# Patient Record
Sex: FEMALE | Race: BLACK OR AFRICAN AMERICAN | NOT HISPANIC OR LATINO | Employment: OTHER | URBAN - METROPOLITAN AREA
[De-identification: names, ages, dates, MRNs, and addresses within clinical notes are randomized per-mention and may not be internally consistent; named-entity substitution may affect disease eponyms.]

---

## 2018-12-24 ENCOUNTER — INITIAL CONSULT (OUTPATIENT)
Dept: HEMATOLOGY/ONCOLOGY | Facility: CLINIC | Age: 78
End: 2018-12-24
Payer: MEDICARE

## 2018-12-24 ENCOUNTER — LAB VISIT (OUTPATIENT)
Dept: LAB | Facility: HOSPITAL | Age: 78
End: 2018-12-24
Attending: INTERNAL MEDICINE
Payer: MEDICARE

## 2018-12-24 VITALS
HEART RATE: 77 BPM | HEIGHT: 63 IN | RESPIRATION RATE: 18 BRPM | TEMPERATURE: 98 F | DIASTOLIC BLOOD PRESSURE: 82 MMHG | OXYGEN SATURATION: 98 % | WEIGHT: 209.88 LBS | BODY MASS INDEX: 37.19 KG/M2 | SYSTOLIC BLOOD PRESSURE: 138 MMHG

## 2018-12-24 DIAGNOSIS — C50.919 TRIPLE NEGATIVE MALIGNANT NEOPLASM OF BREAST: ICD-10-CM

## 2018-12-24 DIAGNOSIS — Z17.1 MALIGNANT NEOPLASM OF UPPER-OUTER QUADRANT OF RIGHT BREAST IN FEMALE, ESTROGEN RECEPTOR NEGATIVE: ICD-10-CM

## 2018-12-24 DIAGNOSIS — C50.919 TRIPLE NEGATIVE MALIGNANT NEOPLASM OF BREAST: Primary | ICD-10-CM

## 2018-12-24 DIAGNOSIS — C50.411 MALIGNANT NEOPLASM OF UPPER-OUTER QUADRANT OF RIGHT BREAST IN FEMALE, ESTROGEN RECEPTOR NEGATIVE: ICD-10-CM

## 2018-12-24 LAB
ALBUMIN SERPL BCP-MCNC: 3.4 G/DL
ALP SERPL-CCNC: 112 U/L
ALT SERPL W/O P-5'-P-CCNC: 15 U/L
ANION GAP SERPL CALC-SCNC: 9 MMOL/L
AST SERPL-CCNC: 23 U/L
BASOPHILS # BLD AUTO: 0.02 K/UL
BASOPHILS NFR BLD: 0.3 %
BILIRUB SERPL-MCNC: 0.3 MG/DL
BUN SERPL-MCNC: 10 MG/DL
CALCIUM SERPL-MCNC: 9.6 MG/DL
CHLORIDE SERPL-SCNC: 106 MMOL/L
CO2 SERPL-SCNC: 26 MMOL/L
CREAT SERPL-MCNC: 0.8 MG/DL
DIFFERENTIAL METHOD: NORMAL
EOSINOPHIL # BLD AUTO: 0.1 K/UL
EOSINOPHIL NFR BLD: 2.2 %
ERYTHROCYTE [DISTWIDTH] IN BLOOD BY AUTOMATED COUNT: 14.2 %
EST. GFR  (AFRICAN AMERICAN): >60 ML/MIN/1.73 M^2
EST. GFR  (NON AFRICAN AMERICAN): >60 ML/MIN/1.73 M^2
GLUCOSE SERPL-MCNC: 80 MG/DL
HCT VFR BLD AUTO: 37.6 %
HGB BLD-MCNC: 12.1 G/DL
LDH SERPL L TO P-CCNC: 237 U/L
LYMPHOCYTES # BLD AUTO: 1.8 K/UL
LYMPHOCYTES NFR BLD: 28.1 %
MCH RBC QN AUTO: 29.4 PG
MCHC RBC AUTO-ENTMCNC: 32.2 G/DL
MCV RBC AUTO: 92 FL
MONOCYTES # BLD AUTO: 0.4 K/UL
MONOCYTES NFR BLD: 6.6 %
NEUTROPHILS # BLD AUTO: 4 K/UL
NEUTROPHILS NFR BLD: 62.8 %
PLATELET # BLD AUTO: 200 K/UL
PMV BLD AUTO: 9.5 FL
POTASSIUM SERPL-SCNC: 3.8 MMOL/L
PROT SERPL-MCNC: 8.1 G/DL
RBC # BLD AUTO: 4.11 M/UL
SODIUM SERPL-SCNC: 141 MMOL/L
WBC # BLD AUTO: 6.33 K/UL

## 2018-12-24 PROCEDURE — 99999 PR PBB SHADOW E&M-NEW PATIENT-LVL IV: CPT | Mod: PBBFAC,,, | Performed by: INTERNAL MEDICINE

## 2018-12-24 PROCEDURE — 80053 COMPREHEN METABOLIC PANEL: CPT

## 2018-12-24 PROCEDURE — 99204 OFFICE O/P NEW MOD 45 MIN: CPT | Mod: PBBFAC | Performed by: INTERNAL MEDICINE

## 2018-12-24 PROCEDURE — 85025 COMPLETE CBC W/AUTO DIFF WBC: CPT

## 2018-12-24 PROCEDURE — 83615 LACTATE (LD) (LDH) ENZYME: CPT

## 2018-12-24 PROCEDURE — 36415 COLL VENOUS BLD VENIPUNCTURE: CPT

## 2018-12-24 PROCEDURE — 99205 OFFICE O/P NEW HI 60 MIN: CPT | Mod: S$PBB,,, | Performed by: INTERNAL MEDICINE

## 2018-12-24 RX ORDER — LISINOPRIL 10 MG/1
TABLET ORAL
COMMUNITY
End: 2019-01-09

## 2018-12-24 RX ORDER — AMLODIPINE BESYLATE 10 MG/1
TABLET ORAL
COMMUNITY
End: 2019-10-31

## 2018-12-24 RX ORDER — ASPIRIN 81 MG/1
81 TABLET ORAL DAILY
COMMUNITY
End: 2019-03-01

## 2018-12-24 NOTE — PROGRESS NOTES
Subjective:       Patient ID: Soo Holly is a 78 y.o. female.    Chief Complaint: Consult; Results; and Breast Cancer (Triple negative breast cancer, inflammatory breast cancer)    HPI 78-year-old female referred by operative surgeon for evaluation of triple negative breast cancer as well as laboratory breast cancer ECOG status 1 patient complains of right shoulder pain    Past Medical History:   Diagnosis Date    Hypertension      Family History   Problem Relation Age of Onset    Breast cancer Sister      Social History     Socioeconomic History    Marital status:      Spouse name: Not on file    Number of children: Not on file    Years of education: Not on file    Highest education level: Not on file   Social Needs    Financial resource strain: Not on file    Food insecurity - worry: Not on file    Food insecurity - inability: Not on file    Transportation needs - medical: Not on file    Transportation needs - non-medical: Not on file   Occupational History    Not on file   Tobacco Use    Smoking status: Never Smoker   Substance and Sexual Activity    Alcohol use: No     Frequency: Never    Drug use: No    Sexual activity: Not Currently   Other Topics Concern    Not on file   Social History Narrative    Not on file     History reviewed. No pertinent surgical history.    Labs:  No results found for: WBC, HGB, HCT, MCV, PLT  BMP  No results found for: NA, K, CL, CO2, BUN, CREATININE, CALCIUM, ANIONGAP, ESTGFRAFRICA, EGFRNONAA  No results found for: ALT, AST, GGT, ALKPHOS, BILITOT    No results found for: IRON, TIBC, FERRITIN, SATURATEDIRO  No results found for: [id number]  No results found for: FOLATE  No results found for: TSH      Review of Systems   Constitutional: Positive for activity change and fatigue. Negative for appetite change, chills, diaphoresis, fever and unexpected weight change.   HENT: Negative for congestion, dental problem, drooling, ear discharge, ear pain, facial  swelling, hearing loss, mouth sores, nosebleeds, postnasal drip, rhinorrhea, sinus pressure, sneezing, sore throat, tinnitus, trouble swallowing and voice change.    Eyes: Negative for photophobia, pain, discharge, redness, itching and visual disturbance.   Respiratory: Negative for cough, choking, chest tightness, shortness of breath, wheezing and stridor.    Cardiovascular: Negative for chest pain, palpitations and leg swelling.   Gastrointestinal: Negative for abdominal distention, abdominal pain, anal bleeding, blood in stool, constipation, diarrhea, nausea, rectal pain and vomiting.   Endocrine: Negative for cold intolerance, heat intolerance, polydipsia, polyphagia and polyuria.   Genitourinary: Negative for decreased urine volume, difficulty urinating, dyspareunia, dysuria, enuresis, flank pain, frequency, genital sores, hematuria, menstrual problem, pelvic pain, urgency, vaginal bleeding, vaginal discharge and vaginal pain.   Musculoskeletal: Positive for arthralgias. Negative for back pain, gait problem, joint swelling, myalgias, neck pain and neck stiffness.   Skin: Negative for color change, pallor and rash.   Allergic/Immunologic: Negative for environmental allergies, food allergies and immunocompromised state.   Neurological: Negative for dizziness, tremors, seizures, syncope, facial asymmetry, speech difficulty, weakness, light-headedness, numbness and headaches.   Hematological: Negative for adenopathy. Does not bruise/bleed easily.   Psychiatric/Behavioral: Positive for dysphoric mood. Negative for agitation, behavioral problems, confusion, decreased concentration, hallucinations, self-injury, sleep disturbance and suicidal ideas. The patient is nervous/anxious. The patient is not hyperactive.        Objective:      Physical Exam   Constitutional: She is oriented to person, place, and time. She appears well-developed and well-nourished. She appears distressed.   HENT:   Head: Normocephalic and  atraumatic.   Right Ear: Tympanic membrane and external ear normal.   Left Ear: Tympanic membrane and external ear normal.   Nose: Nose normal. Right sinus exhibits no maxillary sinus tenderness and no frontal sinus tenderness. Left sinus exhibits no maxillary sinus tenderness and no frontal sinus tenderness.   Mouth/Throat: Oropharynx is clear and moist. No oropharyngeal exudate.   Eyes: Conjunctivae, EOM and lids are normal. Pupils are equal, round, and reactive to light. Right eye exhibits no discharge. Left eye exhibits no discharge. Right conjunctiva is not injected. Right conjunctiva has no hemorrhage. Left conjunctiva is not injected. Left conjunctiva has no hemorrhage. No scleral icterus.   Neck: Normal range of motion. Neck supple. No JVD present. No tracheal deviation present. No thyromegaly present.   Cardiovascular: Normal rate, regular rhythm, normal heart sounds and intact distal pulses.   Pulmonary/Chest: Effort normal and breath sounds normal. No stridor. No respiratory distress. She exhibits no tenderness.       Abdominal: Soft. Bowel sounds are normal. She exhibits no distension and no mass. There is no splenomegaly or hepatomegaly. There is no tenderness. There is no rebound.   Musculoskeletal: Normal range of motion. She exhibits tenderness. She exhibits no edema.   Lymphadenopathy:     She has no cervical adenopathy.     She has no axillary adenopathy.        Right: No supraclavicular adenopathy present.        Left: No supraclavicular adenopathy present.   Neurological: She is alert and oriented to person, place, and time. No cranial nerve deficit. Coordination normal.   Skin: Skin is dry. No rash noted. She is not diaphoretic. No erythema.   Psychiatric: She has a normal mood and affect. Her behavior is normal. Judgment and thought content normal.   Vitals reviewed.          Assessment:      1. Triple negative malignant neoplasm of breast    2. Malignant neoplasm of upper-outer quadrant of  right breast in female, estrogen receptor negative            Plan:     Extensive conversation with family present at this point will proceed with evaluation with baseline laboratory studies PET scans to rule out systemic disease consult placed for surgery as well  for evaluation MediPort placement articles from up-to-date on triple negative breast cancer as well as inflammatory breast cancer followed family discussed briefly treatment options with family history granddaughter present would recommend patient undergo genetic testing is receive recommend a follow-up at my risk testing be done discussed implications with her        Yaniv Pop Jr, MD FACP

## 2018-12-27 ENCOUNTER — TELEPHONE (OUTPATIENT)
Dept: RADIOLOGY | Facility: HOSPITAL | Age: 78
End: 2018-12-27

## 2018-12-28 ENCOUNTER — TELEPHONE (OUTPATIENT)
Dept: RADIOLOGY | Facility: HOSPITAL | Age: 78
End: 2018-12-28

## 2018-12-31 ENCOUNTER — OFFICE VISIT (OUTPATIENT)
Dept: HEMATOLOGY/ONCOLOGY | Facility: CLINIC | Age: 78
End: 2018-12-31
Payer: MEDICARE

## 2018-12-31 ENCOUNTER — HOSPITAL ENCOUNTER (OUTPATIENT)
Dept: RADIOLOGY | Facility: HOSPITAL | Age: 78
Discharge: HOME | End: 2018-12-31
Attending: INTERNAL MEDICINE
Payer: MEDICARE

## 2018-12-31 VITALS
WEIGHT: 207.25 LBS | TEMPERATURE: 98 F | DIASTOLIC BLOOD PRESSURE: 73 MMHG | OXYGEN SATURATION: 96 % | SYSTOLIC BLOOD PRESSURE: 141 MMHG | HEART RATE: 71 BPM | BODY MASS INDEX: 38.14 KG/M2 | HEIGHT: 62 IN

## 2018-12-31 DIAGNOSIS — D49.89 NEOPLASM OF HEAD: ICD-10-CM

## 2018-12-31 DIAGNOSIS — Z79.899 ENCOUNTER FOR MONITORING CARDIOTOXIC DRUG THERAPY: ICD-10-CM

## 2018-12-31 DIAGNOSIS — C50.919 TRIPLE NEGATIVE MALIGNANT NEOPLASM OF BREAST: Primary | ICD-10-CM

## 2018-12-31 DIAGNOSIS — Z51.81 ENCOUNTER FOR MONITORING CARDIOTOXIC DRUG THERAPY: ICD-10-CM

## 2018-12-31 DIAGNOSIS — Z17.1 MALIGNANT NEOPLASM OF UPPER-OUTER QUADRANT OF RIGHT BREAST IN FEMALE, ESTROGEN RECEPTOR NEGATIVE: ICD-10-CM

## 2018-12-31 DIAGNOSIS — C50.919 TRIPLE NEGATIVE MALIGNANT NEOPLASM OF BREAST: ICD-10-CM

## 2018-12-31 DIAGNOSIS — C50.411 MALIGNANT NEOPLASM OF UPPER-OUTER QUADRANT OF RIGHT BREAST IN FEMALE, ESTROGEN RECEPTOR NEGATIVE: ICD-10-CM

## 2018-12-31 PROCEDURE — 78815 PET IMAGE W/CT SKULL-THIGH: CPT | Mod: 26,PI,, | Performed by: RADIOLOGY

## 2018-12-31 PROCEDURE — 99999 PR PBB SHADOW E&M-EST. PATIENT-LVL V: CPT | Mod: PBBFAC,,, | Performed by: INTERNAL MEDICINE

## 2018-12-31 PROCEDURE — 78815 NM PET CT ROUTINE: ICD-10-PCS | Mod: 26,PI,, | Performed by: RADIOLOGY

## 2018-12-31 PROCEDURE — 99215 OFFICE O/P EST HI 40 MIN: CPT | Mod: PBBFAC,25 | Performed by: INTERNAL MEDICINE

## 2018-12-31 PROCEDURE — 78815 PET IMAGE W/CT SKULL-THIGH: CPT | Mod: TC,PI

## 2018-12-31 PROCEDURE — 99215 OFFICE O/P EST HI 40 MIN: CPT | Mod: S$PBB,,, | Performed by: INTERNAL MEDICINE

## 2018-12-31 PROCEDURE — A9552 F18 FDG: HCPCS

## 2018-12-31 RX ORDER — SODIUM CHLORIDE 0.9 % (FLUSH) 0.9 %
10 SYRINGE (ML) INJECTION
Status: CANCELLED | OUTPATIENT
Start: 2019-01-18

## 2018-12-31 RX ORDER — DOXORUBICIN HYDROCHLORIDE 2 MG/ML
60 INJECTION, SOLUTION INTRAVENOUS
Status: CANCELLED | OUTPATIENT
Start: 2019-01-18

## 2018-12-31 RX ORDER — HEPARIN 100 UNIT/ML
500 SYRINGE INTRAVENOUS
Status: CANCELLED | OUTPATIENT
Start: 2019-01-18

## 2018-12-31 NOTE — PROGRESS NOTES
Distress Screening Results: Psychosocial Distress screening score of Distress Score: 0 {AMB ONC DISTRESS SCORE:00770}

## 2018-12-31 NOTE — PROGRESS NOTES
Subjective:       Patient ID: Soo Holly is a 78 y.o. female.    Chief Complaint: Results and Breast Cancer (Triple negative breast cancer, inflammatory breast cancer)    HPI 78-year-old female returns for review of PET scan recent diagnosis triple a breast cancer laboratory breast cancer breast ECOG status 1    Past Medical History:   Diagnosis Date    Hypertension      Family History   Problem Relation Age of Onset    Breast cancer Sister      Social History     Socioeconomic History    Marital status:      Spouse name: Not on file    Number of children: Not on file    Years of education: Not on file    Highest education level: Not on file   Social Needs    Financial resource strain: Not on file    Food insecurity - worry: Not on file    Food insecurity - inability: Not on file    Transportation needs - medical: Not on file    Transportation needs - non-medical: Not on file   Occupational History    Not on file   Tobacco Use    Smoking status: Never Smoker   Substance and Sexual Activity    Alcohol use: No     Frequency: Never    Drug use: No    Sexual activity: Not Currently   Other Topics Concern    Not on file   Social History Narrative    Not on file     History reviewed. No pertinent surgical history.    Labs:  Lab Results   Component Value Date    WBC 6.33 12/24/2018    HGB 12.1 12/24/2018    HCT 37.6 12/24/2018    MCV 92 12/24/2018     12/24/2018     BMP  Lab Results   Component Value Date     12/24/2018    K 3.8 12/24/2018     12/24/2018    CO2 26 12/24/2018    BUN 10 12/24/2018    CREATININE 0.8 12/24/2018    CALCIUM 9.6 12/24/2018    ANIONGAP 9 12/24/2018    ESTGFRAFRICA >60 12/24/2018    EGFRNONAA >60 12/24/2018     Lab Results   Component Value Date    ALT 15 12/24/2018    AST 23 12/24/2018    ALKPHOS 112 12/24/2018    BILITOT 0.3 12/24/2018       No results found for: IRON, TIBC, FERRITIN, SATURATEDIRO  No results found for: [id number]  No results found  for: FOLATE  No results found for: TSH      Review of Systems   Constitutional: Positive for fatigue. Negative for activity change, appetite change, chills, diaphoresis, fever and unexpected weight change.   HENT: Negative for congestion, dental problem, drooling, ear discharge, ear pain, facial swelling, hearing loss, mouth sores, nosebleeds, postnasal drip, rhinorrhea, sinus pressure, sneezing, sore throat, tinnitus, trouble swallowing and voice change.    Eyes: Negative for photophobia, pain, discharge, redness, itching and visual disturbance.   Respiratory: Negative for cough, choking, chest tightness, shortness of breath, wheezing and stridor.    Cardiovascular: Negative for chest pain, palpitations and leg swelling.   Gastrointestinal: Negative for abdominal distention, abdominal pain, anal bleeding, blood in stool, constipation, diarrhea, nausea, rectal pain and vomiting.   Endocrine: Negative for cold intolerance, heat intolerance, polydipsia, polyphagia and polyuria.   Genitourinary: Negative for decreased urine volume, difficulty urinating, dyspareunia, dysuria, enuresis, flank pain, frequency, genital sores, hematuria, menstrual problem, pelvic pain, urgency, vaginal bleeding, vaginal discharge and vaginal pain.   Musculoskeletal: Negative for arthralgias, back pain, gait problem, joint swelling, myalgias, neck pain and neck stiffness.   Skin: Negative for color change, pallor and rash.   Allergic/Immunologic: Negative for environmental allergies, food allergies and immunocompromised state.   Neurological: Positive for weakness. Negative for dizziness, tremors, seizures, syncope, facial asymmetry, speech difficulty, light-headedness, numbness and headaches.   Hematological: Negative for adenopathy. Does not bruise/bleed easily.   Psychiatric/Behavioral: Positive for dysphoric mood. Negative for agitation, behavioral problems, confusion, decreased concentration, hallucinations, self-injury, sleep  disturbance and suicidal ideas. The patient is nervous/anxious. The patient is not hyperactive.        Objective:      Physical Exam   Constitutional: She is oriented to person, place, and time. She appears well-developed and well-nourished. She appears distressed.   HENT:   Head: Normocephalic and atraumatic.   Right Ear: External ear normal.   Left Ear: External ear normal.   Nose: Nose normal. Right sinus exhibits no maxillary sinus tenderness and no frontal sinus tenderness. Left sinus exhibits no maxillary sinus tenderness and no frontal sinus tenderness.   Mouth/Throat: Oropharynx is clear and moist. No oropharyngeal exudate.   Eyes: Conjunctivae, EOM and lids are normal. Pupils are equal, round, and reactive to light. Right eye exhibits no discharge. Left eye exhibits no discharge. Right conjunctiva is not injected. Right conjunctiva has no hemorrhage. Left conjunctiva is not injected. Left conjunctiva has no hemorrhage. No scleral icterus.   Neck: Normal range of motion. Neck supple. No JVD present. No tracheal deviation present. No thyromegaly present.   Cardiovascular: Normal rate and regular rhythm.   Pulmonary/Chest: Effort normal. No stridor. No respiratory distress. She exhibits no tenderness.   Abdominal: Soft. She exhibits no distension and no mass. There is no splenomegaly or hepatomegaly. There is no tenderness. There is no rebound.   Musculoskeletal: Normal range of motion. She exhibits no edema or tenderness.   Lymphadenopathy:     She has no cervical adenopathy.     She has no axillary adenopathy.        Right: No supraclavicular adenopathy present.        Left: No supraclavicular adenopathy present.   Neurological: She is alert and oriented to person, place, and time. No cranial nerve deficit. Coordination normal.   Skin: Skin is dry. No rash noted. She is not diaphoretic. No erythema.   Psychiatric: Her behavior is normal. Judgment and thought content normal. Her mood appears anxious. She  exhibits a depressed mood.   Vitals reviewed.          Assessment:      1. Triple negative malignant neoplasm of breast    2. Encounter for monitoring cardiotoxic drug therapy    3. Neoplasm of head           Plan:     Reviewed PET scan with patient demonstrating locally advanced disease and breast.  As well as intramammary node no evidence of metastatic disease.  Very and deltoid no clinical findings pain discomfort a presents stressed during imaging study.  At this point will begin process to treat patient with locally advanced breast carcinoma dose dense AC followed by Taxol consult made to  cardiology for evaluation cardiac toxicity Radiation Oncology for evaluation pre treatment visit.  In addition are nurse navigator has been notified MediPort as ordered.  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  Discussed implications with family answer questions photographs taken 40 min face-to-face time        Yaniv Pop Jr, MD FACP

## 2019-01-02 ENCOUNTER — TELEPHONE (OUTPATIENT)
Dept: HEMATOLOGY/ONCOLOGY | Facility: CLINIC | Age: 79
End: 2019-01-02

## 2019-01-02 NOTE — TELEPHONE ENCOUNTER
----- Message from Jay Hernandez sent at 1/2/2019 10:44 AM CST -----  I scheduled the appointment with Dr. Wiley on 1/9/19 for 1:00p at our [place] office.  Please notify patient.    Thanks  ----- Message -----  From: Jany Youssef RN  Sent: 1/2/2019   9:36 AM  To: [facility] Clinical Staff, #    Is there a way to get this pt seen for cardiology consult on either 1/7 or 1/9?  She is having an echo on 1/8 and is needing cardiology clearance prior to starting chemotherapy.  They live in [place] and one of those days would be easier for her since she will already be here on 1/8.  Thanks so much!

## 2019-01-07 ENCOUNTER — TELEPHONE (OUTPATIENT)
Dept: RADIOLOGY | Facility: HOSPITAL | Age: 79
End: 2019-01-07

## 2019-01-08 ENCOUNTER — OFFICE VISIT (OUTPATIENT)
Dept: HEMATOLOGY/ONCOLOGY | Facility: CLINIC | Age: 79
End: 2019-01-08
Payer: MEDICARE

## 2019-01-08 ENCOUNTER — OFFICE VISIT (OUTPATIENT)
Dept: SURGERY | Facility: CLINIC | Age: 79
End: 2019-01-08
Payer: MEDICARE

## 2019-01-08 ENCOUNTER — CLINICAL SUPPORT (OUTPATIENT)
Dept: CARDIOLOGY | Facility: CLINIC | Age: 79
End: 2019-01-08
Attending: INTERNAL MEDICINE
Payer: MEDICARE

## 2019-01-08 ENCOUNTER — HOSPITAL ENCOUNTER (OUTPATIENT)
Dept: RADIOLOGY | Facility: HOSPITAL | Age: 79
Discharge: HOME | End: 2019-01-08
Attending: INTERNAL MEDICINE
Payer: MEDICARE

## 2019-01-08 VITALS
TEMPERATURE: 98 F | SYSTOLIC BLOOD PRESSURE: 130 MMHG | DIASTOLIC BLOOD PRESSURE: 70 MMHG | HEART RATE: 69 BPM | BODY MASS INDEX: 38.91 KG/M2 | WEIGHT: 212.75 LBS

## 2019-01-08 VITALS
RESPIRATION RATE: 17 BRPM | WEIGHT: 211.44 LBS | TEMPERATURE: 98 F | HEIGHT: 62 IN | HEART RATE: 70 BPM | SYSTOLIC BLOOD PRESSURE: 138 MMHG | DIASTOLIC BLOOD PRESSURE: 76 MMHG | OXYGEN SATURATION: 98 % | BODY MASS INDEX: 38.91 KG/M2

## 2019-01-08 DIAGNOSIS — C50.411 MALIGNANT NEOPLASM OF UPPER-OUTER QUADRANT OF RIGHT BREAST IN FEMALE, ESTROGEN RECEPTOR NEGATIVE: Primary | ICD-10-CM

## 2019-01-08 DIAGNOSIS — Z17.1 MALIGNANT NEOPLASM OF UPPER-OUTER QUADRANT OF RIGHT BREAST IN FEMALE, ESTROGEN RECEPTOR NEGATIVE: Primary | ICD-10-CM

## 2019-01-08 DIAGNOSIS — Z51.81 ENCOUNTER FOR MONITORING CARDIOTOXIC DRUG THERAPY: ICD-10-CM

## 2019-01-08 DIAGNOSIS — Z79.899 ENCOUNTER FOR MONITORING CARDIOTOXIC DRUG THERAPY: ICD-10-CM

## 2019-01-08 DIAGNOSIS — C77.3 BREAST CANCER METASTASIZED TO AXILLARY LYMPH NODE, RIGHT: ICD-10-CM

## 2019-01-08 DIAGNOSIS — D49.89 NEOPLASM OF HEAD: ICD-10-CM

## 2019-01-08 DIAGNOSIS — C50.911 BREAST CANCER METASTASIZED TO AXILLARY LYMPH NODE, RIGHT: ICD-10-CM

## 2019-01-08 DIAGNOSIS — C50.919 TRIPLE NEGATIVE MALIGNANT NEOPLASM OF BREAST: ICD-10-CM

## 2019-01-08 DIAGNOSIS — I51.7 CARDIOMEGALY: ICD-10-CM

## 2019-01-08 LAB
DIASTOLIC DYSFUNCTION: NO
ESTIMATED PA SYSTOLIC PRESSURE: 17.9
MITRAL VALVE MOBILITY: NORMAL
RETIRED EF AND QEF - SEE NOTES: 70 (ref 55–65)

## 2019-01-08 PROCEDURE — 99215 OFFICE O/P EST HI 40 MIN: CPT | Mod: PBBFAC,25 | Performed by: SURGERY

## 2019-01-08 PROCEDURE — 93307 2D ECHO ONLY: ICD-10-PCS | Mod: 26,S$PBB,, | Performed by: INTERNAL MEDICINE

## 2019-01-08 PROCEDURE — 70553 MRI BRAIN STEM W/O & W/DYE: CPT | Mod: 26,,, | Performed by: RADIOLOGY

## 2019-01-08 PROCEDURE — 70553 MRI BRAIN W WO CONTRAST: ICD-10-PCS | Mod: 26,,, | Performed by: RADIOLOGY

## 2019-01-08 PROCEDURE — 11104 PR PUNCH BIOPSY, SKIN, SINGLE LESION: ICD-10-PCS | Mod: S$PBB,,, | Performed by: SURGERY

## 2019-01-08 PROCEDURE — 99999 PR PBB SHADOW E&M-EST. PATIENT-LVL III: CPT | Mod: PBBFAC,,, | Performed by: NURSE PRACTITIONER

## 2019-01-08 PROCEDURE — 88342 IMHCHEM/IMCYTCHM 1ST ANTB: CPT | Mod: 26,59,, | Performed by: PATHOLOGY

## 2019-01-08 PROCEDURE — 99999 PR PBB SHADOW E&M-EST. PATIENT-LVL V: ICD-10-PCS | Mod: PBBFAC,,, | Performed by: SURGERY

## 2019-01-08 PROCEDURE — 99204 OFFICE O/P NEW MOD 45 MIN: CPT | Mod: S$PBB,,, | Performed by: SURGERY

## 2019-01-08 PROCEDURE — 99999 PR PBB SHADOW E&M-EST. PATIENT-LVL V: CPT | Mod: PBBFAC,,, | Performed by: SURGERY

## 2019-01-08 PROCEDURE — 99213 OFFICE O/P EST LOW 20 MIN: CPT | Mod: PBBFAC,25,27 | Performed by: NURSE PRACTITIONER

## 2019-01-08 PROCEDURE — A9585 GADOBUTROL INJECTION: HCPCS | Mod: PO | Performed by: INTERNAL MEDICINE

## 2019-01-08 PROCEDURE — 11104 PUNCH BX SKIN SINGLE LESION: CPT | Mod: S$PBB,,, | Performed by: SURGERY

## 2019-01-08 PROCEDURE — 93307 TTE W/O DOPPLER COMPLETE: CPT | Mod: PBBFAC | Performed by: INTERNAL MEDICINE

## 2019-01-08 PROCEDURE — 88305 TISSUE EXAM BY PATHOLOGIST: CPT | Mod: 26,,, | Performed by: PATHOLOGY

## 2019-01-08 PROCEDURE — 11104 PUNCH BX SKIN SINGLE LESION: CPT | Mod: PBBFAC | Performed by: SURGERY

## 2019-01-08 PROCEDURE — 99499 NO LOS: ICD-10-PCS | Mod: S$PBB,,, | Performed by: NURSE PRACTITIONER

## 2019-01-08 PROCEDURE — 88305 TISSUE EXAM BY PATHOLOGIST: CPT | Performed by: PATHOLOGY

## 2019-01-08 PROCEDURE — 70553 MRI BRAIN STEM W/O & W/DYE: CPT | Mod: TC,PO

## 2019-01-08 PROCEDURE — 25500020 PHARM REV CODE 255: Mod: PO | Performed by: INTERNAL MEDICINE

## 2019-01-08 PROCEDURE — 99204 PR OFFICE/OUTPT VISIT, NEW, LEVL IV, 45-59 MIN: ICD-10-PCS | Mod: S$PBB,,, | Performed by: SURGERY

## 2019-01-08 PROCEDURE — 88305 TISSUE SPECIMEN TO PATHOLOGY, SURGERY: ICD-10-PCS | Mod: 26,,, | Performed by: PATHOLOGY

## 2019-01-08 PROCEDURE — 88360 TISSUE SPECIMEN TO PATHOLOGY, SURGERY: ICD-10-PCS | Mod: 26,,, | Performed by: PATHOLOGY

## 2019-01-08 PROCEDURE — 99499 UNLISTED E&M SERVICE: CPT | Mod: S$PBB,,, | Performed by: NURSE PRACTITIONER

## 2019-01-08 PROCEDURE — 88342 TISSUE SPECIMEN TO PATHOLOGY, SURGERY: ICD-10-PCS | Mod: 26,59,, | Performed by: PATHOLOGY

## 2019-01-08 PROCEDURE — 88360 TUMOR IMMUNOHISTOCHEM/MANUAL: CPT | Mod: 26,,, | Performed by: PATHOLOGY

## 2019-01-08 PROCEDURE — 99999 PR PBB SHADOW E&M-EST. PATIENT-LVL III: ICD-10-PCS | Mod: PBBFAC,,, | Performed by: NURSE PRACTITIONER

## 2019-01-08 RX ORDER — LIDOCAINE HYDROCHLORIDE 10 MG/ML
1 INJECTION, SOLUTION EPIDURAL; INFILTRATION; INTRACAUDAL; PERINEURAL ONCE
Status: DISCONTINUED | OUTPATIENT
Start: 2019-01-08 | End: 2019-01-16 | Stop reason: HOSPADM

## 2019-01-08 RX ORDER — GADOBUTROL 604.72 MG/ML
9 INJECTION INTRAVENOUS
Status: COMPLETED | OUTPATIENT
Start: 2019-01-08 | End: 2019-01-08

## 2019-01-08 RX ORDER — SODIUM CHLORIDE 9 MG/ML
INJECTION, SOLUTION INTRAVENOUS CONTINUOUS
Status: CANCELLED | OUTPATIENT
Start: 2019-01-08

## 2019-01-08 RX ADMIN — GADOBUTROL 9 ML: 604.72 INJECTION INTRAVENOUS at 09:01

## 2019-01-08 NOTE — LETTER
January 8, 2019      Yaniv Pop MD  [address]  [facility] [address]           [facility]  [address] [place], [place] [address]on [facility] [address]  Phone: [phone]  Fax: [phone]          Patient: Soo Holly   MR Number: [id number]   YOB: 1940   Date of Visit: 1/8/2019       Dear Dr. Yaniv Pop:    Thank you for referring Soo Holly to me for evaluation. Attached you will find relevant portions of my assessment and plan of care.    If you have questions, please do not hesitate to call me. I look forward to following Soo Holly along with you.    Sincerely,    Quin Santa MD    Enclosure  CC:  No Recipients    If you would like to receive this communication electronically, please contact [email or web address] or [phone] to request more information on [facility] [employer] access.    For providers and/or their staff who would like to refer a patient to [facility], please contact us through our one-stop-shop provider referral line, [facility] , at [phone].    If you feel you have received this communication in error or would no longer like to receive these types of communications, please e-mail [email or web address]

## 2019-01-08 NOTE — PROGRESS NOTES
History & Physical  General Surgery      SUBJECTIVE:     Chief Complaint/Reason for Admission: Consult (Triple Neg Malignant Neoplasm)      History of Present Illness:  Patient is a 78 y.o. female presents with Consult (Triple Neg Malignant Neoplasm)    78-year-old female with biopsy [id number]-HER2- breast cancer to right breast with axillary node involvement.  She has noted a palpable mass with overlying skin changes for approximately a month now.  She denies nipple discharge.  She has been experiencing right shoulder pain, denies weight loss, fever, or fatigue.  She reports menarche at age 13.  No history of OCP use.  No new hormonal use for menopause symptoms.  She has a significant family history of 2 sisters with breast cancer, 1 living and 1  at age 69.  No personal history of prior breast biopsy or breast cancer.  PET scan obtained by Oncology shows locally advanced tumor without evidence metastatic disease.  She was referred for MediPort placement.      Current Outpatient Medications:     amLODIPine (NORVASC) 10 MG tablet, amlodipine 10 mg tablet, Disp: , Rfl:     aspirin (ECOTRIN) 81 MG EC tablet, Take 81 mg by mouth once daily., Disp: , Rfl:     lisinopril 10 MG tablet, lisinopril 10 mg tabs, Disp: , Rfl:     Current Facility-Administered Medications:     lidocaine (PF) 10 mg/ml (1%) injection 10 mg, 1 mL, Intradermal, Once, Quin Santa MD    Review of patient's allergies indicates:   Allergen Reactions    Iodine     Sulfa (sulfonamide antibiotics)        Past Medical History:   Diagnosis Date    Hypertension      No past surgical history on file.  Family History   Problem Relation Age of Onset    Breast cancer Sister      Social History     Tobacco Use    Smoking status: Never Smoker   Substance Use Topics    Alcohol use: No     Frequency: Never    Drug use: No        Review of Systems:  Review of Systems   Constitutional: Negative for fever.   HENT: Negative for congestion.     Eyes: Negative for visual disturbance.   Respiratory: Negative for shortness of breath.    Cardiovascular: Positive for chest pain (Right breast pain).   Gastrointestinal: Negative for abdominal pain.   Genitourinary: Negative for difficulty urinating.   Musculoskeletal:        Right shoulder pain   Skin: Positive for color change ( right breast) and rash ( right breast).   Neurological: Negative for weakness.       OBJECTIVE:     Vital Signs (Most Recent)  /70   Pulse 69   Temp 98.1 °F (36.7 °C) (Oral)   Wt 96.5 kg (212 lb 11.9 oz)   BMI 38.91 kg/m²     Physical Exam:   Physical Exam   Constitutional: She appears well-developed and well-nourished. No distress.   HENT:   Head: Normocephalic and atraumatic.   Eyes: EOM are normal.   Neck: Neck supple.   Cardiovascular: Normal rate and regular rhythm.   Pulmonary/Chest: Effort normal. Right breast exhibits mass (Large mass the upper outer quadrant of the breast), skin change (peau de orange skin changes) and tenderness. Right breast exhibits no inverted nipple and no nipple discharge. Left breast exhibits no inverted nipple, no mass, no nipple discharge, no skin change and no tenderness. Breasts are asymmetrical. There is breast swelling.   Musculoskeletal: Normal range of motion.   Lymphadenopathy:     She has axillary adenopathy ( right).   Skin: Skin is warm.   Psychiatric: She has a normal mood and affect.     PET scan reviewed.  Locally advanced tumor without evidence of distant metastatic disease.    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, right    Other orders  -     Vital signs ; Standing  -     Insert peripheral IV; Standing  -     lidocaine (PF) 10 mg/ml (1%) injection 10 mg  -     Verify beta-blocker dose taken within 24 hours if patient is prescribed beta-blocker; Standing  -     Verify discontinuation of antithrombotics; Standing  -     Verify consent; Standing  -     Chlorhexidine (CHG) 2% Wipes; Standing  -     Diet NPO Except for: Medication, Ice Chips, Sips with Medication; Standing  -     0.9%  NaCl infusion  -     IP VTE HIGH RISK PATIENT; Standing  -     ceFAZolin (ANCEF) 2 g in dextrose 5 % 50 mL IVPB  -     Full code; Standing  -     Place in Outpatient; Standing  -     Place sequential compression device; Standing      Will schedule for MediPort placement on 01/16/2019.  Hold aspirin starting tomorrow.  Needs ultrasound-guided clip placement of her involved nodes prior to neoadjuvant therapy.  Skin biopsy performed today.  If this confirms skin involvement, patient will be limited to mastectomy with axillary node dissection after neoadjuvant therapy.    Risks, benefits, and alternatives including bleeding, scarring, infection, pneumothorax, need for further surgery, damage to surrounding structures were discussed with the patient who agrees to proceed with MediPort placement.     Quin Santa

## 2019-01-08 NOTE — H&P (VIEW-ONLY)
History & Physical  General Surgery      SUBJECTIVE:     Chief Complaint/Reason for Admission: Consult (Triple Neg Malignant Neoplasm)      History of Present Illness:  Patient is a 78 y.o. female presents with Consult (Triple Neg Malignant Neoplasm)    78-year-old female with biopsy ER-MO-HER2- breast cancer to right breast with axillary node involvement.  She has noted a palpable mass with overlying skin changes for approximately a month now.  She denies nipple discharge.  She has been experiencing right shoulder pain, denies weight loss, fever, or fatigue.  She reports menarche at age 13.  No history of OCP use.  No new hormonal use for menopause symptoms.  She has a significant family history of 2 sisters with breast cancer, 1 living and 1  at age 69.  No personal history of prior breast biopsy or breast cancer.  PET scan obtained by Oncology shows locally advanced tumor without evidence metastatic disease.  She was referred for MediPort placement.      Current Outpatient Medications:     amLODIPine (NORVASC) 10 MG tablet, amlodipine 10 mg tablet, Disp: , Rfl:     aspirin (ECOTRIN) 81 MG EC tablet, Take 81 mg by mouth once daily., Disp: , Rfl:     lisinopril 10 MG tablet, lisinopril 10 mg tabs, Disp: , Rfl:     Current Facility-Administered Medications:     lidocaine (PF) 10 mg/ml (1%) injection 10 mg, 1 mL, Intradermal, Once, Quin Santa MD    Review of patient's allergies indicates:   Allergen Reactions    Iodine     Sulfa (sulfonamide antibiotics)        Past Medical History:   Diagnosis Date    Hypertension      No past surgical history on file.  Family History   Problem Relation Age of Onset    Breast cancer Sister      Social History     Tobacco Use    Smoking status: Never Smoker   Substance Use Topics    Alcohol use: No     Frequency: Never    Drug use: No        Review of Systems:  Review of Systems   Constitutional: Negative for fever.   HENT: Negative for congestion.     Eyes: Negative for visual disturbance.   Respiratory: Negative for shortness of breath.    Cardiovascular: Positive for chest pain (Right breast pain).   Gastrointestinal: Negative for abdominal pain.   Genitourinary: Negative for difficulty urinating.   Musculoskeletal:        Right shoulder pain   Skin: Positive for color change ( right breast) and rash ( right breast).   Neurological: Negative for weakness.       OBJECTIVE:     Vital Signs (Most Recent)  /70   Pulse 69   Temp 98.1 °F (36.7 °C) (Oral)   Wt 96.5 kg (212 lb 11.9 oz)   BMI 38.91 kg/m²     Physical Exam:   Physical Exam   Constitutional: She appears well-developed and well-nourished. No distress.   HENT:   Head: Normocephalic and atraumatic.   Eyes: EOM are normal.   Neck: Neck supple.   Cardiovascular: Normal rate and regular rhythm.   Pulmonary/Chest: Effort normal. Right breast exhibits mass (Large mass the upper outer quadrant of the breast), skin change (peau de orange skin changes) and tenderness. Right breast exhibits no inverted nipple and no nipple discharge. Left breast exhibits no inverted nipple, no mass, no nipple discharge, no skin change and no tenderness. Breasts are asymmetrical. There is breast swelling.   Musculoskeletal: Normal range of motion.   Lymphadenopathy:     She has axillary adenopathy ( right).   Skin: Skin is warm.   Psychiatric: She has a normal mood and affect.     PET scan reviewed.  Locally advanced tumor without evidence of distant metastatic disease.    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, right    Other orders  -     Vital signs ; Standing  -     Insert peripheral IV; Standing  -     lidocaine (PF) 10 mg/ml (1%) injection 10 mg  -     Verify beta-blocker dose taken within 24 hours if patient is prescribed beta-blocker; Standing  -     Verify discontinuation of antithrombotics; Standing  -     Verify consent; Standing  -     Chlorhexidine (CHG) 2% Wipes; Standing  -     Diet NPO Except for: Medication, Ice Chips, Sips with Medication; Standing  -     0.9%  NaCl infusion  -     IP VTE HIGH RISK PATIENT; Standing  -     ceFAZolin (ANCEF) 2 g in dextrose 5 % 50 mL IVPB  -     Full code; Standing  -     Place in Outpatient; Standing  -     Place sequential compression device; Standing      Will schedule for MediPort placement on 01/16/2019.  Hold aspirin starting tomorrow.  Needs ultrasound-guided clip placement of her involved nodes prior to neoadjuvant therapy.  Skin biopsy performed today.  If this confirms skin involvement, patient will be limited to mastectomy with axillary node dissection after neoadjuvant therapy.    Risks, benefits, and alternatives including bleeding, scarring, infection, pneumothorax, need for further surgery, damage to surrounding structures were discussed with the patient who agrees to proceed with MediPort placement.     Quin Santa

## 2019-01-08 NOTE — PROGRESS NOTES
Punch Biopsy Procedure Note    Pre-operative Diagnosis:  Right breast skin changes, ER-SC-HER2- Breast cancer    Post-operative Diagnosis: same    Locations:inferior periareolar right breast    Indications:  78-year-old female with newly diagnosed ER-SC-HER2- Breast cancer.  She has notable skin changes suggestive of dermal involvement.  She will be undergoing neoadjuvant chemotherapy given her triple negative hormonal status.  She is in need of a skin punch biopsy to determine dermal involvement.  If this is positive she will be limited to mastectomy with axillary node dissection.    Anesthesia: Lidocaine 1% with epinephrine without added sodium bicarbonate    Procedure Details   History of allergy to iodine: yes   Patient informed of the risks (including bleeding and infection) and benefits of the   procedure and Written informed consent obtained.    The lesion and surrounding area was given a sterile prep using chlorhexidine and draped in the usual sterile fashion. The skin was then stretched perpendicular to the skin tension lines and the lesion removed using the 6 mm punch. The resulting ellipse was then closed. The wound was closed with 4-0 Vicryl using simple interrupted stitches. Antibiotic ointment and a sterile dressing applied. The specimen was sent for pathologic examination. The patient tolerated the procedure well.    EBL: 2 ml    Findings:  Sufficient punch biopsy was skin and subcutaneous tissue    Condition:  Stable    Complications:  none.    Plan:  1. Instructed to keep the wound dry and covered for 24-48h and clean thereafter.  2. Warning signs of infection were reviewed.    3. Recommended that the patient use OTC analgesics as needed for pain.

## 2019-01-09 ENCOUNTER — CLINICAL SUPPORT (OUTPATIENT)
Dept: CARDIOLOGY | Facility: CLINIC | Age: 79
End: 2019-01-09
Payer: MEDICARE

## 2019-01-09 ENCOUNTER — OFFICE VISIT (OUTPATIENT)
Dept: HEMATOLOGY/ONCOLOGY | Facility: CLINIC | Age: 79
End: 2019-01-09
Payer: MEDICARE

## 2019-01-09 ENCOUNTER — OFFICE VISIT (OUTPATIENT)
Dept: CARDIOLOGY | Facility: CLINIC | Age: 79
End: 2019-01-09
Payer: MEDICARE

## 2019-01-09 ENCOUNTER — HOSPITAL ENCOUNTER (OUTPATIENT)
Dept: RADIOLOGY | Facility: HOSPITAL | Age: 79
Discharge: HOME | End: 2019-01-09
Attending: SURGERY
Payer: MEDICARE

## 2019-01-09 VITALS
HEART RATE: 74 BPM | SYSTOLIC BLOOD PRESSURE: 132 MMHG | BODY MASS INDEX: 39.01 KG/M2 | WEIGHT: 212 LBS | DIASTOLIC BLOOD PRESSURE: 70 MMHG | HEIGHT: 62 IN

## 2019-01-09 DIAGNOSIS — Z17.1 MALIGNANT NEOPLASM OF UPPER-OUTER QUADRANT OF RIGHT BREAST IN FEMALE, ESTROGEN RECEPTOR NEGATIVE: ICD-10-CM

## 2019-01-09 DIAGNOSIS — C77.3 BREAST CANCER METASTASIZED TO AXILLARY LYMPH NODE, RIGHT: Primary | ICD-10-CM

## 2019-01-09 DIAGNOSIS — R11.0 CHEMOTHERAPY-INDUCED NAUSEA: ICD-10-CM

## 2019-01-09 DIAGNOSIS — C50.919 TRIPLE NEGATIVE MALIGNANT NEOPLASM OF BREAST: Primary | ICD-10-CM

## 2019-01-09 DIAGNOSIS — T45.1X5A CHEMOTHERAPY-INDUCED NAUSEA: ICD-10-CM

## 2019-01-09 DIAGNOSIS — I10 ESSENTIAL HYPERTENSION: ICD-10-CM

## 2019-01-09 DIAGNOSIS — Z71.9 ENCOUNTER FOR EDUCATION: ICD-10-CM

## 2019-01-09 DIAGNOSIS — C50.411 MALIGNANT NEOPLASM OF UPPER-OUTER QUADRANT OF RIGHT BREAST IN FEMALE, ESTROGEN RECEPTOR NEGATIVE: ICD-10-CM

## 2019-01-09 DIAGNOSIS — C50.911 BREAST CANCER METASTASIZED TO AXILLARY LYMPH NODE, RIGHT: Primary | ICD-10-CM

## 2019-01-09 PROCEDURE — 99211 OFF/OP EST MAY X REQ PHY/QHP: CPT | Mod: PBBFAC,25,27 | Performed by: NURSE PRACTITIONER

## 2019-01-09 PROCEDURE — 93010 EKG 12-LEAD: ICD-10-PCS | Mod: S$PBB,,, | Performed by: INTERNAL MEDICINE

## 2019-01-09 PROCEDURE — 99999 PR PBB SHADOW E&M-EST. PATIENT-LVL I: CPT | Mod: PBBFAC,,, | Performed by: NURSE PRACTITIONER

## 2019-01-09 PROCEDURE — 93005 ELECTROCARDIOGRAM TRACING: CPT | Mod: PBBFAC | Performed by: INTERNAL MEDICINE

## 2019-01-09 PROCEDURE — 99999 PR PBB SHADOW E&M-EST. PATIENT-LVL III: CPT | Mod: PBBFAC,,, | Performed by: INTERNAL MEDICINE

## 2019-01-09 PROCEDURE — 99204 OFFICE O/P NEW MOD 45 MIN: CPT | Mod: S$PBB,,, | Performed by: INTERNAL MEDICINE

## 2019-01-09 PROCEDURE — 71045 XR CHEST 1 VIEW: ICD-10-PCS | Mod: 26,,, | Performed by: RADIOLOGY

## 2019-01-09 PROCEDURE — 99204 PR OFFICE/OUTPT VISIT, NEW, LEVL IV, 45-59 MIN: ICD-10-PCS | Mod: S$PBB,,, | Performed by: INTERNAL MEDICINE

## 2019-01-09 PROCEDURE — 71045 X-RAY EXAM CHEST 1 VIEW: CPT | Mod: TC

## 2019-01-09 PROCEDURE — 99999 PR PBB SHADOW E&M-EST. PATIENT-LVL III: ICD-10-PCS | Mod: PBBFAC,,, | Performed by: INTERNAL MEDICINE

## 2019-01-09 PROCEDURE — 99215 PR OFFICE/OUTPT VISIT, EST, LEVL V, 40-54 MIN: ICD-10-PCS | Mod: S$PBB,,, | Performed by: NURSE PRACTITIONER

## 2019-01-09 PROCEDURE — 71045 X-RAY EXAM CHEST 1 VIEW: CPT | Mod: 26,,, | Performed by: RADIOLOGY

## 2019-01-09 PROCEDURE — 99215 OFFICE O/P EST HI 40 MIN: CPT | Mod: S$PBB,,, | Performed by: NURSE PRACTITIONER

## 2019-01-09 PROCEDURE — 93010 ELECTROCARDIOGRAM REPORT: CPT | Mod: S$PBB,,, | Performed by: INTERNAL MEDICINE

## 2019-01-09 PROCEDURE — 99999 PR PBB SHADOW E&M-EST. PATIENT-LVL I: ICD-10-PCS | Mod: PBBFAC,,, | Performed by: NURSE PRACTITIONER

## 2019-01-09 PROCEDURE — 99213 OFFICE O/P EST LOW 20 MIN: CPT | Mod: PBBFAC,25 | Performed by: INTERNAL MEDICINE

## 2019-01-09 RX ORDER — PROCHLORPERAZINE MALEATE 10 MG
10 TABLET ORAL EVERY 6 HOURS PRN
Qty: 30 TABLET | Refills: 1 | Status: SHIPPED | OUTPATIENT
Start: 2019-01-09 | End: 2019-03-18 | Stop reason: SDUPTHER

## 2019-01-09 RX ORDER — ONDANSETRON HYDROCHLORIDE 8 MG/1
8 TABLET, FILM COATED ORAL EVERY 12 HOURS PRN
Qty: 30 TABLET | Refills: 2 | Status: SHIPPED | OUTPATIENT
Start: 2019-01-09 | End: 2019-04-13 | Stop reason: SDUPTHER

## 2019-01-09 NOTE — LETTER
January 9, 2019      Yaniv Pop MD  [address] [facility] [address]           [facility]  [address] [place], [place] [address] [facility] [address]  Phone: [phone]  Fax: [phone]          Patient: Soo Holly   MR Number: [id number]   YOB: 1940   Date of Visit: 1/9/2019       Dear Dr. Yaniv Pop:    Thank you for referring Soo Holly to me for evaluation. Attached you will find relevant portions of my assessment and plan of care.    If you have questions, please do not hesitate to call me. I look forward to following Soo Holly along with you.    Sincerely,    Mehrdad Wiley MD    Enclosure  CC:  No Recipients    If you would like to receive this communication electronically, please contact [email or web address] or [phone] to request more information on [facility] Link access.    For providers and/or their staff who would like to refer a patient to [facility], please contact us through our one-stop-shop provider referral line, [facility], at [phone].    If you feel you have received this communication in error or would no longer like to receive these types of communications, please e-mail [email or web address]

## 2019-01-09 NOTE — PROGRESS NOTES
79 y/o female for chemotherapy teaching. Patient given the Navigation Notebook. Explained the contents of the chemotherapy binder. Discussed with patient the rationale for chemotherapy, how it works, the process of treatment, potential side effects and symptoms to report.  Patient is accompanied by son and grand daughter.  Compazine and zofran sent in to patient's pharmacy and medication indications and proper useage discussed with patient and family.  List of upcoming appointment printed out for patient.      Reviewed the specific medication and gave them a handout describing the potential side effects of Adriamycin, Cytoxan, and taxol.      Discussed potential side effects such as:  Nausea and vomiting  Myelosuppression  Fatigue  Anorexia  Alopecia  Stomatitis  Diarrhea  Cystitis  Red urine  Gastritis  Fever > 100.4  Antiemetics instructions  Skin care  Constipation  Rash  Hyperpigmentation  Photosensitivity  Sunscreen  Small freq meals  Increased protein  Increased calories  Vitamin support   Taste alterations  Neuropathys  Hydration  Renal toxicity  Hepatic toxicity  Port management  Community resources  Thrombocytopenia precautions      Phone numbers to contact healthcare team provided to patient.  Patient verbalized understanding plan of care and how to contact healthcare team if needed.

## 2019-01-09 NOTE — PROGRESS NOTES
Patient accompanied by son to appointment.  [facility] screening paperwork completed and blood work drawn by nurse.

## 2019-01-10 ENCOUNTER — TELEPHONE (OUTPATIENT)
Dept: RADIATION ONCOLOGY | Facility: CLINIC | Age: 79
End: 2019-01-10

## 2019-01-10 NOTE — PRE-PROCEDURE INSTRUCTIONS
Pre op instructions reviewed with patient per phone:    To confirm, Your surgeon has instructed you:  Surgery is scheduled 1/16/19 at 1215.  Pre admit office to call afternoon prior to surgery with final arrival time.  If surgery is on Monday, Pre admit office to call Friday afternoon with with final arrival time      Please report to [facility] 1st floor main lobby by 1045.      INSTRUCTIONS IMPORTANT!!!  ¨ No smoking after 12 midnight, the night before surgery.  ¨ No solid food after 12 midnight, but you may have clear liquids up until 3 hours prior to surgery.  This includes: grape, cranberry, and apple juice (not orange, and no coffee.)   ¨ OK to brush teeth, but no gum, candy or mints!    ¨ Take only these medicines with a small swallow of water-morning of surgery.  Amlodipine    ____  Do not wear makeup, including mascara.  ____  No powder, lotions or creams to surgical area.  ____  Please remove all jewelry, including piercings and leave at home.  ____  No money or valuables needed. Please leave at home.  ____  Please bring identification and insurance information to hospital.  ____  If going home the same day, arrange for a ride home. You will not be able to   drive if Anesthesia was used.  ____  Children, under 12 years old, must remain in the waiting room with an adult.  They are not allowed in patient areas.  ____  Wear loose fitting clothing. Allow for dressings, bandages.  ____  Stop Aspirin, Ibuprofen, Motrin and Aleve at least 5-7 days before surgery, unless otherwise instructed by your doctor, or the nurse.   You MAY use Tylenol/acetaminophen until day of surgery.  ____  If you take diabetic medication, do not take am of surgery unless instructed by   Doctor.  ____ Stop taking any Fish Oil supplement or any Vitamins that contain Vitamin E at least 5 days prior to surgery.          Bathing Instructions-- The night before surgery and the morning prior to coming to the  hospital:   -Do not shave the surgical area.   -Shower and wash your hair and body as usual with your regular soap and shampoo.   -Rinse your hair and body completely.   -Use one packet of hibiclens to wash the surgical site (using your hand) gently for 5 minutes.  Do not scrub you skin too hard.   -Do not use hibiclens on your head, face, or genitals.   -Do not wash with regular soap after you use the hibiclens.   -Rinse your body thoroughly.   -Dry with clean, soft towel.  Do not use lotion, cream, deodorant, or powders on   the surgical site.    Use antibacterial soap in place of hibiclens if your surgery is on the head, face or genitals.         Surgical Site Infection    Prevention of surgical site infections:     -Keep incisions clean and dry.   -Do not soak/submerge incisions in water until completely healed.   -Do not apply lotions, powders, creams, or deodorants to site.   -Always make sure hands are cleaned with antibacterial soap/ alcohol-based   prior to touching the surgical site.  (This includes doctors, nurses, staff, and yourself.)    Signs and symptoms:   -Redness and pain around the area where you had surgery   -Drainage of cloudy fluid from your surgical wound   -Fever over 100.4  I have read or had read and explained to me, and understand the above information.

## 2019-01-15 DIAGNOSIS — C50.919 BREAST CANCER: Primary | ICD-10-CM

## 2019-01-16 ENCOUNTER — HOSPITAL ENCOUNTER (OUTPATIENT)
Facility: HOSPITAL | Age: 79
Discharge: HOME | End: 2019-01-16
Attending: SURGERY | Admitting: SURGERY
Payer: MEDICARE

## 2019-01-16 ENCOUNTER — PATIENT MESSAGE (OUTPATIENT)
Dept: SURGERY | Facility: HOSPITAL | Age: 79
End: 2019-01-16

## 2019-01-16 DIAGNOSIS — C77.3 BREAST CANCER METASTASIZED TO AXILLARY LYMPH NODE, RIGHT: ICD-10-CM

## 2019-01-16 DIAGNOSIS — C50.911 BREAST CANCER METASTASIZED TO AXILLARY LYMPH NODE, RIGHT: ICD-10-CM

## 2019-01-16 PROCEDURE — 25000003 PHARM REV CODE 250: Performed by: SURGERY

## 2019-01-16 PROCEDURE — 77001 CHG FLUOROGUIDE CNTRL VEN ACCESS,PLACE,REPLACE,REMOVE: ICD-10-PCS | Mod: 26,,, | Performed by: SURGERY

## 2019-01-16 PROCEDURE — 63600175 PHARM REV CODE 636 W HCPCS: Performed by: ANESTHESIOLOGY

## 2019-01-16 PROCEDURE — 63600175 PHARM REV CODE 636 W HCPCS: Performed by: SURGERY

## 2019-01-16 PROCEDURE — 71000033 HC RECOVERY, INTIAL HOUR: Performed by: SURGERY

## 2019-01-16 PROCEDURE — 37000008 HC ANESTHESIA 1ST 15 MINUTES: Performed by: SURGERY

## 2019-01-16 PROCEDURE — 25000003 PHARM REV CODE 250: Performed by: ANESTHESIOLOGY

## 2019-01-16 PROCEDURE — 77001 FLUOROGUIDE FOR VEIN DEVICE: CPT | Mod: 26,,, | Performed by: SURGERY

## 2019-01-16 PROCEDURE — 36561 INSERT TUNNELED CV CATH: CPT | Mod: RT,,, | Performed by: SURGERY

## 2019-01-16 PROCEDURE — 36561 PR INSERT TUNNELED CV CATH WITH PORT: ICD-10-PCS | Mod: RT,,, | Performed by: SURGERY

## 2019-01-16 PROCEDURE — 71000039 HC RECOVERY, EACH ADD'L HOUR: Performed by: SURGERY

## 2019-01-16 PROCEDURE — C1788 PORT, INDWELLING, IMP: HCPCS | Performed by: SURGERY

## 2019-01-16 PROCEDURE — S0020 INJECTION, BUPIVICAINE HYDRO: HCPCS | Performed by: SURGERY

## 2019-01-16 PROCEDURE — 71000015 HC POSTOP RECOV 1ST HR: Performed by: SURGERY

## 2019-01-16 PROCEDURE — 36000706: Performed by: SURGERY

## 2019-01-16 PROCEDURE — 36000707: Performed by: SURGERY

## 2019-01-16 PROCEDURE — 37000009 HC ANESTHESIA EA ADD 15 MINS: Performed by: SURGERY

## 2019-01-16 DEVICE — PORT POWER CLEAR VIEW: Type: IMPLANTABLE DEVICE | Site: SUBCLAVIAN | Status: FUNCTIONAL

## 2019-01-16 RX ORDER — SODIUM CHLORIDE 9 MG/ML
INJECTION, SOLUTION INTRAVENOUS CONTINUOUS
Status: CANCELLED | OUTPATIENT
Start: 2019-01-16

## 2019-01-16 RX ORDER — MEPERIDINE HYDROCHLORIDE 50 MG/ML
12.5 INJECTION INTRAMUSCULAR; INTRAVENOUS; SUBCUTANEOUS ONCE AS NEEDED
Status: DISCONTINUED | OUTPATIENT
Start: 2019-01-16 | End: 2019-01-16 | Stop reason: HOSPADM

## 2019-01-16 RX ORDER — BUPIVACAINE HYDROCHLORIDE 5 MG/ML
INJECTION, SOLUTION EPIDURAL; INTRACAUDAL
Status: DISCONTINUED | OUTPATIENT
Start: 2019-01-16 | End: 2019-01-16 | Stop reason: HOSPADM

## 2019-01-16 RX ORDER — SODIUM CHLORIDE 0.9 % (FLUSH) 0.9 %
3 SYRINGE (ML) INJECTION EVERY 8 HOURS
Status: DISCONTINUED | OUTPATIENT
Start: 2019-01-16 | End: 2019-01-16 | Stop reason: HOSPADM

## 2019-01-16 RX ORDER — SODIUM CHLORIDE 0.9 % (FLUSH) 0.9 %
3 SYRINGE (ML) INJECTION
Status: DISCONTINUED | OUTPATIENT
Start: 2019-01-16 | End: 2019-01-16 | Stop reason: HOSPADM

## 2019-01-16 RX ORDER — HEPARIN 100 UNIT/ML
SYRINGE INTRAVENOUS
Status: DISCONTINUED | OUTPATIENT
Start: 2019-01-16 | End: 2019-01-16 | Stop reason: HOSPADM

## 2019-01-16 RX ORDER — CEFAZOLIN SODIUM 2 G/50ML
2 SOLUTION INTRAVENOUS
Status: DISCONTINUED | OUTPATIENT
Start: 2019-01-16 | End: 2019-01-16 | Stop reason: HOSPADM

## 2019-01-16 RX ORDER — ACETAMINOPHEN 10 MG/ML
1000 INJECTION, SOLUTION INTRAVENOUS ONCE
Status: COMPLETED | OUTPATIENT
Start: 2019-01-16 | End: 2019-01-16

## 2019-01-16 RX ORDER — ONDANSETRON 2 MG/ML
4 INJECTION INTRAMUSCULAR; INTRAVENOUS ONCE AS NEEDED
Status: COMPLETED | OUTPATIENT
Start: 2019-01-16 | End: 2019-01-16

## 2019-01-16 RX ORDER — HYDROMORPHONE HYDROCHLORIDE 2 MG/ML
0.2 INJECTION, SOLUTION INTRAMUSCULAR; INTRAVENOUS; SUBCUTANEOUS EVERY 5 MIN PRN
Status: DISCONTINUED | OUTPATIENT
Start: 2019-01-16 | End: 2019-01-16 | Stop reason: HOSPADM

## 2019-01-16 RX ORDER — SODIUM CHLORIDE 9 MG/ML
INJECTION, SOLUTION INTRAVENOUS CONTINUOUS
Status: DISCONTINUED | OUTPATIENT
Start: 2019-01-16 | End: 2019-01-16 | Stop reason: HOSPADM

## 2019-01-16 RX ADMIN — ACETAMINOPHEN 1000 MG: 10 INJECTION, SOLUTION INTRAVENOUS at 09:01

## 2019-01-16 RX ADMIN — PROMETHAZINE HYDROCHLORIDE 6.25 MG: 25 INJECTION INTRAMUSCULAR; INTRAVENOUS at 10:01

## 2019-01-16 RX ADMIN — ONDANSETRON 4 MG: 2 INJECTION, SOLUTION INTRAMUSCULAR; INTRAVENOUS at 09:01

## 2019-01-16 NOTE — OP NOTE
Operative Note       SURGERY DATE:  01/16/2019    PRE-OP DIAGNOSIS:  Malignant neoplasm of upper-outer quadrant of right breast in female, estrogen receptor negative [C50.411, Z17.1]    POST-OP DIAGNOSIS:  Malignant neoplasm of upper-outer quadrant of right breast in female, estrogen receptor negative [C50.411, Z17.1]   Active Hospital Problems    Diagnosis  POA    Breast cancer metastasized to axillary lymph node, right [C50.911, C77.3]  Yes      Resolved Hospital Problems   No resolved problems to display.       Procedure(s) (LRB):  PLACEMENT, MEDIPORT (Right)    Surgeon(s) and Role:     * Prem Massey MD - Primary    ASSISTANTS: None  ANESTHESIA: General    FINDINGS: Unremarkable.    ESTIMATED BLOOD LOSS: 5 mL              COMPLICATIONS:  None    SPECIMEN:  None    Implants: Mediport    INDICATION:  Breast cancer, right.    DESCRIPTION OF PROCEDURE:    The patient was placed supine. SCDs were placed for DVT prophylaxis and antibiotics were given 30 minutes before the incision. The bed was placed in slight Trendelenburg position and the chest and neck were prepped and draped with sterile technique. The central catheter was flushed with heparin to ensure function. Landmarks were identified and a skin entry site was chosen 1-2 cm inferior to the distal third of the clavicle. The skin and subcutaneous tissue were anesthetized with lidocaine. Local anesthesia was carried down to the periosteum of the clavicle.     As the ipsilateral arm was retracted caudad, the right subclavian vein was then located with the needle and a 10-mL syringe. The needle was inserted at the chosen site with the bevel down and directed toward the sternal notch. With continuous negative pressure in the syringe, the needle was advanced in a stepwise fashion down the clavicle. The vein was located as the needle passed beneath the bone. As soon as the syringe began to fill with venous blood, the needle was rotated 90 degrees so the bevel was  pointed at the foot of the bed. The needle position was secured and the syringe removed. The hub was occluded to prevent venous air embolus. The guide wire passed easily, and its position in the superior vena cava was confirmed by fluoroscopy. The needle was removed while the wire was held in place. A small incision was then made at the point of wire entry. A 3-cm incision was made on the anterior chest wall, and electrocautery was used to create a subcutaneous pocket for the port.     The catheter was then tunneled under the skin from the chest wall incision to the guide wire entry site. The port was placed in the subcutaneous pocket, and the catheter was cut at a suitable length to ensure correct placement of the tip in the superior vena cava. The dilator/introducer was placed over the wire and the tract gently dilated. The dilator and wire were then removed, and the catheter inserted into the introducer, which was split and removed. The tip of the catheter was confirmed in the superior vena cava by fluoroscopy.    The port was secured in place in the subcutaneous pocket with 0-Vicryl sutures. The port was aspirated to ensure adequate blood flow and then flushed with heparinized saline. Both skin incisions were then closed with 4-0 Vicryl sutures.     The patient tolerated the procedure well and was taken to the postanesthesia care unit in stable condition.  An upright chest x-ray was ordered to document location of the catheter tip and check for pneumothorax.            CONDITION: Good    DISPOSITION: PACU - hemodynamically stable.     Prem Massey

## 2019-01-16 NOTE — INTERVAL H&P NOTE
The patient has been examined and the H&P has been reviewed:    I concur with the findings and no changes have occurred since H&P was written.    Anesthesia/Surgery risks, benefits and alternative options discussed and understood by patient/family.          Active Hospital Problems    Diagnosis  POA    Breast cancer metastasized to axillary lymph node, right [C50.911, C77.3]  Yes      Resolved Hospital Problems   No resolved problems to display.

## 2019-01-16 NOTE — DISCHARGE SUMMARY
[facility]  Short Stay  Discharge Summary    Admit Date: 1/16/2019    Discharge Date and Time: 1/16/2019      Discharge Attending Physician: Prem Massey MD     Reason for Admission: Mediport insertion, right subclavian.    Hospital Course (synopsis of major diagnoses, care, treatment, and services provided during the course of the hospital stay): Uneventful and full recovery    Final Diagnoses:    Principal Problem:Breast cancer, right.   Secondary Diagnoses: Breast cancer, right.    Procedures Performed: Procedure(s) (LRB):  PLACEMENT, MEDIPORT (Right)      Discharged Condition: good    Disposition: Home or Self Care     Diet: regular.    Discharge Condition: Stable    Follow up/Patient Instructions:      Medications:      Medication List      CONTINUE taking these medications    amLODIPine 10 MG tablet  Commonly known as:  NORVASC     aspirin 81 MG EC tablet  Commonly known as:  ECOTRIN     ondansetron 8 MG tablet  Commonly known as:  ZOFRAN  Take 1 tablet (8 mg total) by mouth every 12 (twelve) hours as needed for Nausea.     prochlorperazine 10 MG tablet  Commonly known as:  COMPAZINE  Take 1 tablet (10 mg total) by mouth every 6 (six) hours as needed.          Discharge Procedure Orders   Diet general     Prem Massey

## 2019-01-16 NOTE — DISCHARGE INSTRUCTIONS
*YOU MAY REMOVE THE DRESSING IN 2 DAYS.*      What to expect during recovery    Pain  · You will experience some level of pain after surgery.  Pain medication should help with the pain, but may not be able to eliminate it entirely.  Pain will decrease with time, and most pain will be gone by 4 to 6 weeks after surgery.  · Ice packs may help with pain and can reduce swelling.  · Your prescription pain medication may contain acetaminophen (Tylenol).  If so, you should not take additional acetaminophen (Tylenol) at the same time as your pain medication.   · Do not drive, operate machinery or power tools, or sign legal papers for 24 hours or as long as you are on your postoperative pain medication.   · Prescription pain medication should be taken only as directed.  We are not able to replace pain medication that has been lost or stolen.    Nausea/vomiting  · You may experience nausea or vomiting as a result of anesthesia or pain medication.  · If you experience severe nausea or you are unable to keep fluids down, contact your doctor.    Bleeding  · A small amount of clear or reddish drainage from the incision is normal after surgery.  · For mild bleeding from the incision, apply pressure for five minutes.  · If bleeding is severe or does not stop with pressure, contact your doctor.    Signs of infection  · Notify your doctor if you have the following signs of infection:  · Fever over 101 degrees  · Worsening redness around incsion  · Thick drainage from incision  · Foul smell from incision  · You may experience low fever/chills, this is normal after surgery.    Other post-operative symptoms  · It is safe to take over-the-counter medications for constipation, heartburn, sleep, or itching if needed.    · You may experience light-headedness, dizziness, and sleepiness following surgery. Please do not stay alone. A responsible adult should be with you for this 24 hour period.     Activity  · Try to rest and avoid strenuous  activity, but also get out of bed regularly unless your doctor has ordered strict bedrest.  · Several times every hour while you are awake, pump and flex your feet 5-6 times and do foot circles. This will help prevent blood clots.  · Several times every hour while you are awake, take 2 to 3 deep breaths and cough. If you had stomach surgery, hold a pillow or rolled towel firmly against your stomach before you cough. This will help with any pain the cough might cause.  · Do not smoke after surgery, it decreases your ability to heal and increases the risk of infection and pneumonia.    Nozin: Nasal   · Nozin reduces nasal germs to help decrease the risk of infection after surgery.  · Continue Nozin provided at discharge twice daily for 7 days or until the incision is healed.    · Place 4 drops to cotton swab tip and swab both nostril rims 6 times in each direction.  · See pamphlet for more information.     Diet  · Drink lots of fluids after surgery, unless otherwise instructed.  · You might not have much appetite at first.  Progress slowly to a normal diet unless given other specific diet instructions by your doctor.  Begin with liquids, then soup and crackers, working up to solid foods.  · Do not drink alcoholic beverages including beer for 24 hours or as long as you are on post-operative pain medication.    Follow-up after surgery  · You can contact your doctor through the patient portal using the [facility]ela or at [email or web address].  · You can also contact your doctor at any time by calling [phone] for the [facility] on [facility], or [phone] for the [facility] on [facility].  · A nurse will be calling you sometime after surgery. Do not be alarmed. This is our way of finding out how you are doing.

## 2019-01-16 NOTE — PLAN OF CARE
Pt prepared for surgery. Family to come to bedside. Pt aware Dr. Massey will be performing surgery.

## 2019-01-17 ENCOUNTER — TELEPHONE (OUTPATIENT)
Dept: HEMATOLOGY/ONCOLOGY | Facility: CLINIC | Age: 79
End: 2019-01-17

## 2019-01-17 ENCOUNTER — TELEPHONE (OUTPATIENT)
Dept: RADIATION ONCOLOGY | Facility: CLINIC | Age: 79
End: 2019-01-17

## 2019-01-17 NOTE — TELEPHONE ENCOUNTER
Contacted patient in regards to my inability to perform her mediport today due to a personal medical urgent issue. Port placed by my college in my stead. Will see patient after neoadjuvant chemotherapy and discuss surgical options.    Quin Santa

## 2019-01-17 NOTE — TELEPHONE ENCOUNTER
Spoke with pts son Jasper and rescheduled lab, Dr. Pop, and chemo appointment to later in the day tomorrow 1/18 per request.  Jasper will speak with Shana in radiation and reschedule Radiation Oncology consult for another day when they are here.

## 2019-01-17 NOTE — TELEPHONE ENCOUNTER
Returned patients phone call. Spoke with Jasper, stated they need to reschedule all appointments for tomorrow to a later time. Called infusion room and was told our nurse navigator jany will call him to reschedule. Confirmed Jany's phone number with Jasper.

## 2019-01-17 NOTE — TELEPHONE ENCOUNTER
Returned call and discussed appt conflict for tomorrow. Pt wants to r/s all appts to later in the day. Sent message to med onc to see if it can be accommodated. They will call patient back.

## 2019-01-17 NOTE — TELEPHONE ENCOUNTER
----- Message from Diamond Mojica sent at 1/17/2019  2:45 PM CST -----  Contact: Pt's son Jasper Hutchinson is calling in regards to rescheduling pt's appt for tomorrow. He would like a callback to reschedule.    He can be reached at [phone].    Thank you

## 2019-01-18 ENCOUNTER — OFFICE VISIT (OUTPATIENT)
Dept: HEMATOLOGY/ONCOLOGY | Facility: CLINIC | Age: 79
End: 2019-01-18
Payer: MEDICARE

## 2019-01-18 ENCOUNTER — INFUSION (OUTPATIENT)
Dept: INFUSION THERAPY | Facility: HOSPITAL | Age: 79
End: 2019-01-18
Attending: INTERNAL MEDICINE
Payer: MEDICARE

## 2019-01-18 VITALS
RESPIRATION RATE: 16 BRPM | TEMPERATURE: 98 F | BODY MASS INDEX: 39.13 KG/M2 | OXYGEN SATURATION: 98 % | SYSTOLIC BLOOD PRESSURE: 116 MMHG | WEIGHT: 207.25 LBS | DIASTOLIC BLOOD PRESSURE: 64 MMHG | HEART RATE: 78 BPM | HEIGHT: 61 IN

## 2019-01-18 DIAGNOSIS — C50.919 TRIPLE NEGATIVE MALIGNANT NEOPLASM OF BREAST: Primary | ICD-10-CM

## 2019-01-18 PROCEDURE — 96413 CHEMO IV INFUSION 1 HR: CPT

## 2019-01-18 PROCEDURE — 96377 APPLICATON ON-BODY INJECTOR: CPT | Mod: 59

## 2019-01-18 PROCEDURE — 96367 TX/PROPH/DG ADDL SEQ IV INF: CPT

## 2019-01-18 PROCEDURE — 25000003 PHARM REV CODE 250: Performed by: INTERNAL MEDICINE

## 2019-01-18 PROCEDURE — 99215 PR OFFICE/OUTPT VISIT, EST, LEVL V, 40-54 MIN: ICD-10-PCS | Mod: 25,S$PBB,, | Performed by: INTERNAL MEDICINE

## 2019-01-18 PROCEDURE — 96411 CHEMO IV PUSH ADDL DRUG: CPT

## 2019-01-18 PROCEDURE — 99213 OFFICE O/P EST LOW 20 MIN: CPT | Mod: PBBFAC,25 | Performed by: INTERNAL MEDICINE

## 2019-01-18 PROCEDURE — 99999 PR PBB SHADOW E&M-EST. PATIENT-LVL III: ICD-10-PCS | Mod: PBBFAC,,, | Performed by: INTERNAL MEDICINE

## 2019-01-18 PROCEDURE — 99999 PR PBB SHADOW E&M-EST. PATIENT-LVL III: CPT | Mod: PBBFAC,,, | Performed by: INTERNAL MEDICINE

## 2019-01-18 PROCEDURE — 99215 OFFICE O/P EST HI 40 MIN: CPT | Mod: 25,S$PBB,, | Performed by: INTERNAL MEDICINE

## 2019-01-18 PROCEDURE — 63600175 PHARM REV CODE 636 W HCPCS: Performed by: INTERNAL MEDICINE

## 2019-01-18 RX ORDER — HEPARIN 100 UNIT/ML
500 SYRINGE INTRAVENOUS
Status: DISCONTINUED | OUTPATIENT
Start: 2019-01-18 | End: 2019-01-18 | Stop reason: HOSPADM

## 2019-01-18 RX ORDER — DOXORUBICIN HYDROCHLORIDE 2 MG/ML
60 INJECTION, SOLUTION INTRAVENOUS
Status: COMPLETED | OUTPATIENT
Start: 2019-01-18 | End: 2019-01-18

## 2019-01-18 RX ADMIN — Medication 500 UNITS: at 04:01

## 2019-01-18 RX ADMIN — DEXAMETHASONE SODIUM PHOSPHATE: 4 INJECTION, SOLUTION INTRA-ARTICULAR; INTRALESIONAL; INTRAMUSCULAR; INTRAVENOUS; SOFT TISSUE at 01:01

## 2019-01-18 RX ADMIN — PEGFILGRASTIM 6 MG: KIT SUBCUTANEOUS at 03:01

## 2019-01-18 RX ADMIN — APREPITANT 130 MG: 130 INJECTION, EMULSION INTRAVENOUS at 02:01

## 2019-01-18 RX ADMIN — DOXORUBICIN HYDROCHLORIDE 122 MG: 2 INJECTION, SOLUTION INTRAVENOUS at 02:01

## 2019-01-18 RX ADMIN — CYCLOPHOSPHAMIDE 1220 MG: 1 INJECTION, POWDER, FOR SOLUTION INTRAVENOUS; ORAL at 02:01

## 2019-01-18 NOTE — NURSING
neulasta on body injector video viewed per patient and grand daughter.  They both state understanding and verbalized correct placement and what lights and sounds indicated.  They state understanding and verifiy intent to comply with instructions

## 2019-01-18 NOTE — DISCHARGE INSTRUCTIONS
[facility]  [address]  [address] [address]  [phone] phone     [phone] fax  Hours of Operation: Monday- Friday 8:00am- 5:00pm  After hours phone  [phone]  Hematology / Oncology Physicians on call      Dr. Donn Kennedy                        Please call with any concerns regarding your appointment today.  FALL PREVENTION   Falls often occur due to slipping, tripping or losing your balance. Here are ways to reduce your risk of falling again.   Was there anything that caused your fall that can be fixed, removed or replaced?   Make your home safe by keeping walkways clear of objects you may trip over.   Use non-slip pads under rugs.   Do not walk in poorly lit areas.   Do not stand on chairs or wobbly ladders.   Use caution when reaching overhead or looking upward. This position can cause a loss of balance.   Be sure your shoes fit properly, have non-slip bottoms and are in good condition.   Be cautious when going up and down stairs, curbs, and when walking on uneven sidewalks.   If your balance is poor, consider using a cane or walker.   If your fall was related to alcohol use, stop or limit alcohol intake.   If your fall was related to use of sleeping medicines, talk to your doctor about this. You may need to reduce your dosage at bedtime if you awaken during the night to go to the bathroom.   To reduce the need for nighttime bathroom trips:   Avoid drinking fluids for several hours before going to bed   Empty your bladder before going to bed   Men can keep a urinal at the bedside   © 8588-1366 [employer] [facility] [place], [address] [place] [address]. All rights reserved. This information is not intended as a substitute for professional medical care. Always follow your healthcare professional's instructions.  HOME CARE AFTER CHEMOTHERAPY   Meals   Many patients feel sick and lose their appetites during treatment. Eat small  meals several times a day. Choose bland foods with little taste or smell if you have problems with nausea. Be sure to cook all food thoroughly. This kills bacteria and helps you avoid intestinal infection. Soft foods are easier to swallow and digest.   Activity   Exercise keeps you strong and keeps your heart and lungs active. Talk to your doctor about an appropriate exercise program for you.   Skin Care   To prevent a skin infection, bathe or shower once a day. Use a moisturizing soap and wash with warm water. Avoid very hot or cold water. Chemotherapy can make your skin dry . Apply moisturizing lotion to help relieve dry skin. Some drugs used in high doses can cause slight burns to appear (like sunburn). Ask for a special cream to help relieve the burn and protect your skin.   Prevent Mouth Sores   During chemotherapy, many people get mouth sores. Do the following to help prevent mouth sores or to ease discomfort.   Brush your teeth with a soft-bristle toothbrush after every meal.  Don't use dental floss if your platelet count is below 50,000. Your doctor or nurse will tell you if this is the case.  Use an oral swab or special soft toothbrush if your gums bleed during regular brushing.  Use mouthwash as directed. If you can't tolerate commercial mouthwash, use salt and baking soda to clean your mouth. Mix 1 teaspoon of salt and 1 teaspoon of baking soda into a glass of water. Swish and spit.  Call your doctor or return to this facility if you develop any of the following:   Sore throat   White patches in the mouth or throat   Fever of 100.4ºF (38ºC) or higher, or as directed by your healthcare provider  © 8148-5327 [employer], [address] [place] [address], [place] [address]. All rights reserved. This information is not intended as a substitute for professional medical care. Always follow your healthcare professional's   Oncology: Controlling Nausea and Vomiting   Call the Doctor If:   Nausea or vomiting lasts for  24 hours or more   You have trouble keeping fluids down      Nausea and vomiting are common side effects of chemotherapy and radiation therapy. Side effects result when treatment affects some normal cells as well as cancer cells. In this case, the cells lining your stomach and the part of your brain that controls vomiting are affected.      Taken before meals, medications can help ease nausea.      Medications Can Help   Nausea or vomiting can often be prevented or controlled with medications (antiemetics). Your doctor can give you antiemetics before or after treatment.   Eating Tips   If you have medications to control nausea, take them before meals as directed.   Avoid fatty or greasy foods while nauseous.   Eat small meals slowly throughout the day.   Ask someone to sit with you while you eat to keep you from thinking about feeling nauseated.   Eat foods at room temperature or colder to avoid strong smells.   Eat dry foods such as toast, crackers, or pretzels; cool, light foods such as applesauce; and bland foods such as oatmeal or skinned chicken.     Eating may be more pleasant if you have company.      Other Ways to Feel Better   Get a little fresh air. Take a short walk.   Talk to a friend, listen to music, or watch TV.   Take a few deep, slow breaths.   Eat by candlelight or in surroundings that you find relaxing.   Use a technique such as guided imagery to help you relax. Imagine yourself in a beautiful, restful scene. Or daydream about the place youd most like to be.  © 0653-5537 [employer], [address] [place] [address], [place] [address]. All rights reserved. This information is not intended as a substitute for professional medical care. Always follow your healthcare professional's instructions  Nutrition During Chemotherapy   During chemotherapy, the energy provided by a healthy diet can help you rebuild normal cells. It can also help you keep up your strength and fight infection. As a result, you may  feel better and be more able to cope with side effects. Ask your doctor about your nutrition needs.   Drink Plenty of Fluids   Fluids help the body produce urine and decrease constipation. They help prevent kidney and bladder problems. They also help replace fluids lost from vomiting and diarrhea.   Try water, unsweetened juices, and other flavored drinks without caffeine. They flush toxins from the body. Avoid drinks with added sugar or those with artificial sweetener.  Get Enough Calories   Calories are fuel. The body uses this fuel to perform all of its functions, including healing.   Its okay to be lean, but be sure you are not underweight. If you are, try eating more calories.   Eat calorie-dense foods such as avocados, peanut butter, eggs, and ice cream.   If you need extra calories, add butter, gravy, and sauces to foods (if tolerated).   Limit the amount of processed foods you eat. Also try to limit foods that are fried, greasy, or high in fat or added sugar.  Eat Protein, Fruits, and Vegetables   Protein builds muscle, bone, skin, and blood. It helps your body heal and fight infection. It also helps boost your energy level.   Good choices include yogurt, eggs, chicken, lean meats, and peanut butter.   Fruits and vegetables are full of vitamins and nutrition. Beans are high in protein.   Try to eat a variety of vegetables, fruits, whole grains, and beans.   Ask your doctor about instant protein powder or other supplements.  Eating Right During Treatment   Side effects may make it a little harder to eat well on some days. The following tips will help you to continue to get the nutrition you need.   Be open to new foods and recipes.   Eat small portions often and slowly.   Have a healthy snack instead of a meal if you are not very hungry.   Try eating in a new setting.   Physical activity, such as walking, can help increase your appetite. Try to be active for at least 30 minutes each day.   Round off your  diet with vitamins from fruit, vegetables, and grains.   If you live alone and are not up to cooking, ask your healthcare provider about Meals on Wheels or other outreach programs.  For more information, go to www.cancer.org or call 6-950-FLM-4006.    © 6806-4614 [employer], [address] [place] [address]. All rights reserved. This information is not intended as a substitute for professional medical care. Always follow your healthcare professional's      Mouth Care During Chemotherapy   Call the Doctor If:   You develop mouth sores   Mouth pain keeps you from eating      Mouth sores (stomatitis) and dry mouth are common side effects of chemotherapy and radiation therapy. These side effects occur because these treatments affect normal cells as well as cancer cells. Using the tips on this handout may help you feel better.   Remedies That Help   Rinse with 1/2 teaspoon baking soda in a glass of water. This helps keep your mouth free of germs.   Use products that coat and protect the mouth and throat. Or use medications that coat and soothe mouth sores themselves.   Numb your mouth and throat with special sprays or lozenges to make eating easier.  Prevent Mouth Sores      Rinse with baking soda.      Buy a special type of soft toothbrush, mild toothpaste, and mouthwash without alcohol.   Gently brush your teeth and gums.   Have your dentist treat any dental problems before therapy begins.  Moisten a Dry Mouth   Drink plenty of water. Take frequent sips, or keep a spray bottle.   Suck on sugar-free candy and lozenges. Chew gum.   Use products that moisten the mouth if your doctor prescribes them.   Apply lip balm to help prevent dry lips.   Avoid mouthwash that contains alcohol.  Choose Foods Less Likely to Irritate   Try foods that are:   Soft and go down smoothly, such as a milkshake or food puréed with a    Served cold or at room temperature   Cooked until tender and cut into small pieces  ©  8755-8639 [employer] [facility], [address] [place] [address], [place] [address]. All rights reserved. This information is not intended as a substitute for professional medical care. Always follow   Oncology: Managing Fatigue   Fatigue is a common side effect of chemotherapy and radiation therapy. It can be caused by worry, lack of sleep, and poor appetite. Fatigue can also be a sign of anemia (a shortage of red blood cells). This could require medical treatment. The tips below can help you feel better.      Family members can help with meals and chores around the house.      Conserving Energy   Note the times of day when you are most tired and plan around them. For instance, if you are more tired in the afternoon, try to get tasks done in the morning.   Decide which tasks are most important. Do those first.   Pass tasks along to others when you need to. Ask for help.   Accept help when its offered. Tell people what they can do to help. For instance, you may need someone to fix a meal, fold clothes, or put gas in your car.   Plan rest times. You may want to take a nap each day. Just sitting quietly for a few minutes can make you feel more rested.  What You Can Do to Feel Better   Relax before you try to sleep. Take a bath or read for a while.   Form a sleep pattern. Go to bed at the same time each night and get up at the same time each morning.   Eat well. Choose foods from all of the food groups each day.   Exercise. Take a brisk walk to help increase your energy.   Avoid caffeine and alcohol. Drink plenty of water or fruit juices instead.  Treating Anemia   If you begin to feel more tired than normal, tell your doctor. Fatigue could be a sign of anemia. This problem is fairly common during chemotherapy and radiation treatments. If your red blood cell count is too low, you are likely to receive a blood transfusion. Most people feel better shortly after the transfusion. In some cases, medication may be prescribed to increase  red blood cell production.   Call Your Doctor If You Have:   Shortness of breath or chest pain   A dizzy feeling when you get up from lying or sitting down   Paler skin than normal   Extreme tiredness that is not helped by sleep    © 7454-7738 [employer], [address] [place] [address], [place] [address]. All rights reserved. This information is not intended as a substitute for professional medical care. Always follow your healthcare professional's instructions.

## 2019-01-18 NOTE — PROGRESS NOTES
Subjective:       Patient ID: Soo Holly is a 78 y.o. female.    Chief Complaint: Results; Breast Cancer; and Chemotherapy    HPI 78-year-old female to initiate treatment with dose dense AC followed by Taxol for stage IIIC locally advanced triple negative breast cancer ECOG status 1    Past Medical History:   Diagnosis Date    Hypertension      Family History   Problem Relation Age of Onset    Breast cancer Sister      Social History     Socioeconomic History    Marital status:      Spouse name: Not on file    Number of children: Not on file    Years of education: Not on file    Highest education level: Not on file   Social Needs    Financial resource strain: Not on file    Food insecurity - worry: Not on file    Food insecurity - inability: Not on file    Transportation needs - medical: Not on file    Transportation needs - non-medical: Not on file   Occupational History    Not on file   Tobacco Use    Smoking status: Never Smoker    Smokeless tobacco: Never Used   Substance and Sexual Activity    Alcohol use: No     Frequency: Never    Drug use: No    Sexual activity: Not Currently   Other Topics Concern    Not on file   Social History Narrative    Not on file     Past Surgical History:   Procedure Laterality Date    cataracts      HYSTERECTOMY      INSERTION, PORT-A-CATH Right 1/16/2019    [facility]    JOINT REPLACEMENT Right     Knee       Labs:  Lab Results   Component Value Date    WBC 6.41 01/18/2019    HGB 12.2 01/18/2019    HCT 38.5 01/18/2019    MCV 93 01/18/2019     01/18/2019     BMP  Lab Results   Component Value Date     01/18/2019    K 3.7 01/18/2019     01/18/2019    CO2 26 01/18/2019    BUN 10 01/18/2019    CREATININE 0.8 01/18/2019    CALCIUM 9.4 01/18/2019    ANIONGAP 7 (L) 01/18/2019    ESTGFRAFRICA >60 01/18/2019    EGFRNONAA >60 01/18/2019     Lab Results   Component Value Date    ALT 15 01/18/2019    AST 18  01/18/2019    ALKPHOS 121 01/18/2019    BILITOT 0.3 01/18/2019       No results found for: IRON, TIBC, FERRITIN, SATURATEDIRO  No results found for: FVIHMLWZ80  No results found for: FOLATE  No results found for: TSH      Review of Systems   Constitutional: Negative for activity change, appetite change, chills, diaphoresis, fatigue, fever and unexpected weight change.   HENT: Negative for congestion, dental problem, drooling, ear discharge, ear pain, facial swelling, hearing loss, mouth sores, nosebleeds, postnasal drip, rhinorrhea, sinus pressure, sneezing, sore throat, tinnitus, trouble swallowing and voice change.    Eyes: Negative for photophobia, pain, discharge, redness, itching and visual disturbance.   Respiratory: Negative for cough, choking, chest tightness, shortness of breath, wheezing and stridor.    Cardiovascular: Negative for chest pain, palpitations and leg swelling.   Gastrointestinal: Negative for abdominal distention, abdominal pain, anal bleeding, blood in stool, constipation, diarrhea, nausea, rectal pain and vomiting.   Endocrine: Negative for cold intolerance, heat intolerance, polydipsia, polyphagia and polyuria.   Genitourinary: Negative for decreased urine volume, difficulty urinating, dyspareunia, dysuria, enuresis, flank pain, frequency, genital sores, hematuria, menstrual problem, pelvic pain, urgency, vaginal bleeding, vaginal discharge and vaginal pain.   Musculoskeletal: Negative for arthralgias, back pain, gait problem, joint swelling, myalgias, neck pain and neck stiffness.   Skin: Negative for color change, pallor and rash.   Allergic/Immunologic: Negative for environmental allergies, food allergies and immunocompromised state.   Neurological: Negative for dizziness, tremors, seizures, syncope, facial asymmetry, speech difficulty, weakness, light-headedness, numbness and headaches.   Hematological: Negative for adenopathy. Does not bruise/bleed easily.   Psychiatric/Behavioral:  Positive for dysphoric mood. Negative for agitation, behavioral problems, confusion, decreased concentration, hallucinations, self-injury, sleep disturbance and suicidal ideas. The patient is nervous/anxious. The patient is not hyperactive.        Objective:      Physical Exam   Constitutional: She is oriented to person, place, and time. She appears well-developed and well-nourished. She appears distressed.   HENT:   Head: Normocephalic and atraumatic.   Right Ear: External ear normal.   Left Ear: External ear normal.   Nose: Nose normal. Right sinus exhibits no maxillary sinus tenderness and no frontal sinus tenderness. Left sinus exhibits no maxillary sinus tenderness and no frontal sinus tenderness.   Mouth/Throat: Oropharynx is clear and moist. No oropharyngeal exudate.   Eyes: Conjunctivae, EOM and lids are normal. Pupils are equal, round, and reactive to light. Right eye exhibits no discharge. Left eye exhibits no discharge. Right conjunctiva is not injected. Right conjunctiva has no hemorrhage. Left conjunctiva is not injected. Left conjunctiva has no hemorrhage. No scleral icterus.   Neck: Normal range of motion. Neck supple. No JVD present. No tracheal deviation present. No thyromegaly present.   Cardiovascular: Normal rate and regular rhythm.   Pulmonary/Chest: Effort normal. No stridor. No respiratory distress. She exhibits no tenderness.   Abdominal: Soft. She exhibits no distension and no mass. There is no splenomegaly or hepatomegaly. There is no tenderness. There is no rebound.   Musculoskeletal: Normal range of motion. She exhibits no edema or tenderness.   Lymphadenopathy:     She has no cervical adenopathy.     She has no axillary adenopathy.        Right: No supraclavicular adenopathy present.        Left: No supraclavicular adenopathy present.   Neurological: She is alert and oriented to person, place, and time. No cranial nerve deficit. Coordination normal.   Skin: Skin is dry. No rash noted.  She is not diaphoretic. No erythema.   Psychiatric: She has a normal mood and affect. Her behavior is normal. Judgment and thought content normal.   Vitals reviewed.          Assessment:      1. Triple negative malignant neoplasm of breast           Plan:   Patient to initiate systemic therapy dose dense AC Taxol risk complications explained consent form signed proceed with initiation of systemic chemotherapy results of my risk testing negative.  Copies given to family return in 2 weeks CBC CMP for cycle 2 day 1          Yaniv Pop Jr, MD FACP

## 2019-01-18 NOTE — PLAN OF CARE
Problem: Adult Inpatient Plan of Care  Goal: Plan of Care Review  Outcome: Ongoing (interventions implemented as appropriate)  Pt states is just a little tired today and is ready to begin her chemo treatments.  New shawna

## 2019-01-21 ENCOUNTER — OFFICE VISIT (OUTPATIENT)
Dept: HEMATOLOGY/ONCOLOGY | Facility: CLINIC | Age: 79
End: 2019-01-21
Payer: MEDICARE

## 2019-01-21 ENCOUNTER — TELEPHONE (OUTPATIENT)
Dept: HEMATOLOGY/ONCOLOGY | Facility: CLINIC | Age: 79
End: 2019-01-21

## 2019-01-21 ENCOUNTER — INFUSION (OUTPATIENT)
Dept: INFUSION THERAPY | Facility: HOSPITAL | Age: 79
End: 2019-01-21
Attending: INTERNAL MEDICINE
Payer: MEDICARE

## 2019-01-21 VITALS
DIASTOLIC BLOOD PRESSURE: 81 MMHG | TEMPERATURE: 97 F | WEIGHT: 213.88 LBS | HEART RATE: 80 BPM | BODY MASS INDEX: 39.36 KG/M2 | OXYGEN SATURATION: 98 % | HEIGHT: 62 IN | SYSTOLIC BLOOD PRESSURE: 143 MMHG

## 2019-01-21 VITALS
HEIGHT: 62 IN | BODY MASS INDEX: 39.36 KG/M2 | DIASTOLIC BLOOD PRESSURE: 71 MMHG | WEIGHT: 213.88 LBS | SYSTOLIC BLOOD PRESSURE: 138 MMHG | HEART RATE: 70 BPM

## 2019-01-21 DIAGNOSIS — C50.919 TRIPLE NEGATIVE MALIGNANT NEOPLASM OF BREAST: Primary | ICD-10-CM

## 2019-01-21 DIAGNOSIS — D70.1 CHEMOTHERAPY INDUCED NEUTROPENIA: ICD-10-CM

## 2019-01-21 DIAGNOSIS — T45.1X5A CHEMOTHERAPY INDUCED NEUTROPENIA: ICD-10-CM

## 2019-01-21 PROCEDURE — 99999 PR PBB SHADOW E&M-EST. PATIENT-LVL III: ICD-10-PCS | Mod: PBBFAC,,, | Performed by: INTERNAL MEDICINE

## 2019-01-21 PROCEDURE — 99215 OFFICE O/P EST HI 40 MIN: CPT | Mod: 25,S$PBB,, | Performed by: INTERNAL MEDICINE

## 2019-01-21 PROCEDURE — 99213 OFFICE O/P EST LOW 20 MIN: CPT | Mod: PBBFAC,25 | Performed by: INTERNAL MEDICINE

## 2019-01-21 PROCEDURE — 96372 THER/PROPH/DIAG INJ SC/IM: CPT

## 2019-01-21 PROCEDURE — 63600175 PHARM REV CODE 636 W HCPCS: Mod: JG | Performed by: INTERNAL MEDICINE

## 2019-01-21 PROCEDURE — 99999 PR PBB SHADOW E&M-EST. PATIENT-LVL III: CPT | Mod: PBBFAC,,, | Performed by: INTERNAL MEDICINE

## 2019-01-21 PROCEDURE — 99215 PR OFFICE/OUTPT VISIT, EST, LEVL V, 40-54 MIN: ICD-10-PCS | Mod: 25,S$PBB,, | Performed by: INTERNAL MEDICINE

## 2019-01-21 RX ADMIN — PEGFILGRASTIM 6 MG: 6 INJECTION SUBCUTANEOUS at 09:01

## 2019-01-21 NOTE — NURSING
0927  1/21/19  Injection given without difficulties.Bandaid applied. Patient instructed to stay in the clinic for 15 minutes. Patient verbalized understanding and will notify nurse with any complaints.

## 2019-01-21 NOTE — TELEPHONE ENCOUNTER
Spoke with patients baltazar Hutchinson, scheduled appointment with Dr. Pop for this morning. Confirmed appointment time and location.

## 2019-01-21 NOTE — PROGRESS NOTES
Subjective:       Patient ID: Soo Holly is a 78 y.o. female.    Chief Complaint: Results and Chemotherapy    HPI 78-year-old female seen urgently this morning patient was given Cytoxan Adriamycin and had Neulasta injector placed the patient begin to take the injector off at the beginning of the injection we cannot be sure how much she received patient presents urgently today for further evaluation patient seen urgently this morning after contacted on-call physician yesterday you    Past Medical History:   Diagnosis Date    Hypertension      Family History   Problem Relation Age of Onset    Breast cancer Sister      Social History     Socioeconomic History    Marital status:      Spouse name: Not on file    Number of children: Not on file    Years of education: Not on file    Highest education level: Not on file   Social Needs    Financial resource strain: Not on file    Food insecurity - worry: Not on file    Food insecurity - inability: Not on file    Transportation needs - medical: Not on file    Transportation needs - non-medical: Not on file   Occupational History    Not on file   Tobacco Use    Smoking status: Never Smoker    Smokeless tobacco: Never Used   Substance and Sexual Activity    Alcohol use: No     Frequency: Never    Drug use: No    Sexual activity: Not Currently   Other Topics Concern    Not on file   Social History Narrative    Not on file     Past Surgical History:   Procedure Laterality Date    cataracts      HYSTERECTOMY      INSERTION, PORT-A-CATH Right 1/16/2019    Performed [facility] at [facility] OR    JOINT REPLACEMENT Right     Knee       Labs:  Lab Results   Component Value Date    WBC 6.41 01/18/2019    HGB 12.2 01/18/2019    HCT 38.5 01/18/2019    MCV 93 01/18/2019     01/18/2019     BMP  Lab Results   Component Value Date     01/18/2019    K 3.7 01/18/2019     01/18/2019    CO2 26 01/18/2019    BUN 10 01/18/2019     CREATININE 0.8 01/18/2019    CALCIUM 9.4 01/18/2019    ANIONGAP 7 (L) 01/18/2019    ESTGFRAFRICA >60 01/18/2019    EGFRNONAA >60 01/18/2019     Lab Results   Component Value Date    ALT 15 01/18/2019    AST 18 01/18/2019    ALKPHOS 121 01/18/2019    BILITOT 0.3 01/18/2019       No results found for: IRON, TIBC, FERRITIN, SATURATEDIRO  No results found for: [id number]  No results found for: FOLATE  No results found for: TSH      Review of Systems   Constitutional: Negative for activity change, appetite change, chills, diaphoresis, fatigue, fever and unexpected weight change.   HENT: Negative for congestion, dental problem, drooling, ear discharge, ear pain, facial swelling, hearing loss, mouth sores, nosebleeds, postnasal drip, rhinorrhea, sinus pressure, sneezing, sore throat, tinnitus, trouble swallowing and voice change.    Eyes: Negative for photophobia, pain, discharge, redness, itching and visual disturbance.   Respiratory: Negative for cough, choking, chest tightness, shortness of breath, wheezing and stridor.    Cardiovascular: Negative for chest pain, palpitations and leg swelling.   Gastrointestinal: Negative for abdominal distention, abdominal pain, anal bleeding, blood in stool, constipation, diarrhea, nausea, rectal pain and vomiting.   Endocrine: Negative for cold intolerance, heat intolerance, polydipsia, polyphagia and polyuria.   Genitourinary: Negative for decreased urine volume, difficulty urinating, dyspareunia, dysuria, enuresis, flank pain, frequency, genital sores, hematuria, menstrual problem, pelvic pain, urgency, vaginal bleeding, vaginal discharge and vaginal pain.   Musculoskeletal: Negative for arthralgias, back pain, gait problem, joint swelling, myalgias, neck pain and neck stiffness.   Skin: Negative for color change, pallor and rash.   Allergic/Immunologic: Negative for environmental allergies, food allergies and immunocompromised state.   Neurological: Negative for dizziness,  tremors, seizures, syncope, facial asymmetry, speech difficulty, weakness, light-headedness, numbness and headaches.   Hematological: Negative for adenopathy. Does not bruise/bleed easily.   Psychiatric/Behavioral: Positive for dysphoric mood. Negative for agitation, behavioral problems, confusion, decreased concentration, hallucinations, self-injury, sleep disturbance and suicidal ideas. The patient is nervous/anxious. The patient is not hyperactive.        Objective:      Physical Exam   Constitutional: She is oriented to person, place, and time. She appears well-developed and well-nourished. She appears distressed.   HENT:   Head: Normocephalic and atraumatic.   Right Ear: External ear normal.   Left Ear: External ear normal.   Nose: Nose normal. Right sinus exhibits no maxillary sinus tenderness and no frontal sinus tenderness. Left sinus exhibits no maxillary sinus tenderness and no frontal sinus tenderness.   Mouth/Throat: Oropharynx is clear and moist. No oropharyngeal exudate.   Eyes: Conjunctivae, EOM and lids are normal. Pupils are equal, round, and reactive to light. Right eye exhibits no discharge. Left eye exhibits no discharge. Right conjunctiva is not injected. Right conjunctiva has no hemorrhage. Left conjunctiva is not injected. Left conjunctiva has no hemorrhage. No scleral icterus.   Neck: Normal range of motion. Neck supple. No JVD present. No tracheal deviation present. No thyromegaly present.   Cardiovascular: Normal rate and regular rhythm.   Pulmonary/Chest: Effort normal. No stridor. No respiratory distress. She exhibits no tenderness.   Abdominal: Soft. She exhibits no distension and no mass. There is no splenomegaly or hepatomegaly. There is no tenderness. There is no rebound.   Musculoskeletal: Normal range of motion. She exhibits no edema or tenderness.   Lymphadenopathy:     She has no cervical adenopathy.     She has no axillary adenopathy.        Right: No supraclavicular adenopathy  present.        Left: No supraclavicular adenopathy present.   Neurological: She is alert and oriented to person, place, and time. No cranial nerve deficit. Coordination normal.   Skin: Skin is dry. No rash noted. She is not diaphoretic. No erythema.   Psychiatric: She has a normal mood and affect. Her behavior is normal. Judgment and thought content normal.   Vitals reviewed.          Assessment:      1. 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  At this point my recommendations are that the patient Neulasta be given by nursing staff.  If this is not able to accomplish from a insurance standpoint that I would probably recommend the patient receive Neupogen between 5 and 7 days.  Obviously we may need to extend the interval between her dosing but she is at a high risk for neutropenia because of advanced age in concurrent medical illnesses      Yaniv Pop Jr, MD FACP

## 2019-01-21 NOTE — NURSING
0900  1/21/19  Pt to clinic today with family stating she took her Neulasta OBI off too early - before the med began injecting.  After speaking with her to verify what time she removed the device, it was indeed determined that she did remove it prior to the infusion of the med.  After seeing Dr. Pop this am, he places orders for Neulasta 6 mg IM today as pt did not receive any medication with the OBI.

## 2019-01-21 NOTE — DISCHARGE INSTRUCTIONS
[facility]  [address]  [address] [address]  [phone] phone     [phone] fax  Hours of Operation: Monday- Friday 8:00am- 5:00pm  After hours phone  [phone]  Hematology / Oncology Physicians on call      Dr. Donn Kennedy                        Please call with any concerns regarding your appointment today.    HOME CARE AFTER CHEMOTHERAPY   Meals   Many patients feel sick and lose their appetites during treatment. Eat small meals several times a day. Choose bland foods with little taste or smell if you have problems with nausea. Be sure to cook all food thoroughly. This kills bacteria and helps you avoid intestinal infection. Soft foods are easier to swallow and digest.   Activity   Exercise keeps you strong and keeps your heart and lungs active. Talk to your doctor about an appropriate exercise program for you.   Skin Care   To prevent a skin infection, bathe or shower once a day. Use a moisturizing soap and wash with warm water. Avoid very hot or cold water. Chemotherapy can make your skin dry . Apply moisturizing lotion to help relieve dry skin. Some drugs used in high doses can cause slight burns to appear (like sunburn). Ask for a special cream to help relieve the burn and protect your skin.   Prevent Mouth Sores   During chemotherapy, many people get mouth sores. Do the following to help prevent mouth sores or to ease discomfort.   Brush your teeth with a soft-bristle toothbrush after every meal.  Don't use dental floss if your platelet count is below 50,000. Your doctor or nurse will tell you if this is the case.  Use an oral swab or special soft toothbrush if your gums bleed during regular brushing.  Use mouthwash as directed. If you can't tolerate commercial mouthwash, use salt and baking soda to clean your mouth. Mix 1 teaspoon of salt and 1 teaspoon of baking soda into a glass of water. Swish and spit.  Call your  doctor or return to this facility if you develop any of the following:   Sore throat   White patches in the mouth or throat   Fever of 100.4ºF (38ºC) or higher, or as directed by your healthcare provider  © 7084-2523 [employer], [address] [place] [address], [place] [address]. All rights reserved. This information is not intended as a substitute for professional medical care. Always follow your healthcare professional's       WAYS TO HELP PREVENT INFECTION         WASH YOUR HANDS OFTEN DURING THE DAY, ESPECIALLY BEFORE YOU EAT, AFTER USING THE BATHROOM, AND AFTER TOUCHING ANIMALS     STAY AWAY FROM PEOPLE WHO HAVE ILLNESSES YOU CAN CATCH; SUCH AS COLDS, FLU, CHICKEN POX     TRY TO AVOID CROWDS     STAY AWAY FROM CHILDREN WHO RECENTLY HAVE RECEIVED LIVE VIRUS VACCINES     MAINTAIN GOOD MOUTH CARE     DO NOT SQUEEZE OR SCRATCH PIMPLES     CLEAN CUTS & SCRAPES RIGHT AWAY AND DAILY UNTIL HEALED WITH WARM WATER, SOAP & AN ANTISEPTIC     AVOID CONTACT WITH LITTER BOXES, BIRD CAGES, & FISH TANKS     AVOID STANDING WATER, IE., BIRD BATHS, FLOWER POTS/VASES, OR HUMIDIFIERS     WEAR GLOVES WHEN GARDENING OR CLEANING UP AFTER OTHERS, ESPECIALLY BABIES & SMALL CHILDREN     DO NOT EAT RAW FISH, SEAFOOD, MEAT, OR EGGS       YOU HAVE STARTED ON CHEMOTHERAPY. IF YOU EXPERIENCE ANY OF THE FOLLOWING PROBLEMS, CALL THE OFFICE IMMEDIATELY.    *FEVER .0 OR GREATER    *CHILLS, ESPECIALLY SHAKING CHILLS, OR SWEATING    *A SEVERE COUGH OR SORE THROAT, OR SINUS PAIN/     PRESSURE    *REDNESS, SWELLING, OR TENDERNESS AROUND A WOUND,     SORE, PIMPLE, RECTAL AREA, OR IV SITE    *SORES OR ULCERS IN THE MOUTH    *BLISTERS ON THE LIPS OR SKIN    *FREQUENT URGENCY TO URINATE OR A BURNING FEELING   WHEN YOU URINATE    *BLOOD IN THE URINE OR STOOL    *ANY UNEXPLAINED BRUISING OR PROLONGED BLEEDING,     (NOSEBLEEDS OR BLEEDING GUMS)    *LOOSE BOWEL MOVEMENTS THAT DO NOT RESPOND TO     IMODIUM OR MORE THAN THREE TIMES A  DAY    *VOMITING UNRESPONSIVE TO ANTINAUSEA MEDICINE    *ANY UNUSUAL PHYSICAL SYMPTOMS THAT BEGAN AFTER     CHEMOTHERAPY    DURING WEEKDAYS, CALL AND ASK TO SPEAK DIRECTLY TO A NURSE.  AT OTHER TIMES, CALL THE OFFICE PHONE NUMBER; THE ANSWERING SERVICE WILL CONTACT THE ON-CALL PHYSICIAN.  SOMEONE IS AVAILABLE 24 HOURS A DAY, SEVEN DAYS A WEEK.    FALL PREVENTION   Falls often occur due to slipping, tripping or losing your balance. Here are ways to reduce your risk of falling again.   Was there anything that caused your fall that can be fixed, removed or replaced?   Make your home safe by keeping walkways clear of objects you may trip over.   Use non-slip pads under rugs.   Do not walk in poorly lit areas.   Do not stand on chairs or wobbly ladders.   Use caution when reaching overhead or looking upward. This position can cause a loss of balance.   Be sure your shoes fit properly, have non-slip bottoms and are in good condition.   Be cautious when going up and down stairs, curbs, and when walking on uneven sidewalks.   If your balance is poor, consider using a cane or walker.   If your fall was related to alcohol use, stop or limit alcohol intake.   If your fall was related to use of sleeping medicines, talk to your doctor about this. You may need to reduce your dosage at bedtime if you awaken during the night to go to the bathroom.   To reduce the need for nighttime bathroom trips:   Avoid drinking fluids for several hours before going to bed   Empty your bladder before going to bed   Men can keep a urinal at the bedside   © 5326-5806 [facility], [address] [place] [address], [place] [address]. All rights reserved. This information is not intended as a substitute for professional medical care. Always follow your healthcare professional's instructions.

## 2019-01-21 NOTE — TELEPHONE ENCOUNTER
----- Message from Yaniv Pop MD sent at 1/21/2019  7:42 AM CST -----  Please give her a call and let us get around this morning to see what  ----- Message -----  From: Vikas Javier MD  Sent: 1/20/2019  10:38 AM  To: Yaniv Pop MD, Christy Jimenez RN, #    I got a call from the patient's granddaughter on Saturday night stating that she thinks her grandmother took off the on body Neulasta injector before she got the actual injection.  I told her to come to the Cancer Center on Monday morning around 9:00 a.m. To verify this as she will need the Neulasta injection if she did not in fact get it.  Her next dose of chemotherapy will have to be appropriately rescheduled as well. Thank you.

## 2019-01-24 ENCOUNTER — HOSPITAL ENCOUNTER (OUTPATIENT)
Dept: RADIOLOGY | Facility: HOSPITAL | Age: 79
Discharge: HOME | End: 2019-01-24
Attending: SURGERY
Payer: MEDICARE

## 2019-01-24 DIAGNOSIS — C50.919 BREAST CANCER: ICD-10-CM

## 2019-01-24 DIAGNOSIS — C50.919 TRIPLE NEGATIVE MALIGNANT NEOPLASM OF BREAST: Primary | ICD-10-CM

## 2019-01-24 DIAGNOSIS — C50.411 MALIGNANT NEOPLASM OF UPPER-OUTER QUADRANT OF RIGHT BREAST IN FEMALE, ESTROGEN RECEPTOR NEGATIVE: ICD-10-CM

## 2019-01-24 DIAGNOSIS — Z17.1 MALIGNANT NEOPLASM OF UPPER-OUTER QUADRANT OF RIGHT BREAST IN FEMALE, ESTROGEN RECEPTOR NEGATIVE: ICD-10-CM

## 2019-01-24 PROCEDURE — 88305 TISSUE EXAM BY PATHOLOGIST: CPT | Performed by: PATHOLOGY

## 2019-01-24 PROCEDURE — 88360 TISSUE SPECIMEN TO PATHOLOGY, RADIOLOGY: ICD-10-PCS | Mod: 26,,, | Performed by: PATHOLOGY

## 2019-01-24 PROCEDURE — 88342 TISSUE SPECIMEN TO PATHOLOGY, RADIOLOGY: ICD-10-PCS | Mod: 26,59,, | Performed by: PATHOLOGY

## 2019-01-24 PROCEDURE — 38505 US BIOPSY LYMPH NODE AXILLA: ICD-10-PCS | Mod: RT,,, | Performed by: RADIOLOGY

## 2019-01-24 PROCEDURE — 38505 NEEDLE BIOPSY LYMPH NODES: CPT | Mod: RT,,, | Performed by: RADIOLOGY

## 2019-01-24 PROCEDURE — 27201068 US BIOPSY LYMPH NODE AXILLA

## 2019-01-24 PROCEDURE — 88305 TISSUE SPECIMEN TO PATHOLOGY, RADIOLOGY: ICD-10-PCS | Mod: 26,,, | Performed by: PATHOLOGY

## 2019-01-24 PROCEDURE — 88342 IMHCHEM/IMCYTCHM 1ST ANTB: CPT | Mod: 26,59,, | Performed by: PATHOLOGY

## 2019-01-24 PROCEDURE — 88305 TISSUE EXAM BY PATHOLOGIST: CPT | Mod: 26,,, | Performed by: PATHOLOGY

## 2019-01-24 PROCEDURE — 88360 TUMOR IMMUNOHISTOCHEM/MANUAL: CPT | Mod: 26,,, | Performed by: PATHOLOGY

## 2019-01-24 NOTE — NURSING
Pressure held on right axilla biopsy site for 10 mins, hemostasis was achieved, steri strips were applied, and wound was covered with 4x4 gauze and a tegaderm. Dressing clean, dry and intact with no drainage noted.  Discharge instructions given verbally and in writing, patient voiced understandings.  Patient discharged and accompanied by family member.

## 2019-01-30 VITALS
RESPIRATION RATE: 13 BRPM | DIASTOLIC BLOOD PRESSURE: 60 MMHG | TEMPERATURE: 98 F | SYSTOLIC BLOOD PRESSURE: 125 MMHG | WEIGHT: 207.69 LBS | HEART RATE: 80 BPM | OXYGEN SATURATION: 95 % | BODY MASS INDEX: 38.22 KG/M2 | HEIGHT: 62 IN

## 2019-01-30 NOTE — PROGRESS NOTES
[facility] [place]    RADIATION ONCOLOGY CONSULTATION    Name: Soo Holly  : 1940      Patient Referred To Radiation Oncology By:  Dr. 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 18 had a diagnostic bilateral mammogram due to palpable fullness in the right breast.  This showed an area 10 cm deep to the nipple at 3:00 position irregular asymmetric increased density with spiculation which was the area marked with a skin marker and measured 27 mm. This was not present in 2013.    2. 12/3/18 right breast core biopsy returned invasive ductal carcinoma, grade 3, ER negative, [place] negative, her 2 Stormy nonamplified, Ki-67 60%.    3. She was seen by Dr. Pop who noted on physical exam inflammatory breast cancer with edema of the skin.    4. 18 PET scan showed multiple FDG avid right axillary lymph nodes in level 1 and 2 at up to 17.2 SUV and possibly tiny mildly FDG avid level 3 lymph nodes more superiorly at 2.5 SUV.  There were FDG avid lymph nodes in the anterior mediastinum/superior right internal mammary chain at 5.3 SUV.  There was a focal area of uptake inferior to the medial clavicle just above the costochondral junction of the 1st rib likely an FDG avid lymph node at 8.8 SUV.  There was diffuse uptake in the right central breast and diffusely in the skin and inferior breast at 6.8 SUV and a focal uptake at a 0.2 SUV superior right breast.  There was intense uptake in the left deltoid muscle at 7.1 SUV which was focal.  There was also focal 4.8 SUV right deltoid uptake.  On my review there are at least 10 suspicious right axillary lymph nodes, and at least 3 right superior internal mammary lymph nodes.    5. 19 skin punch biopsy of the right breast returned invasive ductal carcinoma in the subcutaneous tissue and dermal lymphatics..     6. 18 [facility]  Genetic testing ordered.    7. 1/18/19 started chemotherapy with dose dense AC with Taxol.    8. 1/24/19 right axillary ultrasound-guided biopsy with clip placement was performed of an abnormal lymph node.  This returned invasive ductal carcinoma.    HISTORY OF PRESENT ILLNESS:  Soo Holly is a pleasant 78 y.o. female who 11/19/18 had a diagnostic bilateral mammogram due to palpable fullness in the right breast.  This showed an area 10 cm deep to the nipple at 3:00 position irregular asymmetric increased density with spiculation which was the area marked with a skin marker and measured 27 mm. This was not present in 2013.  12/3/18 right breast core biopsy returned invasive ductal carcinoma, grade 3, ER negative, [place] negative, her 2 Stormy nonamplified, Ki-67 60%.  She was seen by Dr. Pop who noted on physical exam inflammatory breast cancer with edema of the skin.  12/31/18 PET scan showed multiple FDG avid right axillary lymph nodes in level 1 and 2 at up to 17.2 SUV and possibly tiny mildly FDG avid level 3 lymph nodes more superiorly at 2.5 SUV.  There were FDG avid lymph nodes in the anterior mediastinum/superior right internal mammary chain at 5.3 SUV.  There was a focal area of uptake inferior to the medial clavicle just above the costochondral junction of the 1st rib likely an FDG avid lymph node at 8.8 SUV.  There was diffuse uptake in the right central breast and diffusely in the skin and inferior breast at 6.8 SUV and a focal uptake at a 0.2 SUV superior right breast.  There was intense uptake in the left deltoid muscle at 7.1 SUV which was focal.  There was also focal 4.8 SUV right deltoid uptake.  On my review there are at least 10 suspicious right axillary lymph nodes, and at least 3 right superior internal mammary lymph nodes.  She was seen by Dr. Santa of surgery.  1/8/19 skin punch biopsy of the right breast returned invasive ductal carcinoma in the subcutaneous tissue and dermal lymphatics..    1/9/18 [facility] [employer] testing has been ordered.  1/18/19 started chemotherapy plan with dose dense AC with Taxol.  1/24/19 right axillary ultrasound-guided biopsy with clip placement was performed of an abnormal lymph node.  This returned invasive ductal carcinoma.    REVIEW OF SYSTEMS: (Positive findings bold, otherwise negative)   Constitutional: fever, fatigue, weight change  Eyes: blurred vision in the past 3 months, double vision   ENT: ear pain, new mouth lesions, jaw pain, difficulty swallowing, sore throat  Cardiovascular: chest pain on exertion, reflux, extremity swelling  Respiratory: shortness of breath, dyspnea, cough, hemoptysis.   GI: abdominal pain, diarrhea, constipation, blood in stool, painful bowel movements  : painful or burning urination, denies blood in urine  Musculoskeletal: new bone or joint pains  Neurologic: headache, seizure, focal numbness or tingling, balance changes, speech changes  Lymph: new or enlarged lymph nodes  Psychiatric: depression, anxiety    PRIOR RADIATION HISTORY: None    PAST MEDICAL HISTORY:  Past Medical History:   Diagnosis Date    Hypertension        PAST SURGICAL HISTORY:  Past Surgical History:   Procedure Laterality Date    HYSTERECTOMY      JOINT REPLACEMENT Right     Knee       ALLERGIES:   Review of patient's allergies indicates:   Allergen Reactions    Iodine Other (See Comments)     Unknown      Sulfa (sulfonamide antibiotics) Other (See Comments)     Unknown         MEDICATIONS:    Current Outpatient Medications:     amLODIPine (NORVASC) 10 MG tablet, amlodipine 10 mg tablet, Disp: , Rfl:     aspirin (ECOTRIN) 81 MG EC tablet, Take 81 mg by mouth once daily., Disp: , Rfl:     ondansetron (ZOFRAN) 8 MG tablet, Take 1 tablet (8 mg total) by mouth every 12 (twelve) hours as needed for Nausea., Disp: 30 tablet, Rfl: 2    prochlorperazine (COMPAZINE) 10 MG tablet, Take 1 tablet (10 mg total) by mouth every 6 (six) hours as needed., Disp: 30 tablet,  "Rfl: 1    Current Facility-Administered Medications:     lidocaine (PF) 10 mg/ml (1%) injection 10 mg, 1 mL, Intradermal, Once, Quin Santa MD    SOCIAL HISTORY:  Social History     Socioeconomic History    Marital status:      Spouse name: Not on file    Number of children: Not on file    Years of education: Not on file    Highest education level: Not on file   Social Needs    Financial resource strain: Not on file    Food insecurity - worry: Not on file    Food insecurity - inability: Not on file    Transportation needs - medical: Not on file    Transportation needs - non-medical: Not on file   Occupational History    Not on file   Tobacco Use    Smoking status: Never Smoker    Smokeless tobacco: Never Used   Substance and Sexual Activity    Alcohol use: No     Frequency: Never    Drug use: No    Sexual activity: Not Currently   Other Topics Concern    Not on file   Social History Narrative    Not on file       FAMILY HISTORY:  Family History   Problem Relation Age of Onset    Breast cancer Sister        PHYSICAL EXAMINATION:  Constitutional: well appearing, no acute distress, ECOG 0 - Fully Active  Vitals:    BP (!) 146/77   Pulse 83   Temp 97.1 °F (36.2 °C)   Resp 18   Ht 5' 2" (1.575 m)   Wt 93.5 kg (206 lb 1.6 oz)   SpO2 99%   BMI 37.70 kg/m²   Lymphatic: no cervical, supraclavicular adenopathy, fixed right bulky axillary adenopathy involving all of level 1 and at least inferior lateral level 2  Breast:  Left breast without masses, skin changes or retractions, non-tender.  Right breast erythema, edema and peau d'orange occupying 80% of the breast skin centered around the areola.  Induration diffusely of the breast skin in this area.  Diffuse induration of the breast parenchyma.  Skin induration limits exam of the underlying parenchyma.  Nipple retraction.  Cardiovascular: regular rate, no murmurs, no edema of the upper or lower extremities, radial pulse 2+  Respiratory: " unlabored effort, clear to auscultation, no wheezes  Abdomen: soft, non-tender, no rigidity, no masses, no hepatomegaly  Spine: non-tender to percussion cervical, thoracic and lumbosacral spine    IMAGING AND LABORATORY FINDINGS: As per HPI; images reviewed personally.    ASSESSMENT:  Inflammatory breast carcinoma    PLAN:  We discussed the role of radiation treatment in inflammatory breast cancer.  Typical treatment is neoadjuvant chemotherapy followed by modified radical mastectomy and adjuvant chest wall and comprehensive xochilt radiation.  Radiation dose is 50 Gy in 25 fractions with a boost of 10-16 Gy to the mastectomy incision based on the tumor response.  She had multiple FDG avid lymph nodes, and a post chemotherapy PET scan will be helpful for radiation planning purposes to determine the need for xochilt boost.  She will likely receive about 3 months or so of chemotherapy, and hopefully she is able to tolerate the full dose. I will see her back in 3 months.    She may require IMRT for coverage of the extensive xochilt involvement including the axilla, supraclavicular and superior internal mammary chain.  Without IMRT, the lung dose may be unacceptably high and coverage of these regions May need to be compromised to achieve an acceptable lung dose.  Compromising coverage inflammatory cancer with extensive xochilt involvement could decrease her survival.      We discussed the techniques, toxicities and indications of radiation and I answered the patient's questions to their apparent satisfaction.

## 2019-02-01 ENCOUNTER — INITIAL CONSULT (OUTPATIENT)
Dept: RADIATION ONCOLOGY | Facility: CLINIC | Age: 79
End: 2019-02-01
Payer: MEDICARE

## 2019-02-01 ENCOUNTER — OFFICE VISIT (OUTPATIENT)
Dept: HEMATOLOGY/ONCOLOGY | Facility: CLINIC | Age: 79
End: 2019-02-01
Payer: MEDICARE

## 2019-02-01 ENCOUNTER — INFUSION (OUTPATIENT)
Dept: INFUSION THERAPY | Facility: HOSPITAL | Age: 79
End: 2019-02-01
Attending: INTERNAL MEDICINE
Payer: MEDICARE

## 2019-02-01 VITALS
DIASTOLIC BLOOD PRESSURE: 77 MMHG | SYSTOLIC BLOOD PRESSURE: 146 MMHG | WEIGHT: 206.13 LBS | RESPIRATION RATE: 18 BRPM | OXYGEN SATURATION: 99 % | BODY MASS INDEX: 37.93 KG/M2 | HEIGHT: 62 IN | TEMPERATURE: 97 F | HEART RATE: 83 BPM

## 2019-02-01 VITALS
TEMPERATURE: 98 F | OXYGEN SATURATION: 99 % | WEIGHT: 205.94 LBS | DIASTOLIC BLOOD PRESSURE: 77 MMHG | RESPIRATION RATE: 20 BRPM | HEART RATE: 83 BPM | SYSTOLIC BLOOD PRESSURE: 146 MMHG | HEIGHT: 62 IN | BODY MASS INDEX: 37.9 KG/M2

## 2019-02-01 DIAGNOSIS — C50.919 TRIPLE NEGATIVE MALIGNANT NEOPLASM OF BREAST: Primary | ICD-10-CM

## 2019-02-01 PROCEDURE — 99999 PR PBB SHADOW E&M-EST. PATIENT-LVL III: ICD-10-PCS | Mod: PBBFAC,,, | Performed by: INTERNAL MEDICINE

## 2019-02-01 PROCEDURE — 99999 PR PBB SHADOW E&M-EST. PATIENT-LVL III: ICD-10-PCS | Mod: PBBFAC,,, | Performed by: RADIOLOGY

## 2019-02-01 PROCEDURE — 96377 APPLICATON ON-BODY INJECTOR: CPT

## 2019-02-01 PROCEDURE — 96367 TX/PROPH/DG ADDL SEQ IV INF: CPT

## 2019-02-01 PROCEDURE — 25000003 PHARM REV CODE 250

## 2019-02-01 PROCEDURE — 25000003 PHARM REV CODE 250: Performed by: INTERNAL MEDICINE

## 2019-02-01 PROCEDURE — 96411 CHEMO IV PUSH ADDL DRUG: CPT

## 2019-02-01 PROCEDURE — 99215 OFFICE O/P EST HI 40 MIN: CPT | Mod: 25,S$PBB,, | Performed by: INTERNAL MEDICINE

## 2019-02-01 PROCEDURE — 63600175 PHARM REV CODE 636 W HCPCS: Mod: JG

## 2019-02-01 PROCEDURE — 99213 OFFICE O/P EST LOW 20 MIN: CPT | Mod: PBBFAC,25 | Performed by: RADIOLOGY

## 2019-02-01 PROCEDURE — 99205 PR OFFICE/OUTPT VISIT, NEW, LEVL V, 60-74 MIN: ICD-10-PCS | Mod: S$PBB,,, | Performed by: RADIOLOGY

## 2019-02-01 PROCEDURE — 96413 CHEMO IV INFUSION 1 HR: CPT

## 2019-02-01 PROCEDURE — 99205 OFFICE O/P NEW HI 60 MIN: CPT | Mod: S$PBB,,, | Performed by: RADIOLOGY

## 2019-02-01 PROCEDURE — 99999 PR PBB SHADOW E&M-EST. PATIENT-LVL III: CPT | Mod: PBBFAC,,, | Performed by: INTERNAL MEDICINE

## 2019-02-01 PROCEDURE — 99215 PR OFFICE/OUTPT VISIT, EST, LEVL V, 40-54 MIN: ICD-10-PCS | Mod: 25,S$PBB,, | Performed by: INTERNAL MEDICINE

## 2019-02-01 PROCEDURE — 99213 OFFICE O/P EST LOW 20 MIN: CPT | Mod: PBBFAC,27,25 | Performed by: INTERNAL MEDICINE

## 2019-02-01 PROCEDURE — 63600175 PHARM REV CODE 636 W HCPCS: Mod: TB | Performed by: INTERNAL MEDICINE

## 2019-02-01 PROCEDURE — 99999 PR PBB SHADOW E&M-EST. PATIENT-LVL III: CPT | Mod: PBBFAC,,, | Performed by: RADIOLOGY

## 2019-02-01 RX ORDER — DOXORUBICIN HYDROCHLORIDE 2 MG/ML
60 INJECTION, SOLUTION INTRAVENOUS
Status: CANCELLED | OUTPATIENT
Start: 2019-02-01

## 2019-02-01 RX ORDER — SODIUM CHLORIDE 0.9 % (FLUSH) 0.9 %
10 SYRINGE (ML) INJECTION
Status: CANCELLED | OUTPATIENT
Start: 2019-02-01

## 2019-02-01 RX ORDER — HEPARIN 100 UNIT/ML
500 SYRINGE INTRAVENOUS
Status: CANCELLED | OUTPATIENT
Start: 2019-02-01

## 2019-02-01 RX ORDER — DOXORUBICIN HYDROCHLORIDE 2 MG/ML
60 INJECTION, SOLUTION INTRAVENOUS
Status: COMPLETED | OUTPATIENT
Start: 2019-02-01 | End: 2019-02-01

## 2019-02-01 RX ORDER — HEPARIN 100 UNIT/ML
500 SYRINGE INTRAVENOUS
Status: DISCONTINUED | OUTPATIENT
Start: 2019-02-01 | End: 2019-02-01 | Stop reason: HOSPADM

## 2019-02-01 RX ADMIN — PEGFILGRASTIM 6 MG: KIT SUBCUTANEOUS at 04:02

## 2019-02-01 RX ADMIN — DOXORUBICIN HYDROCHLORIDE 122 MG: 2 INJECTION, SOLUTION INTRAVENOUS at 03:02

## 2019-02-01 RX ADMIN — CYCLOPHOSPHAMIDE 1220 MG: 1 INJECTION, POWDER, FOR SOLUTION INTRAVENOUS; ORAL at 03:02

## 2019-02-01 RX ADMIN — DEXAMETHASONE SODIUM PHOSPHATE: 4 INJECTION, SOLUTION INTRA-ARTICULAR; INTRALESIONAL; INTRAMUSCULAR; INTRAVENOUS; SOFT TISSUE at 02:02

## 2019-02-01 RX ADMIN — APREPITANT 130 MG: 130 INJECTION, EMULSION INTRAVENOUS at 02:02

## 2019-02-01 RX ADMIN — Medication 500 UNITS: at 04:02

## 2019-02-01 RX ADMIN — SODIUM CHLORIDE: 0.9 INJECTION, SOLUTION INTRAVENOUS at 02:02

## 2019-02-01 NOTE — PROGRESS NOTES
Subjective:       Patient ID: Soo Holly is a 79 y.o. female.    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 presents for cycle 2 day 1 of therapy tolerated 1st cycle well ECOG status 1 seen by Radiation Oncology today    Past Medical History:   Diagnosis Date    Hypertension      Family History   Problem Relation Age of Onset    Breast cancer Sister      Social History     Socioeconomic History    Marital status:      Spouse name: Not on file    Number of children: Not on file    Years of education: Not on file    Highest education level: Not on file   Social Needs    Financial resource strain: Not on file    Food insecurity - worry: Not on file    Food insecurity - inability: Not on file    Transportation needs - medical: Not on file    Transportation needs - non-medical: Not on file   Occupational History    Not on file   Tobacco Use    Smoking status: Never Smoker    Smokeless tobacco: Never Used   Substance and Sexual Activity    Alcohol use: No     Frequency: Never    Drug use: No    Sexual activity: Not Currently   Other Topics Concern    Not on file   Social History Narrative    Lives in [place], [place] (1.5h).  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     Past Surgical History:   Procedure Laterality Date    cataracts      HYSTERECTOMY      INSERTION, PORT-A-CATH Right 1/16/2019    [facility]formed [facility]    JOINT REPLACEMENT Right     Knee       Labs:  Lab Results   Component Value Date    WBC 12.22 02/01/2019    HGB 11.4 (L) 02/01/2019    HCT 36.5 (L) 02/01/2019    MCV 93 02/01/2019     02/01/2019     BMP  Lab Results   Component Value Date     02/01/2019    K 3.8 02/01/2019     02/01/2019    CO2 26 02/01/2019    BUN 8 02/01/2019    CREATININE 0.8 02/01/2019    CALCIUM 9.1 02/01/2019    ANIONGAP 9 02/01/2019    ESTGFRAFRICA >60 02/01/2019    EGFRNONAA >60 02/01/2019      Lab Results   Component Value Date    ALT 21 02/01/2019    AST 25 02/01/2019    ALKPHOS 133 02/01/2019    BILITOT 0.1 02/01/2019       No results found for: IRON, TIBC, FERRITIN, SATURATEDIRO  No results found for: [id number]  No results found for: FOLATE  No results found for: TSH      Review of Systems   Constitutional: Negative for activity change, appetite change, chills, diaphoresis, fatigue, fever and unexpected weight change.   HENT: Negative for congestion, dental problem, drooling, ear discharge, ear pain, facial swelling, hearing loss, mouth sores, nosebleeds, postnasal drip, rhinorrhea, sinus pressure, sneezing, sore throat, tinnitus, trouble swallowing and voice change.    Eyes: Negative for photophobia, pain, discharge, redness, itching and visual disturbance.   Respiratory: Negative for cough, choking, chest tightness, shortness of breath, wheezing and stridor.    Cardiovascular: Negative for chest pain, palpitations and leg swelling.   Gastrointestinal: Negative for abdominal distention, abdominal pain, anal bleeding, blood in stool, constipation, diarrhea, nausea, rectal pain and vomiting.   Endocrine: Negative for cold intolerance, heat intolerance, polydipsia, polyphagia and polyuria.   Genitourinary: Negative for decreased urine volume, difficulty urinating, dyspareunia, dysuria, enuresis, flank pain, frequency, genital sores, hematuria, menstrual problem, pelvic pain, urgency, vaginal bleeding, vaginal discharge and vaginal pain.   Musculoskeletal: Negative for arthralgias, back pain, gait problem, joint swelling, myalgias, neck pain and neck stiffness.   Skin: Negative for color change, pallor and rash.   Allergic/Immunologic: Negative for environmental allergies, food allergies and immunocompromised state.   Neurological: Negative for dizziness, tremors, seizures, syncope, facial asymmetry, speech difficulty, weakness, light-headedness, numbness and headaches.   Hematological: Negative for  adenopathy. Does not bruise/bleed easily.   Psychiatric/Behavioral: Negative for agitation, behavioral problems, confusion, decreased concentration, dysphoric mood, hallucinations, self-injury, sleep disturbance and suicidal ideas. The patient is not nervous/anxious and is not hyperactive.        Objective:      Physical Exam   Constitutional: She is oriented to person, place, and time. She appears well-developed and well-nourished. She appears distressed.   HENT:   Head: Normocephalic and atraumatic.   Right Ear: External ear normal.   Left Ear: External ear normal.   Nose: Nose normal. Right sinus exhibits no maxillary sinus tenderness and no frontal sinus tenderness. Left sinus exhibits no maxillary sinus tenderness and no frontal sinus tenderness.   Mouth/Throat: Oropharynx is clear and moist. No oropharyngeal exudate.   Eyes: Conjunctivae, EOM and lids are normal. Pupils are equal, round, and reactive to light. Right eye exhibits no discharge. Left eye exhibits no discharge. Right conjunctiva is not injected. Right conjunctiva has no hemorrhage. Left conjunctiva is not injected. Left conjunctiva has no hemorrhage. No scleral icterus.   Neck: Normal range of motion. Neck supple. No JVD present. No tracheal deviation present. No thyromegaly present.   Cardiovascular: Normal rate, regular rhythm, normal heart sounds and intact distal pulses.   Pulmonary/Chest: Effort normal and breath sounds normal. No stridor. No respiratory distress. She exhibits no tenderness.       Abdominal: Soft. Bowel sounds are normal. She exhibits no distension and no mass. There is no splenomegaly or hepatomegaly. There is no tenderness. There is no rebound.   Musculoskeletal: Normal range of motion. She exhibits no edema or tenderness.   Lymphadenopathy:     She has no cervical adenopathy.     She has no axillary adenopathy.        Right: No supraclavicular adenopathy present.        Left: No supraclavicular adenopathy present.    Neurological: She is alert and oriented to person, place, and time. No cranial nerve deficit. Coordination normal.   Skin: Skin is dry. No rash noted. She is not diaphoretic. No erythema.   Psychiatric: She has a normal mood and affect. Her behavior is normal. Judgment and thought content normal.   Vitals reviewed.          Assessment:      1. Triple negative malignant neoplasm of breast           Plan:   CBC except for treatment proceed with cycle 2 day 1 of therapy orders written reviewed patient did have trouble with Neulasta self injected will be educated again not take off too soon proceed with cycle 2 day 1 of therapy Radiation Oncology note appreciated for baseline initiation of therapy          Yaniv Pop Jr, MD FACP

## 2019-02-01 NOTE — PLAN OF CARE
Problem: Nausea and Vomiting (Chemotherapy Effects)  Goal: Fluid and Electrolyte Balance    Intervention: Prevent and Manage Nausea and Vomiting  Oral fluids encouraged.

## 2019-02-01 NOTE — PLAN OF CARE
Problem: Neutropenia (Chemotherapy Effects)  Goal: Absence of Infection    Intervention: Prevent Infection and Maximize Resistance  Infection precautions reviewed.  Pt states full understanding to call with any sign of infection, including temp >100.4.

## 2019-02-01 NOTE — NURSING
Pt tolerated Adriamycin/Cytoxan well. No adverse reaction noted. Pt education reinforced on chemo regimen, side effects, what to expect, and when to call . Pt and family verbalized understanding. I reviewed pt calendar w/ pt and understanding verbalized. Right PAC deaccessed and flushed w/ NS and heparin per protocol.  Neulasta OBI in place on right upper abdomen, verbal and written instructions given on when it will be ok to remove OBI, pt and her brother state understanding.

## 2019-02-01 NOTE — DISCHARGE INSTRUCTIONS
[facility]  [address] [address]  [address] [address]  [phone] phone     [phone] fax  Hours of Operation: Monday- Friday 8:00am- 5:00pm  After hours phone  [phone]  Hematology / Oncology Physicians on call      Dr. Donn Martin    Please call with any concerns regarding your appointment today.    Discharge Instructions for Chemotherapy   Your doctor prescribed a type of medication therapy for you called chemotherapy. Doctors prescribe chemotherapy for many different types of illnesses, including cancer. There are many types of chemotherapy. This sheet provides general guidelines on how you can take care of yourself after your chemotherapy.   Mouth Care   Dont be discouraged if you get mouth sores, even if you are following all your doctors instructions. Many people get mouth sores as a side effect of chemotherapy. Heres what you can do to prevent mouth sores:   Keep your mouth clean. Brush your teeth with a soft-bristle toothbrush after every meal.   Use an oral swab or special soft toothbrush if your gums bleed during regular brushing.   Dont use dental floss if your platelet count is below 50,000. Your doctor or nurse will tell you if this is the case.   Use any mouthwashes given to you as directed.   If you cant tolerate regular methods, use salt and baking soda to clean your mouth. Mix 1 teaspoon(s) of salt and 1 teaspoon(s) of baking soda into an 8-ounce glass of warm water. Swish and spit.   Watch your mouth and tongue for white patches. This is a sign of fungal infection, a common side effect of chemotherapy. Be sure to tell your doctor about these patches. Medication can be prescribed to help you fight the fungal infection.  Other Home Care   Try to exercise. Exercise keeps you strong and keeps your heart and lungs active. Walk as much as you can without becoming dizzy or weak.   Keep clean. During  chemotherapy, your body cant fight infection very well. Take short baths or showers.   Use moisturizing soap. Chemotherapy can make your skin dry.   Apply moisturizing lotion several times a day to help relieve dry skin.   Dont take very hot or very cold showers or baths.   Dont be surprised if your chemotherapy causes slight burns to your skin--usually on the hands and feet. Some drugs used in high doses cause this to happen. Ask for a special cream to help relieve the burn and protect your skin.   Let your doctor know if your throat is sore. You may have an infection that needs treatment.   Remember, many patients feel sick and lose their appetites during treatment. Eat small meals several times a day to keep your strength up.   Choose bland foods with little taste or smell if you are reacting strongly to food.   Be sure to cook all food thoroughly. This kills bacteria and helps you avoid infection.   Eat foods that are soft. Soft foods are less likely to cause stomach irritation.   Follow-Up   Make a follow-up appointment as directed by our staff.   When to Call Your Doctor   Call your doctor right away if you have any of the following:   Unexplained bleeding   Trouble concentrating   Ongoing fatigue   Shortness of breath, wheezing, or trouble breathing   Rapid, irregular heartbeat; chest pain   Dizziness, lightheadedness   Constant feeling of being cold   Hives or a cut or rash that swells, turns red, feels hot or painful, or begins to ooze   Fever of 100.4°F or higher, or chills    © 3426-7459 [facility], [address] [place] [address], [place] [address]. All rights reserved. This information is not intended as a substitute for professional medical care. Always follow your

## 2019-02-01 NOTE — PLAN OF CARE
Problem: Adult Inpatient Plan of Care  Goal: Plan of Care Review  Outcome: Ongoing (interventions implemented as appropriate)  Pt states she feels well today, only complaint is some fatigue.

## 2019-02-01 NOTE — LETTER
February 1, 2019      Yaniv Pop MD  [address]  [place] [address]           [facility] Radiation Oncology  [id number] [place] [id number]  Phone: [phone]  Fax: [phone]          Patient: Soo Holly   MR Number: [id number]   YOB: 1940   Date of Visit: 2/1/2019       Dear Dr. Yaniv Pop:    Thank you for referring Soo Holly to me for evaluation. Attached you will find relevant portions of my assessment and plan of care.    If you have questions, please do not hesitate to call me. I look forward to following Soo Holly along with you.    Sincerely,    Devaughn Plata II, MD    Enclosure  CC:  No Recipients    If you would like to receive this communication electronically, please contact [email or web address] or [phone] to request more information on [facility] access.    For providers and/or their staff who would like to refer a patient to [facility], please contact us through our one-stop-shop provider referral line, [facility], at [phone].    If you feel you have received this communication in error or would no longer like to receive these types of communications, please e-mail [email or web address]

## 2019-02-11 ENCOUNTER — PATIENT MESSAGE (OUTPATIENT)
Dept: HEMATOLOGY/ONCOLOGY | Facility: CLINIC | Age: 79
End: 2019-02-11

## 2019-02-15 ENCOUNTER — INFUSION (OUTPATIENT)
Dept: INFUSION THERAPY | Facility: HOSPITAL | Age: 79
End: 2019-02-15
Attending: INTERNAL MEDICINE
Payer: MEDICARE

## 2019-02-15 ENCOUNTER — OFFICE VISIT (OUTPATIENT)
Dept: HEMATOLOGY/ONCOLOGY | Facility: CLINIC | Age: 79
End: 2019-02-15
Payer: MEDICARE

## 2019-02-15 VITALS
HEART RATE: 88 BPM | TEMPERATURE: 99 F | WEIGHT: 205 LBS | DIASTOLIC BLOOD PRESSURE: 78 MMHG | OXYGEN SATURATION: 98 % | SYSTOLIC BLOOD PRESSURE: 145 MMHG | BODY MASS INDEX: 37.73 KG/M2 | HEIGHT: 62 IN

## 2019-02-15 DIAGNOSIS — E86.0 DEHYDRATION: ICD-10-CM

## 2019-02-15 DIAGNOSIS — C50.919 TRIPLE NEGATIVE MALIGNANT NEOPLASM OF BREAST: Primary | ICD-10-CM

## 2019-02-15 DIAGNOSIS — K12.1 STOMATITIS: ICD-10-CM

## 2019-02-15 PROCEDURE — 96377 APPLICATON ON-BODY INJECTOR: CPT

## 2019-02-15 PROCEDURE — 96367 TX/PROPH/DG ADDL SEQ IV INF: CPT

## 2019-02-15 PROCEDURE — S5010 5% DEXTROSE AND 0.45% SALINE: HCPCS | Performed by: INTERNAL MEDICINE

## 2019-02-15 PROCEDURE — 96411 CHEMO IV PUSH ADDL DRUG: CPT

## 2019-02-15 PROCEDURE — 25000003 PHARM REV CODE 250: Performed by: INTERNAL MEDICINE

## 2019-02-15 PROCEDURE — 99215 PR OFFICE/OUTPT VISIT, EST, LEVL V, 40-54 MIN: ICD-10-PCS | Mod: 25,S$PBB,, | Performed by: INTERNAL MEDICINE

## 2019-02-15 PROCEDURE — 99999 PR PBB SHADOW E&M-EST. PATIENT-LVL II: CPT | Mod: PBBFAC,,, | Performed by: INTERNAL MEDICINE

## 2019-02-15 PROCEDURE — 99215 OFFICE O/P EST HI 40 MIN: CPT | Mod: 25,S$PBB,, | Performed by: INTERNAL MEDICINE

## 2019-02-15 PROCEDURE — 99999 PR PBB SHADOW E&M-EST. PATIENT-LVL II: ICD-10-PCS | Mod: PBBFAC,,, | Performed by: INTERNAL MEDICINE

## 2019-02-15 PROCEDURE — 63600175 PHARM REV CODE 636 W HCPCS: Performed by: INTERNAL MEDICINE

## 2019-02-15 PROCEDURE — 96413 CHEMO IV INFUSION 1 HR: CPT

## 2019-02-15 PROCEDURE — 99212 OFFICE O/P EST SF 10 MIN: CPT | Mod: PBBFAC,25 | Performed by: INTERNAL MEDICINE

## 2019-02-15 RX ORDER — DOXORUBICIN HYDROCHLORIDE 2 MG/ML
60 INJECTION, SOLUTION INTRAVENOUS
Status: COMPLETED | OUTPATIENT
Start: 2019-02-15 | End: 2019-02-15

## 2019-02-15 RX ORDER — HEPARIN 100 UNIT/ML
500 SYRINGE INTRAVENOUS
Status: DISCONTINUED | OUTPATIENT
Start: 2019-02-15 | End: 2019-02-15 | Stop reason: HOSPADM

## 2019-02-15 RX ORDER — HEPARIN 100 UNIT/ML
500 SYRINGE INTRAVENOUS
Status: CANCELLED | OUTPATIENT
Start: 2019-02-15

## 2019-02-15 RX ORDER — DOXORUBICIN HYDROCHLORIDE 2 MG/ML
60 INJECTION, SOLUTION INTRAVENOUS
Status: CANCELLED | OUTPATIENT
Start: 2019-02-15

## 2019-02-15 RX ORDER — DEXTROSE MONOHYDRATE, SODIUM CHLORIDE, AND POTASSIUM CHLORIDE 50; 1.49; 4.5 G/1000ML; G/1000ML; G/1000ML
INJECTION, SOLUTION INTRAVENOUS
Status: CANCELLED | OUTPATIENT
Start: 2019-02-15 | End: 2019-02-15

## 2019-02-15 RX ORDER — SODIUM CHLORIDE 0.9 % (FLUSH) 0.9 %
10 SYRINGE (ML) INJECTION
Status: DISCONTINUED | OUTPATIENT
Start: 2019-02-15 | End: 2019-02-15 | Stop reason: HOSPADM

## 2019-02-15 RX ORDER — SODIUM CHLORIDE 0.9 % (FLUSH) 0.9 %
10 SYRINGE (ML) INJECTION
Status: CANCELLED | OUTPATIENT
Start: 2019-02-15

## 2019-02-15 RX ADMIN — PEGFILGRASTIM 6 MG: KIT SUBCUTANEOUS at 04:02

## 2019-02-15 RX ADMIN — HEPARIN 500 UNITS: 100 SYRINGE at 04:02

## 2019-02-15 RX ADMIN — APREPITANT 130 MG: 130 INJECTION, EMULSION INTRAVENOUS at 03:02

## 2019-02-15 RX ADMIN — DEXTROSE AND SODIUM CHLORIDE 1000 ML: 5; .45 INJECTION, SOLUTION INTRAVENOUS at 02:02

## 2019-02-15 RX ADMIN — DOXORUBICIN HYDROCHLORIDE 122 MG: 2 INJECTION, SOLUTION INTRAVENOUS at 03:02

## 2019-02-15 RX ADMIN — SODIUM CHLORIDE: 0.9 INJECTION, SOLUTION INTRAVENOUS at 02:02

## 2019-02-15 RX ADMIN — DEXAMETHASONE SODIUM PHOSPHATE: 4 INJECTION, SOLUTION INTRA-ARTICULAR; INTRALESIONAL; INTRAMUSCULAR; INTRAVENOUS; SOFT TISSUE at 02:02

## 2019-02-15 RX ADMIN — CYCLOPHOSPHAMIDE 1210 MG: 1 INJECTION, POWDER, FOR SOLUTION INTRAVENOUS; ORAL at 03:02

## 2019-02-15 NOTE — DISCHARGE INSTRUCTIONS
[facility]  [address]  [address] [address]  [phone] phone     [phone] fax  Hours of Operation: Monday- Friday 8:00am- 5:00pm  After hours phone  [phone]  Hematology / Oncology Physicians on call      Dr. Donn Kennedy                        Please call with any concerns regarding your appointment today.  Support Groups/Classes    Support groups and classes are being offered at the   [facility] and [facility]!!    Nutrition Class:  Second Wednesday of each month   at noon at the [facility].  Metastatic Support Group:  Third Tuesday of each month   at noon at the [facility].  Next Steps Class/Group: Second Thursday and Last Wednesday   of each month at noon at the [facility].  [facility] (Breast Cancer Support Group): First Tuesday of each month   at 5:30pm at the [facility] location.    If you are interested in attending or would like more information please ask our social workers or your nurse!

## 2019-02-15 NOTE — PROGRESS NOTES
Subjective:       Patient ID: Soo Holly is a 79 y.o. female.    Chief Complaint: Triple negative malignant neoplasm of breast [C50.919]  HPI: We have an opportunity to see Ms. Soo Holly in Hematology Oncology clinic at [facility] on 02/15/2019.  Ms. Soo Holly is a 79 y.o. 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  Reported feeling dehydrated with dizziness and unsteadiness, dry mouth.  Has stomatitis.       Triple negative malignant neoplasm of breast    12/24/2018 Initial Diagnosis     Triple negative malignant neoplasm of breast          Cancer Staged     Cancer Staging  Triple negative malignant neoplasm of breast  Staging form: Breast, AJCC 8th Edition  - Clinical stage from 12/31/2018: Stage IIIC (cT4, cN2b, cM0, G3, ER: Negative, AR: Negative, HER2: Negative) - Signed by Yaniv Pop MD on 12/31/2018          Genetic Testing     Patient has genetic testing done for MY RISK                                           Results revealed patient has the following mutationNO MUTATION:          Past Medical History:   Diagnosis Date    Hypertension      Family History   Problem Relation Age of Onset    Breast cancer Sister      Social History     Socioeconomic History    Marital status:      Spouse name: Not on file    Number of children: Not on file    Years of education: Not on file    Highest education level: Not on file   Social Needs    Financial resource strain: Not on file    Food insecurity - worry: Not on file    Food insecurity - inability: Not on file    Transportation needs - medical: Not on file    Transportation needs - non-medical: Not on file   Occupational History    Not on file   Tobacco Use    Smoking status: Never Smoker    Smokeless tobacco: Never Used   Substance and Sexual Activity    Alcohol use: No     Frequency: Never    Drug use: No    Sexual activity: Not Currently   Other Topics  Concern    Not on file   Social History Narrative    Lives in [place], [place] (1.5h).  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     Past Surgical History:   Procedure Laterality Date    cataracts      HYSTERECTOMY      INSERTION, PORT-A-CATH Right 1/16/2019    [facility]    JOINT REPLACEMENT Right     Knee     Current Outpatient Medications   Medication Sig Dispense Refill    amLODIPine (NORVASC) 10 MG tablet amlodipine 10 mg tablet once daily      ondansetron (ZOFRAN) 8 MG tablet Take 1 tablet (8 mg total) by mouth every 12 (twelve) hours as needed for Nausea. 30 tablet 2    prochlorperazine (COMPAZINE) 10 MG tablet Take 1 tablet (10 mg total) by mouth every 6 (six) hours as needed. 30 tablet 1    aspirin (ECOTRIN) 81 MG EC tablet Take 81 mg by mouth once daily.      diphenhydrAMINE-aluminum-magnesium hydroxide-simethicone-lidocaine HCl 2% Swish and spit 15 mLs every 4 (four) hours as needed. 500 mL 2     No current facility-administered medications for this visit.      Facility-Administered Medications Ordered in Other Visits   Medication Dose Route Frequency Provider Last Rate Last Dose    aprepitant (CINVANTI) 130 mg in sodium chloride 0.9% 148 mL infusion  130 mg Intravenous 1 time in Clinic/HOD Talib Kennedy Jr.,  mL/hr at 02/15/19 1502 130 mg at 02/15/19 1502    cyclophosphamide (CYTOXAN) 600 mg/m2 = 1,210 mg in sodium chloride 0.9% 310.5 mL chemo infusion  600 mg/m2 (Treatment Plan Recorded) Intravenous 1 time in Clinic/HOD Talib Kennedy Jr., MD        dextrose 5 % and 0.45% NaCl 5-0.45 % bolus 1,000 mL  1,000 mL Intravenous Once Long H. MD Veronica        DOXOrubicin chemo injection 122 mg  60 mg/m2 (Treatment Plan Recorded) Intravenous 1 time in Clinic/HOD Talib Kennedy Jr., MD        heparin, porcine (PF) 100 unit/mL injection flush 500 Units  500 Units Intravenous PRN Talib Kennedy Jr., MD        pegfilgrastim (NEULASTA (ON BODY INJECTOR)) injection 6 mg  6 mg  Subcutaneous Once Talib Kennedy Jr., MD        sodium chloride 0.9% flush 10 mL  10 mL Intravenous PRN Talib Kennedy Jr., MD           Labs:  Lab Results   Component Value Date    WBC 16.31 (H) 02/15/2019    HGB 10.4 (L) 02/15/2019    HCT 32.5 (L) 02/15/2019    MCV 91 02/15/2019     02/15/2019     BMP  Lab Results   Component Value Date     02/15/2019    K 3.7 02/15/2019     02/15/2019    CO2 28 02/15/2019    BUN 7 (L) 02/15/2019    CREATININE 0.8 02/15/2019    CALCIUM 9.1 02/15/2019    ANIONGAP 8 02/15/2019    ESTGFRAFRICA >60 02/15/2019    EGFRNONAA >60 02/15/2019     Lab Results   Component Value Date    ALT 14 02/15/2019    AST 18 02/15/2019    ALKPHOS 114 02/15/2019    BILITOT 0.1 02/15/2019       No results found for: IRON, TIBC, FERRITIN, SATURATEDIRO  No results found for: [id number]  No results found for: FOLATE  No results found for: TSH    I have reviewed the radiology reports and examined the scan/xray images.    Review of Systems   Constitutional: Positive for activity change, fatigue and unexpected weight change.   HENT: Negative.    Eyes: Negative.    Respiratory: Negative.    Cardiovascular: Negative.    Gastrointestinal: Negative.    Endocrine: Negative.    Genitourinary: Negative.    Musculoskeletal: Negative.    Skin: Negative.    Allergic/Immunologic: Negative.    Neurological: Negative.    Hematological: Negative.    Psychiatric/Behavioral: Negative.      ECOG SCORE    1 - Restricted in strenuous activity-ambulatory and able to carry out work of a light nature            Objective:     Vitals:    02/15/19 1255   BP: (!) 145/78   Pulse: 88   Temp: 98.7 °F (37.1 °C)   Body mass index is 37.5 kg/m².  Physical Exam   Constitutional: She is oriented to person, place, and time. She appears well-developed and well-nourished.   HENT:   Head: Normocephalic and atraumatic.   Eyes: EOM are normal. Pupils are equal, round, and reactive to light.   Neck: Normal range of motion. Neck  supple.   Cardiovascular: Normal rate and regular rhythm.   Pulmonary/Chest: Effort normal and breath sounds normal.   Abdominal: Soft. Bowel sounds are normal.   Musculoskeletal: Normal range of motion.   Neurological: She is alert and oriented to person, place, and time.   Skin: Skin is warm and dry.   Psychiatric: She has a normal mood and affect. Her behavior is normal. Judgment and thought content normal.   Nursing note and vitals reviewed.        Assessment:      1. Triple negative malignant neoplasm of breast    2. Stomatitis    3. Dehydration           Plan:     Triple negative malignant neoplasm of breast  Will give cycle 3 of 4 dose dense AC today, also onbody neulasta.      Stomatitis  -     diphenhydrAMINE-aluminum-magnesium  hydroxide-simethicone-lidocaine HCl 2%; Swish and spit 15 mLs every 4 (four) hours as needed.  Dispense: 500 mL; Refill: 2    Dehydration  1 liter D5 1/2 NS

## 2019-02-15 NOTE — PLAN OF CARE
Problem: Adult Inpatient Plan of Care  Goal: Plan of Care Review  Outcome: Ongoing (interventions implemented as appropriate)  I'm doing good, just a little more tired than usual.

## 2019-02-23 ENCOUNTER — NURSE TRIAGE (OUTPATIENT)
Dept: ADMINISTRATIVE | Facility: CLINIC | Age: 79
End: 2019-02-23

## 2019-02-23 NOTE — TELEPHONE ENCOUNTER
Reason for Disposition   Caller has medication question, adult has minor symptoms, caller declines triage, and triager answers question    Protocols used: ST MEDICATION QUESTION CALL-A-AH     calling regarding Pt having a headache and wanted to know what medication she can take for it.  Noted Pt already prescribed Compazine.  Care advice given.

## 2019-03-01 ENCOUNTER — OFFICE VISIT (OUTPATIENT)
Dept: HEMATOLOGY/ONCOLOGY | Facility: CLINIC | Age: 79
End: 2019-03-01
Payer: MEDICARE

## 2019-03-01 ENCOUNTER — INFUSION (OUTPATIENT)
Dept: INFUSION THERAPY | Facility: HOSPITAL | Age: 79
End: 2019-03-01
Attending: INTERNAL MEDICINE
Payer: MEDICARE

## 2019-03-01 VITALS
HEIGHT: 62 IN | TEMPERATURE: 98 F | BODY MASS INDEX: 38.09 KG/M2 | SYSTOLIC BLOOD PRESSURE: 122 MMHG | WEIGHT: 207 LBS | HEART RATE: 87 BPM | RESPIRATION RATE: 16 BRPM | DIASTOLIC BLOOD PRESSURE: 64 MMHG | OXYGEN SATURATION: 99 %

## 2019-03-01 DIAGNOSIS — C50.919 TRIPLE NEGATIVE MALIGNANT NEOPLASM OF BREAST: Primary | ICD-10-CM

## 2019-03-01 PROCEDURE — 96377 APPLICATON ON-BODY INJECTOR: CPT

## 2019-03-01 PROCEDURE — 96413 CHEMO IV INFUSION 1 HR: CPT

## 2019-03-01 PROCEDURE — 99213 OFFICE O/P EST LOW 20 MIN: CPT | Mod: PBBFAC,25 | Performed by: INTERNAL MEDICINE

## 2019-03-01 PROCEDURE — 63600175 PHARM REV CODE 636 W HCPCS: Mod: JG | Performed by: INTERNAL MEDICINE

## 2019-03-01 PROCEDURE — 99215 OFFICE O/P EST HI 40 MIN: CPT | Mod: 25,S$PBB,, | Performed by: INTERNAL MEDICINE

## 2019-03-01 PROCEDURE — 99999 PR PBB SHADOW E&M-EST. PATIENT-LVL III: CPT | Mod: PBBFAC,,, | Performed by: INTERNAL MEDICINE

## 2019-03-01 PROCEDURE — 99999 PR PBB SHADOW E&M-EST. PATIENT-LVL III: ICD-10-PCS | Mod: PBBFAC,,, | Performed by: INTERNAL MEDICINE

## 2019-03-01 PROCEDURE — 99215 PR OFFICE/OUTPT VISIT, EST, LEVL V, 40-54 MIN: ICD-10-PCS | Mod: 25,S$PBB,, | Performed by: INTERNAL MEDICINE

## 2019-03-01 PROCEDURE — 25000003 PHARM REV CODE 250: Performed by: INTERNAL MEDICINE

## 2019-03-01 PROCEDURE — 96411 CHEMO IV PUSH ADDL DRUG: CPT

## 2019-03-01 PROCEDURE — 96367 TX/PROPH/DG ADDL SEQ IV INF: CPT

## 2019-03-01 RX ORDER — HEPARIN 100 UNIT/ML
500 SYRINGE INTRAVENOUS
Status: CANCELLED | OUTPATIENT
Start: 2019-03-15

## 2019-03-01 RX ORDER — SODIUM CHLORIDE 0.9 % (FLUSH) 0.9 %
10 SYRINGE (ML) INJECTION
Status: CANCELLED | OUTPATIENT
Start: 2019-03-15

## 2019-03-01 RX ORDER — DIPHENHYDRAMINE HYDROCHLORIDE 50 MG/ML
50 INJECTION INTRAMUSCULAR; INTRAVENOUS ONCE AS NEEDED
Status: CANCELLED | OUTPATIENT
Start: 2019-03-15 | End: 2019-03-15

## 2019-03-01 RX ORDER — DEXAMETHASONE 4 MG/1
20 TABLET ORAL EVERY 6 HOURS
Qty: 20 TABLET | Refills: 2 | Status: SHIPPED | OUTPATIENT
Start: 2019-03-01 | End: 2019-04-29 | Stop reason: SDUPTHER

## 2019-03-01 RX ORDER — DOXORUBICIN HYDROCHLORIDE 2 MG/ML
60 INJECTION, SOLUTION INTRAVENOUS
Status: COMPLETED | OUTPATIENT
Start: 2019-03-01 | End: 2019-03-01

## 2019-03-01 RX ORDER — EPINEPHRINE 0.3 MG/.3ML
0.3 INJECTION SUBCUTANEOUS ONCE AS NEEDED
Status: CANCELLED | OUTPATIENT
Start: 2019-03-15 | End: 2019-03-15

## 2019-03-01 RX ORDER — DOXORUBICIN HYDROCHLORIDE 2 MG/ML
60 INJECTION, SOLUTION INTRAVENOUS
Status: CANCELLED | OUTPATIENT
Start: 2019-03-01

## 2019-03-01 RX ORDER — FAMOTIDINE 10 MG/ML
20 INJECTION INTRAVENOUS
Status: CANCELLED | OUTPATIENT
Start: 2019-03-15

## 2019-03-01 RX ORDER — HEPARIN 100 UNIT/ML
500 SYRINGE INTRAVENOUS
Status: DISCONTINUED | OUTPATIENT
Start: 2019-03-01 | End: 2019-03-01 | Stop reason: HOSPADM

## 2019-03-01 RX ORDER — HEPARIN 100 UNIT/ML
500 SYRINGE INTRAVENOUS
Status: CANCELLED | OUTPATIENT
Start: 2019-03-01

## 2019-03-01 RX ORDER — SODIUM CHLORIDE 0.9 % (FLUSH) 0.9 %
10 SYRINGE (ML) INJECTION
Status: CANCELLED | OUTPATIENT
Start: 2019-03-01

## 2019-03-01 RX ADMIN — HEPARIN 500 UNITS: 100 SYRINGE at 11:03

## 2019-03-01 RX ADMIN — PEGFILGRASTIM 6 MG: KIT SUBCUTANEOUS at 11:03

## 2019-03-01 RX ADMIN — APREPITANT 130 MG: 130 INJECTION, EMULSION INTRAVENOUS at 09:03

## 2019-03-01 RX ADMIN — DOXORUBICIN HYDROCHLORIDE 122 MG: 2 INJECTION, SOLUTION INTRAVENOUS at 09:03

## 2019-03-01 RX ADMIN — CYCLOPHOSPHAMIDE 1210 MG: 1 INJECTION, POWDER, FOR SOLUTION INTRAVENOUS; ORAL at 10:03

## 2019-03-01 RX ADMIN — DEXAMETHASONE SODIUM PHOSPHATE: 4 INJECTION, SOLUTION INTRA-ARTICULAR; INTRALESIONAL; INTRAMUSCULAR; INTRAVENOUS; SOFT TISSUE at 08:03

## 2019-03-01 NOTE — PLAN OF CARE
Problem: Adult Inpatient Plan of Care  Goal: Plan of Care Review  Outcome: Ongoing (interventions implemented as appropriate)  Pt states no complaints today

## 2019-03-01 NOTE — PROGRESS NOTES
Subjective:       Patient ID: Soo Holly is a 79 y.o. female.    Chief Complaint: Results; Chemotherapy; and Breast Cancer    HPI 79-year-old female locally advanced triple negative breast cancer right breast patient states that she has has congestion developed patient states that there is persistent pain right breast but continued shrinkage of tumor cycle 4 day 1 of therapy ECOG status 2    Past Medical History:   Diagnosis Date    Hypertension      Family History   Problem Relation Age of Onset    Breast cancer Sister      Social History     Socioeconomic History    Marital status:      Spouse name: Not on file    Number of children: Not on file    Years of education: Not on file    Highest education level: Not on file   Social Needs    Financial resource strain: Not on file    Food insecurity - worry: Not on file    Food insecurity - inability: Not on file    Transportation needs - medical: Not on file    Transportation needs - non-medical: Not on file   Occupational History    Not on file   Tobacco Use    Smoking status: Never Smoker    Smokeless tobacco: Never Used   Substance and Sexual Activity    Alcohol use: No     Frequency: Never    Drug use: No    Sexual activity: Not Currently   Other Topics Concern    Not on file   Social History Narrative    Lives in [place], [place] (1.5h).  Retired [employer].     Past Surgical History:   Procedure Laterality Date    cataracts      HYSTERECTOMY      INSERTION, PORT-A-CATH Right 1/16/2019    Performed by [facility] at [facility]    JOINT REPLACEMENT Right     Knee       Labs:  Lab Results   Component Value Date    WBC 12.95 (H) 03/01/2019    HGB 9.4 (L) 03/01/2019    HCT 29.8 (L) 03/01/2019    MCV 93 03/01/2019     03/01/2019     BMP  Lab Results   Component Value Date     03/01/2019    K 3.5 03/01/2019     03/01/2019    CO2 27 03/01/2019    BUN 7 (L) 03/01/2019    CREATININE 0.7 03/01/2019    CALCIUM 8.9  03/01/2019    ANIONGAP 11 03/01/2019    ESTGFRAFRICA >60 03/01/2019    EGFRNONAA >60 03/01/2019     Lab Results   Component Value Date    ALT 12 03/01/2019    AST 17 03/01/2019    ALKPHOS 127 03/01/2019    BILITOT 0.1 03/01/2019       No results found for: IRON, TIBC, FERRITIN, SATURATEDIRO  No results found for: [id number]  No results found for: FOLATE  No results found for: TSH      Review of Systems   Constitutional: Positive for activity change and fatigue. Negative for appetite change, chills, diaphoresis, fever and unexpected weight change.   HENT: Negative for congestion, dental problem, drooling, ear discharge, ear pain, facial swelling, hearing loss, mouth sores, nosebleeds, postnasal drip, rhinorrhea, sinus pressure, sneezing, sore throat, tinnitus, trouble swallowing and voice change.    Eyes: Negative for photophobia, pain, discharge, redness, itching and visual disturbance.   Respiratory: Negative for cough, choking, chest tightness, shortness of breath, wheezing and stridor.    Cardiovascular: Negative for chest pain, palpitations and leg swelling.   Gastrointestinal: Negative for abdominal distention, abdominal pain, anal bleeding, blood in stool, constipation, diarrhea, nausea, rectal pain and vomiting.   Endocrine: Negative for cold intolerance, heat intolerance, polydipsia, polyphagia and polyuria.   Genitourinary: Negative for decreased urine volume, difficulty urinating, dyspareunia, dysuria, enuresis, flank pain, frequency, genital sores, hematuria, menstrual problem, pelvic pain, urgency, vaginal bleeding, vaginal discharge and vaginal pain.   Musculoskeletal: Negative for arthralgias, back pain, gait problem, joint swelling, myalgias, neck pain and neck stiffness.   Skin: Negative for color change, pallor and rash.   Allergic/Immunologic: Negative for environmental allergies, food allergies and immunocompromised state.   Neurological: Positive for weakness. Negative for dizziness, tremors,  seizures, syncope, facial asymmetry, speech difficulty, light-headedness, numbness and headaches.   Hematological: Negative for adenopathy. Does not bruise/bleed easily.   Psychiatric/Behavioral: Positive for dysphoric mood. Negative for agitation, behavioral problems, confusion, decreased concentration, hallucinations, self-injury, sleep disturbance and suicidal ideas. The patient is nervous/anxious. The patient is not hyperactive.        Objective:      Physical Exam   Constitutional: She is oriented to person, place, and time. She appears well-developed and well-nourished. She appears distressed.   HENT:   Head: Normocephalic and atraumatic.   Right Ear: External ear normal.   Left Ear: External ear normal.   Nose: Nose normal. Right sinus exhibits no maxillary sinus tenderness and no frontal sinus tenderness. Left sinus exhibits no maxillary sinus tenderness and no frontal sinus tenderness.   Mouth/Throat: Oropharynx is clear and moist. No oropharyngeal exudate.   Eyes: Conjunctivae, EOM and lids are normal. Pupils are equal, round, and reactive to light. Right eye exhibits no discharge. Left eye exhibits no discharge. Right conjunctiva is not injected. Right conjunctiva has no hemorrhage. Left conjunctiva is not injected. Left conjunctiva has no hemorrhage. No scleral icterus.   Neck: Normal range of motion. Neck supple. No JVD present. No tracheal deviation present. No thyromegaly present.   Cardiovascular: Normal rate and regular rhythm.   Pulmonary/Chest: Effort normal. No stridor. No respiratory distress. She exhibits no tenderness.   Abdominal: Soft. She exhibits no distension and no mass. There is no splenomegaly or hepatomegaly. There is no tenderness. There is no rebound.   Musculoskeletal: Normal range of motion. She exhibits no edema or tenderness.   Lymphadenopathy:     She has no cervical adenopathy.     She has no axillary adenopathy.        Right: No supraclavicular adenopathy present.         Left: No supraclavicular adenopathy present.   Neurological: She is alert and oriented to person, place, and time. No cranial nerve deficit. Coordination normal.   Skin: Skin is dry. No rash noted. She is not diaphoretic. No erythema.   Psychiatric: She has a normal mood and affect. Her behavior is normal. Judgment and thought content normal.   Vitals reviewed.          Assessment:      1. Triple negative malignant neoplasm of breast           Plan:     CBC except for treatment proceed with dose dense AC will begin next dose of Taxol prescription for at Decadron given.  Continued responsive tumor told Nexium for GI looks        Yaniv Pop Jr, MD FACP

## 2019-03-01 NOTE — DISCHARGE INSTRUCTIONS
[facility]  [address]  [address] [address]  [phone] phone     [phone] fax  Hours of Operation: Monday- Friday 8:00am- 5:00pm  After hours phone  [phone]  Hematology / Oncology Physicians on call      Dr. Donn Kennedy                        Please call with any concerns regarding your appointment today.  FALL PREVENTION   Falls often occur due to slipping, tripping or losing your balance. Here are ways to reduce your risk of falling again.   Was there anything that caused your fall that can be fixed, removed or replaced?   Make your home safe by keeping walkways clear of objects you may trip over.   Use non-slip pads under rugs.   Do not walk in poorly lit areas.   Do not stand on chairs or wobbly ladders.   Use caution when reaching overhead or looking upward. This position can cause a loss of balance.   Be sure your shoes fit properly, have non-slip bottoms and are in good condition.   Be cautious when going up and down stairs, curbs, and when walking on uneven sidewalks.   If your balance is poor, consider using a cane or walker.   If your fall was related to alcohol use, stop or limit alcohol intake.   If your fall was related to use of sleeping medicines, talk to your doctor about this. You may need to reduce your dosage at bedtime if you awaken during the night to go to the bathroom.   To reduce the need for nighttime bathroom trips:   Avoid drinking fluids for several hours before going to bed   Empty your bladder before going to bed   Men can keep a urinal at the bedside   © 2627-3973 [employer] [facility], [address] [place] [address]. All rights reserved. This information is not intended as a substitute for professional medical care. Always follow your healthcare professional's instructions.  HOME CARE AFTER CHEMOTHERAPY   Meals   Many patients feel sick and lose their appetites during treatment. Eat small  meals several times a day. Choose bland foods with little taste or smell if you have problems with nausea. Be sure to cook all food thoroughly. This kills bacteria and helps you avoid intestinal infection. Soft foods are easier to swallow and digest.   Activity   Exercise keeps you strong and keeps your heart and lungs active. Talk to your doctor about an appropriate exercise program for you.   Skin Care   To prevent a skin infection, bathe or shower once a day. Use a moisturizing soap and wash with warm water. Avoid very hot or cold water. Chemotherapy can make your skin dry . Apply moisturizing lotion to help relieve dry skin. Some drugs used in high doses can cause slight burns to appear (like sunburn). Ask for a special cream to help relieve the burn and protect your skin.   Prevent Mouth Sores   During chemotherapy, many people get mouth sores. Do the following to help prevent mouth sores or to ease discomfort.   Brush your teeth with a soft-bristle toothbrush after every meal.  Don't use dental floss if your platelet count is below 50,000. Your doctor or nurse will tell you if this is the case.  Use an oral swab or special soft toothbrush if your gums bleed during regular brushing.  Use mouthwash as directed. If you can't tolerate commercial mouthwash, use salt and baking soda to clean your mouth. Mix 1 teaspoon of salt and 1 teaspoon of baking soda into a glass of water. Swish and spit.  Call your doctor or return to this facility if you develop any of the following:   Sore throat   White patches in the mouth or throat   Fever of 100.4ºF (38ºC) or higher, or as directed by your healthcare provider  © 9909-0381 [employer], [address] [place] [address], [place] [address]. All rights reserved. This information is not intended as a substitute for professional medical care. Always follow your healthcare professional's   WAYS TO HELP PREVENT INFECTION         WASH YOUR HANDS OFTEN DURING THE DAY, ESPECIALLY BEFORE  YOU EAT, AFTER USING THE BATHROOM, AND AFTER TOUCHING ANIMALS     STAY AWAY FROM PEOPLE WHO HAVE ILLNESSES YOU CAN CATCH; SUCH AS COLDS, FLU, CHICKEN POX     TRY TO AVOID CROWDS     STAY AWAY FROM CHILDREN WHO RECENTLY HAVE RECEIVED LIVE VIRUS VACCINES     MAINTAIN GOOD MOUTH CARE     DO NOT SQUEEZE OR SCRATCH PIMPLES     CLEAN CUTS & SCRAPES RIGHT AWAY AND DAILY UNTIL HEALED WITH WARM WATER, SOAP & AN ANTISEPTIC     AVOID CONTACT WITH LITTER BOXES, BIRD CAGES, & FISH TANKS     AVOID STANDING WATER, IE., BIRD BATHS, FLOWER POTS/VASES, OR HUMIDIFIERS     WEAR GLOVES WHEN GARDENING OR CLEANING UP AFTER OTHERS, ESPECIALLY BABIES & SMALL CHILDREN     DO NOT EAT RAW FISH, SEAFOOD, MEAT, OR EGGS    Remember to take your decadron pills the night before and morning of your next chemo.  Take 5 pills each time as directed on instruction sheet.

## 2019-03-15 ENCOUNTER — OFFICE VISIT (OUTPATIENT)
Dept: HEMATOLOGY/ONCOLOGY | Facility: CLINIC | Age: 79
End: 2019-03-15
Payer: MEDICARE

## 2019-03-15 ENCOUNTER — INFUSION (OUTPATIENT)
Dept: INFUSION THERAPY | Facility: HOSPITAL | Age: 79
End: 2019-03-15
Attending: INTERNAL MEDICINE
Payer: MEDICARE

## 2019-03-15 VITALS
DIASTOLIC BLOOD PRESSURE: 71 MMHG | RESPIRATION RATE: 18 BRPM | SYSTOLIC BLOOD PRESSURE: 142 MMHG | BODY MASS INDEX: 38.06 KG/M2 | WEIGHT: 206.81 LBS | HEART RATE: 96 BPM | TEMPERATURE: 97 F | OXYGEN SATURATION: 98 % | HEIGHT: 62 IN

## 2019-03-15 VITALS
OXYGEN SATURATION: 98 % | DIASTOLIC BLOOD PRESSURE: 77 MMHG | SYSTOLIC BLOOD PRESSURE: 134 MMHG | RESPIRATION RATE: 18 BRPM | HEART RATE: 81 BPM

## 2019-03-15 DIAGNOSIS — K12.1 STOMATITIS: ICD-10-CM

## 2019-03-15 DIAGNOSIS — C50.919 TRIPLE NEGATIVE MALIGNANT NEOPLASM OF BREAST: Primary | ICD-10-CM

## 2019-03-15 PROCEDURE — 99215 OFFICE O/P EST HI 40 MIN: CPT | Mod: 25,S$PBB,, | Performed by: INTERNAL MEDICINE

## 2019-03-15 PROCEDURE — 63600175 PHARM REV CODE 636 W HCPCS: Performed by: INTERNAL MEDICINE

## 2019-03-15 PROCEDURE — 99215 PR OFFICE/OUTPT VISIT, EST, LEVL V, 40-54 MIN: ICD-10-PCS | Mod: 25,S$PBB,, | Performed by: INTERNAL MEDICINE

## 2019-03-15 PROCEDURE — 96375 TX/PRO/DX INJ NEW DRUG ADDON: CPT

## 2019-03-15 PROCEDURE — 96413 CHEMO IV INFUSION 1 HR: CPT

## 2019-03-15 PROCEDURE — 99999 PR PBB SHADOW E&M-EST. PATIENT-LVL III: ICD-10-PCS | Mod: PBBFAC,,, | Performed by: INTERNAL MEDICINE

## 2019-03-15 PROCEDURE — 96367 TX/PROPH/DG ADDL SEQ IV INF: CPT

## 2019-03-15 PROCEDURE — 96377 APPLICATON ON-BODY INJECTOR: CPT

## 2019-03-15 PROCEDURE — 96415 CHEMO IV INFUSION ADDL HR: CPT

## 2019-03-15 PROCEDURE — S0028 INJECTION, FAMOTIDINE, 20 MG: HCPCS | Performed by: INTERNAL MEDICINE

## 2019-03-15 PROCEDURE — 99999 PR PBB SHADOW E&M-EST. PATIENT-LVL III: CPT | Mod: PBBFAC,,, | Performed by: INTERNAL MEDICINE

## 2019-03-15 PROCEDURE — 25000003 PHARM REV CODE 250: Performed by: INTERNAL MEDICINE

## 2019-03-15 PROCEDURE — 99213 OFFICE O/P EST LOW 20 MIN: CPT | Mod: PBBFAC,25 | Performed by: INTERNAL MEDICINE

## 2019-03-15 RX ORDER — HEPARIN 100 UNIT/ML
500 SYRINGE INTRAVENOUS
Status: DISCONTINUED | OUTPATIENT
Start: 2019-03-15 | End: 2019-03-15 | Stop reason: HOSPADM

## 2019-03-15 RX ORDER — FAMOTIDINE 10 MG/ML
20 INJECTION INTRAVENOUS
Status: COMPLETED | OUTPATIENT
Start: 2019-03-15 | End: 2019-03-15

## 2019-03-15 RX ORDER — DIPHENHYDRAMINE HYDROCHLORIDE 50 MG/ML
50 INJECTION INTRAMUSCULAR; INTRAVENOUS ONCE AS NEEDED
Status: DISCONTINUED | OUTPATIENT
Start: 2019-03-15 | End: 2019-03-15 | Stop reason: HOSPADM

## 2019-03-15 RX ADMIN — DIPHENHYDRAMINE HYDROCHLORIDE 50 MG: 50 INJECTION, SOLUTION INTRAMUSCULAR; INTRAVENOUS at 09:03

## 2019-03-15 RX ADMIN — DEXAMETHASONE SODIUM PHOSPHATE 20 MG: 4 INJECTION, SOLUTION INTRA-ARTICULAR; INTRALESIONAL; INTRAMUSCULAR; INTRAVENOUS; SOFT TISSUE at 09:03

## 2019-03-15 RX ADMIN — HEPARIN 500 UNITS: 100 SYRINGE at 01:03

## 2019-03-15 RX ADMIN — PEGFILGRASTIM 6 MG: KIT SUBCUTANEOUS at 01:03

## 2019-03-15 RX ADMIN — PACLITAXEL 354 MG: 6 INJECTION, SOLUTION INTRAVENOUS at 10:03

## 2019-03-15 RX ADMIN — SODIUM CHLORIDE: 0.9 INJECTION, SOLUTION INTRAVENOUS at 09:03

## 2019-03-15 RX ADMIN — FAMOTIDINE 20 MG: 10 INJECTION, SOLUTION INTRAVENOUS at 09:03

## 2019-03-15 NOTE — DISCHARGE INSTRUCTIONS
[facility]  [address]  [address] [address]  [phone] phone     [phone] fax  Hours of Operation: Monday- Friday 8:00am- 5:00pm  After hours phone  [phone]  Hematology / Oncology Physicians on call      Dr. Donn Kennedy                        Please call with any concerns regarding your appointment today.  FALL PREVENTION   Falls often occur due to slipping, tripping or losing your balance. Here are ways to reduce your risk of falling again.   Was there anything that caused your fall that can be fixed, removed or replaced?   Make your home safe by keeping walkways clear of objects you may trip over.   Use non-slip pads under rugs.   Do not walk in poorly lit areas.   Do not stand on chairs or wobbly ladders.   Use caution when reaching overhead or looking upward. This position can cause a loss of balance.   Be sure your shoes fit properly, have non-slip bottoms and are in good condition.   Be cautious when going up and down stairs, curbs, and when walking on uneven sidewalks.   If your balance is poor, consider using a cane or walker.   If your fall was related to alcohol use, stop or limit alcohol intake.   If your fall was related to use of sleeping medicines, talk to your doctor about this. You may need to reduce your dosage at bedtime if you awaken during the night to go to the bathroom.   To reduce the need for nighttime bathroom trips:   Avoid drinking fluids for several hours before going to bed   Empty your bladder before going to bed   Men can keep a urinal at the bedside   © 2385-7485 [employer] [facility], [address] [place] [address]. All rights reserved. This information is not intended as a substitute for professional medical care. Always follow your healthcare professional's instructions.  HOME CARE AFTER CHEMOTHERAPY   Meals   Many patients feel sick and lose their appetites during treatment. Eat small  meals several times a day. Choose bland foods with little taste or smell if you have problems with nausea. Be sure to cook all food thoroughly. This kills bacteria and helps you avoid intestinal infection. Soft foods are easier to swallow and digest.   Activity   Exercise keeps you strong and keeps your heart and lungs active. Talk to your doctor about an appropriate exercise program for you.   Skin Care   To prevent a skin infection, bathe or shower once a day. Use a moisturizing soap and wash with warm water. Avoid very hot or cold water. Chemotherapy can make your skin dry . Apply moisturizing lotion to help relieve dry skin. Some drugs used in high doses can cause slight burns to appear (like sunburn). Ask for a special cream to help relieve the burn and protect your skin.   Prevent Mouth Sores   During chemotherapy, many people get mouth sores. Do the following to help prevent mouth sores or to ease discomfort.   Brush your teeth with a soft-bristle toothbrush after every meal.  Don't use dental floss if your platelet count is below 50,000. Your doctor or nurse will tell you if this is the case.  Use an oral swab or special soft toothbrush if your gums bleed during regular brushing.  Use mouthwash as directed. If you can't tolerate commercial mouthwash, use salt and baking soda to clean your mouth. Mix 1 teaspoon of salt and 1 teaspoon of baking soda into a glass of water. Swish and spit.  Call your doctor or return to this facility if you develop any of the following:   Sore throat   White patches in the mouth or throat   Fever of 100.4ºF (38ºC) or higher, or as directed by your healthcare provider  © 4662-9588 [employer], [address] [place] [address], [place] [address]. All rights reserved. This information is not intended as a substitute for professional medical care. Always follow your healthcare professional's   WAYS TO HELP PREVENT INFECTION         WASH YOUR HANDS OFTEN DURING THE DAY, ESPECIALLY BEFORE  YOU EAT, AFTER USING THE BATHROOM, AND AFTER TOUCHING ANIMALS     STAY AWAY FROM PEOPLE WHO HAVE ILLNESSES YOU CAN CATCH; SUCH AS COLDS, FLU, CHICKEN POX     TRY TO AVOID CROWDS     STAY AWAY FROM CHILDREN WHO RECENTLY HAVE RECEIVED LIVE VIRUS VACCINES     MAINTAIN GOOD MOUTH CARE     DO NOT SQUEEZE OR SCRATCH PIMPLES     CLEAN CUTS & SCRAPES RIGHT AWAY AND DAILY UNTIL HEALED WITH WARM WATER, SOAP & AN ANTISEPTIC     AVOID CONTACT WITH LITTER BOXES, BIRD CAGES, & FISH TANKS     AVOID STANDING WATER, IE., BIRD BATHS, FLOWER POTS/VASES, OR HUMIDIFIERS     WEAR GLOVES WHEN GARDENING OR CLEANING UP AFTER OTHERS, ESPECIALLY BABIES & SMALL CHILDREN     DO NOT EAT RAW FISH, SEAFOOD, MEAT, OR EGGS

## 2019-03-15 NOTE — NURSING
Pt states she took oral decadron as RX last night and this am.  Written and verbal instructions to take decadron premeds 12 and 6 hours prior to next infusion given to patient .  She states understanding and verifies intent to comply with instructions

## 2019-03-15 NOTE — PROGRESS NOTES
Subjective:       Patient ID: Soo Holly is a 79 y.o. female.    Chief Complaint: Triple negative malignant neoplasm of breast [C50.919]  HPI: We have an opportunity to see Ms. Soo Holly in Hematology Oncology clinic at [facility] on 03/15/2019.  Ms. Soo Holly is a 79 y.o. woman with stage IIIC locally advanced triple negative breast cancer s/p AC 4 cycles.  Reported fatigue.       Triple negative malignant neoplasm of breast    12/24/2018 Initial Diagnosis     Triple negative malignant neoplasm of breast          Cancer Staged     Cancer Staging  Triple negative malignant neoplasm of breast  Staging form: Breast, AJCC 8th Edition  - Clinical stage from 12/31/2018: Stage IIIC (cT4, cN2b, cM0, G3, ER: Negative, TN: Negative, HER2: Negative) - Signed by Yaniv Pop MD on 12/31/2018          Genetic Testing     Patient has genetic testing done for MY RISK                                           Results revealed patient has the following mutationNO MUTATION:          Past Medical History:   Diagnosis Date    Hypertension      Family History   Problem Relation Age of Onset    Breast cancer Sister      Social History     Socioeconomic History    Marital status:      Spouse name: Not on file    Number of children: Not on file    Years of education: Not on file    Highest education level: Not on file   Social Needs    Financial resource strain: Not on file    Food insecurity - worry: Not on file    Food insecurity - inability: Not on file    Transportation needs - medical: Not on file    Transportation needs - non-medical: Not on file   Occupational History    Not on file   Tobacco Use    Smoking status: Never Smoker    Smokeless tobacco: Never Used   Substance and Sexual Activity    Alcohol use: No     Frequency: Never    Drug use: No    Sexual activity: Not Currently   Other Topics Concern    Not on file   Social History Narrative    Lives in  [place], [place] (1.5h).  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     Past Surgical History:   Procedure Laterality Date    cataracts      HYSTERECTOMY      INSERTION, PORT-A-CATH Right 1/16/2019    [facility]    JOINT REPLACEMENT Right     Knee     Current Outpatient Medications   Medication Sig Dispense Refill    amLODIPine (NORVASC) 10 MG tablet amlodipine 10 mg tablet once daily      dexamethasone (DECADRON) 4 MG Tab Take 5 tablets (20 mg) by mouth every 6 hours. Take 5 tablets (20 mg) by mouth 6 & 12 hours prior to TAXOL 20 tablet 2    diphenhydrAMINE-aluminum-magnesium hydroxide-simethicone-lidocaine HCl 2% Swish and spit 15 mLs every 4 (four) hours as needed. 500 mL 2    esomeprazole magnesium (NEXIUM ORAL) Take by mouth daily as needed.      ondansetron (ZOFRAN) 8 MG tablet Take 1 tablet (8 mg total) by mouth every 12 (twelve) hours as needed for Nausea. 30 tablet 2    prochlorperazine (COMPAZINE) 10 MG tablet Take 1 tablet (10 mg total) by mouth every 6 (six) hours as needed. 30 tablet 1     No current facility-administered medications for this visit.        Labs:  Lab Results   Component Value Date    WBC 21.25 (H) 03/15/2019    HGB 9.5 (L) 03/15/2019    HCT 29.5 (L) 03/15/2019    MCV 92 03/15/2019     03/15/2019     BMP  Lab Results   Component Value Date     03/15/2019    K 3.7 03/15/2019     03/15/2019    CO2 19 (L) 03/15/2019    BUN 5 (L) 03/15/2019    CREATININE 0.9 03/15/2019    CALCIUM 9.1 03/15/2019    ANIONGAP 15 03/15/2019    ESTGFRAFRICA >60 03/15/2019    EGFRNONAA >60 03/15/2019     Lab Results   Component Value Date    ALT 14 03/15/2019    AST 15 03/15/2019    ALKPHOS 154 (H) 03/15/2019    BILITOT 0.1 03/15/2019       No results found for: IRON, TIBC, FERRITIN, SATURATEDIRO  No results found for: [id number]  No results found for: FOLATE  No results found for: TSH    I have reviewed the radiology reports and examined the scan/xray images.    Review of  Systems   Constitutional: Positive for fatigue.   HENT: Negative.    Eyes: Negative.    Respiratory: Negative.    Cardiovascular: Negative.    Gastrointestinal: Negative.    Endocrine: Negative.    Genitourinary: Negative.    Musculoskeletal: Negative.    Skin: Negative.    Allergic/Immunologic: Negative.    Neurological: Negative.    Hematological: Negative.    Psychiatric/Behavioral: Negative.      ECOG SCORE    1 - Restricted in strenuous activity-ambulatory and able to carry out work of a light nature            Objective:     Vitals:    03/15/19 0815   BP: (!) 142/71   Pulse: 96   Resp: 18   Temp: 96.9 °F (36.1 °C)   Body mass index is 37.82 kg/m².  Physical Exam   Constitutional: She is oriented to person, place, and time. She appears well-developed and well-nourished.   HENT:   Head: Normocephalic and atraumatic.   Eyes: Conjunctivae and EOM are normal.   Neck: Normal range of motion. Neck supple.   Cardiovascular: Normal rate and regular rhythm.   Pulmonary/Chest: Effort normal and breath sounds normal.   Abdominal: Soft. Bowel sounds are normal.   Musculoskeletal: Normal range of motion.   Neurological: She is alert and oriented to person, place, and time.   Skin: Skin is warm and dry.   Psychiatric: She has a normal mood and affect. Her behavior is normal. Judgment and thought content normal.   Nursing note and vitals reviewed.        Assessment:      1. Triple negative malignant neoplasm of breast    2. Stomatitis           Plan:     Triple negative malignant neoplasm of breast  Will start cycle 1 of 4 dose dense taxol today.  Give neulasta onbody injection.    Stomatitis  Under control with magic mouthwash.

## 2019-03-15 NOTE — PLAN OF CARE
Problem: Adult Inpatient Plan of Care  Goal: Plan of Care Review  Outcome: Ongoing (interventions implemented as appropriate)  Pt states feels well no complaints except that she gets tired easily

## 2019-03-18 DIAGNOSIS — R11.0 CHEMOTHERAPY-INDUCED NAUSEA: ICD-10-CM

## 2019-03-18 DIAGNOSIS — T45.1X5A CHEMOTHERAPY-INDUCED NAUSEA: ICD-10-CM

## 2019-03-18 RX ORDER — PROCHLORPERAZINE MALEATE 10 MG
TABLET ORAL
Qty: 30 TABLET | Refills: 1 | Status: SHIPPED | OUTPATIENT
Start: 2019-03-18 | End: 2019-04-15 | Stop reason: SDUPTHER

## 2019-03-21 ENCOUNTER — DOCUMENTATION ONLY (OUTPATIENT)
Dept: HEMATOLOGY/ONCOLOGY | Facility: CLINIC | Age: 79
End: 2019-03-21

## 2019-03-21 NOTE — PROGRESS NOTES
On Friday 3/15 pt rec'd [facility] and visit from [facility]  while in infusion.

## 2019-03-25 ENCOUNTER — CLINICAL SUPPORT (OUTPATIENT)
Dept: CARDIOLOGY | Facility: CLINIC | Age: 79
End: 2019-03-25
Attending: INTERNAL MEDICINE
Payer: MEDICARE

## 2019-03-25 DIAGNOSIS — I10 ESSENTIAL HYPERTENSION: ICD-10-CM

## 2019-03-25 DIAGNOSIS — C50.919 TRIPLE NEGATIVE MALIGNANT NEOPLASM OF BREAST: ICD-10-CM

## 2019-03-25 LAB
DIASTOLIC DYSFUNCTION: NO
ESTIMATED PA SYSTOLIC PRESSURE: 20.47
MITRAL VALVE MOBILITY: NORMAL
RETIRED EF AND QEF - SEE NOTES: 65 (ref 55–65)

## 2019-03-25 PROCEDURE — 93306 2D ECHO WITH COLOR FLOW DOPPLER: ICD-10-PCS | Mod: 26,S$PBB,, | Performed by: NUCLEAR MEDICINE

## 2019-03-25 PROCEDURE — 93306 TTE W/DOPPLER COMPLETE: CPT | Mod: PBBFAC | Performed by: NUCLEAR MEDICINE

## 2019-03-29 ENCOUNTER — OFFICE VISIT (OUTPATIENT)
Dept: HEMATOLOGY/ONCOLOGY | Facility: CLINIC | Age: 79
End: 2019-03-29
Payer: MEDICARE

## 2019-03-29 ENCOUNTER — INFUSION (OUTPATIENT)
Dept: INFUSION THERAPY | Facility: HOSPITAL | Age: 79
End: 2019-03-29
Attending: INTERNAL MEDICINE
Payer: MEDICARE

## 2019-03-29 VITALS
HEIGHT: 62 IN | DIASTOLIC BLOOD PRESSURE: 77 MMHG | TEMPERATURE: 97 F | OXYGEN SATURATION: 98 % | HEART RATE: 94 BPM | WEIGHT: 202.38 LBS | SYSTOLIC BLOOD PRESSURE: 142 MMHG | BODY MASS INDEX: 37.24 KG/M2

## 2019-03-29 VITALS — SYSTOLIC BLOOD PRESSURE: 143 MMHG | HEART RATE: 85 BPM | DIASTOLIC BLOOD PRESSURE: 79 MMHG

## 2019-03-29 DIAGNOSIS — C50.919 TRIPLE NEGATIVE MALIGNANT NEOPLASM OF BREAST: Primary | ICD-10-CM

## 2019-03-29 PROCEDURE — 96367 TX/PROPH/DG ADDL SEQ IV INF: CPT

## 2019-03-29 PROCEDURE — 99999 PR PBB SHADOW E&M-EST. PATIENT-LVL III: ICD-10-PCS | Mod: PBBFAC,,, | Performed by: INTERNAL MEDICINE

## 2019-03-29 PROCEDURE — 96415 CHEMO IV INFUSION ADDL HR: CPT

## 2019-03-29 PROCEDURE — 99215 PR OFFICE/OUTPT VISIT, EST, LEVL V, 40-54 MIN: ICD-10-PCS | Mod: 25,S$PBB,, | Performed by: INTERNAL MEDICINE

## 2019-03-29 PROCEDURE — 99213 OFFICE O/P EST LOW 20 MIN: CPT | Mod: PBBFAC | Performed by: INTERNAL MEDICINE

## 2019-03-29 PROCEDURE — 99999 PR PBB SHADOW E&M-EST. PATIENT-LVL III: CPT | Mod: PBBFAC,,, | Performed by: INTERNAL MEDICINE

## 2019-03-29 PROCEDURE — 25000003 PHARM REV CODE 250: Performed by: INTERNAL MEDICINE

## 2019-03-29 PROCEDURE — 96377 APPLICATON ON-BODY INJECTOR: CPT | Mod: 59

## 2019-03-29 PROCEDURE — 99215 OFFICE O/P EST HI 40 MIN: CPT | Mod: 25,S$PBB,, | Performed by: INTERNAL MEDICINE

## 2019-03-29 PROCEDURE — 96413 CHEMO IV INFUSION 1 HR: CPT

## 2019-03-29 PROCEDURE — 63600175 PHARM REV CODE 636 W HCPCS: Performed by: INTERNAL MEDICINE

## 2019-03-29 PROCEDURE — 96375 TX/PRO/DX INJ NEW DRUG ADDON: CPT

## 2019-03-29 PROCEDURE — S0028 INJECTION, FAMOTIDINE, 20 MG: HCPCS | Performed by: INTERNAL MEDICINE

## 2019-03-29 RX ORDER — HEPARIN 100 UNIT/ML
500 SYRINGE INTRAVENOUS
Status: CANCELLED | OUTPATIENT
Start: 2019-03-29

## 2019-03-29 RX ORDER — HEPARIN 100 UNIT/ML
500 SYRINGE INTRAVENOUS
Status: DISCONTINUED | OUTPATIENT
Start: 2019-03-29 | End: 2019-03-29 | Stop reason: HOSPADM

## 2019-03-29 RX ORDER — DIPHENHYDRAMINE HYDROCHLORIDE 50 MG/ML
50 INJECTION INTRAMUSCULAR; INTRAVENOUS ONCE AS NEEDED
Status: DISCONTINUED | OUTPATIENT
Start: 2019-03-29 | End: 2019-03-29 | Stop reason: HOSPADM

## 2019-03-29 RX ORDER — EPINEPHRINE 0.3 MG/.3ML
0.3 INJECTION SUBCUTANEOUS ONCE AS NEEDED
Status: CANCELLED | OUTPATIENT
Start: 2019-03-29

## 2019-03-29 RX ORDER — DIPHENHYDRAMINE HYDROCHLORIDE 50 MG/ML
50 INJECTION INTRAMUSCULAR; INTRAVENOUS ONCE AS NEEDED
Status: CANCELLED | OUTPATIENT
Start: 2019-03-29

## 2019-03-29 RX ORDER — SODIUM CHLORIDE 0.9 % (FLUSH) 0.9 %
10 SYRINGE (ML) INJECTION
Status: CANCELLED | OUTPATIENT
Start: 2019-03-29

## 2019-03-29 RX ORDER — FAMOTIDINE 10 MG/ML
20 INJECTION INTRAVENOUS
Status: CANCELLED | OUTPATIENT
Start: 2019-03-29

## 2019-03-29 RX ORDER — FAMOTIDINE 10 MG/ML
20 INJECTION INTRAVENOUS
Status: COMPLETED | OUTPATIENT
Start: 2019-03-29 | End: 2019-03-29

## 2019-03-29 RX ADMIN — PEGFILGRASTIM 6 MG: KIT SUBCUTANEOUS at 01:03

## 2019-03-29 RX ADMIN — DIPHENHYDRAMINE HYDROCHLORIDE 50 MG: 50 INJECTION, SOLUTION INTRAMUSCULAR; INTRAVENOUS at 09:03

## 2019-03-29 RX ADMIN — FAMOTIDINE 20 MG: 10 INJECTION, SOLUTION INTRAVENOUS at 08:03

## 2019-03-29 RX ADMIN — SODIUM CHLORIDE: 0.9 INJECTION, SOLUTION INTRAVENOUS at 08:03

## 2019-03-29 RX ADMIN — PACLITAXEL 354 MG: 6 INJECTION, SOLUTION INTRAVENOUS at 10:03

## 2019-03-29 RX ADMIN — HEPARIN SODIUM (PORCINE) LOCK FLUSH IV SOLN 100 UNIT/ML 500 UNITS: 100 SOLUTION at 01:03

## 2019-03-29 RX ADMIN — DEXAMETHASONE SODIUM PHOSPHATE 20 MG: 4 INJECTION, SOLUTION INTRA-ARTICULAR; INTRALESIONAL; INTRAMUSCULAR; INTRAVENOUS; SOFT TISSUE at 09:03

## 2019-03-29 NOTE — NURSING
Pt tolerated DD Taxol #2 well. No adverse reaction noted. Pt education reinforced on chemo regimen, side effects, what to expect, and when to call . Pt verbalized understanding. I reviewed pt calendar w/ pt and understanding verbalized. Right PAC deaccessed and flushed w/ NS and heparin per protocol.  Neulasta OBI in place on right lower abdomen, verbal and written instructions given on when it will be ok to remove OBI, pt and family state understanding.  Dexamethasone premed instructions reviewed with pt, she states full understanding to take #5 tabs 12 hours and 6 hours before next Taxol treatment.

## 2019-03-29 NOTE — DISCHARGE INSTRUCTIONS
[facility]  [address] [facility]  [address]  [phone] phone     [phone] fax  Hours of Operation: Monday- Friday 8:00am- 5:00pm  After hours phone  [phone]  Hematology / Oncology Physicians on call      Dr. Donn Martin      Please call with any concerns regarding your appointment today.    FALL PREVENTION   Falls often occur due to slipping, tripping or losing your balance. Here are ways to reduce your risk of falling again.   Was there anything that caused your fall that can be fixed, removed or replaced?   Make your home safe by keeping walkways clear of objects you may trip over.   Use non-slip pads under rugs.   Do not walk in poorly lit areas.   Do not stand on chairs or wobbly ladders.   Use caution when reaching overhead or looking upward. This position can cause a loss of balance.   Be sure your shoes fit properly, have non-slip bottoms and are in good condition.   Be cautious when going up and down stairs, curbs, and when walking on uneven sidewalks.   If your balance is poor, consider using a cane or walker.   If your fall was related to alcohol use, stop or limit alcohol intake.   If your fall was related to use of sleeping medicines, talk to your doctor about this. You may need to reduce your dosage at bedtime if you awaken during the night to go to the bathroom.   To reduce the need for nighttime bathroom trips:   Avoid drinking fluids for several hours before going to bed   Empty your bladder before going to bed   Men can keep a urinal at the bedside   © 6414-1090 [facility], [address] [place] [address]. All rights reserved. This information is not intended as a substitute for professional medical care. Always follow your healthcare professional's instructions.      IF YOU EXPERIENCE ANY OF THE FOLLOWING PROBLEMS, CALL THE OFFICE IMMEDIATELY.    *FEVER .0 OR  GREATER    *CHILLS, ESPECIALLY SHAKING CHILLS, OR SWEATING    *A SEVERE COUGH OR SORE THROAT, OR SINUS PAIN/     PRESSURE    *REDNESS, SWELLING, OR TENDERNESS AROUND A WOUND,     SORE, PIMPLE, RECTAL AREA, OR IV SITE    *SORES OR ULCERS IN THE MOUTH    *BLISTERS ON THE LIPS OR SKIN    *FREQUENT URGENCY TO URINATE OR A BURNING FEELING   WHEN YOU URINATE    *BLOOD IN THE URINE OR STOOL    *ANY UNEXPLAINED BRUISING OR PROLONGED BLEEDING,     (NOSEBLEEDS OR BLEEDING GUMS)    *LOOSE BOWEL MOVEMENTS THAT DO NOT RESPOND TO     IMODIUM OR MORE THAN THREE TIMES A DAY    *VOMITING UNRESPONSIVE TO ANTINAUSEA MEDICINE    *ANY UNUSUAL PHYSICAL SYMPTOMS THAT BEGAN AFTER     CHEMOTHERAPY    DURING WEEKDAYS, CALL AND ASK TO SPEAK DIRECTLY TO A NURSE.  AT OTHER TIMES, CALL THE OFFICE PHONE NUMBER; THE ANSWERING SERVICE WILL CONTACT THE ON-CALL PHYSICIAN.  SOMEONE IS AVAILABLE 24 HOURS A DAY, SEVEN DAYS A WEEK.

## 2019-03-29 NOTE — PLAN OF CARE
Problem: Adult Inpatient Plan of Care  Goal: Plan of Care Review  Outcome: Ongoing (interventions implemented as appropriate)  Pt states she feels well today except that her legs feel weak.

## 2019-03-29 NOTE — PROGRESS NOTES
Subjective:       Patient ID: Soo Holly is a 79 y.o. female.    Chief Complaint: Results; Chemotherapy; and Breast Cancer    HPI 79-year-old female cycle 2/4 dose dense Taxol triple negative breast cancer patient reports myalgias arthralgias lower extremities denies any nausea vomiting fevers chills night sweats ECOG status 1    Past Medical History:   Diagnosis Date    Hypertension      Family History   Problem Relation Age of Onset    Breast cancer Sister      Social History     Socioeconomic History    Marital status:      Spouse name: Not on file    Number of children: Not on file    Years of education: Not on file    Highest education level: Not on file   Occupational History    Not on file   Social Needs    Financial resource strain: Not on file    Food insecurity:     Worry: Not on file     Inability: Not on file    Transportation needs:     Medical: Not on file     Non-medical: Not on file   Tobacco Use    Smoking status: Never Smoker    Smokeless tobacco: Never Used   Substance and Sexual Activity    Alcohol use: No     Frequency: Never    Drug use: No    Sexual activity: Not Currently   Lifestyle    Physical activity:     Days per week: Not on file     Minutes per session: Not on file    Stress: Not on file   Relationships    Social connections:     Talks on phone: Not on file     Gets together: Not on file     Attends Anabaptism service: Not on file     Active member of club or organization: Not on file     Attends meetings of clubs or organizations: Not on file     Relationship status: Not on file    Intimate partner violence:     Fear of current or ex partner: Not on file     Emotionally abused: Not on file     Physically abused: Not on file     Forced sexual activity: Not on file   Other Topics Concern    Not on file   Social History Narrative    Lives in [place], [place] (1.5h).  Retired [employer].     Past Surgical History:   Procedure Laterality Date     cataracts      HYSTERECTOMY      INSERTION, PORT-A-CATH Right 1/16/2019    [facility]    JOINT REPLACEMENT Right     Knee       Labs:  Lab Results   Component Value Date    WBC 19.87 (H) 03/29/2019    HGB 9.9 (L) 03/29/2019    HCT 31.9 (L) 03/29/2019    MCV 93 03/29/2019     03/29/2019     BMP  Lab Results   Component Value Date     03/15/2019    K 3.7 03/15/2019     03/15/2019    CO2 19 (L) 03/15/2019    BUN 5 (L) 03/15/2019    CREATININE 0.9 03/15/2019    CALCIUM 9.1 03/15/2019    ANIONGAP 15 03/15/2019    ESTGFRAFRICA >60 03/15/2019    EGFRNONAA >60 03/15/2019     Lab Results   Component Value Date    ALT 14 03/15/2019    AST 15 03/15/2019    ALKPHOS 154 (H) 03/15/2019    BILITOT 0.1 03/15/2019       No results found for: IRON, TIBC, FERRITIN, SATURATEDIRO  No results found for: [id number]  No results found for: FOLATE  No results found for: TSH      Review of Systems   Constitutional: Positive for activity change, appetite change and fatigue. Negative for chills, diaphoresis, fever and unexpected weight change.   HENT: Negative for congestion, dental problem, drooling, ear discharge, ear pain, facial swelling, hearing loss, mouth sores, nosebleeds, postnasal drip, rhinorrhea, sinus pressure, sneezing, sore throat, tinnitus, trouble swallowing and voice change.    Eyes: Negative for photophobia, pain, discharge, redness, itching and visual disturbance.   Respiratory: Negative for cough, choking, chest tightness, shortness of breath, wheezing and stridor.    Cardiovascular: Negative for chest pain, palpitations and leg swelling.   Gastrointestinal: Negative for abdominal distention, abdominal pain, anal bleeding, blood in stool, constipation, diarrhea, nausea, rectal pain and vomiting.   Endocrine: Negative for cold intolerance, heat intolerance, polydipsia, polyphagia and polyuria.   Genitourinary: Negative for decreased urine volume, difficulty urinating,  dyspareunia, dysuria, enuresis, flank pain, frequency, genital sores, hematuria, menstrual problem, pelvic pain, urgency, vaginal bleeding, vaginal discharge and vaginal pain.   Musculoskeletal: Positive for arthralgias and myalgias. Negative for back pain, gait problem, joint swelling, neck pain and neck stiffness.   Skin: Negative for color change, pallor and rash.   Allergic/Immunologic: Negative for environmental allergies, food allergies and immunocompromised state.   Neurological: Positive for weakness. Negative for dizziness, tremors, seizures, syncope, facial asymmetry, speech difficulty, light-headedness, numbness and headaches.   Hematological: Negative for adenopathy. Does not bruise/bleed easily.   Psychiatric/Behavioral: Positive for dysphoric mood. Negative for agitation, behavioral problems, confusion, decreased concentration, hallucinations, self-injury, sleep disturbance and suicidal ideas. The patient is nervous/anxious. The patient is not hyperactive.        Objective:      Physical Exam   Constitutional: She is oriented to person, place, and time. She appears distressed.   HENT:   Head: Normocephalic and atraumatic.   Right Ear: External ear normal.   Left Ear: External ear normal.   Nose: Nose normal. Right sinus exhibits no maxillary sinus tenderness and no frontal sinus tenderness. Left sinus exhibits no maxillary sinus tenderness and no frontal sinus tenderness.   Mouth/Throat: Oropharynx is clear and moist. No oropharyngeal exudate.   Eyes: Pupils are equal, round, and reactive to light. Conjunctivae, EOM and lids are normal. Right eye exhibits no discharge. Left eye exhibits no discharge. Right conjunctiva is not injected. Right conjunctiva has no hemorrhage. Left conjunctiva is not injected. Left conjunctiva has no hemorrhage. No scleral icterus.   Neck: Normal range of motion. Neck supple. No JVD present. No tracheal deviation present. No thyromegaly present.   Cardiovascular: Normal rate  and regular rhythm.   Pulmonary/Chest: Effort normal. No stridor. No respiratory distress. She exhibits no tenderness.   Abdominal: Soft. She exhibits no distension and no mass. There is no splenomegaly or hepatomegaly. There is no tenderness. There is no rebound.   Musculoskeletal: Normal range of motion. She exhibits no edema or tenderness.   Lymphadenopathy:     She has no cervical adenopathy.     She has no axillary adenopathy.        Right: No supraclavicular adenopathy present.        Left: No supraclavicular adenopathy present.   Neurological: She is alert and oriented to person, place, and time. No cranial nerve deficit. Coordination normal.   Skin: Skin is dry. No rash noted. She is not diaphoretic. No erythema.   Psychiatric: She has a normal mood and affect. Her behavior is normal. Judgment and thought content normal.   Vitals reviewed.          Assessment:      1. Triple negative malignant neoplasm of breast           Plan:   CBC acceptable for treatment proceed with cycle 2/4 dose dense Taxol orders written discuss treatment goals options and symptomatology patient is tolerating therapy well proceed with systemic therapy order signed          Yaniv Pop Jr, MD FACP

## 2019-04-12 ENCOUNTER — OFFICE VISIT (OUTPATIENT)
Dept: HEMATOLOGY/ONCOLOGY | Facility: CLINIC | Age: 79
End: 2019-04-12
Payer: MEDICARE

## 2019-04-12 ENCOUNTER — INFUSION (OUTPATIENT)
Dept: INFUSION THERAPY | Facility: HOSPITAL | Age: 79
End: 2019-04-12
Attending: INTERNAL MEDICINE
Payer: MEDICARE

## 2019-04-12 VITALS
RESPIRATION RATE: 16 BRPM | BODY MASS INDEX: 37.21 KG/M2 | SYSTOLIC BLOOD PRESSURE: 140 MMHG | TEMPERATURE: 98 F | WEIGHT: 202.19 LBS | OXYGEN SATURATION: 99 % | HEART RATE: 96 BPM | HEIGHT: 62 IN | DIASTOLIC BLOOD PRESSURE: 84 MMHG

## 2019-04-12 VITALS
SYSTOLIC BLOOD PRESSURE: 143 MMHG | HEART RATE: 92 BPM | WEIGHT: 202.19 LBS | HEIGHT: 62 IN | DIASTOLIC BLOOD PRESSURE: 81 MMHG | BODY MASS INDEX: 37.21 KG/M2 | RESPIRATION RATE: 20 BRPM | OXYGEN SATURATION: 98 %

## 2019-04-12 DIAGNOSIS — C50.919 TRIPLE NEGATIVE MALIGNANT NEOPLASM OF BREAST: Primary | ICD-10-CM

## 2019-04-12 PROCEDURE — S0028 INJECTION, FAMOTIDINE, 20 MG: HCPCS | Performed by: INTERNAL MEDICINE

## 2019-04-12 PROCEDURE — 99999 PR PBB SHADOW E&M-EST. PATIENT-LVL III: CPT | Mod: PBBFAC,,, | Performed by: INTERNAL MEDICINE

## 2019-04-12 PROCEDURE — 96375 TX/PRO/DX INJ NEW DRUG ADDON: CPT

## 2019-04-12 PROCEDURE — A4216 STERILE WATER/SALINE, 10 ML: HCPCS | Performed by: INTERNAL MEDICINE

## 2019-04-12 PROCEDURE — 99215 OFFICE O/P EST HI 40 MIN: CPT | Mod: 25,S$PBB,, | Performed by: INTERNAL MEDICINE

## 2019-04-12 PROCEDURE — 99213 OFFICE O/P EST LOW 20 MIN: CPT | Mod: PBBFAC,25 | Performed by: INTERNAL MEDICINE

## 2019-04-12 PROCEDURE — 63600175 PHARM REV CODE 636 W HCPCS: Performed by: INTERNAL MEDICINE

## 2019-04-12 PROCEDURE — 96415 CHEMO IV INFUSION ADDL HR: CPT

## 2019-04-12 PROCEDURE — 96361 HYDRATE IV INFUSION ADD-ON: CPT

## 2019-04-12 PROCEDURE — 99999 PR PBB SHADOW E&M-EST. PATIENT-LVL III: ICD-10-PCS | Mod: PBBFAC,,, | Performed by: INTERNAL MEDICINE

## 2019-04-12 PROCEDURE — 96377 APPLICATON ON-BODY INJECTOR: CPT | Mod: 59

## 2019-04-12 PROCEDURE — 25000003 PHARM REV CODE 250: Performed by: INTERNAL MEDICINE

## 2019-04-12 PROCEDURE — 96367 TX/PROPH/DG ADDL SEQ IV INF: CPT

## 2019-04-12 PROCEDURE — 99215 PR OFFICE/OUTPT VISIT, EST, LEVL V, 40-54 MIN: ICD-10-PCS | Mod: 25,S$PBB,, | Performed by: INTERNAL MEDICINE

## 2019-04-12 PROCEDURE — 96413 CHEMO IV INFUSION 1 HR: CPT

## 2019-04-12 RX ORDER — SODIUM CHLORIDE 9 MG/ML
1000 INJECTION, SOLUTION INTRAVENOUS
Status: COMPLETED | OUTPATIENT
Start: 2019-04-12 | End: 2019-04-12

## 2019-04-12 RX ORDER — HEPARIN 100 UNIT/ML
500 SYRINGE INTRAVENOUS
Status: CANCELLED | OUTPATIENT
Start: 2019-04-12

## 2019-04-12 RX ORDER — DIPHENHYDRAMINE HYDROCHLORIDE 50 MG/ML
50 INJECTION INTRAMUSCULAR; INTRAVENOUS ONCE AS NEEDED
Status: CANCELLED | OUTPATIENT
Start: 2019-04-12

## 2019-04-12 RX ORDER — FAMOTIDINE 10 MG/ML
20 INJECTION INTRAVENOUS
Status: CANCELLED | OUTPATIENT
Start: 2019-04-12

## 2019-04-12 RX ORDER — SODIUM CHLORIDE 0.9 % (FLUSH) 0.9 %
10 SYRINGE (ML) INJECTION
Status: DISCONTINUED | OUTPATIENT
Start: 2019-04-12 | End: 2019-04-12 | Stop reason: HOSPADM

## 2019-04-12 RX ORDER — FAMOTIDINE 10 MG/ML
20 INJECTION INTRAVENOUS
Status: COMPLETED | OUTPATIENT
Start: 2019-04-12 | End: 2019-04-12

## 2019-04-12 RX ORDER — ACETAMINOPHEN 325 MG/1
650 TABLET ORAL
Status: COMPLETED | OUTPATIENT
Start: 2019-04-12 | End: 2019-04-12

## 2019-04-12 RX ORDER — EPINEPHRINE 0.3 MG/.3ML
0.3 INJECTION SUBCUTANEOUS ONCE AS NEEDED
Status: CANCELLED | OUTPATIENT
Start: 2019-04-12

## 2019-04-12 RX ORDER — ACETAMINOPHEN 325 MG/1
650 TABLET ORAL
Status: CANCELLED
Start: 2019-04-12

## 2019-04-12 RX ORDER — SODIUM CHLORIDE 0.9 % (FLUSH) 0.9 %
10 SYRINGE (ML) INJECTION
Status: CANCELLED | OUTPATIENT
Start: 2019-04-12

## 2019-04-12 RX ORDER — HEPARIN 100 UNIT/ML
500 SYRINGE INTRAVENOUS
Status: DISCONTINUED | OUTPATIENT
Start: 2019-04-12 | End: 2019-04-12 | Stop reason: HOSPADM

## 2019-04-12 RX ADMIN — SODIUM CHLORIDE 10 ML: 9 INJECTION, SOLUTION INTRAMUSCULAR; INTRAVENOUS; SUBCUTANEOUS at 09:04

## 2019-04-12 RX ADMIN — DIPHENHYDRAMINE HYDROCHLORIDE 50 MG: 50 INJECTION, SOLUTION INTRAMUSCULAR; INTRAVENOUS at 10:04

## 2019-04-12 RX ADMIN — DEXAMETHASONE SODIUM PHOSPHATE 20 MG: 4 INJECTION, SOLUTION INTRA-ARTICULAR; INTRALESIONAL; INTRAMUSCULAR; INTRAVENOUS; SOFT TISSUE at 10:04

## 2019-04-12 RX ADMIN — SODIUM CHLORIDE: 0.9 INJECTION, SOLUTION INTRAVENOUS at 09:04

## 2019-04-12 RX ADMIN — PACLITAXEL 348 MG: 6 INJECTION, SOLUTION INTRAVENOUS at 11:04

## 2019-04-12 RX ADMIN — ACETAMINOPHEN 650 MG: 325 TABLET ORAL at 10:04

## 2019-04-12 RX ADMIN — FAMOTIDINE 20 MG: 10 INJECTION, SOLUTION INTRAVENOUS at 11:04

## 2019-04-12 RX ADMIN — HEPARIN SODIUM (PORCINE) LOCK FLUSH IV SOLN 100 UNIT/ML 500 UNITS: 100 SOLUTION at 04:04

## 2019-04-12 RX ADMIN — SODIUM CHLORIDE 1000 ML: 0.9 INJECTION, SOLUTION INTRAVENOUS at 02:04

## 2019-04-12 RX ADMIN — PEGFILGRASTIM 6 MG: KIT SUBCUTANEOUS at 04:04

## 2019-04-12 NOTE — PLAN OF CARE
Problem: Adult Inpatient Plan of Care  Goal: Plan of Care Review  Outcome: Ongoing (interventions implemented as appropriate)  Pt states feeling weak, tired, SOB with exertion.

## 2019-04-12 NOTE — PROGRESS NOTES
Subjective:       Patient ID: Soo Holly is a 79 y.o. female.    Chief Complaint: Triple negative malignant neoplasm of breast [C50.919]  HPI: We have an opportunity to see Ms. Soo Holly in Hematology Oncology clinic at [facility] on 04/12/2019.  Ms. Soo Holly is a 79 y.o. woman with stage IIIC locally advanced triple negative breast cancer on adjuvant chemotherapy s/p AC 4 cycles, currently on ddtaxol s/p 2 cycles.  Has fatigue, insomnia, dry eyes.         Triple negative malignant neoplasm of breast    12/24/2018 Initial Diagnosis     Triple negative malignant neoplasm of breast          Cancer Staged     Cancer Staging  Triple negative malignant neoplasm of breast  Staging form: Breast, AJCC 8th Edition  - Clinical stage from 12/31/2018: Stage IIIC (cT4, cN2b, cM0, G3, ER: Negative, CO: Negative, HER2: Negative) - Signed by Yaniv Ppo MD on 12/31/2018          Genetic Testing     Patient has genetic testing done for MY RISK                                           Results revealed patient has the following mutationNO MUTATION:         3/15/2019 -  Chemotherapy     Treatment Summary   Plan Name: OP BREAST PACLITAXEL Q2W  Treatment Goal: Control  Status: Active  Start Date: 3/15/2019  End Date: 4/26/2019 (Planned)  Provider: Yaniv Pop MD  Chemotherapy: PACLitaxel (TAXOL) 175 mg/m2 = 354 mg in sodium chloride 0.9% 559 mL chemo infusion, 175 mg/m2 = 354 mg, Intravenous, Clinic/HOD 1 time, 2 of 4 cycles  Administration: 354 mg (3/15/2019), 354 mg (3/29/2019)          Past Medical History:   Diagnosis Date    Hypertension      Family History   Problem Relation Age of Onset    Breast cancer Sister      Social History     Socioeconomic History    Marital status:      Spouse name: Not on file    Number of children: Not on file    Years of education: Not on file    Highest education level: Not on file   Occupational History    Not on file   Social Needs     Financial resource strain: Not on file    Food insecurity:     Worry: Not on file     Inability: Not on file    Transportation needs:     Medical: Not on file     Non-medical: Not on file   Tobacco Use    Smoking status: Never Smoker    Smokeless tobacco: Never Used   Substance and Sexual Activity    Alcohol use: No     Frequency: Never    Drug use: No    Sexual activity: Not Currently   Lifestyle    Physical activity:     Days per week: Not on file     Minutes per session: Not on file    Stress: Not on file   Relationships    Social connections:     Talks on phone: Not on file     Gets together: Not on file     Attends Yarsanism service: Not on file     Active member of club or organization: Not on file     Attends meetings of clubs or organizations: Not on file     Relationship status: Not on file   Other Topics Concern    Not on file   Social History Narrative    Lives in [place], [place] (1.5h).  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     Past Surgical History:   Procedure Laterality Date    cataracts      HYSTERECTOMY      INSERTION, PORT-A-CATH Right 1/16/2019    Performed by Prem [facility]sey MD at [facility] OR    JOINT REPLACEMENT Right     Knee     Current Outpatient Medications   Medication Sig Dispense Refill    amLODIPine (NORVASC) 10 MG tablet amlodipine 10 mg tablet once daily      dexamethasone (DECADRON) 4 MG Tab Take 5 tablets (20 mg) by mouth every 6 hours. Take 5 tablets (20 mg) by mouth 6 & 12 hours prior to TAXOL 20 tablet 2    diphenhydrAMINE-aluminum-magnesium hydroxide-simethicone-lidocaine HCl 2% Swish and spit 15 mLs every 4 (four) hours as needed. 500 mL 2    esomeprazole magnesium (NEXIUM ORAL) Take by mouth daily as needed.      ondansetron (ZOFRAN) 8 MG tablet Take 1 tablet (8 mg total) by mouth every 12 (twelve) hours as needed for Nausea. 30 tablet 2    prochlorperazine (COMPAZINE) 10 MG tablet TAKE ONE TABLET BY MOUTH EVERY 6 HOURS AS NEEDED 30 tablet 1     No current  facility-administered medications for this visit.        Labs:  Lab Results   Component Value Date    WBC 9.31 04/12/2019    HGB 10.3 (L) 04/12/2019    HCT 32.6 (L) 04/12/2019    MCV 95 04/12/2019     04/12/2019     BMP  Lab Results   Component Value Date     04/12/2019    K 4.0 04/12/2019     04/12/2019    CO2 20 (L) 04/12/2019    BUN 11 04/12/2019    CREATININE 0.9 04/12/2019    CALCIUM 9.8 04/12/2019    ANIONGAP 15 04/12/2019    ESTGFRAFRICA >60 04/12/2019    EGFRNONAA >60 04/12/2019     Lab Results   Component Value Date    ALT 26 04/12/2019    AST 20 04/12/2019    ALKPHOS 138 (H) 04/12/2019    BILITOT 0.2 04/12/2019       No results found for: IRON, TIBC, FERRITIN, SATURATEDIRO  No results found for: [id number]  No results found for: FOLATE  No results found for: TSH    I have reviewed the radiology reports and examined the scan/xray images.    Review of Systems   Constitutional: Positive for fatigue.   HENT: Negative.    Eyes: Negative.    Respiratory: Negative.    Cardiovascular: Negative.    Gastrointestinal: Negative.    Endocrine: Negative.    Genitourinary: Negative.    Musculoskeletal: Negative.    Skin: Negative.    Allergic/Immunologic: Negative.    Neurological: Negative.    Hematological: Negative.    Psychiatric/Behavioral: Negative.      ECOG SCORE    1 - Restricted in strenuous activity-ambulatory and able to carry out work of a light nature            Objective:     Vitals:    04/12/19 0825   BP: (!) 140/84   Pulse: 96   Resp: 16   Temp: 97.5 °F (36.4 °C)   Body mass index is 36.98 kg/m².  Physical Exam   Constitutional: She is oriented to person, place, and time. She appears well-developed and well-nourished.   HENT:   Head: Normocephalic and atraumatic.   Eyes: Conjunctivae and EOM are normal.   Neck: Normal range of motion. Neck supple.   Cardiovascular: Normal rate and regular rhythm.   Pulmonary/Chest: Effort normal and breath sounds normal.   Abdominal: Soft. Bowel sounds  are normal.   Musculoskeletal: Normal range of motion.   Neurological: She is alert and oriented to person, place, and time.   Skin: Skin is warm and dry.   Psychiatric: She has a normal mood and affect. Her behavior is normal. Judgment and thought content normal.   Nursing note and vitals reviewed.        Assessment:      1. Triple negative malignant neoplasm of breast           Plan:     Triple negative malignant neoplasm of breast  Will give cycle 3 today, cycle 4 in 2 weeks.  Give 1 liter NS, and encourage to drink at least 60 ounces fluids per day.  Artificial tears   Melatonin for insomnia.  -     CBC auto differential; Future; Expected date: 04/26/2019  -     Comprehensive metabolic panel; Future; Expected date: 04/26/2019

## 2019-04-12 NOTE — DISCHARGE INSTRUCTIONS
[facility]  [address]  [address] [address]  [phone] phone     [phone] fax  Hours of Operation: Monday- Friday 8:00am- 5:00pm  After hours phone  [phone]  Hematology / Oncology Physicians on call      Dr. Donn Kennedy                        Please call with any concerns regarding your appointment today.    [facility]  [address]  or  [address] [address]  [phone] phone     [phone] fax  Hours of Operation: Monday- Friday 8:00am- 5:00pm  After hours phone  [phone]  Hematology / Oncology Physicians on call      Dr. Donn Martin      Please call with any concerns regarding your appointment today.        WAYS TO HELP PREVENT INFECTION         WASH YOUR HANDS OFTEN DURING THE DAY, ESPECIALLY BEFORE YOU EAT, AFTER USING THE BATHROOM, AND AFTER TOUCHING ANIMALS     STAY AWAY FROM PEOPLE WHO HAVE ILLNESSES YOU CAN CATCH; SUCH AS COLDS, FLU, CHICKEN POX     TRY TO AVOID CROWDS     STAY AWAY FROM CHILDREN WHO RECENTLY HAVE RECEIVED LIVE VIRUS VACCINES     MAINTAIN GOOD MOUTH CARE     DO NOT SQUEEZE OR SCRATCH PIMPLES     CLEAN CUTS & SCRAPES RIGHT AWAY AND DAILY UNTIL HEALED WITH WARM WATER, SOAP & AN ANTISEPTIC     AVOID CONTACT WITH LITTER BOXES, BIRD CAGES, & FISH TANKS     AVOID STANDING WATER, IE., BIRD BATHS, FLOWER POTS/VASES, OR HUMIDIFIERS     WEAR GLOVES WHEN GARDENING OR CLEANING UP AFTER OTHERS, ESPECIALLY BABIES & SMALL CHILDREN     DO NOT EAT RAW FISH, SEAFOOD, MEAT, OR EGGS    YOU HAVE STARTED ON CHEMOTHERAPY. IF YOU EXPERIENCE ANY OF THE FOLLOWING PROBLEMS, CALL THE OFFICE IMMEDIATELY.    *FEVER .0 OR GREATER    *CHILLS, ESPECIALLY SHAKING CHILLS, OR SWEATING    *A SEVERE COUGH OR SORE THROAT, OR SINUS PAIN/     PRESSURE    *REDNESS, SWELLING, OR TENDERNESS AROUND A WOUND,     SORE, PIMPLE, RECTAL  AREA, OR IV SITE    *SORES OR ULCERS IN THE MOUTH    *BLISTERS ON THE LIPS OR SKIN    *FREQUENT URGENCY TO URINATE OR A BURNING FEELING   WHEN YOU URINATE    *BLOOD IN THE URINE OR STOOL    *ANY UNEXPLAINED BRUISING OR PROLONGED BLEEDING,     (NOSEBLEEDS OR BLEEDING GUMS)    *LOOSE BOWEL MOVEMENTS THAT DO NOT RESPOND TO     IMODIUM OR MORE THAN THREE TIMES A DAY    *VOMITING UNRESPONSIVE TO ANTINAUSEA MEDICINE    *ANY UNUSUAL PHYSICAL SYMPTOMS THAT BEGAN AFTER     CHEMOTHERAPY    DURING WEEKDAYS, CALL AND ASK TO SPEAK DIRECTLY TO A NURSE.  AT OTHER TIMES, CALL THE OFFICE PHONE NUMBER; THE ANSWERING SERVICE WILL CONTACT THE ON-CALL PHYSICIAN.  SOMEONE IS AVAILABLE 24 HOURS A DAY, SEVEN DAYS A WEEK.    FALL PREVENTION   Falls often occur due to slipping, tripping or losing your balance. Here are ways to reduce your risk of falling again.   Was there anything that caused your fall that can be fixed, removed or replaced?   Make your home safe by keeping walkways clear of objects you may trip over.   Use non-slip pads under rugs.   Do not walk in poorly lit areas.   Do not stand on chairs or wobbly ladders.   Use caution when reaching overhead or looking upward. This position can cause a loss of balance.   Be sure your shoes fit properly, have non-slip bottoms and are in good condition.   Be cautious when going up and down stairs, curbs, and when walking on uneven sidewalks.   If your balance is poor, consider using a cane or walker.   If your fall was related to alcohol use, stop or limit alcohol intake.   If your fall was related to use of sleeping medicines, talk to your doctor about this. You may need to reduce your dosage at bedtime if you awaken during the night to go to the bathroom.   To reduce the need for nighttime bathroom trips:   Avoid drinking fluids for several hours before going to bed   Empty your bladder before going to bed   Men can keep a urinal at the bedside   © 6647-5371 [facility], [address]  [place] [address]. All rights reserved. This information is not intended as a substitute for professional medical care. Always follow your healthcare professional's instructions.

## 2019-04-13 DIAGNOSIS — T45.1X5A CHEMOTHERAPY-INDUCED NAUSEA: ICD-10-CM

## 2019-04-13 DIAGNOSIS — R11.0 CHEMOTHERAPY-INDUCED NAUSEA: ICD-10-CM

## 2019-04-15 ENCOUNTER — TELEPHONE (OUTPATIENT)
Dept: HEMATOLOGY/ONCOLOGY | Facility: CLINIC | Age: 79
End: 2019-04-15

## 2019-04-15 DIAGNOSIS — R11.0 CHEMOTHERAPY-INDUCED NAUSEA: ICD-10-CM

## 2019-04-15 DIAGNOSIS — T45.1X5A CHEMOTHERAPY-INDUCED NAUSEA: ICD-10-CM

## 2019-04-15 RX ORDER — ONDANSETRON HYDROCHLORIDE 8 MG/1
TABLET, FILM COATED ORAL
Qty: 30 TABLET | Refills: 2 | Status: SHIPPED | OUTPATIENT
Start: 2019-04-15

## 2019-04-15 RX ORDER — PROCHLORPERAZINE MALEATE 10 MG
10 TABLET ORAL EVERY 6 HOURS PRN
Qty: 30 TABLET | Refills: 1 | Status: SHIPPED | OUTPATIENT
Start: 2019-04-15 | End: 2019-09-16

## 2019-04-15 RX ORDER — ONDANSETRON HYDROCHLORIDE 8 MG/1
TABLET, FILM COATED ORAL
Qty: 30 TABLET | Refills: 2 | Status: SHIPPED | OUTPATIENT
Start: 2019-04-15 | End: 2019-04-15 | Stop reason: SDUPTHER

## 2019-04-15 NOTE — TELEPHONE ENCOUNTER
----- Message from Mohinder Linder sent at 4/15/2019 12:42 PM CDT -----  Contact: self- [phone]  ..Type:  Patient Returning Call    Who Called:Soo   Who Left Message for Patient:nurse  Does the patient know what this is regarding?:dont know   Would the patient rather a call back or a response via [facility]? Call back   Best Call Back Number:[phone]  Additional Information:

## 2019-04-15 NOTE — TELEPHONE ENCOUNTER
Notified patient that medication has been refilled and sent to local pharmacy. Verbalized understanding

## 2019-04-26 ENCOUNTER — HOSPITAL ENCOUNTER (INPATIENT)
Facility: HOSPITAL | Age: 79
LOS: 1 days | Discharge: HOME | DRG: 303 | End: 2019-04-28
Attending: EMERGENCY MEDICINE | Admitting: INTERNAL MEDICINE
Payer: MEDICARE

## 2019-04-26 ENCOUNTER — OFFICE VISIT (OUTPATIENT)
Dept: HEMATOLOGY/ONCOLOGY | Facility: CLINIC | Age: 79
DRG: 303 | End: 2019-04-26
Payer: MEDICARE

## 2019-04-26 VITALS
HEART RATE: 102 BPM | HEIGHT: 62 IN | BODY MASS INDEX: 36.63 KG/M2 | OXYGEN SATURATION: 99 % | RESPIRATION RATE: 18 BRPM | WEIGHT: 199.06 LBS | TEMPERATURE: 98 F | SYSTOLIC BLOOD PRESSURE: 130 MMHG | DIASTOLIC BLOOD PRESSURE: 81 MMHG

## 2019-04-26 DIAGNOSIS — R07.9 ACUTE CHEST PAIN: ICD-10-CM

## 2019-04-26 DIAGNOSIS — R06.02 SOB (SHORTNESS OF BREATH): ICD-10-CM

## 2019-04-26 DIAGNOSIS — C50.919 TRIPLE NEGATIVE MALIGNANT NEOPLASM OF BREAST: Primary | ICD-10-CM

## 2019-04-26 DIAGNOSIS — R79.89 ELEVATED TROPONIN: Primary | ICD-10-CM

## 2019-04-26 DIAGNOSIS — R07.9 CHEST PAIN: ICD-10-CM

## 2019-04-26 PROBLEM — R55 PRE-SYNCOPE: Status: ACTIVE | Noted: 2019-04-26

## 2019-04-26 LAB
ALBUMIN SERPL BCP-MCNC: 3.3 G/DL (ref 3.5–5.2)
ALP SERPL-CCNC: 159 U/L (ref 55–135)
ALT SERPL W/O P-5'-P-CCNC: 28 U/L (ref 10–44)
ANION GAP SERPL CALC-SCNC: 15 MMOL/L (ref 8–16)
ANISOCYTOSIS BLD QL SMEAR: SLIGHT
AST SERPL-CCNC: 22 U/L (ref 10–40)
BASOPHILS # BLD AUTO: 0 K/UL (ref 0–0.2)
BASOPHILS NFR BLD: 0 % (ref 0–1.9)
BILIRUB SERPL-MCNC: 0.2 MG/DL (ref 0.1–1)
BNP SERPL-MCNC: 87 PG/ML (ref 0–99)
BUN SERPL-MCNC: 8 MG/DL (ref 8–23)
CALCIUM SERPL-MCNC: 10 MG/DL (ref 8.7–10.5)
CHLORIDE SERPL-SCNC: 106 MMOL/L (ref 95–110)
CO2 SERPL-SCNC: 18 MMOL/L (ref 23–29)
CREAT SERPL-MCNC: 0.9 MG/DL (ref 0.5–1.4)
DACRYOCYTES BLD QL SMEAR: ABNORMAL
DIFFERENTIAL METHOD: ABNORMAL
EOSINOPHIL # BLD AUTO: 0 K/UL (ref 0–0.5)
EOSINOPHIL NFR BLD: 0 % (ref 0–8)
ERYTHROCYTE [DISTWIDTH] IN BLOOD BY AUTOMATED COUNT: 18.3 % (ref 11.5–14.5)
EST. GFR  (AFRICAN AMERICAN): >60 ML/MIN/1.73 M^2
EST. GFR  (NON AFRICAN AMERICAN): >60 ML/MIN/1.73 M^2
GLUCOSE SERPL-MCNC: 328 MG/DL (ref 70–110)
HCT VFR BLD AUTO: 32.5 % (ref 37–48.5)
HGB BLD-MCNC: 10.4 G/DL (ref 12–16)
LYMPHOCYTES # BLD AUTO: 0.7 K/UL (ref 1–4.8)
LYMPHOCYTES NFR BLD: 7.3 % (ref 18–48)
MCH RBC QN AUTO: 30.5 PG (ref 27–31)
MCHC RBC AUTO-ENTMCNC: 32 G/DL (ref 32–36)
MCV RBC AUTO: 95 FL (ref 82–98)
MONOCYTES # BLD AUTO: 0.1 K/UL (ref 0.3–1)
MONOCYTES NFR BLD: 0.9 % (ref 4–15)
NEUTROPHILS # BLD AUTO: 8.6 K/UL (ref 1.8–7.7)
NEUTROPHILS NFR BLD: 92.2 % (ref 38–73)
PLATELET # BLD AUTO: 207 K/UL (ref 150–350)
PLATELET BLD QL SMEAR: ABNORMAL
PMV BLD AUTO: 9.9 FL (ref 9.2–12.9)
POIKILOCYTOSIS BLD QL SMEAR: SLIGHT
POLYCHROMASIA BLD QL SMEAR: ABNORMAL
POTASSIUM SERPL-SCNC: 4.2 MMOL/L (ref 3.5–5.1)
PROT SERPL-MCNC: 7.7 G/DL (ref 6–8.4)
RBC # BLD AUTO: 3.41 M/UL (ref 4–5.4)
SODIUM SERPL-SCNC: 139 MMOL/L (ref 136–145)
STOMATOCYTES BLD QL SMEAR: PRESENT
TROPONIN I SERPL DL<=0.01 NG/ML-MCNC: 0.06 NG/ML (ref 0–0.03)
TROPONIN I SERPL DL<=0.01 NG/ML-MCNC: 0.06 NG/ML (ref 0–0.03)
WBC # BLD AUTO: 9.39 K/UL (ref 3.9–12.7)

## 2019-04-26 PROCEDURE — 99215 PR OFFICE/OUTPT VISIT, EST, LEVL V, 40-54 MIN: ICD-10-PCS | Mod: S$PBB,,, | Performed by: INTERNAL MEDICINE

## 2019-04-26 PROCEDURE — G0378 HOSPITAL OBSERVATION PER HR: HCPCS

## 2019-04-26 PROCEDURE — 83036 HEMOGLOBIN GLYCOSYLATED A1C: CPT

## 2019-04-26 PROCEDURE — 99999 PR PBB SHADOW E&M-EST. PATIENT-LVL III: ICD-10-PCS | Mod: PBBFAC,,, | Performed by: INTERNAL MEDICINE

## 2019-04-26 PROCEDURE — 99213 OFFICE O/P EST LOW 20 MIN: CPT | Mod: PBBFAC,25 | Performed by: INTERNAL MEDICINE

## 2019-04-26 PROCEDURE — 25000003 PHARM REV CODE 250: Performed by: NURSE PRACTITIONER

## 2019-04-26 PROCEDURE — 80053 COMPREHEN METABOLIC PANEL: CPT

## 2019-04-26 PROCEDURE — 96374 THER/PROPH/DIAG INJ IV PUSH: CPT

## 2019-04-26 PROCEDURE — 83880 ASSAY OF NATRIURETIC PEPTIDE: CPT

## 2019-04-26 PROCEDURE — 96372 THER/PROPH/DIAG INJ SC/IM: CPT | Mod: 59

## 2019-04-26 PROCEDURE — 99215 OFFICE O/P EST HI 40 MIN: CPT | Mod: S$PBB,,, | Performed by: INTERNAL MEDICINE

## 2019-04-26 PROCEDURE — 99285 EMERGENCY DEPT VISIT HI MDM: CPT | Mod: 25,27

## 2019-04-26 PROCEDURE — 36415 COLL VENOUS BLD VENIPUNCTURE: CPT

## 2019-04-26 PROCEDURE — 63600175 PHARM REV CODE 636 W HCPCS: Performed by: NURSE PRACTITIONER

## 2019-04-26 PROCEDURE — 96361 HYDRATE IV INFUSION ADD-ON: CPT

## 2019-04-26 PROCEDURE — 25500020 PHARM REV CODE 255: Performed by: EMERGENCY MEDICINE

## 2019-04-26 PROCEDURE — 80061 LIPID PANEL: CPT

## 2019-04-26 PROCEDURE — 93010 EKG 12-LEAD: ICD-10-PCS | Mod: ,,, | Performed by: INTERNAL MEDICINE

## 2019-04-26 PROCEDURE — 93010 ELECTROCARDIOGRAM REPORT: CPT | Mod: ,,, | Performed by: INTERNAL MEDICINE

## 2019-04-26 PROCEDURE — 25000003 PHARM REV CODE 250: Performed by: EMERGENCY MEDICINE

## 2019-04-26 PROCEDURE — 85025 COMPLETE CBC W/AUTO DIFF WBC: CPT

## 2019-04-26 PROCEDURE — 99999 PR PBB SHADOW E&M-EST. PATIENT-LVL III: CPT | Mod: PBBFAC,,, | Performed by: INTERNAL MEDICINE

## 2019-04-26 PROCEDURE — 63600175 PHARM REV CODE 636 W HCPCS: Performed by: EMERGENCY MEDICINE

## 2019-04-26 PROCEDURE — 84484 ASSAY OF TROPONIN QUANT: CPT

## 2019-04-26 PROCEDURE — 93005 ELECTROCARDIOGRAM TRACING: CPT

## 2019-04-26 RX ORDER — SODIUM CHLORIDE 0.9 % (FLUSH) 0.9 %
10 SYRINGE (ML) INJECTION
Status: CANCELLED | OUTPATIENT
Start: 2019-04-26

## 2019-04-26 RX ORDER — DIPHENHYDRAMINE HYDROCHLORIDE 50 MG/ML
25 INJECTION INTRAMUSCULAR; INTRAVENOUS
Status: COMPLETED | OUTPATIENT
Start: 2019-04-26 | End: 2019-04-26

## 2019-04-26 RX ORDER — ASPIRIN 325 MG
325 TABLET ORAL
Status: COMPLETED | OUTPATIENT
Start: 2019-04-26 | End: 2019-04-26

## 2019-04-26 RX ORDER — ONDANSETRON 2 MG/ML
4 INJECTION INTRAMUSCULAR; INTRAVENOUS EVERY 8 HOURS PRN
Status: DISCONTINUED | OUTPATIENT
Start: 2019-04-26 | End: 2019-04-28 | Stop reason: HOSPADM

## 2019-04-26 RX ORDER — ENOXAPARIN SODIUM 100 MG/ML
40 INJECTION SUBCUTANEOUS EVERY 24 HOURS
Status: DISCONTINUED | OUTPATIENT
Start: 2019-04-26 | End: 2019-04-28 | Stop reason: HOSPADM

## 2019-04-26 RX ORDER — PANTOPRAZOLE SODIUM 40 MG/1
40 TABLET, DELAYED RELEASE ORAL DAILY
Status: DISCONTINUED | OUTPATIENT
Start: 2019-04-26 | End: 2019-04-28 | Stop reason: HOSPADM

## 2019-04-26 RX ORDER — EPINEPHRINE 0.3 MG/.3ML
0.3 INJECTION SUBCUTANEOUS ONCE AS NEEDED
Status: CANCELLED | OUTPATIENT
Start: 2019-04-26

## 2019-04-26 RX ORDER — AMLODIPINE BESYLATE 10 MG/1
10 TABLET ORAL DAILY
Status: DISCONTINUED | OUTPATIENT
Start: 2019-04-27 | End: 2019-04-28 | Stop reason: HOSPADM

## 2019-04-26 RX ORDER — ACETAMINOPHEN 325 MG/1
650 TABLET ORAL EVERY 6 HOURS PRN
Status: DISCONTINUED | OUTPATIENT
Start: 2019-04-26 | End: 2019-04-28 | Stop reason: HOSPADM

## 2019-04-26 RX ORDER — SODIUM CHLORIDE 0.9 % (FLUSH) 0.9 %
10 SYRINGE (ML) INJECTION
Status: DISCONTINUED | OUTPATIENT
Start: 2019-04-26 | End: 2019-04-28 | Stop reason: HOSPADM

## 2019-04-26 RX ORDER — HEPARIN 100 UNIT/ML
500 SYRINGE INTRAVENOUS
Status: CANCELLED | OUTPATIENT
Start: 2019-04-26

## 2019-04-26 RX ORDER — FAMOTIDINE 10 MG/ML
20 INJECTION INTRAVENOUS
Status: CANCELLED | OUTPATIENT
Start: 2019-04-26

## 2019-04-26 RX ORDER — DIPHENHYDRAMINE HYDROCHLORIDE 50 MG/ML
50 INJECTION INTRAMUSCULAR; INTRAVENOUS ONCE AS NEEDED
Status: CANCELLED | OUTPATIENT
Start: 2019-04-26

## 2019-04-26 RX ORDER — ASPIRIN 81 MG/1
81 TABLET ORAL DAILY
Status: DISCONTINUED | OUTPATIENT
Start: 2019-04-26 | End: 2019-04-28 | Stop reason: HOSPADM

## 2019-04-26 RX ADMIN — ASPIRIN 81 MG: 81 TABLET, COATED ORAL at 05:04

## 2019-04-26 RX ADMIN — ENOXAPARIN SODIUM 40 MG: 100 INJECTION SUBCUTANEOUS at 05:04

## 2019-04-26 RX ADMIN — SODIUM CHLORIDE 500 ML: 0.9 INJECTION, SOLUTION INTRAVENOUS at 12:04

## 2019-04-26 RX ADMIN — PANTOPRAZOLE SODIUM 40 MG: 40 TABLET, DELAYED RELEASE ORAL at 05:04

## 2019-04-26 RX ADMIN — ASPIRIN 325 MG ORAL TABLET 325 MG: 325 PILL ORAL at 12:04

## 2019-04-26 RX ADMIN — DIPHENHYDRAMINE HYDROCHLORIDE 25 MG: 50 INJECTION, SOLUTION INTRAMUSCULAR; INTRAVENOUS at 12:04

## 2019-04-26 RX ADMIN — IOHEXOL 100 ML: 350 INJECTION, SOLUTION INTRAVENOUS at 01:04

## 2019-04-26 NOTE — ED PROVIDER NOTES
SCRIBE #1 NOTE: I, Naveen Mott, am scribing for, and in the presence of, Favian Shay Jr., MD. I have scribed the entire note.      History      Chief Complaint   Patient presents with    Shortness of Breath     intermittent x 1 week; sent today by Dr. Pop for chest pressure and heaviness     Review of patient's allergies indicates:   Allergen Reactions    Iodine Other (See Comments)     Unknown  When eating shellfish       Sulfa (sulfonamide antibiotics) Other (See Comments)     Unknown          HPI   HPI    4/26/2019, 11:23 AM   History obtained from the patient      History of Present Illness: Soo Holly is a 79 y.o. female patient with a PMHx of HTN and breast CA (currently on chemotherapy) who presents to the Emergency Department for SOB, onset 1 week PTA. Pt was referred to the ED by Dr. Pop (Hem/Onc) for further evaluation. Symptoms are intermittent and moderate in severity. No mitigating or exacerbating factors reported. Associated sxs include chest tightness and L chest pain. Patient denies any BLE edema, palpitations, fever, chills, weakness, numbness, n/v, dizziness, headache, syncope, and all other sxs at this time. No prior Tx reported. No further complaints or concerns at this time.     Arrival mode: Personal vehicle     PCP: [facility]       Past Medical History:  Past Medical History:   Diagnosis Date    Hypertension        Past Surgical History:  Past Surgical History:   Procedure Laterality Date    cataracts      HYSTERECTOMY      INSERTION, PORT-A-CATH Right 1/16/2019    Performed by Prem [facility]sey MD at [facility] OR    JOINT REPLACEMENT Right     Knee         Family History:  Family History   Problem Relation Age of Onset    Breast cancer Sister        Social History:  Social History     Tobacco Use    Smoking status: Never Smoker    Smokeless tobacco: Never Used   Substance and Sexual Activity    Alcohol use: No     Frequency: Never    Drug use: No    Sexual activity:  Not Currently       ROS   Review of Systems   Constitutional: Negative for chills, diaphoresis and fever.   HENT: Negative for sore throat.    Respiratory: Positive for chest tightness and shortness of breath.    Cardiovascular: Positive for chest pain. Negative for palpitations and leg swelling.   Gastrointestinal: Negative for abdominal pain, diarrhea, nausea and vomiting.   Genitourinary: Negative for dysuria.   Musculoskeletal: Negative for back pain.   Skin: Negative for rash.   Neurological: Negative for dizziness, syncope, weakness, light-headedness, numbness and headaches.   Hematological: Does not bruise/bleed easily.   All other systems reviewed and are negative.    Physical Exam      Initial Vitals [04/26/19 1113]   BP Pulse Resp Temp SpO2   133/73 94 16 98.5 °F (36.9 °C) 99 %      MAP       --          Physical Exam  Nursing Notes and Vital Signs Reviewed.  Constitutional: Patient is in no acute distress. Well-developed and well-nourished.  Head: Atraumatic. Normocephalic.  Eyes: PERRL. EOM intact. Conjunctivae are not pale. No scleral icterus.  ENT: Mucous membranes are moist. Oropharynx is clear and symmetric.    Neck: Supple. Full ROM. No lymphadenopathy.  Cardiovascular: Regular rate. Regular rhythm. No murmurs, rubs, or gallops. Distal pulses are 2+ and symmetric.  Pulmonary/Chest: No respiratory distress. Clear to auscultation bilaterally. No wheezing or rales.  Abdominal: Soft and non-distended.  There is no tenderness.  No rebound, guarding, or rigidity. Good bowel sounds.  Musculoskeletal: Moves all extremities. No obvious deformities. No edema. No calf tenderness.  Skin: Warm and dry.  Neurological:  Alert, awake, and appropriate.  Normal speech.  No acute focal neurological deficits are appreciated.  Psychiatric: Normal affect. Good eye contact. Appropriate in content.    ED Course    Procedures  ED Vital Signs:  Vitals:    04/27/19 0500 04/27/19 0844 04/27/19 1134 04/27/19 1702   BP:  130/61  "(!) 166/72 (!) 142/68   Pulse:  83 91 73   Resp:  20 20 18   Temp:  97.8 °F (36.6 °C)  98.2 °F (36.8 °C)   TempSrc:  Oral  Oral   SpO2:  100%  97%   Weight:       Height: 5' 2" (1.575 m)       04/27/19 1920 04/27/19 2002 04/27/19 2100 04/27/19 2325   BP:  124/62  (!) 116/56   Pulse: 74 76 72 77   Resp:  18  18   Temp:  97.7 °F (36.5 °C)  97.9 °F (36.6 °C)   TempSrc:  Oral  Oral   SpO2:  98%  100%   Weight:       Height:        04/28/19 0100 04/28/19 0300 04/28/19 0445 04/28/19 0500   BP:   (!) 140/87    Pulse: 78 74 68 64   Resp:   18    Temp:   97.3 °F (36.3 °C)    TempSrc:   Oral    SpO2:   99%    Weight:   86.8 kg (191 lb 5.8 oz)    Height:        04/28/19 0851 04/28/19 1154 04/28/19 1623   BP: (!) 118/58 104/61 101/64   Pulse: 78 71 80   Resp: 20     Temp: 97 °F (36.1 °C) 98.1 °F (36.7 °C) 98.1 °F (36.7 °C)   TempSrc: Oral Oral Oral   SpO2: 99% 98% 96%   Weight:      Height:          Abnormal Lab Results:  Labs Reviewed   CBC W/ AUTO DIFFERENTIAL - Abnormal; Notable for the following components:       Result Value    RBC 3.41 (*)     Hemoglobin 10.4 (*)     Hematocrit 32.5 (*)     RDW 18.3 (*)     Gran # (ANC) 8.6 (*)     Lymph # 0.7 (*)     Mono # 0.1 (*)     Gran% 92.2 (*)     Lymph% 7.3 (*)     Mono% 0.9 (*)     All other components within normal limits   COMPREHENSIVE METABOLIC PANEL - Abnormal; Notable for the following components:    CO2 18 (*)     Glucose 328 (*)     Albumin 3.3 (*)     Alkaline Phosphatase 159 (*)     All other components within normal limits   TROPONIN I - Abnormal; Notable for the following components:    Troponin I 0.060 (*)     All other components within normal limits   B-TYPE NATRIURETIC PEPTIDE        All Lab Results:  Results for orders placed or performed during the hospital encounter of 04/26/19   CBC auto differential   Result Value Ref Range    WBC 9.39 3.90 - 12.70 K/uL    RBC 3.41 (L) 4.00 - 5.40 M/uL    Hemoglobin 10.4 (L) 12.0 - 16.0 g/dL    Hematocrit 32.5 (L) 37.0 - " 48.5 %    Mean Corpuscular Volume 95 82 - 98 fL    Mean Corpuscular Hemoglobin 30.5 27.0 - 31.0 pg    Mean Corpuscular Hemoglobin Conc 32.0 32.0 - 36.0 g/dL    RDW 18.3 (H) 11.5 - 14.5 %    Platelets 207 150 - 350 K/uL    MPV 9.9 9.2 - 12.9 fL    Gran # (ANC) 8.6 (H) 1.8 - 7.7 K/uL    Lymph # 0.7 (L) 1.0 - 4.8 K/uL    Mono # 0.1 (L) 0.3 - 1.0 K/uL    Eos # 0.0 0.0 - 0.5 K/uL    Baso # 0.00 0.00 - 0.20 K/uL    Gran% 92.2 (H) 38.0 - 73.0 %    Lymph% 7.3 (L) 18.0 - 48.0 %    Mono% 0.9 (L) 4.0 - 15.0 %    Eosinophil% 0.0 0.0 - 8.0 %    Basophil% 0.0 0.0 - 1.9 %    Platelet Estimate Appears normal     Aniso Slight     Poik Slight     Poly Occasional     Tear Drop Cells Occasional     Stomatocytes Present     Differential Method Automated    Comprehensive metabolic panel   Result Value Ref Range    Sodium 139 136 - 145 mmol/L    Potassium 4.2 3.5 - 5.1 mmol/L    Chloride 106 95 - 110 mmol/L    CO2 18 (L) 23 - 29 mmol/L    Glucose 328 (H) 70 - 110 mg/dL    BUN, Bld 8 8 - 23 mg/dL    Creatinine 0.9 0.5 - 1.4 mg/dL    Calcium 10.0 8.7 - 10.5 mg/dL    Total Protein 7.7 6.0 - 8.4 g/dL    Albumin 3.3 (L) 3.5 - 5.2 g/dL    Total Bilirubin 0.2 0.1 - 1.0 mg/dL    Alkaline Phosphatase 159 (H) 55 - 135 U/L    AST 22 10 - 40 U/L    ALT 28 10 - 44 U/L    Anion Gap 15 8 - 16 mmol/L    eGFR if African American >60 >60 mL/min/1.73 m^2    eGFR if non African American >60 >60 mL/min/1.73 m^2   Troponin I #1   Result Value Ref Range    Troponin I 0.060 (H) 0.000 - 0.026 ng/mL   B-Type natriuretic peptide (BNP)   Result Value Ref Range    BNP 87 0 - 99 pg/mL   Lipid panel   Result Value Ref Range    Cholesterol 221 (H) 120 - 199 mg/dL    Triglycerides 90 30 - 150 mg/dL    HDL 41 40 - 75 mg/dL    LDL Cholesterol 162.0 (H) 63.0 - 159.0 mg/dL    Hdl/Cholesterol Ratio 18.6 (L) 20.0 - 50.0 %    Total Cholesterol/HDL Ratio 5.4 (H) 2.0 - 5.0    Non-HDL Cholesterol 180 mg/dL   Hemoglobin A1c   Result Value Ref Range    Hemoglobin A1C 6.7 (H) 4.0 -  5.6 %    Estimated Avg Glucose 146 (H) 68 - 131 mg/dL   Troponin I #2   Result Value Ref Range    Troponin I 0.057 (H) 0.000 - 0.026 ng/mL   Troponin I #2   Result Value Ref Range    Troponin I 0.076 (H) 0.000 - 0.026 ng/mL   Troponin I #2   Result Value Ref Range    Troponin I 0.070 (H) 0.000 - 0.026 ng/mL   CBC auto differential   Result Value Ref Range    WBC 14.06 (H) 3.90 - 12.70 K/uL    RBC 3.12 (L) 4.00 - 5.40 M/uL    Hemoglobin 9.4 (L) 12.0 - 16.0 g/dL    Hematocrit 29.7 (L) 37.0 - 48.5 %    Mean Corpuscular Volume 95 82 - 98 fL    Mean Corpuscular Hemoglobin 30.1 27.0 - 31.0 pg    Mean Corpuscular Hemoglobin Conc 31.6 (L) 32.0 - 36.0 g/dL    RDW 18.6 (H) 11.5 - 14.5 %    Platelets 227 150 - 350 K/uL    MPV 9.9 9.2 - 12.9 fL    Gran # (ANC) 12.7 (H) 1.8 - 7.7 K/uL    Lymph # 0.9 (L) 1.0 - 4.8 K/uL    Mono # 0.5 0.3 - 1.0 K/uL    Eos # 0.0 0.0 - 0.5 K/uL    Baso # 0.00 0.00 - 0.20 K/uL    Gran% 90.3 (H) 38.0 - 73.0 %    Lymph% 6.4 (L) 18.0 - 48.0 %    Mono% 3.3 (L) 4.0 - 15.0 %    Eosinophil% 0.0 0.0 - 8.0 %    Basophil% 0.0 0.0 - 1.9 %    Differential Method Automated    Comprehensive metabolic panel   Result Value Ref Range    Sodium 142 136 - 145 mmol/L    Potassium 4.2 3.5 - 5.1 mmol/L    Chloride 106 95 - 110 mmol/L    CO2 25 23 - 29 mmol/L    Glucose 153 (H) 70 - 110 mg/dL    BUN, Bld 10 8 - 23 mg/dL    Creatinine 0.7 0.5 - 1.4 mg/dL    Calcium 9.6 8.7 - 10.5 mg/dL    Total Protein 6.8 6.0 - 8.4 g/dL    Albumin 3.1 (L) 3.5 - 5.2 g/dL    Total Bilirubin 0.2 0.1 - 1.0 mg/dL    Alkaline Phosphatase 141 (H) 55 - 135 U/L    AST 21 10 - 40 U/L    ALT 27 10 - 44 U/L    Anion Gap 11 8 - 16 mmol/L    eGFR if African American >60 >60 mL/min/1.73 m^2    eGFR if non African American >60 >60 mL/min/1.73 m^2   Troponin I #2   Result Value Ref Range    Troponin I 0.078 (H) 0.000 - 0.026 ng/mL   Urinalysis, Reflex to Urine Culture Urine, Clean Catch   Result Value Ref Range    Specimen UA Urine, Clean Catch     Color,  UA Yellow Yellow, Straw, Chrissy    Appearance, UA Clear Clear    pH, UA 6.0 5.0 - 8.0    Specific Gravity, UA 1.010 1.005 - 1.030    Protein, UA Negative Negative    Glucose, UA Negative Negative    Ketones, UA Negative Negative    Bilirubin (UA) Negative Negative    Occult Blood UA Negative Negative    Nitrite, UA Negative Negative    Urobilinogen, UA Negative <2.0 EU/dL    Leukocytes, UA Negative Negative   CBC auto differential   Result Value Ref Range    WBC 7.64 3.90 - 12.70 K/uL    RBC 3.16 (L) 4.00 - 5.40 M/uL    Hemoglobin 9.4 (L) 12.0 - 16.0 g/dL    Hematocrit 30.8 (L) 37.0 - 48.5 %    Mean Corpuscular Volume 98 82 - 98 fL    Mean Corpuscular Hemoglobin 29.7 27.0 - 31.0 pg    Mean Corpuscular Hemoglobin Conc 30.5 (L) 32.0 - 36.0 g/dL    RDW 18.9 (H) 11.5 - 14.5 %    Platelets 234 150 - 350 K/uL    MPV 9.6 9.2 - 12.9 fL    Gran # (ANC) 5.6 1.8 - 7.7 K/uL    Lymph # 1.3 1.0 - 4.8 K/uL    Mono # 0.8 0.3 - 1.0 K/uL    Eos # 0.0 0.0 - 0.5 K/uL    Baso # 0.02 0.00 - 0.20 K/uL    Gran% 73.0 38.0 - 73.0 %    Lymph% 16.4 (L) 18.0 - 48.0 %    Mono% 10.2 4.0 - 15.0 %    Eosinophil% 0.1 0.0 - 8.0 %    Basophil% 0.3 0.0 - 1.9 %    Differential Method Automated    Comprehensive metabolic panel   Result Value Ref Range    Sodium 143 136 - 145 mmol/L    Potassium 4.3 3.5 - 5.1 mmol/L    Chloride 107 95 - 110 mmol/L    CO2 31 (H) 23 - 29 mmol/L    Glucose 123 (H) 70 - 110 mg/dL    BUN, Bld 12 8 - 23 mg/dL    Creatinine 0.7 0.5 - 1.4 mg/dL    Calcium 9.1 8.7 - 10.5 mg/dL    Total Protein 6.6 6.0 - 8.4 g/dL    Albumin 3.1 (L) 3.5 - 5.2 g/dL    Total Bilirubin 0.3 0.1 - 1.0 mg/dL    Alkaline Phosphatase 138 (H) 55 - 135 U/L    AST 26 10 - 40 U/L    ALT 33 10 - 44 U/L    Anion Gap 5 (L) 8 - 16 mmol/L    eGFR if African American >60 >60 mL/min/1.73 m^2    eGFR if non African American >60 >60 mL/min/1.73 m^2   Lipid panel   Result Value Ref Range    Cholesterol 212 (H) 120 - 199 mg/dL    Triglycerides 132 30 - 150 mg/dL    HDL 39  (L) 40 - 75 mg/dL    LDL Cholesterol 146.6 63.0 - 159.0 mg/dL    Hdl/Cholesterol Ratio 18.4 (L) 20.0 - 50.0 %    Total Cholesterol/HDL Ratio 5.4 (H) 2.0 - 5.0    Non-HDL Cholesterol 173 mg/dL   Iron and TIBC   Result Value Ref Range    Iron 86 30 - 160 ug/dL    Transferrin 159 (L) 200 - 375 mg/dL    TIBC 235 (L) 250 - 450 ug/dL    Saturated Iron 37 20 - 50 %   Ferritin   Result Value Ref Range    Ferritin 754 (H) 20.0 - 300.0 ng/mL   NM Multi Pharm Stress Cardiac Component   Result Value Ref Range    Diastolic Dysfunction No    2D echo only   Result Value Ref Range    QEF 60 55 - 65   POCT glucose   Result Value Ref Range    POCT Glucose 161 (H) 70 - 110 mg/dL   POCT glucose   Result Value Ref Range    POCT Glucose 173 (H) 70 - 110 mg/dL   POCT glucose   Result Value Ref Range    POCT Glucose 133 (H) 70 - 110 mg/dL     Imaging Results:  Imaging Results          US Carotid Bilateral (Final result)  Result time 04/26/19 18:04:46    Final result by Osmar March MD (04/26/19 18:04:46)                 Impression:      No sonographic evidence of a significant stenosis is identified in either carotid system.      Electronically signed by: Osmar March MD  Date:    04/26/2019  Time:    18:04             Narrative:    EXAMINATION:  US CAROTID BILATERAL    CLINICAL HISTORY:  Aortic atherosclerosis.    COMPARISON:  None    FINDINGS:  Sonographic evaluation of the carotid systems was performed.    Calcified plaque in the carotid bulb regions bilaterally.    The peak systolic velocity in the right internal carotid artery was approximately 76 cm/sec.    The peak systolic velocity in the left internal carotid artery was [id number] cm/sec.    Antegrade flow noted in both vertebral arteries.    Stenosis percentage validated velocity measurements with angiographic measurements, velocity criteria are extrapolated from diameter data as defined by the Society of Radiologists in Ultrasound Consensus Conference Radiology 2003;  229;340-346.                               CTA Chest Non-Coronary (PE Study) (Final result)  Result time 04/26/19 13:39:18    Final result by Mehdi Mast MD (04/26/19 13:39:18)                 Impression:      No evidence of pulmonary embolism.    See additional findings above    All CT scans at this facility use dose modulation, iterative reconstruction and/or weight based dosing when appropriate to reduce radiation dose to as low as reasonably achievable.      Electronically signed by: Mehdi Mast MD  Date:    04/26/2019  Time:    13:39             Narrative:    EXAMINATION:  CTA CHEST NON CORONARY    CLINICAL HISTORY:  Shortness of breath    TECHNIQUE:  After the intravenous administration of 100 cc of Omni 35 nonionic contrast using CT pulmonary angio technique, 2.5 mm axial images were acquired using helical CT technique from the lung apices through costophrenic sulci.  Sagittal coronal and oblique MIPS were also submitted for interpretation.    COMPARISON:  Chest x-ray dated 01/16/2019    FINDINGS:  -Pulmonary arteries: Pulmonary arteries are well opacified.  No evidence of pulmonary embolism.  No evidence of pulmonary hypertension.  No right heart strain is identified.    -Lungs: No nodules or infiltrates.    -Pleura: No thickening or fluid.    -Mediastinum/Mitra:No significant adenopathy    -Axilla: No adenopathy.    -Thyroid: Lower solid right thyroid nodule.  Weight small [facility] at with this and body of the    -Heart/Aorta: Heart size is normal.  Severe coronary artery disease.  No pericardial effusion. Aorta normal caliber. Aorta demonstrates moderate atherosclerotic disease.    -Bones/Chest Wall: Mild degenerative changes.  No lytic or blastic abnormalities are identified right-sided chest port noted with the tip overlying the SVC.    -Upper Abdomen: Cholecystectomy clips.  Mild constipation.                               The EKG was ordered, reviewed, and independently interpreted by the ED  provider.  Interpretation time: 11:33  Rate: 94 BPM  Rhythm: normal sinus rhythm  Interpretation: LVH. No STEMI.           The Emergency Provider reviewed the vital signs and test results, which are outlined above.    ED Discussion     2:43 PM: Discussed case with WAYNE Granger ([facility]). Dr. Mack agrees with current care and management of pt and accepts admission.   Admitting Service: Hospital Medicine  Admitting Physician: Dr. Mack  Admit to: Obs Tele    2:45 PM: Re-evaluated pt. I have discussed test results, shared treatment plan, and the need for admission with patient and family at bedside. Pt and family express understanding at this time and agree with all information. All questions answered. Pt and family have no further questions or concerns at this time. Pt is ready for admit.    ED Medication(s):  Medications   aspirin tablet 325 mg (325 mg Oral Given 4/26/19 1240)   sodium chloride 0.9% bolus 500 mL (0 mLs Intravenous Stopped 4/26/19 1432)   diphenhydrAMINE injection 25 mg (25 mg Intravenous Given 4/26/19 1241)   iohexol (OMNIPAQUE 350) injection 100 mL (100 mLs Intravenous Given 4/26/19 1306)   regadenoson injection 0.4 mg (0.4 mg Intravenous Given 4/27/19 1415)       Discharge Medication List as of 4/28/2019  5:21 PM      START taking these medications    Details   aspirin (ECOTRIN) 81 MG EC tablet Take 1 tablet (81 mg total) by mouth once daily., Starting Mon 4/29/2019, Until Tue 4/28/2020, OTC      metoprolol tartrate (LOPRESSOR) 25 MG tablet Take 1 tablet (25 mg total) by mouth 2 (two) times daily., Starting Sun 4/28/2019, Until Mon 4/27/2020, Normal           Follow-up Information     [facility] In 3 days.    Contact information:  [address] [place] [phone]                 Medical Decision Making    Medical Decision Making:   Clinical Tests:   Lab Tests: Ordered and Reviewed  Radiological Study: Ordered and Reviewed  Medical Tests: Ordered and Reviewed     Additional  MDM:   Heart Score:    History:          Slightly suspicious.  ECG:             Normal  Age:               >65 years  Risk factors: 1-2 risk factors  Troponin:       1-2x normal limit  Final Score: 4           Scribe Attestation:   Scribe #1: I performed the above scribed service and the documentation accurately describes the services I performed. I attest to the accuracy of the note.    Attending:   Physician Attestation Statement for Scribe #1: I, Favian Shay Jr., MD, personally performed the services described in this documentation, as scribed by Naveen Mott, in my presence, and it is both accurate and complete.          Clinical Impression       ICD-10-CM ICD-9-CM   1. Elevated troponin R74.8 790.6   2. SOB (shortness of breath) R06.02 786.05   3. Chest pain R07.9 786.50       Disposition:   Disposition: Placed in Observation  Condition: Scott Shay Jr., MD  05/01/19 0900

## 2019-04-26 NOTE — ASSESSMENT & PLAN NOTE
Near Syncope with concerns for decompensation.  Place on Observation  Telemetry monitoring, orthostatic VS every shift, neuro checks every 4 hours, serial troponin levels, 2 D ECHO pending

## 2019-04-26 NOTE — ED NOTES
Patient moved to ED room 14, patient assisted onto stretcher and changed into a gown. Patient placed on cardiac monitor, continuous pulse oximetry and automatic blood pressure cuff. Bed placed in low locked position, side rails up x 2, call light is within reach of patient or family, orientation to room and explanation of wait provided to family and patient, alarms set and turned on for monitor and pulse ox, awaiting MD evaluation and orders, will continue to monitor.    Patient identifies self as Soo Holly      LOC: The patient is awake, alert and aware of environment with an appropriate affect, the patient is oriented x 3 and speaking appropriately.  APPEARANCE: Patient resting comfortably and in no acute distress, patient is clean and well groomed, patient's clothing is properly fastened.  SKIN: The skin is warm and dry, color consistent with ethnicity, patient has normal skin turgor and moist mucus membranes, skin intact, no breakdown or bruising noted.  MUSCULOSKELETAL: Patient moving all extremities well, no obvious swelling or deformities noted.  RESPIRATORY: Airway is open and patent, respirations are spontaneous, patient has a normal effort and rate, no accessory muscle use noted.  CARDIAC: Patient has a normal rate and rhythm, no periphreal edema noted, capillary refill < 3 seconds.  ABDOMEN: Soft and non tender to palpation, no distention noted.  NEUROLOGIC: PERRL, 3 mm bilaterally, eyes open spontaneously, behavior appropriate to situation, follows commands, facial expression symmetrical, bilateral hand grasp equal and even, purposeful motor response noted, normal sensation in all extremities when touched with a finger.

## 2019-04-26 NOTE — ASSESSMENT & PLAN NOTE
Place on Observation  Telemetry monitoring  Trend troponins - depending on results, may need Cardiology consult for possible stress test  Lipid panel pending  ASA daily

## 2019-04-26 NOTE — ASSESSMENT & PLAN NOTE
CTA of Chest is negative for PE or other significant findings  2 d ECHO is pending  Does patient have anxiety?

## 2019-04-26 NOTE — ASSESSMENT & PLAN NOTE
Moderately elevated  Continue amlodipine  Adjust medication as warrante  Hydralazine 10 mg IV every 6 hours prn SBP > 180

## 2019-04-26 NOTE — ED NOTES
Report given to MARIA DEL CARMEN Hutchinson on Observation Unit. Patient will be transferred at this time.

## 2019-04-26 NOTE — NURSING
Pt family did not bring home O2, instructed pt to have them go get it prior to leaving hospital, stated he did not need it for ride home.

## 2019-04-26 NOTE — SUBJECTIVE & OBJECTIVE
Past Medical History:   Diagnosis Date    Hypertension        Past Surgical History:   Procedure Laterality Date    cataracts      HYSTERECTOMY      INSERTION, PORT-A-CATH Right 1/16/2019    Performed by Prem Massey MD at [facility] OR    JOINT REPLACEMENT Right     Knee       Review of patient's allergies indicates:   Allergen Reactions    Iodine Other (See Comments)     Unknown  When eating shellfish       Sulfa (sulfonamide antibiotics) Other (See Comments)     Unknown         No current facility-administered medications on file prior to encounter.      Current Outpatient Medications on File Prior to Encounter   Medication Sig    amLODIPine (NORVASC) 10 MG tablet amlodipine 10 mg tablet once daily    dexamethasone (DECADRON) 4 MG Tab Take 5 tablets (20 mg) by mouth every 6 hours. Take 5 tablets (20 mg) by mouth 6 & 12 hours prior to TAXOL    diphenhydrAMINE-aluminum-magnesium hydroxide-simethicone-lidocaine HCl 2% Swish and spit 15 mLs every 4 (four) hours as needed.    esomeprazole magnesium (NEXIUM ORAL) Take by mouth daily as needed.    ondansetron (ZOFRAN) 8 MG tablet TAKE ONE TABLET BY MOUTH EVERY 12 HOURS AS NEEDED FOR NAUSEA    prochlorperazine (COMPAZINE) 10 MG tablet Take 1 tablet (10 mg total) by mouth every 6 (six) hours as needed.     Family History     Problem Relation (Age of Onset)    Breast cancer Sister        Tobacco Use    Smoking status: Never Smoker    Smokeless tobacco: Never Used   Substance and Sexual Activity    Alcohol use: No     Frequency: Never    Drug use: No    Sexual activity: Not Currently     Review of Systems   Constitutional: Negative.    HENT: Negative.    Eyes: Negative for pain, redness and visual disturbance.   Respiratory: Positive for shortness of breath. Negative for cough and wheezing.    Cardiovascular: Negative for chest pain, palpitations and leg swelling.   Gastrointestinal: Negative for abdominal pain, diarrhea, nausea and vomiting.   Genitourinary:  Negative for difficulty urinating, dysuria and hematuria.   Musculoskeletal: Positive for myalgias. Negative for arthralgias.   Skin: Negative for pallor, rash and wound.   Neurological: Positive for weakness. Negative for dizziness, seizures, facial asymmetry, speech difficulty, light-headedness, numbness and headaches. Syncope: previous hx of syncope.   Psychiatric/Behavioral: Negative for confusion. The patient is nervous/anxious.      Objective:     Vital Signs (Most Recent):  Temp: 98.5 °F (36.9 °C) (04/26/19 1113)  Pulse: 109 (04/26/19 1500)  Resp: (!) 21 (04/26/19 1500)  BP: (!) 166/81 (04/26/19 1500)  SpO2: 99 % (04/26/19 1500) Vital Signs (24h Range):  Temp:  [97.5 °F (36.4 °C)-98.5 °F (36.9 °C)] 98.5 °F (36.9 °C)  Pulse:  [] 109  Resp:  [15-21] 21  SpO2:  [98 %-99 %] 99 %  BP: (130-166)/(67-81) 166/81     Weight: 90.3 kg (199 lb 1.2 oz)  Body mass index is 36.41 kg/m².    Physical Exam   Constitutional: She is oriented to person, place, and time. She appears well-developed and well-nourished.   HENT:   Head: Normocephalic and atraumatic.   Nose: Nose normal.   Mouth/Throat: Oropharynx is clear and moist.   Eyes: Pupils are equal, round, and reactive to light. Conjunctivae are normal. No scleral icterus.   Neck: Normal range of motion. Neck supple.   Cardiovascular: Normal rate, regular rhythm and normal heart sounds. Exam reveals no gallop and no friction rub.   No murmur heard.  Pulmonary/Chest: Effort normal and breath sounds normal.   Abdominal: Soft. Bowel sounds are normal.   Musculoskeletal: Normal range of motion. She exhibits no edema or tenderness.   Neurological: She is alert and oriented to person, place, and time.   Skin: Skin is warm and dry.   Psychiatric: She has a normal mood and affect. Her behavior is normal.   Vitals reviewed.        CRANIAL NERVES     CN III, IV, VI   Pupils are equal, round, and reactive to light.       Significant Labs:   CBC:   Recent Labs   Lab 04/26/19  1000  04/26/19  1141   WBC 10.11 9.39   HGB 10.1* 10.4*   HCT 32.9* 32.5*    207     CMP:   Recent Labs   Lab 04/26/19  1000 04/26/19  1141    139   K 4.0 4.2    106   CO2 20* 18*   * 328*   BUN 8 8   CREATININE 1.0 0.9   CALCIUM 10.1 10.0   PROT 7.6 7.7   ALBUMIN 3.3* 3.3*   BILITOT 0.2 0.2   ALKPHOS 166* 159*   AST 20 22   ALT 28 28   ANIONGAP 13 15   EGFRNONAA 54* >60     Troponin:   Recent Labs   Lab 04/26/19  1141   TROPONINI 0.060*     All pertinent labs within the past 24 hours have been reviewed.    Significant Imaging: I have reviewed all pertinent imaging results/findings within the past 24 hours.

## 2019-04-26 NOTE — PROGRESS NOTES
Subjective:       Patient ID: Soo Holly is a 79 y.o. female.    Chief Complaint: Results; Chemotherapy; Breast Cancer; and Pain    HPI 79-year-old female scheduled for day 4 of dose dense Taxol locally advanced breast cancer patient reports pain involving her left chest with some shortness of breath patient states that has been very trouble with tumor over the last several days accompanied by family was concerned    Past Medical History:   Diagnosis Date    Hypertension      Family History   Problem Relation Age of Onset    Breast cancer Sister      Social History     Socioeconomic History    Marital status:      Spouse name: Not on file    Number of children: Not on file    Years of education: Not on file    Highest education level: Not on file   Occupational History    Not on file   Social Needs    Financial resource strain: Not on file    Food insecurity:     Worry: Not on file     Inability: Not on file    Transportation needs:     Medical: Not on file     Non-medical: Not on file   Tobacco Use    Smoking status: Never Smoker    Smokeless tobacco: Never Used   Substance and Sexual Activity    Alcohol use: No     Frequency: Never    Drug use: No    Sexual activity: Not Currently   Lifestyle    Physical activity:     Days per week: Not on file     Minutes per session: Not on file    Stress: Not on file   Relationships    Social connections:     Talks on phone: Not on file     Gets together: Not on file     Attends Latter day service: Not on file     Active member of club or organization: Not on file     Attends meetings of clubs or organizations: Not on file     Relationship status: Not on file   Other Topics Concern    Not on file   Social History Narrative    Lives in [place], [place] (1.5h).  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     Past Surgical History:   Procedure Laterality Date    cataracts      HYSTERECTOMY      INSERTION, PORT-A-CATH Right 1/16/2019    Performed by Prem Massey MD at  [facility] OR    JOINT REPLACEMENT Right     Knee       Labs:  Lab Results   Component Value Date    WBC 10.11 04/26/2019    HGB 10.1 (L) 04/26/2019    HCT 32.9 (L) 04/26/2019    MCV 96 04/26/2019     04/26/2019     BMP  Lab Results   Component Value Date     04/26/2019    K 4.0 04/26/2019     04/26/2019    CO2 20 (L) 04/26/2019    BUN 8 04/26/2019    CREATININE 1.0 04/26/2019    CALCIUM 10.1 04/26/2019    ANIONGAP 13 04/26/2019    ESTGFRAFRICA >60 04/26/2019    EGFRNONAA 54 (A) 04/26/2019     Lab Results   Component Value Date    ALT 28 04/26/2019    AST 20 04/26/2019    ALKPHOS 166 (H) 04/26/2019    BILITOT 0.2 04/26/2019       No results found for: IRON, TIBC, FERRITIN, SATURATEDIRO  No results found for: [id number]  No results found for: FOLATE  No results found for: TSH      Review of Systems   Constitutional: Negative for activity change, appetite change, chills, diaphoresis, fatigue, fever and unexpected weight change.   HENT: Negative for congestion, dental problem, drooling, ear discharge, ear pain, facial swelling, hearing loss, mouth sores, nosebleeds, postnasal drip, rhinorrhea, sinus pressure, sneezing, sore throat, tinnitus, trouble swallowing and voice change.    Eyes: Negative for photophobia, pain, discharge, redness, itching and visual disturbance.   Respiratory: Positive for shortness of breath. Negative for cough, choking, chest tightness, wheezing and stridor.    Cardiovascular: Positive for chest pain. Negative for palpitations and leg swelling.   Gastrointestinal: Negative for abdominal distention, abdominal pain, anal bleeding, blood in stool, constipation, diarrhea, nausea, rectal pain and vomiting.   Endocrine: Negative for cold intolerance, heat intolerance, polydipsia, polyphagia and polyuria.   Genitourinary: Negative for decreased urine volume, difficulty urinating, dyspareunia, dysuria, enuresis, flank pain, frequency, genital sores, hematuria, menstrual problem, pelvic  pain, urgency, vaginal bleeding, vaginal discharge and vaginal pain.   Musculoskeletal: Negative for arthralgias, back pain, gait problem, joint swelling, myalgias, neck pain and neck stiffness.   Skin: Negative for color change, pallor and rash.   Allergic/Immunologic: Negative for environmental allergies, food allergies and immunocompromised state.   Neurological: Negative for dizziness, tremors, seizures, syncope, facial asymmetry, speech difficulty, weakness, light-headedness, numbness and headaches.   Hematological: Negative for adenopathy. Does not bruise/bleed easily.   Psychiatric/Behavioral: Negative for agitation, behavioral problems, confusion, decreased concentration, dysphoric mood, hallucinations, self-injury, sleep disturbance and suicidal ideas. The patient is not nervous/anxious and is not hyperactive.        Objective:      Physical Exam   Constitutional: She is oriented to person, place, and time. She appears well-developed and well-nourished. She appears distressed.   HENT:   Head: Normocephalic and atraumatic.   Right Ear: External ear normal.   Left Ear: External ear normal.   Nose: Nose normal. Right sinus exhibits no maxillary sinus tenderness and no frontal sinus tenderness. Left sinus exhibits no maxillary sinus tenderness and no frontal sinus tenderness.   Mouth/Throat: Oropharynx is clear and moist. No oropharyngeal exudate.   Eyes: Pupils are equal, round, and reactive to light. Conjunctivae, EOM and lids are normal. Right eye exhibits no discharge. Left eye exhibits no discharge. Right conjunctiva is not injected. Right conjunctiva has no hemorrhage. Left conjunctiva is not injected. Left conjunctiva has no hemorrhage. No scleral icterus.   Neck: Normal range of motion. Neck supple. No JVD present. No tracheal deviation present. No thyromegaly present.   Cardiovascular: Normal rate, regular rhythm, normal heart sounds and intact distal pulses.   Pulmonary/Chest: Effort normal and breath  sounds normal. No stridor. No respiratory distress. She exhibits no tenderness.   Abdominal: Soft. Bowel sounds are normal. She exhibits no distension and no mass. There is no splenomegaly or hepatomegaly. There is no tenderness. There is no rebound.   Musculoskeletal: Normal range of motion. She exhibits no edema or tenderness.   Lymphadenopathy:     She has no cervical adenopathy.     She has no axillary adenopathy.        Right: No supraclavicular adenopathy present.        Left: No supraclavicular adenopathy present.   Neurological: She is alert and oriented to person, place, and time. No cranial nerve deficit. Coordination normal.   Skin: Skin is dry. No rash noted. She is not diaphoretic. No erythema.   Psychiatric: Her behavior is normal. Judgment and thought content normal. Her mood appears anxious. She exhibits a depressed mood.   Vitals reviewed.          Assessment:      1. Triple negative malignant neoplasm of breast    2. Acute chest pain           Plan:     Hold treatment today concern possibility of pulmonary embolus spoke to emergency room will send patient for evaluation of chest pain left-sided make sure CTA of lung is been done will follow up next week for further therapy based off of imaging studies today        Yaniv Pop Jr, MD FACP

## 2019-04-26 NOTE — H&P
[facility] Hospital Medicine  History & Physical    Patient Name: Soo Holly  MRN: [id number]  Admission Date: 4/26/2019  Attending Physician: Leonel Mack MD  Primary Care Provider: LANG Seals         Patient information was obtained from patient and ER records.     Subjective:     Principal Problem:Elevated troponin    Chief Complaint:   Chief Complaint   Patient presents with    Shortness of Breath     intermittent x 1 week; sent today by Dr. Pop for chest pressure and heaviness        HPI: Patient is a 79-year-old female with PMHx of HTN.  She is presently undergoing chemotherapy for right-sided breast cancer.  Today, she was scheduled for chemotherapy however, she verbalized discomfort to her left shoulder blade area with shortness of breath.  Dr. Pop was concerned about pulmonary embolism therefore, he sent for further evaluation.  Patient explains that for the past week she has noted periodic episodes of a pressure-like sensation that starts at the right shoulder and radiates throught the anterior neck and down the left posterior back at the shoulder blade region.   The discomfort is described as dull & pressure-like and it causes the patient to have to take a deep breath due to shortness of breath.  Associated symptoms include generalized weakness and presyncopal feelings. Duration of symptoms are brief. She denies any anterior chest pain, fever, chills, cough, palpitations, abdominal pain, nausea, diaphoresis, dizziness/lightheadedness and urinary symptoms.  Patient does report a previous history of syncope which was evaluated approximately 1 year ago but is unsure of findings.  She states she has never had a cardiac stress test.  Vital signs on arrival are temperature 98.5°, pulse 94, respirations 16, blood pressure 133/73 and oxygen saturation 99% on room air.  CTA of the chest was negative for pulmonary embolism. EKG - NSR with rate 94.  Labs find normocytic anemia &  essentially baseline CMP with initial troponin 0.060. Pt is placed on Observation to evaluate chest pain, SOB and presyncopal feelings.     Past Medical History:   Diagnosis Date    Hypertension        Past Surgical History:   Procedure Laterality Date    cataracts      HYSTERECTOMY      INSERTION, PORT-A-CATH Right 1/16/2019    [facility]    JOINT REPLACEMENT Right     Knee       Review of patient's allergies indicates:   Allergen Reactions    Iodine Other (See Comments)     Unknown  When eating shellfish       Sulfa (sulfonamide antibiotics) Other (See Comments)     Unknown         No current facility-administered medications on file prior to encounter.      Current Outpatient Medications on File Prior to Encounter   Medication Sig    amLODIPine (NORVASC) 10 MG tablet amlodipine 10 mg tablet once daily    dexamethasone (DECADRON) 4 MG Tab Take 5 tablets (20 mg) by mouth every 6 hours. Take 5 tablets (20 mg) by mouth 6 & 12 hours prior to TAXOL    diphenhydrAMINE-aluminum-magnesium hydroxide-simethicone-lidocaine HCl 2% Swish and spit 15 mLs every 4 (four) hours as needed.    esomeprazole magnesium (NEXIUM ORAL) Take by mouth daily as needed.    ondansetron (ZOFRAN) 8 MG tablet TAKE ONE TABLET BY MOUTH EVERY 12 HOURS AS NEEDED FOR NAUSEA    prochlorperazine (COMPAZINE) 10 MG tablet Take 1 tablet (10 mg total) by mouth every 6 (six) hours as needed.     Family History     Problem Relation (Age of Onset)    Breast cancer Sister        Tobacco Use    Smoking status: Never Smoker    Smokeless tobacco: Never Used   Substance and Sexual Activity    Alcohol use: No     Frequency: Never    Drug use: No    Sexual activity: Not Currently     Review of Systems   Constitutional: Negative.    HENT: Negative.    Eyes: Negative for pain, redness and visual disturbance.   Respiratory: Positive for shortness of breath. Negative for cough and wheezing.    Cardiovascular: Negative for  chest pain, palpitations and leg swelling.   Gastrointestinal: Negative for abdominal pain, diarrhea, nausea and vomiting.   Genitourinary: Negative for difficulty urinating, dysuria and hematuria.   Musculoskeletal: Positive for myalgias. Negative for arthralgias.   Skin: Negative for pallor, rash and wound.   Neurological: Positive for weakness. Negative for dizziness, seizures, facial asymmetry, speech difficulty, light-headedness, numbness and headaches. Syncope: previous hx of syncope.   Psychiatric/Behavioral: Negative for confusion. The patient is nervous/anxious.      Objective:     Vital Signs (Most Recent):  Temp: 98.5 °F (36.9 °C) (04/26/19 1113)  Pulse: 109 (04/26/19 1500)  Resp: (!) 21 (04/26/19 1500)  BP: (!) 166/81 (04/26/19 1500)  SpO2: 99 % (04/26/19 1500) Vital Signs (24h Range):  Temp:  [97.5 °F (36.4 °C)-98.5 °F (36.9 °C)] 98.5 °F (36.9 °C)  Pulse:  [] 109  Resp:  [15-21] 21  SpO2:  [98 %-99 %] 99 %  BP: (130-166)/(67-81) 166/81     Weight: 90.3 kg (199 lb 1.2 oz)  Body mass index is 36.41 kg/m².    Physical Exam   Constitutional: She is oriented to person, place, and time. She appears well-developed and well-nourished.   HENT:   Head: Normocephalic and atraumatic.   Nose: Nose normal.   Mouth/Throat: Oropharynx is clear and moist.   Eyes: Pupils are equal, round, and reactive to light. Conjunctivae are normal. No scleral icterus.   Neck: Normal range of motion. Neck supple.   Cardiovascular: Normal rate, regular rhythm and normal heart sounds. Exam reveals no gallop and no friction rub.   No murmur heard.  Pulmonary/Chest: Effort normal and breath sounds normal.   Abdominal: Soft. Bowel sounds are normal.   Musculoskeletal: Normal range of motion. She exhibits no edema or tenderness.   Neurological: She is alert and oriented to person, place, and time.   Skin: Skin is warm and dry.   Psychiatric: She has a normal mood and affect. Her behavior is normal.   Vitals reviewed.        CRANIAL  NERVES     CN III, IV, VI   Pupils are equal, round, and reactive to light.       Significant Labs:   CBC:   Recent Labs   Lab 04/26/19  1000 04/26/19  1141   WBC 10.11 9.39   HGB 10.1* 10.4*   HCT 32.9* 32.5*    207     CMP:   Recent Labs   Lab 04/26/19  1000 04/26/19  1141    139   K 4.0 4.2    106   CO2 20* 18*   * 328*   BUN 8 8   CREATININE 1.0 0.9   CALCIUM 10.1 10.0   PROT 7.6 7.7   ALBUMIN 3.3* 3.3*   BILITOT 0.2 0.2   ALKPHOS 166* 159*   AST 20 22   ALT 28 28   ANIONGAP 13 15   EGFRNONAA 54* >60     Troponin:   Recent Labs   Lab 04/26/19  1141   TROPONINI 0.060*     All pertinent labs within the past 24 hours have been reviewed.    Significant Imaging: I have reviewed all pertinent imaging results/findings within the past 24 hours.    Assessment/Plan:     * Elevated troponin  Place on Observation  Telemetry monitoring  Trend troponins - depending on results, may need Cardiology consult for possible stress test  Lipid panel pending  ASA daily       Pre-syncope  Near Syncope with concerns for decompensation.  Place on Observation  Telemetry monitoring, orthostatic VS every shift, neuro checks every 4 hours, serial troponin levels, 2 D ECHO pending          SOB (shortness of breath)  CTA of Chest is negative for PE or other significant findings  2 d ECHO is pending  Does patient have anxiety?      Essential hypertension  Moderately elevated  Continue amlodipine  Adjust medication as warrante  Hydralazine 10 mg IV every 6 hours prn SBP > 180      Triple negative malignant neoplasm of breast  Continue outpatient follow up with Oncology        VTE Risk Mitigation (From admission, onward)        Ordered     enoxaparin injection 40 mg  Daily      04/26/19 1607             Katt Goodman NP  Department of Hospital Medicine   [facility]

## 2019-04-26 NOTE — HPI
Soo Holly is a 79-year-old female with HTN who is presently undergoing chemotherapy for right-sided breast cancer. Today, she was scheduled for chemotherapy. However, she verbalized discomfort to her left shoulder blade area with shortness of breath. Dr. Pop was concerned about possible pulmonary embolism. Therefore, he sent her to the ED for further evaluation. The patient explains that for the past week she has noted periodic episodes of a pressure-like sensation that starts at her right shoulder and radiates throught the anterior neck and down the left posterior back at the shoulder blade region. The discomfort is described as dull & pressure-like. It causes the patient to have to take a deep breath due to shortness of breath. Associated symptoms include generalized weakness and presyncopal feelings. The duration of symptoms is brief. She denies any anterior chest pain, fever, chills, cough, palpitations, abdominal pain, nausea, diaphoresis, dizziness, lightheadedness and urinary symptoms. The patient does report a previous history of syncope which was evaluated approximately 1 year ago, but she is unsure of the findings. She states she has never had a cardiac stress test.  Vital signs on arrival include a temperature of 98.5°, pulse of 94, respirations of 16, blood pressure of 133/73 and oxygen saturation of 99% on room air. CTA of the chest was negative for pulmonary embolism. EKG showed normal sinus rhythm with a rate of 94. Labs find a normocytic anemia with an essentially baseline metabolic panel and initial troponin of 0.060. The patient is being placed in Observation to evaluate her chest pain, SOB and presyncopal feelings.

## 2019-04-27 PROBLEM — D72.829 LEUKOCYTOSIS: Status: ACTIVE | Noted: 2019-04-27

## 2019-04-27 PROBLEM — R73.9 HYPERGLYCEMIA: Status: ACTIVE | Noted: 2019-04-27

## 2019-04-27 LAB
ALBUMIN SERPL BCP-MCNC: 3.1 G/DL (ref 3.5–5.2)
ALP SERPL-CCNC: 141 U/L (ref 55–135)
ALT SERPL W/O P-5'-P-CCNC: 27 U/L (ref 10–44)
ANION GAP SERPL CALC-SCNC: 11 MMOL/L (ref 8–16)
AST SERPL-CCNC: 21 U/L (ref 10–40)
BASOPHILS # BLD AUTO: 0 K/UL (ref 0–0.2)
BASOPHILS NFR BLD: 0 % (ref 0–1.9)
BILIRUB SERPL-MCNC: 0.2 MG/DL (ref 0.1–1)
BUN SERPL-MCNC: 10 MG/DL (ref 8–23)
CALCIUM SERPL-MCNC: 9.6 MG/DL (ref 8.7–10.5)
CHLORIDE SERPL-SCNC: 106 MMOL/L (ref 95–110)
CHOLEST SERPL-MCNC: 221 MG/DL (ref 120–199)
CHOLEST/HDLC SERPL: 5.4 {RATIO} (ref 2–5)
CO2 SERPL-SCNC: 25 MMOL/L (ref 23–29)
CREAT SERPL-MCNC: 0.7 MG/DL (ref 0.5–1.4)
DIFFERENTIAL METHOD: ABNORMAL
EOSINOPHIL # BLD AUTO: 0 K/UL (ref 0–0.5)
EOSINOPHIL NFR BLD: 0 % (ref 0–8)
ERYTHROCYTE [DISTWIDTH] IN BLOOD BY AUTOMATED COUNT: 18.6 % (ref 11.5–14.5)
EST. GFR  (AFRICAN AMERICAN): >60 ML/MIN/1.73 M^2
EST. GFR  (NON AFRICAN AMERICAN): >60 ML/MIN/1.73 M^2
ESTIMATED AVG GLUCOSE: 146 MG/DL (ref 68–131)
GLUCOSE SERPL-MCNC: 153 MG/DL (ref 70–110)
HBA1C MFR BLD HPLC: 6.7 % (ref 4–5.6)
HCT VFR BLD AUTO: 29.7 % (ref 37–48.5)
HDLC SERPL-MCNC: 41 MG/DL (ref 40–75)
HDLC SERPL: 18.6 % (ref 20–50)
HGB BLD-MCNC: 9.4 G/DL (ref 12–16)
LDLC SERPL CALC-MCNC: 162 MG/DL (ref 63–159)
LYMPHOCYTES # BLD AUTO: 0.9 K/UL (ref 1–4.8)
LYMPHOCYTES NFR BLD: 6.4 % (ref 18–48)
MCH RBC QN AUTO: 30.1 PG (ref 27–31)
MCHC RBC AUTO-ENTMCNC: 31.6 G/DL (ref 32–36)
MCV RBC AUTO: 95 FL (ref 82–98)
MONOCYTES # BLD AUTO: 0.5 K/UL (ref 0.3–1)
MONOCYTES NFR BLD: 3.3 % (ref 4–15)
NEUTROPHILS # BLD AUTO: 12.7 K/UL (ref 1.8–7.7)
NEUTROPHILS NFR BLD: 90.3 % (ref 38–73)
NONHDLC SERPL-MCNC: 180 MG/DL
PLATELET # BLD AUTO: 227 K/UL (ref 150–350)
PMV BLD AUTO: 9.9 FL (ref 9.2–12.9)
POCT GLUCOSE: 161 MG/DL (ref 70–110)
POCT GLUCOSE: 173 MG/DL (ref 70–110)
POTASSIUM SERPL-SCNC: 4.2 MMOL/L (ref 3.5–5.1)
PROT SERPL-MCNC: 6.8 G/DL (ref 6–8.4)
RBC # BLD AUTO: 3.12 M/UL (ref 4–5.4)
RETIRED EF AND QEF - SEE NOTES: 60 (ref 55–65)
SODIUM SERPL-SCNC: 142 MMOL/L (ref 136–145)
TRIGL SERPL-MCNC: 90 MG/DL (ref 30–150)
TROPONIN I SERPL DL<=0.01 NG/ML-MCNC: 0.07 NG/ML (ref 0–0.03)
TROPONIN I SERPL DL<=0.01 NG/ML-MCNC: 0.08 NG/ML (ref 0–0.03)
TROPONIN I SERPL DL<=0.01 NG/ML-MCNC: 0.08 NG/ML (ref 0–0.03)
WBC # BLD AUTO: 14.06 K/UL (ref 3.9–12.7)

## 2019-04-27 PROCEDURE — 84484 ASSAY OF TROPONIN QUANT: CPT | Mod: 91

## 2019-04-27 PROCEDURE — 63600175 PHARM REV CODE 636 W HCPCS: Performed by: NURSE PRACTITIONER

## 2019-04-27 PROCEDURE — 84484 ASSAY OF TROPONIN QUANT: CPT

## 2019-04-27 PROCEDURE — 93016 CV STRESS TEST SUPVJ ONLY: CPT | Mod: ,,, | Performed by: INTERNAL MEDICINE

## 2019-04-27 PROCEDURE — 93017 CV STRESS TEST TRACING ONLY: CPT

## 2019-04-27 PROCEDURE — 93307 TTE W/O DOPPLER COMPLETE: CPT

## 2019-04-27 PROCEDURE — 93016 NM MULTI PHARM STRESS CARDIAC COMPONENT: ICD-10-PCS | Mod: ,,, | Performed by: INTERNAL MEDICINE

## 2019-04-27 PROCEDURE — 99223 PR INITIAL HOSPITAL CARE,LEVL III: ICD-10-PCS | Mod: ,,, | Performed by: INTERNAL MEDICINE

## 2019-04-27 PROCEDURE — 78452 NM MULTI PHARM STRESS CARDIAC COMPONENT: ICD-10-PCS | Mod: 26,,, | Performed by: INTERNAL MEDICINE

## 2019-04-27 PROCEDURE — 25000003 PHARM REV CODE 250: Performed by: NURSE PRACTITIONER

## 2019-04-27 PROCEDURE — 36415 COLL VENOUS BLD VENIPUNCTURE: CPT

## 2019-04-27 PROCEDURE — 85025 COMPLETE CBC W/AUTO DIFF WBC: CPT

## 2019-04-27 PROCEDURE — 78452 HT MUSCLE IMAGE SPECT MULT: CPT | Mod: 26,,, | Performed by: INTERNAL MEDICINE

## 2019-04-27 PROCEDURE — 99223 1ST HOSP IP/OBS HIGH 75: CPT | Mod: ,,, | Performed by: INTERNAL MEDICINE

## 2019-04-27 PROCEDURE — 21400001 HC TELEMETRY ROOM

## 2019-04-27 PROCEDURE — 81003 URINALYSIS AUTO W/O SCOPE: CPT

## 2019-04-27 PROCEDURE — 93018 CV STRESS TEST I&R ONLY: CPT | Mod: ,,, | Performed by: INTERNAL MEDICINE

## 2019-04-27 PROCEDURE — 93307 TTE W/O DOPPLER COMPLETE: CPT | Mod: 26,,, | Performed by: INTERNAL MEDICINE

## 2019-04-27 PROCEDURE — 93018 NM MULTI PHARM STRESS CARDIAC COMPONENT: ICD-10-PCS | Mod: ,,, | Performed by: INTERNAL MEDICINE

## 2019-04-27 PROCEDURE — 93307 2D ECHO ONLY: ICD-10-PCS | Mod: 26,,, | Performed by: INTERNAL MEDICINE

## 2019-04-27 PROCEDURE — 80053 COMPREHEN METABOLIC PANEL: CPT

## 2019-04-27 RX ORDER — IBUPROFEN 200 MG
16 TABLET ORAL
Status: DISCONTINUED | OUTPATIENT
Start: 2019-04-27 | End: 2019-04-28 | Stop reason: HOSPADM

## 2019-04-27 RX ORDER — GLUCAGON 1 MG
1 KIT INJECTION
Status: DISCONTINUED | OUTPATIENT
Start: 2019-04-27 | End: 2019-04-28 | Stop reason: HOSPADM

## 2019-04-27 RX ORDER — REGADENOSON 0.08 MG/ML
0.4 INJECTION, SOLUTION INTRAVENOUS ONCE
Status: COMPLETED | OUTPATIENT
Start: 2019-04-27 | End: 2019-04-27

## 2019-04-27 RX ORDER — INSULIN ASPART 100 [IU]/ML
0-5 INJECTION, SOLUTION INTRAVENOUS; SUBCUTANEOUS
Status: DISCONTINUED | OUTPATIENT
Start: 2019-04-27 | End: 2019-04-28 | Stop reason: HOSPADM

## 2019-04-27 RX ORDER — METOPROLOL TARTRATE 25 MG/1
25 TABLET, FILM COATED ORAL 2 TIMES DAILY
Status: DISCONTINUED | OUTPATIENT
Start: 2019-04-27 | End: 2019-04-28 | Stop reason: HOSPADM

## 2019-04-27 RX ORDER — IBUPROFEN 200 MG
24 TABLET ORAL
Status: DISCONTINUED | OUTPATIENT
Start: 2019-04-27 | End: 2019-04-28 | Stop reason: HOSPADM

## 2019-04-27 RX ADMIN — METOPROLOL TARTRATE 25 MG: 25 TABLET ORAL at 08:04

## 2019-04-27 RX ADMIN — ASPIRIN 81 MG: 81 TABLET, COATED ORAL at 09:04

## 2019-04-27 RX ADMIN — METOPROLOL TARTRATE 25 MG: 25 TABLET ORAL at 11:04

## 2019-04-27 RX ADMIN — AMLODIPINE BESYLATE 10 MG: 10 TABLET ORAL at 09:04

## 2019-04-27 RX ADMIN — REGADENOSON 0.4 MG: 0.08 INJECTION, SOLUTION INTRAVENOUS at 02:04

## 2019-04-27 RX ADMIN — PANTOPRAZOLE SODIUM 40 MG: 40 TABLET, DELAYED RELEASE ORAL at 09:04

## 2019-04-27 RX ADMIN — ENOXAPARIN SODIUM 40 MG: 100 INJECTION SUBCUTANEOUS at 05:04

## 2019-04-27 NOTE — HPI
Mrs. Holly is a 79 year old female who presented to [facility] due to shortness of breath and was sent from Dr. Pop' office due to concerns for Pulmonary Embolism. She is current undergoing chemotherapy for right sided breast cancer. She reports periodic episodes of a pressure like sensation that starts at the right shoulder and radiates to the anterior neck and down to the left back area with associated shortness of breath sensation. She was seen by Dr. Wiley in January prior to initiation of chemotherapy with ECHO. Her current medical conditions include HTN, Triple Negative Breast CA. She was placed in observation under the care of hospital medicine. Cardiology consulted to assist with management of elevated troponin and shortness of breath issues today. Patient seen and examined in room today with daughter at bedside. Denies chest pain or anginal equivalents. She continues to have some degree of shortness of breath with exertional activity. Denies orthopnea, PND or abdominal bloating. Will obtain ECHO today and MPI stress test for further evaluation. Further recs to follow.

## 2019-04-27 NOTE — PLAN OF CARE
Problem: Adult Inpatient Plan of Care  Goal: Plan of Care Review  POC reviewed. Comfort measures and safety measures addressed. Telemetry monitoring. No c/o of chest pain. Pt remains free from falls and injury.

## 2019-04-27 NOTE — ASSESSMENT & PLAN NOTE
- BP under fair control  - Continue Amlodipine  - Metoprolol added today  - Adjust medication as warrante  - Hydralazine 10 mg IV every 6 hours prn SBP > 180

## 2019-04-27 NOTE — ASSESSMENT & PLAN NOTE
Patient presented to ED with elevated troponin and SOB issues  Troponin 0.057>0.07  Denies chest pain on exam today  Keep NPO for now   MPI stress test today further evaluation  Continue ASA, Norvasc, BB  Repeat ECHO today, pending  Further recs to follow

## 2019-04-27 NOTE — SUBJECTIVE & OBJECTIVE
Past Medical History:   Diagnosis Date    Hypertension        Past Surgical History:   Procedure Laterality Date    cataracts      HYSTERECTOMY      INSERTION, PORT-A-CATH Right 1/16/2019    Performed by Prem [facility]sey [facility] at [facility] OR    JOINT REPLACEMENT Right     Knee       Review of patient's allergies indicates:   Allergen Reactions    Iodine Other (See Comments)     Unknown  When eating shellfish       Sulfa (sulfonamide antibiotics) Other (See Comments)     Unknown         No current facility-administered medications on file prior to encounter.      Current Outpatient Medications on File Prior to Encounter   Medication Sig    amLODIPine (NORVASC) 10 MG tablet amlodipine 10 mg tablet once daily    dexamethasone (DECADRON) 4 MG Tab Take 5 tablets (20 mg) by mouth every 6 hours. Take 5 tablets (20 mg) by mouth 6 & 12 hours prior to TAXOL    diphenhydrAMINE-aluminum-magnesium hydroxide-simethicone-lidocaine HCl 2% Swish and spit 15 mLs every 4 (four) hours as needed.    esomeprazole magnesium (NEXIUM ORAL) Take by mouth daily as needed.    ondansetron (ZOFRAN) 8 MG tablet TAKE ONE TABLET BY MOUTH EVERY 12 HOURS AS NEEDED FOR NAUSEA    prochlorperazine (COMPAZINE) 10 MG tablet Take 1 tablet (10 mg total) by mouth every 6 (six) hours as needed.     Family History     Problem Relation (Age of Onset)    Breast cancer Sister        Tobacco Use    Smoking status: Never Smoker    Smokeless tobacco: Never Used   Substance and Sexual Activity    Alcohol use: No     Frequency: Never    Drug use: No    Sexual activity: Not Currently     Review of Systems   Constitution: Positive for malaise/fatigue.   HENT: Negative for hearing loss and hoarse voice.    Eyes: Positive for blurred vision. Negative for visual disturbance.   Cardiovascular: Positive for dyspnea on exertion. Negative for chest pain, claudication, irregular heartbeat, leg swelling, near-syncope, orthopnea, palpitations, paroxysmal nocturnal dyspnea  and syncope.   Respiratory: Positive for shortness of breath. Negative for cough, hemoptysis, sleep disturbances due to breathing, snoring and wheezing.    Endocrine: Negative for cold intolerance and heat intolerance.   Hematologic/Lymphatic: Does not bruise/bleed easily.   Skin: Negative for color change, dry skin and nail changes.   Musculoskeletal: Positive for arthritis and back pain. Negative for joint pain and myalgias.   Gastrointestinal: Negative for bloating, abdominal pain, constipation, nausea and vomiting.   Genitourinary: Negative for dysuria, flank pain, hematuria and hesitancy.   Neurological: Negative for headaches, light-headedness, loss of balance, numbness, paresthesias and weakness.   Psychiatric/Behavioral: Negative for altered mental status.   Allergic/Immunologic: Negative for environmental allergies.     Objective:     Vital Signs (Most Recent):  Temp: 97.8 °F (36.6 °C) (04/27/19 0844)  Pulse: 83 (04/27/19 0844)  Resp: 20 (04/27/19 0844)  BP: 130/61 (04/27/19 0844)  SpO2: 100 % (04/27/19 0844) Vital Signs (24h Range):  Temp:  [97.2 °F (36.2 °C)-98.5 °F (36.9 °C)] 97.8 °F (36.6 °C)  Pulse:  [] 83  Resp:  [15-21] 20  SpO2:  [93 %-100 %] 100 %  BP: (130-166)/(59-81) 130/61     Weight: 87.3 kg (192 lb 7.4 oz)  Body mass index is 35.2 kg/m².    SpO2: 100 %  O2 Device (Oxygen Therapy): room air      Intake/Output Summary (Last 24 hours) at 4/27/2019 1045  Last data filed at 4/26/2019 1808  Gross per 24 hour   Intake 500 ml   Output 1 ml   Net 499 ml       Lines/Drains/Airways     Central Venous Catheter Line                 Port A Cath Single Lumen 01/16/19 0842 right subclavian 101 days          Peripheral Intravenous Line                 Peripheral IV - Single Lumen 04/26/19 1139 20 G Right Antecubital less than 1 day                Physical Exam   Constitutional: She is oriented to person, place, and time. She appears well-developed and well-nourished. No distress.   HENT:   Head:  Normocephalic and atraumatic.   Neck: Normal range of motion and full passive range of motion without pain. Neck supple. No JVD present.   Cardiovascular: Normal rate, regular rhythm, S1 normal, S2 normal and intact distal pulses. PMI is not displaced. Exam reveals no distant heart sounds.   No murmur heard.  Pulses:       Radial pulses are 2+ on the right side, and 2+ on the left side.        Dorsalis pedis pulses are 2+ on the right side, and 2+ on the left side.   Pulmonary/Chest: Effort normal and breath sounds normal. No accessory muscle usage. No respiratory distress. She has no decreased breath sounds. She has no wheezes. She has no rales.   +SOB   Abdominal: Soft. Bowel sounds are normal. She exhibits no distension. There is no tenderness.   +obese   Musculoskeletal: Normal range of motion.        Right ankle: She exhibits no swelling.        Left ankle: She exhibits no swelling.   Neurological: She is alert and oriented to person, place, and time.   Skin: Skin is warm and dry. She is not diaphoretic. No cyanosis. Nails show no clubbing.   Psychiatric: She has a normal mood and affect. Her speech is normal and behavior is normal. Judgment and thought content normal. Cognition and memory are normal.   Nursing note and vitals reviewed.      Significant Labs:   BMP:   Recent Labs   Lab 04/26/19  1000 04/26/19  1141 04/27/19  0516   * 328* 153*    139 142   K 4.0 4.2 4.2    106 106   CO2 20* 18* 25   BUN 8 8 10   CREATININE 1.0 0.9 0.7   CALCIUM 10.1 10.0 9.6   , CBC   Recent Labs   Lab 04/26/19  1000 04/26/19  1141 04/27/19  0516   WBC 10.11 9.39 14.06*   HGB 10.1* 10.4* 9.4*   HCT 32.9* 32.5* 29.7*    207 227   , Lipid Panel   Recent Labs   Lab 04/26/19  1745   CHOL 221*   HDL 41   LDLCALC 162.0*   TRIG 90   CHOLHDL 18.6*   , Troponin   Recent Labs   Lab 04/26/19  1745 04/27/19  0037 04/27/19  0516   TROPONINI 0.057* 0.076* 0.070*    and All pertinent lab results from the last 24  hours have been reviewed.    Significant Imaging: Echocardiogram:   2D echo with color flow doppler:   Results for orders placed or performed in visit on 03/25/19   2D echo with color flow doppler   Result Value Ref Range    QEF 65 55 - 65    Diastolic Dysfunction No     Est. PA Systolic Pressure 20.47     Mitral Valve Mobility NORMAL     and X-Ray: CXR: X-Ray Chest 1 View (CXR): No results found for this visit on 04/26/19.

## 2019-04-27 NOTE — ASSESSMENT & PLAN NOTE
- WBCs elevated to 14k  - CTA chest negative for acute findings  - UA pending to r/o source, will need to f/u  - Could be r/t home Dexamethasone  - Daily CBC  - Monitor

## 2019-04-27 NOTE — ASSESSMENT & PLAN NOTE
- BG elevated with no known h/o DM  - A1c 6.7  - Likely r/t home Dexamethasone  - Start Accu checks ACHS with SSI PRN  - Monitor

## 2019-04-27 NOTE — ASSESSMENT & PLAN NOTE
- h/o Near Syncope with concerns for recent worsening  - Carotid US negative  - ECHO pending, will need to f/u  - Neuro checks q 4 hours  - Orthostatic VS checks  - Tele monitoring

## 2019-04-27 NOTE — ASSESSMENT & PLAN NOTE
- Troponin 0.057, 0.076, 0.070  - Cardiology consulted given unexplained SOB with concern for cardiac source  - CTA chest on admit showed severe CAD  - Patient will undergo a ST today with additional recs to follow  - Continue ASA, Norvasc, and BB  - Tele monitoring

## 2019-04-27 NOTE — PROGRESS NOTES
[facility] Hospital Medicine  Progress Note    Patient Name: Soo Holly  MRN: [id number]  Patient Class: IP- Inpatient   Admission Date: 4/26/2019  Length of Stay: 0 days  Attending Physician: Osmar Rayo MD  Primary Care Provider: LANG Seals        Subjective:     Principal Problem:Elevated troponin    HPI:  Patient is a 79-year-old female with PMHx of HTN.  She is presently undergoing chemotherapy for right-sided breast cancer.  Today, she was scheduled for chemotherapy however, she verbalized discomfort to her left shoulder blade area with shortness of breath.  Dr. Pop was concerned about pulmonary embolism therefore, he sent for further evaluation.  Patient explains that for the past week she has noted periodic episodes of a pressure-like sensation that starts at the right shoulder and radiates throught the anterior neck and down the left posterior back at the shoulder blade region.   The discomfort is described as dull & pressure-like and it causes the patient to have to take a deep breath due to shortness of breath.  Associated symptoms include generalized weakness and presyncopal feelings. Duration of symptoms are brief. She denies any anterior chest pain, fever, chills, cough, palpitations, abdominal pain, nausea, diaphoresis, dizziness/lightheadedness and urinary symptoms.  Patient does report a previous history of syncope which was evaluated approximately 1 year ago but is unsure of findings.  She states she has never had a cardiac stress test.  Vital signs on arrival are temperature 98.5°, pulse 94, respirations 16, blood pressure 133/73 and oxygen saturation 99% on room air.  CTA of the chest was negative for pulmonary embolism. EKG - NSR with rate 94.  Labs find normocytic anemia & essentially baseline CMP with initial troponin 0.060. Pt is placed on Observation to evaluate chest pain, SOB and presyncopal feelings.     Hospital Course:  On 4/26 patient was placed in  "OBS secondary to SOB, elevated troponin and pre-syncope.  CTA chest on admit negative for PE and acute findings, but did show severe coronary artery disease.      As of 4/27 patient is currently denying CP, but does report intermittent "pressure to her left shoulder".  Also reports "on and off SOB".  Troponin 0.057, 0.076, 0.070.  Cardiology consulted given unexplained SOB with concern for cardiac source and patient will undergo a ST today with additional recs to follow.  Carotid US negative.  ECHO pending.  Will transition to inpatient for continued care.      Interval History:  No acute events overnight.  Currently resting comfortably in bed with grand-daughter at BS.  patient is currently denying CP, but does report intermittent "pressure to her left shoulder".  Also reports "on and off SOB".  Troponin 0.057, 0.076, 0.070.  Cardiology consulted given unexplained SOB with concern for cardiac source and patient will undergo a ST today with additional recs to follow.  Carotid US negative.  ECHO pending.  Will transition to inpatient for continued care.      Review of Systems   Constitutional: Negative.    HENT: Negative.    Eyes: Negative for pain, redness and visual disturbance.   Respiratory: Positive for shortness of breath. Negative for cough and wheezing.         C/o "pressure to right shoulder"   Cardiovascular: Negative for chest pain, palpitations and leg swelling.   Gastrointestinal: Negative for abdominal pain, diarrhea, nausea and vomiting.   Genitourinary: Negative for difficulty urinating, dysuria and hematuria.   Musculoskeletal: Positive for myalgias. Negative for arthralgias.   Skin: Negative for pallor, rash and wound.   Neurological: Positive for weakness. Negative for dizziness, seizures, syncope, facial asymmetry, speech difficulty, light-headedness, numbness and headaches.   Psychiatric/Behavioral: Negative for confusion. The patient is nervous/anxious.      Objective:     Vital Signs (Most " Recent):  Temp: 97.8 °F (36.6 °C) (04/27/19 0844)  Pulse: 91 (04/27/19 1134)  Resp: 20 (04/27/19 1134)  BP: (!) 166/72 (04/27/19 1134)  SpO2: 100 % (04/27/19 0844) Vital Signs (24h Range):  Temp:  [97.2 °F (36.2 °C)-98.2 °F (36.8 °C)] 97.8 °F (36.6 °C)  Pulse:  [] 91  Resp:  [18-21] 20  SpO2:  [93 %-100 %] 100 %  BP: (130-166)/(59-81) 166/72     Weight: 87.3 kg (192 lb 7.4 oz)  Body mass index is 35.2 kg/m².    Intake/Output Summary (Last 24 hours) at 4/27/2019 1204  Last data filed at 4/26/2019 1808  Gross per 24 hour   Intake 500 ml   Output 1 ml   Net 499 ml      Physical Exam   Constitutional: She is oriented to person, place, and time. She appears well-developed and well-nourished.   Obese   HENT:   Head: Normocephalic and atraumatic.   Nose: Nose normal.   Mouth/Throat: Oropharynx is clear and moist.   Eyes: Pupils are equal, round, and reactive to light. Conjunctivae are normal. No scleral icterus.   Neck: Normal range of motion. Neck supple.   Cardiovascular: Normal rate, regular rhythm and normal heart sounds. Exam reveals no gallop and no friction rub.   No murmur heard.  Pulmonary/Chest: Effort normal and breath sounds normal.   Abdominal: Soft. Bowel sounds are normal.   Musculoskeletal: Normal range of motion. She exhibits no edema or tenderness.   Neurological: She is alert and oriented to person, place, and time.   Skin: Skin is warm and dry.   Psychiatric: She has a normal mood and affect. Her behavior is normal.   Vitals reviewed.      Significant Labs:   CBC:   Recent Labs   Lab 04/26/19  1000 04/26/19  1141 04/27/19  0516   WBC 10.11 9.39 14.06*   HGB 10.1* 10.4* 9.4*   HCT 32.9* 32.5* 29.7*    207 227     CMP:   Recent Labs   Lab 04/26/19  1000 04/26/19  1141 04/27/19  0516    139 142   K 4.0 4.2 4.2    106 106   CO2 20* 18* 25   * 328* 153*   BUN 8 8 10   CREATININE 1.0 0.9 0.7   CALCIUM 10.1 10.0 9.6   PROT 7.6 7.7 6.8   ALBUMIN 3.3* 3.3* 3.1*   BILITOT 0.2 0.2  0.2   ALKPHOS 166* 159* 141*   AST 20 22 21   ALT 28 28 27   ANIONGAP 13 15 11   EGFRNONAA 54* >60 >60     Lipid Panel:   Recent Labs   Lab 04/26/19  1745   CHOL 221*   HDL 41   LDLCALC 162.0*   TRIG 90   CHOLHDL 18.6*     Troponin:   Recent Labs   Lab 04/26/19  1745 04/27/19  0037 04/27/19  0516   TROPONINI 0.057* 0.076* 0.070*       Significant Imaging: I have reviewed all pertinent imaging results/findings within the past 24 hours.    Assessment/Plan:      * Elevated troponin  - Troponin 0.057, 0.076, 0.070  - Cardiology consulted given unexplained SOB with concern for cardiac source  - CTA chest on admit showed severe CAD  - Patient will undergo a ST today with additional recs to follow  - Continue ASA, Norvasc, and BB  - Tele monitoring      SOB (shortness of breath)  - CTA of Chest is negative for PE or other significant findings  - Supplemental O2 as needed  - See plan as per above      Pre-syncope  - h/o Near Syncope with concerns for recent worsening  - Carotid US negative  - ECHO pending, will need to f/u  - Neuro checks q 4 hours  - Orthostatic VS checks  - Tele monitoring          Leukocytosis  - WBCs elevated to 14k  - CTA chest negative for acute findings  - UA pending to r/o source, will need to f/u  - Could be r/t home Dexamethasone  - Daily CBC  - Monitor      Hyperglycemia  - BG elevated with no known h/o DM  - A1c 6.7  - Likely r/t home Dexamethasone  - Start Accu checks ACHS with SSI PRN  - Monitor      Triple 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 Pop  - Hemoc consult pending      Essential hypertension  - BP under fair control  - Continue Amlodipine  - Metoprolol added today  - Adjust medication as warrante  - Hydralazine 10 mg IV every 6 hours prn SBP > 180        VTE Risk Mitigation (From admission, onward)        Ordered     IP VTE HIGH RISK PATIENT  Once      04/26/19 1743     Place ESTHELA hose  Until discontinued      04/26/19 1743     enoxaparin injection 40  mg  Daily      04/26/19 2918              Aleja Bowens DNP, ACNP-BC  Department of Hospital Medicine   [facility]

## 2019-04-27 NOTE — SUBJECTIVE & OBJECTIVE
"Interval History:  No acute events overnight.  Currently resting comfortably in bed with grand-daughter at .  patient is currently denying CP, but does report intermittent "pressure to her left shoulder".  Also reports "on and off SOB".  Troponin 0.057, 0.076, 0.070.  Cardiology consulted given unexplained SOB with concern for cardiac source and patient will undergo a ST today with additional recs to follow.  Carotid US negative.  ECHO pending.  Will transition to inpatient for continued care.      Review of Systems   Constitutional: Negative.    HENT: Negative.    Eyes: Negative for pain, redness and visual disturbance.   Respiratory: Positive for shortness of breath. Negative for cough and wheezing.         C/o "pressure to right shoulder"   Cardiovascular: Negative for chest pain, palpitations and leg swelling.   Gastrointestinal: Negative for abdominal pain, diarrhea, nausea and vomiting.   Genitourinary: Negative for difficulty urinating, dysuria and hematuria.   Musculoskeletal: Positive for myalgias. Negative for arthralgias.   Skin: Negative for pallor, rash and wound.   Neurological: Positive for weakness. Negative for dizziness, seizures, syncope, facial asymmetry, speech difficulty, light-headedness, numbness and headaches.   Psychiatric/Behavioral: Negative for confusion. The patient is nervous/anxious.      Objective:     Vital Signs (Most Recent):  Temp: 97.8 °F (36.6 °C) (04/27/19 0844)  Pulse: 91 (04/27/19 1134)  Resp: 20 (04/27/19 1134)  BP: (!) 166/72 (04/27/19 1134)  SpO2: 100 % (04/27/19 0844) Vital Signs (24h Range):  Temp:  [97.2 °F (36.2 °C)-98.2 °F (36.8 °C)] 97.8 °F (36.6 °C)  Pulse:  [] 91  Resp:  [18-21] 20  SpO2:  [93 %-100 %] 100 %  BP: (130-166)/(59-81) 166/72     Weight: 87.3 kg (192 lb 7.4 oz)  Body mass index is 35.2 kg/m².    Intake/Output Summary (Last 24 hours) at 4/27/2019 1204  Last data filed at 4/26/2019 1808  Gross per 24 hour   Intake 500 ml   Output 1 ml   Net 499 " ml      Physical Exam   Constitutional: She is oriented to person, place, and time. She appears well-developed and well-nourished.   Obese   HENT:   Head: Normocephalic and atraumatic.   Nose: Nose normal.   Mouth/Throat: Oropharynx is clear and moist.   Eyes: Pupils are equal, round, and reactive to light. Conjunctivae are normal. No scleral icterus.   Neck: Normal range of motion. Neck supple.   Cardiovascular: Normal rate, regular rhythm and normal heart sounds. Exam reveals no gallop and no friction rub.   No murmur heard.  Pulmonary/Chest: Effort normal and breath sounds normal.   Abdominal: Soft. Bowel sounds are normal.   Musculoskeletal: Normal range of motion. She exhibits no edema or tenderness.   Neurological: She is alert and oriented to person, place, and time.   Skin: Skin is warm and dry.   Psychiatric: She has a normal mood and affect. Her behavior is normal.   Vitals reviewed.      Significant Labs:   CBC:   Recent Labs   Lab 04/26/19  1000 04/26/19  1141 04/27/19  0516   WBC 10.11 9.39 14.06*   HGB 10.1* 10.4* 9.4*   HCT 32.9* 32.5* 29.7*    207 227     CMP:   Recent Labs   Lab 04/26/19  1000 04/26/19  1141 04/27/19  0516    139 142   K 4.0 4.2 4.2    106 106   CO2 20* 18* 25   * 328* 153*   BUN 8 8 10   CREATININE 1.0 0.9 0.7   CALCIUM 10.1 10.0 9.6   PROT 7.6 7.7 6.8   ALBUMIN 3.3* 3.3* 3.1*   BILITOT 0.2 0.2 0.2   ALKPHOS 166* 159* 141*   AST 20 22 21   ALT 28 28 27   ANIONGAP 13 15 11   EGFRNONAA 54* >60 >60     Lipid Panel:   Recent Labs   Lab 04/26/19  1745   CHOL 221*   HDL 41   LDLCALC 162.0*   TRIG 90   CHOLHDL 18.6*     Troponin:   Recent Labs   Lab 04/26/19  1745 04/27/19  0037 04/27/19  0516   TROPONINI 0.057* 0.076* 0.070*       Significant Imaging: I have reviewed all pertinent imaging results/findings within the past 24 hours.

## 2019-04-27 NOTE — HOSPITAL COURSE
On 4/26/19, patient was placed in Observation due to complaints of SOB, shoulder pain, elevated troponin and pre-syncope. The patient's troponin increased from 0.057, to 0.076 and then 0.070. Cardiology was consulted due to her unexplained SOB with concern for cardiac source. The patient underwent a nuclear stress test on 4/27/19 that was free of evidence for myocardial ischemia or injury. Carotid US and echocardiogram were essentially negative. Her symptoms improved and she agrees to follow up with her PCP and Oncology upon discharge. While hospitalized, the patient was noted to have leukocytosis and hyperglycemia. These are thought to be due to dexamethasone usage and will continue to be followed by Primary Care and Oncology.

## 2019-04-27 NOTE — CONSULTS
[facility] -   Cardiology  Consult Note    Patient Name: Soo Holly  MRN: [id number]  Admission Date: 4/26/2019  Hospital Length of Stay: 0 days  Code Status: Full Code   Attending Provider: Osmar Rayo MD   Consulting Provider: Beba Youssef NP  Primary Care Physician: LANG Seals  Principal Problem:Elevated troponin    Patient information was obtained from patient, relative(s), past medical records and ER records.     Inpatient consult to Cardiology  Consult performed by: Beba Youssef NP  Consult ordered by: Aleja Bowens NP        Subjective:     Chief Complaint:  Shortness of breath     HPI:   Mrs. Holly is a 79 year old female who presented to [facility] due to shortness of breath and was sent from Dr. Pop' office due to concerns for Pulmonary Embolism. She is current undergoing chemotherapy for right sided breast cancer. She reports periodic episodes of a pressure like sensation that starts at the right shoulder and radiates to the anterior neck and down to the left back area with associated shortness of breath sensation. She was seen by Dr. Wiley in January prior to initiation of chemotherapy with ECHO. Her current medical conditions include HTN, Triple Negative Breast CA. She was placed in observation under the care of hospital medicine. Cardiology consulted to assist with management of elevated troponin and shortness of breath issues today. Patient seen and examined in room today with daughter at bedside. Denies chest pain or anginal equivalents. She continues to have some degree of shortness of breath with exertional activity. Denies orthopnea, PND or abdominal bloating. Will obtain ECHO today and MPI stress test for further evaluation. Further recs to follow.     Past Medical History:   Diagnosis Date    Hypertension        Past Surgical History:   Procedure Laterality Date    cataracts      HYSTERECTOMY      INSERTION, PORT-A-CATH Right 1/16/2019    Performed by  Prem [facility]sey [facility] at [facility]    JOINT REPLACEMENT Right     Knee       Review of patient's allergies indicates:   Allergen Reactions    Iodine Other (See Comments)     Unknown  When eating shellfish       Sulfa (sulfonamide antibiotics) Other (See Comments)     Unknown         No current facility-administered medications on file prior to encounter.      Current Outpatient Medications on File Prior to Encounter   Medication Sig    amLODIPine (NORVASC) 10 MG tablet amlodipine 10 mg tablet once daily    dexamethasone (DECADRON) 4 MG Tab Take 5 tablets (20 mg) by mouth every 6 hours. Take 5 tablets (20 mg) by mouth 6 & 12 hours prior to TAXOL    diphenhydrAMINE-aluminum-magnesium hydroxide-simethicone-lidocaine HCl 2% Swish and spit 15 mLs every 4 (four) hours as needed.    esomeprazole magnesium (NEXIUM ORAL) Take by mouth daily as needed.    ondansetron (ZOFRAN) 8 MG tablet TAKE ONE TABLET BY MOUTH EVERY 12 HOURS AS NEEDED FOR NAUSEA    prochlorperazine (COMPAZINE) 10 MG tablet Take 1 tablet (10 mg total) by mouth every 6 (six) hours as needed.     Family History     Problem Relation (Age of Onset)    Breast cancer Sister        Tobacco Use    Smoking status: Never Smoker    Smokeless tobacco: Never Used   Substance and Sexual Activity    Alcohol use: No     Frequency: Never    Drug use: No    Sexual activity: Not Currently     Review of Systems   Constitution: Positive for malaise/fatigue.   HENT: Negative for hearing loss and hoarse voice.    Eyes: Positive for blurred vision. Negative for visual disturbance.   Cardiovascular: Positive for dyspnea on exertion. Negative for chest pain, claudication, irregular heartbeat, leg swelling, near-syncope, orthopnea, palpitations, paroxysmal nocturnal dyspnea and syncope.   Respiratory: Positive for shortness of breath. Negative for cough, hemoptysis, sleep disturbances due to breathing, snoring and wheezing.    Endocrine: Negative for cold intolerance and heat  intolerance.   Hematologic/Lymphatic: Does not bruise/bleed easily.   Skin: Negative for color change, dry skin and nail changes.   Musculoskeletal: Positive for arthritis and back pain. Negative for joint pain and myalgias.   Gastrointestinal: Negative for bloating, abdominal pain, constipation, nausea and vomiting.   Genitourinary: Negative for dysuria, flank pain, hematuria and hesitancy.   Neurological: Negative for headaches, light-headedness, loss of balance, numbness, paresthesias and weakness.   Psychiatric/Behavioral: Negative for altered mental status.   Allergic/Immunologic: Negative for environmental allergies.     Objective:     Vital Signs (Most Recent):  Temp: 97.8 °F (36.6 °C) (04/27/19 0844)  Pulse: 83 (04/27/19 0844)  Resp: 20 (04/27/19 0844)  BP: 130/61 (04/27/19 0844)  SpO2: 100 % (04/27/19 0844) Vital Signs (24h Range):  Temp:  [97.2 °F (36.2 °C)-98.5 °F (36.9 °C)] 97.8 °F (36.6 °C)  Pulse:  [] 83  Resp:  [15-21] 20  SpO2:  [93 %-100 %] 100 %  BP: (130-166)/(59-81) 130/61     Weight: 87.3 kg (192 lb 7.4 oz)  Body mass index is 35.2 kg/m².    SpO2: 100 %  O2 Device (Oxygen Therapy): room air      Intake/Output Summary (Last 24 hours) at 4/27/2019 1045  Last data filed at 4/26/2019 1808  Gross per 24 hour   Intake 500 ml   Output 1 ml   Net 499 ml       Lines/Drains/Airways     Central Venous Catheter Line                 Port A Cath Single Lumen 01/16/19 0842 right subclavian 101 days          Peripheral Intravenous Line                 Peripheral IV - Single Lumen 04/26/19 1139 20 G Right Antecubital less than 1 day                Physical Exam   Constitutional: She is oriented to person, place, and time. She appears well-developed and well-nourished. No distress.   HENT:   Head: Normocephalic and atraumatic.   Neck: Normal range of motion and full passive range of motion without pain. Neck supple. No JVD present.   Cardiovascular: Normal rate, regular rhythm, S1 normal, S2 normal and  intact distal pulses. PMI is not displaced. Exam reveals no distant heart sounds.   No murmur heard.  Pulses:       Radial pulses are 2+ on the right side, and 2+ on the left side.        Dorsalis pedis pulses are 2+ on the right side, and 2+ on the left side.   Pulmonary/Chest: Effort normal and breath sounds normal. No accessory muscle usage. No respiratory distress. She has no decreased breath sounds. She has no wheezes. She has no rales.   +SOB   Abdominal: Soft. Bowel sounds are normal. She exhibits no distension. There is no tenderness.   +obese   Musculoskeletal: Normal range of motion.        Right ankle: She exhibits no swelling.        Left ankle: She exhibits no swelling.   Neurological: She is alert and oriented to person, place, and time.   Skin: Skin is warm and dry. She is not diaphoretic. No cyanosis. Nails show no clubbing.   Psychiatric: She has a normal mood and affect. Her speech is normal and behavior is normal. Judgment and thought content normal. Cognition and memory are normal.   Nursing note and vitals reviewed.      Significant Labs:   BMP:   Recent Labs   Lab 04/26/19  1000 04/26/19  1141 04/27/19  0516   * 328* 153*    139 142   K 4.0 4.2 4.2    106 106   CO2 20* 18* 25   BUN 8 8 10   CREATININE 1.0 0.9 0.7   CALCIUM 10.1 10.0 9.6   , CBC   Recent Labs   Lab 04/26/19  1000 04/26/19  1141 04/27/19  0516   WBC 10.11 9.39 14.06*   HGB 10.1* 10.4* 9.4*   HCT 32.9* 32.5* 29.7*    207 227   , Lipid Panel   Recent Labs   Lab 04/26/19  1745   CHOL 221*   HDL 41   LDLCALC 162.0*   TRIG 90   CHOLHDL 18.6*   , Troponin   Recent Labs   Lab 04/26/19  1745 04/27/19  0037 04/27/19  0516   TROPONINI 0.057* 0.076* 0.070*    and All pertinent lab results from the last 24 hours have been reviewed.    Significant Imaging: Echocardiogram:   2D echo with color flow doppler:   Results for orders placed or performed in visit on 03/25/19   2D echo with color flow doppler   Result Value  Ref Range    QEF 65 55 - 65    Diastolic Dysfunction No     Est. PA Systolic Pressure 20.47     Mitral Valve Mobility NORMAL     and X-Ray: CXR: X-Ray Chest 1 View (CXR): No results found for this visit on 04/26/19.    Assessment and Plan:     * Elevated troponin  Patient presented to ED with elevated troponin and SOB issues  Troponin 0.057>0.07  Denies chest pain on exam today  Keep NPO for now   MPI stress test today further evaluation  Continue ASA, Norvasc, BB  Repeat ECHO today, pending  Further recs to follow    SOB (shortness of breath)  See plan for elevated troponin    Essential hypertension  On medical therapy  Continue Norvasc  Add BB today        VTE Risk Mitigation (From admission, onward)        Ordered     IP VTE HIGH RISK PATIENT  Once      04/26/19 1743     Place ESTHELA hose  Until discontinued      04/26/19 1743     enoxaparin injection 40 mg  Daily      04/26/19 1602          Thank you for your consult. I will follow-up with patient. Please contact us if you have any additional questions.    Beba Youssef NP  Cardiology   [facility]

## 2019-04-28 VITALS
WEIGHT: 191.38 LBS | RESPIRATION RATE: 20 BRPM | TEMPERATURE: 98 F | DIASTOLIC BLOOD PRESSURE: 64 MMHG | SYSTOLIC BLOOD PRESSURE: 101 MMHG | HEART RATE: 80 BPM | BODY MASS INDEX: 35.22 KG/M2 | OXYGEN SATURATION: 96 % | HEIGHT: 62 IN

## 2019-04-28 LAB
ALBUMIN SERPL BCP-MCNC: 3.1 G/DL (ref 3.5–5.2)
ALP SERPL-CCNC: 138 U/L (ref 55–135)
ALT SERPL W/O P-5'-P-CCNC: 33 U/L (ref 10–44)
ANION GAP SERPL CALC-SCNC: 5 MMOL/L (ref 8–16)
AST SERPL-CCNC: 26 U/L (ref 10–40)
BASOPHILS # BLD AUTO: 0.02 K/UL (ref 0–0.2)
BASOPHILS NFR BLD: 0.3 % (ref 0–1.9)
BILIRUB SERPL-MCNC: 0.3 MG/DL (ref 0.1–1)
BILIRUB UR QL STRIP: NEGATIVE
BUN SERPL-MCNC: 12 MG/DL (ref 8–23)
CALCIUM SERPL-MCNC: 9.1 MG/DL (ref 8.7–10.5)
CHLORIDE SERPL-SCNC: 107 MMOL/L (ref 95–110)
CHOLEST SERPL-MCNC: 212 MG/DL (ref 120–199)
CHOLEST/HDLC SERPL: 5.4 {RATIO} (ref 2–5)
CLARITY UR: CLEAR
CO2 SERPL-SCNC: 31 MMOL/L (ref 23–29)
COLOR UR: YELLOW
CREAT SERPL-MCNC: 0.7 MG/DL (ref 0.5–1.4)
DIASTOLIC DYSFUNCTION: NO
DIFFERENTIAL METHOD: ABNORMAL
EOSINOPHIL # BLD AUTO: 0 K/UL (ref 0–0.5)
EOSINOPHIL NFR BLD: 0.1 % (ref 0–8)
ERYTHROCYTE [DISTWIDTH] IN BLOOD BY AUTOMATED COUNT: 18.9 % (ref 11.5–14.5)
EST. GFR  (AFRICAN AMERICAN): >60 ML/MIN/1.73 M^2
EST. GFR  (NON AFRICAN AMERICAN): >60 ML/MIN/1.73 M^2
FERRITIN SERPL-MCNC: 754 NG/ML (ref 20–300)
GLUCOSE SERPL-MCNC: 123 MG/DL (ref 70–110)
GLUCOSE UR QL STRIP: NEGATIVE
HCT VFR BLD AUTO: 30.8 % (ref 37–48.5)
HDLC SERPL-MCNC: 39 MG/DL (ref 40–75)
HDLC SERPL: 18.4 % (ref 20–50)
HGB BLD-MCNC: 9.4 G/DL (ref 12–16)
HGB UR QL STRIP: NEGATIVE
IRON SERPL-MCNC: 86 UG/DL (ref 30–160)
KETONES UR QL STRIP: NEGATIVE
LDLC SERPL CALC-MCNC: 146.6 MG/DL (ref 63–159)
LEUKOCYTE ESTERASE UR QL STRIP: NEGATIVE
LYMPHOCYTES # BLD AUTO: 1.3 K/UL (ref 1–4.8)
LYMPHOCYTES NFR BLD: 16.4 % (ref 18–48)
MCH RBC QN AUTO: 29.7 PG (ref 27–31)
MCHC RBC AUTO-ENTMCNC: 30.5 G/DL (ref 32–36)
MCV RBC AUTO: 98 FL (ref 82–98)
MONOCYTES # BLD AUTO: 0.8 K/UL (ref 0.3–1)
MONOCYTES NFR BLD: 10.2 % (ref 4–15)
NEUTROPHILS # BLD AUTO: 5.6 K/UL (ref 1.8–7.7)
NEUTROPHILS NFR BLD: 73 % (ref 38–73)
NITRITE UR QL STRIP: NEGATIVE
NONHDLC SERPL-MCNC: 173 MG/DL
PH UR STRIP: 6 [PH] (ref 5–8)
PLATELET # BLD AUTO: 234 K/UL (ref 150–350)
PMV BLD AUTO: 9.6 FL (ref 9.2–12.9)
POCT GLUCOSE: 133 MG/DL (ref 70–110)
POTASSIUM SERPL-SCNC: 4.3 MMOL/L (ref 3.5–5.1)
PROT SERPL-MCNC: 6.6 G/DL (ref 6–8.4)
PROT UR QL STRIP: NEGATIVE
RBC # BLD AUTO: 3.16 M/UL (ref 4–5.4)
SATURATED IRON: 37 % (ref 20–50)
SODIUM SERPL-SCNC: 143 MMOL/L (ref 136–145)
SP GR UR STRIP: 1.01 (ref 1–1.03)
TOTAL IRON BINDING CAPACITY: 235 UG/DL (ref 250–450)
TRANSFERRIN SERPL-MCNC: 159 MG/DL (ref 200–375)
TRIGL SERPL-MCNC: 132 MG/DL (ref 30–150)
URN SPEC COLLECT METH UR: NORMAL
UROBILINOGEN UR STRIP-ACNC: NEGATIVE EU/DL
WBC # BLD AUTO: 7.64 K/UL (ref 3.9–12.7)

## 2019-04-28 PROCEDURE — 82728 ASSAY OF FERRITIN: CPT

## 2019-04-28 PROCEDURE — 83540 ASSAY OF IRON: CPT

## 2019-04-28 PROCEDURE — 99223 1ST HOSP IP/OBS HIGH 75: CPT | Mod: ,,, | Performed by: INTERNAL MEDICINE

## 2019-04-28 PROCEDURE — 25000003 PHARM REV CODE 250: Performed by: NURSE PRACTITIONER

## 2019-04-28 PROCEDURE — 80053 COMPREHEN METABOLIC PANEL: CPT

## 2019-04-28 PROCEDURE — 80061 LIPID PANEL: CPT

## 2019-04-28 PROCEDURE — 85025 COMPLETE CBC W/AUTO DIFF WBC: CPT

## 2019-04-28 PROCEDURE — 36415 COLL VENOUS BLD VENIPUNCTURE: CPT

## 2019-04-28 PROCEDURE — 99223 PR INITIAL HOSPITAL CARE,LEVL III: ICD-10-PCS | Mod: ,,, | Performed by: INTERNAL MEDICINE

## 2019-04-28 RX ORDER — METOPROLOL TARTRATE 25 MG/1
25 TABLET, FILM COATED ORAL 2 TIMES DAILY
Qty: 60 TABLET | Refills: 1 | Status: SHIPPED | OUTPATIENT
Start: 2019-04-28 | End: 2019-09-16

## 2019-04-28 RX ORDER — ASPIRIN 81 MG/1
81 TABLET ORAL DAILY
Refills: 0 | COMMUNITY
Start: 2019-04-29 | End: 2019-06-03

## 2019-04-28 RX ADMIN — METOPROLOL TARTRATE 25 MG: 25 TABLET ORAL at 09:04

## 2019-04-28 RX ADMIN — PANTOPRAZOLE SODIUM 40 MG: 40 TABLET, DELAYED RELEASE ORAL at 09:04

## 2019-04-28 RX ADMIN — ASPIRIN 81 MG: 81 TABLET, COATED ORAL at 09:04

## 2019-04-28 RX ADMIN — AMLODIPINE BESYLATE 10 MG: 10 TABLET ORAL at 09:04

## 2019-04-28 NOTE — ASSESSMENT & PLAN NOTE
Patient has been admitted with shortness of breath/elevated troponin.  She is currently undergoing cardiac workup with Cardiology service on board.

## 2019-04-28 NOTE — PROGRESS NOTES
[facility] -   Hematology/Oncology  Progress Note    Patient Name: Soo Holly  Admission Date: 4/26/2019  Hospital Length of Stay: 1 days  Code Status: Full Code     Subjective:     HPI:  79-year-old female with inflammatory breast cancer of the right breast currently undergoing dose dense AC--Taxol who was admitted the for shortness of breath/elevated troponin.  She is currently undergoing cardiac workup with stress test and echocardiogram.  Patient reports significant improvement in shortness of breath and denies any chest pain this morning.        Oncology Treatment Plan:   OP BREAST PACLITAXEL Q2W    Medications:  Continuous Infusions:  Scheduled Meds:   amLODIPine  10 mg Oral Daily    aspirin  81 mg Oral Daily    enoxaparin  40 mg Subcutaneous Daily    metoprolol tartrate  25 mg Oral BID    pantoprazole  40 mg Oral Daily     PRN Meds:acetaminophen, dextrose 50%, dextrose 50%, glucagon (human recombinant), glucose, glucose, insulin aspart U-100, ondansetron, sodium chloride 0.9%     Review of Systems   Constitutional: Positive for activity change, appetite change and fatigue.   HENT: Negative.  Negative for congestion, dental problem, drooling, facial swelling, mouth sores, nosebleeds, rhinorrhea, sinus pressure and sore throat.    Respiratory: Negative for apnea, cough, choking, chest tightness, shortness of breath, wheezing and stridor.    Cardiovascular: Negative for chest pain, palpitations and leg swelling.   Gastrointestinal: Negative for abdominal pain, diarrhea, nausea and vomiting.   Genitourinary: Negative for difficulty urinating, dysuria and hematuria.   Musculoskeletal: Positive for myalgias. Negative for arthralgias.   Skin: Negative for pallor, rash and wound.   Neurological: Positive for weakness. Negative for dizziness, seizures, facial asymmetry, speech difficulty, light-headedness, numbness and headaches. Syncope: previous hx of syncope.   Psychiatric/Behavioral:  Negative for confusion. The patient is nervous/anxious.      Objective:     Vital Signs (Most Recent):  Temp: 98.1 °F (36.7 °C) (04/28/19 1154)  Pulse: 71 (04/28/19 1154)  Resp: 20 (04/28/19 0851)  BP: 104/61 (04/28/19 1154)  SpO2: 98 % (04/28/19 1154) Vital Signs (24h Range):  Temp:  [97 °F (36.1 °C)-98.2 °F (36.8 °C)] 98.1 °F (36.7 °C)  Pulse:  [64-78] 71  Resp:  [18-20] 20  SpO2:  [97 %-100 %] 98 %  BP: (104-142)/(56-87) 104/61     Weight: 86.8 kg (191 lb 5.8 oz)  Body mass index is 35 kg/m².  Body surface area is 1.95 meters squared.      Intake/Output Summary (Last 24 hours) at 4/28/2019 1225  Last data filed at 4/28/2019 0953  Gross per 24 hour   Intake 480 ml   Output 1 ml   Net 479 ml       Physical Exam   Constitutional: She is oriented to person, place, and time. She appears well-developed.   Well groomed   HENT:   Head: Normocephalic and atraumatic.   Right Ear: Tympanic membrane, external ear and ear canal normal.   Left Ear: Tympanic membrane, external ear and ear canal normal.   Nose: Nose normal. Right sinus exhibits no maxillary sinus tenderness and no frontal sinus tenderness. Left sinus exhibits no maxillary sinus tenderness and no frontal sinus tenderness.   Mouth/Throat: Oropharynx is clear and moist and mucous membranes are normal. No oral lesions. Normal dentition.   Eyes: Pupils are equal, round, and reactive to light. Conjunctivae, EOM and lids are normal. Lids are everted and swept, no foreign bodies found. Right eye exhibits no discharge. Left eye exhibits no discharge.   Neck: Trachea normal and normal range of motion. Neck supple. No tracheal deviation present. No thyroid mass and no thyromegaly present.   No crepitus   Cardiovascular: Normal rate, regular rhythm, normal heart sounds, intact distal pulses and normal pulses. Exam reveals no gallop and no friction rub.   No murmur heard.  Pulmonary/Chest: Effort normal and breath sounds normal. She has no wheezes. She has no rales. She  exhibits no tenderness.   Abdominal: Soft. Normal appearance and bowel sounds are normal. She exhibits no distension and no mass. There is no hepatosplenomegaly. There is no tenderness. There is no rebound and no guarding. No hernia.   Musculoskeletal: Normal range of motion. She exhibits no edema or tenderness.   Lymphadenopathy:     She has no cervical adenopathy.     She has axillary adenopathy.   Neurological: She is alert and oriented to person, place, and time. No cranial nerve deficit. She exhibits normal muscle tone. Coordination normal.   Skin: Skin is warm, dry and intact. Capillary refill takes less than 2 seconds. No abrasion, no bruising, no ecchymosis and no rash noted. Rash is not macular, not pustular and not urticarial. No pallor.   Psychiatric: She has a normal mood and affect. Her behavior is normal. Judgment and thought content normal.       Significant Labs:   BMP:   Recent Labs   Lab 04/27/19  0516 04/28/19  0501   * 123*    143   K 4.2 4.3    107   CO2 25 31*   BUN 10 12   CREATININE 0.7 0.7   CALCIUM 9.6 9.1   , CBC:   Recent Labs   Lab 04/27/19  0516 04/28/19  0501   WBC 14.06* 7.64   HGB 9.4* 9.4*   HCT 29.7* 30.8*    234   , CMP:   Recent Labs   Lab 04/27/19  0516 04/28/19  0501    143   K 4.2 4.3    107   CO2 25 31*   * 123*   BUN 10 12   CREATININE 0.7 0.7   CALCIUM 9.6 9.1   PROT 6.8 6.6   ALBUMIN 3.1* 3.1*   BILITOT 0.2 0.3   ALKPHOS 141* 138*   AST 21 26   ALT 27 33   ANIONGAP 11 5*   EGFRNONAA >60 >60   , Coagulation: No results for input(s): PT, INR, APTT in the last 48 hours., Haptoglobin: No results for input(s): HAPTOGLOBIN in the last 48 hours., Immunology: No results for input(s): SPEP, GISELL, RUDI, FREELAMBDALI in the last 48 hours., LDH: No results for input(s): LDHCSF, BFSOURCE in the last 48 hours., LFTs:   Recent Labs   Lab 04/27/19  0516 04/28/19  0501   ALT 27 33   AST 21 26   ALKPHOS 141* 138*   BILITOT 0.2 0.3   PROT 6.8 6.6    ALBUMIN 3.1* 3.1*   , Reticulocytes: No results for input(s): RETIC in the last 48 hours. and Tumor Markers: No results for input(s): PSA, CEA, , AFPTM, [id number],  in the last 48 hours.    Invalid input(s): ALGTM    Diagnostic Results:  I have reviewed all pertinent imaging results/findings within the past 24 hours.    Assessment/Plan:     SOB (shortness of breath)  Patient has been admitted with shortness of breath/elevated troponin.  She is currently undergoing cardiac workup with Cardiology service on board.      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  She has completed dose dense AC and has 1 additional dose of Taxol remaining.  She will follow-up with Dr. Pop after discharge to discuss last dose of neoadjuvant chemotherapy    --patient to follow-up with Dr. Pop after discharge to discuss last dose of dose dense Taxol          Thank you for your consult. I will follow-up with patient. Please contact us if you have any additional questions.     Talib Kennedy Jr, MD  Hematology/Oncology  [facility]

## 2019-04-28 NOTE — SUBJECTIVE & OBJECTIVE
Oncology Treatment Plan:   OP BREAST PACLITAXEL Q2W    Medications:  Continuous Infusions:  Scheduled Meds:   amLODIPine  10 mg Oral Daily    aspirin  81 mg Oral Daily    enoxaparin  40 mg Subcutaneous Daily    metoprolol tartrate  25 mg Oral BID    pantoprazole  40 mg Oral Daily     PRN Meds:acetaminophen, dextrose 50%, dextrose 50%, glucagon (human recombinant), glucose, glucose, insulin aspart U-100, ondansetron, sodium chloride 0.9%     Review of Systems   Constitutional: Positive for activity change, appetite change and fatigue.   HENT: Negative.  Negative for congestion, dental problem, drooling, facial swelling, mouth sores, nosebleeds, rhinorrhea, sinus pressure and sore throat.    Respiratory: Negative for apnea, cough, choking, chest tightness, shortness of breath, wheezing and stridor.    Cardiovascular: Negative for chest pain, palpitations and leg swelling.   Gastrointestinal: Negative for abdominal pain, diarrhea, nausea and vomiting.   Genitourinary: Negative for difficulty urinating, dysuria and hematuria.   Musculoskeletal: Positive for myalgias. Negative for arthralgias.   Skin: Negative for pallor, rash and wound.   Neurological: Positive for weakness. Negative for dizziness, seizures, facial asymmetry, speech difficulty, light-headedness, numbness and headaches. Syncope: previous hx of syncope.   Psychiatric/Behavioral: Negative for confusion. The patient is nervous/anxious.      Objective:     Vital Signs (Most Recent):  Temp: 98.1 °F (36.7 °C) (04/28/19 1154)  Pulse: 71 (04/28/19 1154)  Resp: 20 (04/28/19 0851)  BP: 104/61 (04/28/19 1154)  SpO2: 98 % (04/28/19 1154) Vital Signs (24h Range):  Temp:  [97 °F (36.1 °C)-98.2 °F (36.8 °C)] 98.1 °F (36.7 °C)  Pulse:  [64-78] 71  Resp:  [18-20] 20  SpO2:  [97 %-100 %] 98 %  BP: (104-142)/(56-87) 104/61     Weight: 86.8 kg (191 lb 5.8 oz)  Body mass index is 35 kg/m².  Body surface area is 1.95 meters squared.      Intake/Output Summary (Last  24 hours) at 4/28/2019 1225  Last data filed at 4/28/2019 0953  Gross per 24 hour   Intake 480 ml   Output 1 ml   Net 479 ml       Physical Exam   Constitutional: She is oriented to person, place, and time. She appears well-developed.   Well groomed   HENT:   Head: Normocephalic and atraumatic.   Right Ear: Tympanic membrane, external ear and ear canal normal.   Left Ear: Tympanic membrane, external ear and ear canal normal.   Nose: Nose normal. Right sinus exhibits no maxillary sinus tenderness and no frontal sinus tenderness. Left sinus exhibits no maxillary sinus tenderness and no frontal sinus tenderness.   Mouth/Throat: Oropharynx is clear and moist and mucous membranes are normal. No oral lesions. Normal dentition.   Eyes: Pupils are equal, round, and reactive to light. Conjunctivae, EOM and lids are normal. Lids are everted and swept, no foreign bodies found. Right eye exhibits no discharge. Left eye exhibits no discharge.   Neck: Trachea normal and normal range of motion. Neck supple. No tracheal deviation present. No thyroid mass and no thyromegaly present.   No crepitus   Cardiovascular: Normal rate, regular rhythm, normal heart sounds, intact distal pulses and normal pulses. Exam reveals no gallop and no friction rub.   No murmur heard.  Pulmonary/Chest: Effort normal and breath sounds normal. She has no wheezes. She has no rales. She exhibits no tenderness.   Abdominal: Soft. Normal appearance and bowel sounds are normal. She exhibits no distension and no mass. There is no hepatosplenomegaly. There is no tenderness. There is no rebound and no guarding. No hernia.   Musculoskeletal: Normal range of motion. She exhibits no edema or tenderness.   Lymphadenopathy:     She has no cervical adenopathy.     She has axillary adenopathy.   Neurological: She is alert and oriented to person, place, and time. No cranial nerve deficit. She exhibits normal muscle tone. Coordination normal.   Skin: Skin is warm, dry and  intact. Capillary refill takes less than 2 seconds. No abrasion, no bruising, no ecchymosis and no rash noted. Rash is not macular, not pustular and not urticarial. No pallor.   Psychiatric: She has a normal mood and affect. Her behavior is normal. Judgment and thought content normal.       Significant Labs:   BMP:   Recent Labs   Lab 04/27/19  0516 04/28/19  0501   * 123*    143   K 4.2 4.3    107   CO2 25 31*   BUN 10 12   CREATININE 0.7 0.7   CALCIUM 9.6 9.1   , CBC:   Recent Labs   Lab 04/27/19  0516 04/28/19  0501   WBC 14.06* 7.64   HGB 9.4* 9.4*   HCT 29.7* 30.8*    234   , CMP:   Recent Labs   Lab 04/27/19  0516 04/28/19  0501    143   K 4.2 4.3    107   CO2 25 31*   * 123*   BUN 10 12   CREATININE 0.7 0.7   CALCIUM 9.6 9.1   PROT 6.8 6.6   ALBUMIN 3.1* 3.1*   BILITOT 0.2 0.3   ALKPHOS 141* 138*   AST 21 26   ALT 27 33   ANIONGAP 11 5*   EGFRNONAA >60 >60   , Coagulation: No results for input(s): PT, INR, APTT in the last 48 hours., Haptoglobin: No results for input(s): HAPTOGLOBIN in the last 48 hours., Immunology: No results for input(s): SPEP, GISELL, RUDI, FREELAMBDALI in the last 48 hours., LDH: No results for input(s): LDHCSF, BFSOURCE in the last 48 hours., LFTs:   Recent Labs   Lab 04/27/19  0516 04/28/19  0501   ALT 27 33   AST 21 26   ALKPHOS 141* 138*   BILITOT 0.2 0.3   PROT 6.8 6.6   ALBUMIN 3.1* 3.1*   , Reticulocytes: No results for input(s): RETIC in the last 48 hours. and Tumor Markers: No results for input(s): PSA, CEA, , AFPTM, [id number],  in the last 48 hours.    Invalid input(s): ALGTM    Diagnostic Results:  I have reviewed all pertinent imaging results/findings within the past 24 hours.

## 2019-04-28 NOTE — PLAN OF CARE
Aries met with patient and her granddaughter at bedside to complete assessment. Pt's family reports pt was independent before admission. Pt uses a walker as needed. Patient is a pt of Dr. Pop. She sees Dr. Pop every two weeks. Pt resides in [place], [place]. Patient denies any post hospital needs or services at this time. Transitional Care Folder, Discharge Planning Begins on Admission pamphlet, [facility] Bedside Delivery pamphlet, Advance Directive information given to patient along with the contact information given. Sw placed contact information on white board. Sw instructed patient or family to call with any questions or concerns.    Pt's pcp is LANG Seals. Pt uses   [facility] - [place], [place] - [address]  [address] [facility] [address]  Phone: [phone] Fax: [phone]    [facility] [address] [place] [address]  [place] [facility] [place] [address]  Phone: [phone] Fax: [phone]    D/C Plan: Home with possible home health  D/C Trans: Family       04/28/19 0848   Discharge Assessment   Assessment Type Discharge Planning Assessment   Confirmed/corrected address and phone number on facesheet? Yes   Assessment information obtained from? Patient;Caregiver   Communicated expected length of stay with patient/caregiver yes   Prior to hospitilization cognitive status: Alert/Oriented   Prior to hospitalization functional status: Independent;Assistive Equipment   Current cognitive status: Alert/Oriented   Current Functional Status: Independent;Assistive Equipment   Facility Arrived From: Home   Lives With child(leta), adult  (lives with son)   Able to Return to Prior Arrangements yes   Is patient able to care for self after discharge? Unable to determine at this time (comments)   Who are your caregiver(s) and their phone number(s)? Jasper Holly, son, [phone]   Patient's perception of discharge disposition home or selfcare   Readmission Within the Last 30 Days no previous  admission in last 30 days   Patient currently being followed by outpatient case management? No   Patient currently receives any other outside agency services? No   Equipment Currently Used at Home walker, rolling;cane, straight   Do you have any problems affording any of your prescribed medications? TBD   Is the patient taking medications as prescribed? yes   Does the patient have transportation home? Yes   Transportation Anticipated family or friend will provide   Does the patient receive services at the [facility]? No   Discharge Plan A Home with family   Discharge Plan B Home Health   DME Needed Upon Discharge  none   Patient/Family in Agreement with Plan yes

## 2019-04-28 NOTE — ASSESSMENT & PLAN NOTE
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  She has completed dose dense AC and has 1 additional dose of Taxol remaining.  She will follow-up with Dr. Pop after discharge to discuss last dose of neoadjuvant chemotherapy    --patient to follow-up with Dr. Pop after discharge to discuss last dose of dose dense Taxol

## 2019-04-28 NOTE — NURSING
Pt discharged per MD order. Pt instructions and handout given to pt. Pt instructed to schedule and keep all f/u appointments. Pt verbalizes understanding. IV removed, tele box returned to monitor tech. Pt advised to call out when she is ready to go down.

## 2019-04-28 NOTE — HPI
79-year-old female with inflammatory breast cancer of the right breast currently undergoing dose dense AC--Taxol who was admitted the for shortness of breath/elevated troponin.  She is currently undergoing cardiac workup with stress test and echocardiogram.  Patient reports significant improvement in shortness of breath and denies any chest pain this morning.

## 2019-04-28 NOTE — PLAN OF CARE
Problem: Adult Inpatient Plan of Care  Goal: Plan of Care Review  POC reviewed. Comfort measures and safety measures addressed. Telemetry monitoring. Pt remains free from falls and injuries.

## 2019-04-29 ENCOUNTER — TELEPHONE (OUTPATIENT)
Dept: HEMATOLOGY/ONCOLOGY | Facility: CLINIC | Age: 79
End: 2019-04-29

## 2019-04-29 ENCOUNTER — PATIENT MESSAGE (OUTPATIENT)
Dept: CARDIOLOGY | Facility: CLINIC | Age: 79
End: 2019-04-29

## 2019-04-29 DIAGNOSIS — C50.919 TRIPLE NEGATIVE MALIGNANT NEOPLASM OF BREAST: ICD-10-CM

## 2019-04-29 RX ORDER — DEXAMETHASONE 4 MG/1
20 TABLET ORAL EVERY 6 HOURS
Qty: 20 TABLET | Refills: 2 | Status: SHIPPED | OUTPATIENT
Start: 2019-04-29 | End: 2019-05-02 | Stop reason: ALTCHOICE

## 2019-04-29 NOTE — TELEPHONE ENCOUNTER
Called son back after receiving voicemail that they needed to reschedule his moms chemo that they missed on Friday due to hospital admission.  Pt scheduled for labs, Dr. Pop and poss chemo on 5/1 starting at 8AM at the [place] location.  Jasper stated that they are out of dexamethosone premedication and need a refill.  Notified son that I will send a message to dr. Pop regarding.  He asked that the prescription be sent to [facility].  Son verbalized understanding of all appointments scheduled.

## 2019-05-01 ENCOUNTER — LAB VISIT (OUTPATIENT)
Dept: LAB | Facility: HOSPITAL | Age: 79
End: 2019-05-01
Attending: INTERNAL MEDICINE
Payer: MEDICARE

## 2019-05-01 ENCOUNTER — OFFICE VISIT (OUTPATIENT)
Dept: HEMATOLOGY/ONCOLOGY | Facility: CLINIC | Age: 79
End: 2019-05-01
Payer: MEDICARE

## 2019-05-01 ENCOUNTER — TELEPHONE (OUTPATIENT)
Dept: HEMATOLOGY/ONCOLOGY | Facility: CLINIC | Age: 79
End: 2019-05-01

## 2019-05-01 VITALS
SYSTOLIC BLOOD PRESSURE: 120 MMHG | DIASTOLIC BLOOD PRESSURE: 66 MMHG | HEIGHT: 62 IN | OXYGEN SATURATION: 99 % | BODY MASS INDEX: 36.39 KG/M2 | HEART RATE: 75 BPM | RESPIRATION RATE: 18 BRPM | TEMPERATURE: 98 F | WEIGHT: 197.75 LBS

## 2019-05-01 DIAGNOSIS — C77.3 BREAST CANCER METASTASIZED TO AXILLARY LYMPH NODE, RIGHT: ICD-10-CM

## 2019-05-01 DIAGNOSIS — C50.919 TRIPLE NEGATIVE MALIGNANT NEOPLASM OF BREAST: Primary | ICD-10-CM

## 2019-05-01 DIAGNOSIS — D49.89 NEOPLASM OF ABDOMEN: ICD-10-CM

## 2019-05-01 DIAGNOSIS — C50.511 MALIGNANT NEOPLASM OF LOWER-OUTER QUADRANT OF RIGHT BREAST OF FEMALE, ESTROGEN RECEPTOR NEGATIVE: ICD-10-CM

## 2019-05-01 DIAGNOSIS — Z17.1 MALIGNANT NEOPLASM OF LOWER-OUTER QUADRANT OF RIGHT BREAST OF FEMALE, ESTROGEN RECEPTOR NEGATIVE: ICD-10-CM

## 2019-05-01 DIAGNOSIS — C50.911 BREAST CANCER METASTASIZED TO AXILLARY LYMPH NODE, RIGHT: ICD-10-CM

## 2019-05-01 LAB
ALBUMIN SERPL BCP-MCNC: 3 G/DL (ref 3.5–5.2)
ALP SERPL-CCNC: 138 U/L (ref 55–135)
ALT SERPL W/O P-5'-P-CCNC: 31 U/L (ref 10–44)
ANION GAP SERPL CALC-SCNC: 13 MMOL/L (ref 8–16)
AST SERPL-CCNC: 24 U/L (ref 10–40)
BASOPHILS # BLD AUTO: 0 K/UL (ref 0–0.2)
BASOPHILS NFR BLD: 0 % (ref 0–1.9)
BILIRUB SERPL-MCNC: 0.3 MG/DL (ref 0.1–1)
BUN SERPL-MCNC: 12 MG/DL (ref 8–23)
CALCIUM SERPL-MCNC: 9.4 MG/DL (ref 8.7–10.5)
CHLORIDE SERPL-SCNC: 105 MMOL/L (ref 95–110)
CO2 SERPL-SCNC: 19 MMOL/L (ref 23–29)
CREAT SERPL-MCNC: 0.9 MG/DL (ref 0.5–1.4)
DIFFERENTIAL METHOD: ABNORMAL
EOSINOPHIL # BLD AUTO: 0 K/UL (ref 0–0.5)
EOSINOPHIL NFR BLD: 0 % (ref 0–8)
ERYTHROCYTE [DISTWIDTH] IN BLOOD BY AUTOMATED COUNT: 17.8 % (ref 11.5–14.5)
EST. GFR  (AFRICAN AMERICAN): >60 ML/MIN/1.73 M^2
EST. GFR  (NON AFRICAN AMERICAN): >60 ML/MIN/1.73 M^2
GLUCOSE SERPL-MCNC: 415 MG/DL (ref 70–110)
HCT VFR BLD AUTO: 31.2 % (ref 37–48.5)
HGB BLD-MCNC: 9.7 G/DL (ref 12–16)
IMM GRANULOCYTES # BLD AUTO: 0.03 K/UL (ref 0–0.04)
IMM GRANULOCYTES NFR BLD AUTO: 0.5 % (ref 0–0.5)
LYMPHOCYTES # BLD AUTO: 0.6 K/UL (ref 1–4.8)
LYMPHOCYTES NFR BLD: 9.7 % (ref 18–48)
MCH RBC QN AUTO: 30.9 PG (ref 27–31)
MCHC RBC AUTO-ENTMCNC: 31.1 G/DL (ref 32–36)
MCV RBC AUTO: 99 FL (ref 82–98)
MONOCYTES # BLD AUTO: 0 K/UL (ref 0.3–1)
MONOCYTES NFR BLD: 0.5 % (ref 4–15)
NEUTROPHILS # BLD AUTO: 5.8 K/UL (ref 1.8–7.7)
NEUTROPHILS NFR BLD: 89.8 % (ref 38–73)
NRBC BLD-RTO: 0 /100 WBC
PLATELET # BLD AUTO: 249 K/UL (ref 150–350)
PMV BLD AUTO: 9.4 FL (ref 9.2–12.9)
POTASSIUM SERPL-SCNC: 4.3 MMOL/L (ref 3.5–5.1)
PROT SERPL-MCNC: 7 G/DL (ref 6–8.4)
RBC # BLD AUTO: 3.14 M/UL (ref 4–5.4)
SODIUM SERPL-SCNC: 137 MMOL/L (ref 136–145)
WBC # BLD AUTO: 6.49 K/UL (ref 3.9–12.7)

## 2019-05-01 PROCEDURE — 99999 PR PBB SHADOW E&M-EST. PATIENT-LVL IV: ICD-10-PCS | Mod: PBBFAC,,, | Performed by: INTERNAL MEDICINE

## 2019-05-01 PROCEDURE — 99214 OFFICE O/P EST MOD 30 MIN: CPT | Mod: PBBFAC,PN | Performed by: INTERNAL MEDICINE

## 2019-05-01 PROCEDURE — 99214 OFFICE O/P EST MOD 30 MIN: CPT | Mod: S$PBB,,, | Performed by: INTERNAL MEDICINE

## 2019-05-01 PROCEDURE — 99214 PR OFFICE/OUTPT VISIT, EST, LEVL IV, 30-39 MIN: ICD-10-PCS | Mod: S$PBB,,, | Performed by: INTERNAL MEDICINE

## 2019-05-01 PROCEDURE — 85025 COMPLETE CBC W/AUTO DIFF WBC: CPT

## 2019-05-01 PROCEDURE — 80053 COMPREHEN METABOLIC PANEL: CPT

## 2019-05-01 PROCEDURE — 99999 PR PBB SHADOW E&M-EST. PATIENT-LVL IV: CPT | Mod: PBBFAC,,, | Performed by: INTERNAL MEDICINE

## 2019-05-01 PROCEDURE — 36415 COLL VENOUS BLD VENIPUNCTURE: CPT

## 2019-05-01 NOTE — PROGRESS NOTES
Subjective:       Patient ID: Soo Holly is a 79 y.o. female.    Chief Complaint: Follow-up; Results; and Breast Cancer    HPI 79-year-old female locally advanced triple negative breast cancer T4 N3 presents for cycle 4 day 1 of dose dense Taxol patient had week delay because of fatigue and weakness and chest pain CT chest no evidence of pulmonary embolus patient still is weak and T ECOG status 2    Past Medical History:   Diagnosis Date    Hypertension      Family History   Problem Relation Age of Onset    Breast cancer Sister      Social History     Socioeconomic History    Marital status:      Spouse name: Not on file    Number of children: Not on file    Years of education: Not on file    Highest education level: Not on file   Occupational History    Not on file   Social Needs    Financial resource strain: Not on file    Food insecurity:     Worry: Not on file     Inability: Not on file    Transportation needs:     Medical: Not on file     Non-medical: Not on file   Tobacco Use    Smoking status: Never Smoker    Smokeless tobacco: Never Used   Substance and Sexual Activity    Alcohol use: No     Frequency: Never    Drug use: No    Sexual activity: Not Currently   Lifestyle    Physical activity:     Days per week: Not on file     Minutes per session: Not on file    Stress: Not on file   Relationships    Social connections:     Talks on phone: Not on file     Gets together: Not on file     Attends Caodaism service: Not on file     Active member of club or organization: Not on file     Attends meetings of clubs or organizations: Not on file     Relationship status: Not on file   Other Topics Concern    Not on file   Social History Narrative    Lives in [place], [place] (1.5h).  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     Past Surgical History:   Procedure Laterality Date    cataracts      HYSTERECTOMY      INSERTION, PORT-A-CATH Right 1/16/2019    [facility]    JOINT  REPLACEMENT Right     Knee       Labs:  Lab Results   Component Value Date    WBC 6.49 05/01/2019    HGB 9.7 (L) 05/01/2019    HCT 31.2 (L) 05/01/2019    MCV 99 (H) 05/01/2019     05/01/2019     BMP  Lab Results   Component Value Date     05/01/2019    K 4.3 05/01/2019     05/01/2019    CO2 19 (L) 05/01/2019    BUN 12 05/01/2019    CREATININE 0.9 05/01/2019    CALCIUM 9.4 05/01/2019    ANIONGAP 13 05/01/2019    ESTGFRAFRICA >60 05/01/2019    EGFRNONAA >60 05/01/2019     Lab Results   Component Value Date    ALT 31 05/01/2019    AST 24 05/01/2019    ALKPHOS 138 (H) 05/01/2019    BILITOT 0.3 05/01/2019       Lab Results   Component Value Date    IRON 86 04/28/2019    TIBC 235 (L) 04/28/2019    FERRITIN 754 (H) 04/28/2019     No results found for: [id number]  No results found for: FOLATE  No results found for: TSH      Review of Systems   Constitutional: Positive for activity change and fatigue. Negative for appetite change, chills, diaphoresis, fever and unexpected weight change.   HENT: Negative for congestion, dental problem, drooling, ear discharge, ear pain, facial swelling, hearing loss, mouth sores, nosebleeds, postnasal drip, rhinorrhea, sinus pressure, sneezing, sore throat, tinnitus, trouble swallowing and voice change.    Eyes: Negative for photophobia, pain, discharge, redness, itching and visual disturbance.   Respiratory: Negative for cough, choking, chest tightness, shortness of breath, wheezing and stridor.    Cardiovascular: Negative for chest pain, palpitations and leg swelling.   Gastrointestinal: Negative for abdominal distention, abdominal pain, anal bleeding, blood in stool, constipation, diarrhea, nausea, rectal pain and vomiting.   Endocrine: Negative for cold intolerance, heat intolerance, polydipsia, polyphagia and polyuria.   Genitourinary: Negative for decreased urine volume, difficulty urinating, dyspareunia, dysuria, enuresis, flank pain, frequency, genital sores,  hematuria, menstrual problem, pelvic pain, urgency, vaginal bleeding, vaginal discharge and vaginal pain.   Musculoskeletal: Positive for gait problem. Negative for arthralgias, back pain, joint swelling, myalgias, neck pain and neck stiffness.   Skin: Negative for color change, pallor and rash.   Allergic/Immunologic: Negative for environmental allergies, food allergies and immunocompromised state.   Neurological: Positive for weakness. Negative for dizziness, tremors, seizures, syncope, facial asymmetry, speech difficulty, light-headedness, numbness and headaches.   Hematological: Negative for adenopathy. Does not bruise/bleed easily.   Psychiatric/Behavioral: Positive for dysphoric mood. Negative for agitation, behavioral problems, confusion, decreased concentration, hallucinations, self-injury, sleep disturbance and suicidal ideas. The patient is nervous/anxious. The patient is not hyperactive.        Objective:      Physical Exam   Constitutional: She is oriented to person, place, and time. She has a sickly appearance. She appears ill. She appears distressed.   HENT:   Head: Normocephalic and atraumatic.   Right Ear: External ear normal.   Left Ear: External ear normal.   Nose: Nose normal. Right sinus exhibits no maxillary sinus tenderness and no frontal sinus tenderness. Left sinus exhibits no maxillary sinus tenderness and no frontal sinus tenderness.   Mouth/Throat: Oropharynx is clear and moist. No oropharyngeal exudate.   Eyes: Pupils are equal, round, and reactive to light. Conjunctivae, EOM and lids are normal. Right eye exhibits no discharge. Left eye exhibits no discharge. Right conjunctiva is not injected. Right conjunctiva has no hemorrhage. Left conjunctiva is not injected. Left conjunctiva has no hemorrhage. No scleral icterus.   Neck: Normal range of motion. Neck supple. No JVD present. No tracheal deviation present. No thyromegaly present.   Cardiovascular: Normal rate and regular rhythm.    Pulmonary/Chest: Effort normal. No stridor. No respiratory distress. She exhibits no tenderness.   Abdominal: Soft. She exhibits no distension and no mass. There is no splenomegaly or hepatomegaly. There is no tenderness. There is no rebound.   Musculoskeletal: Normal range of motion. She exhibits no edema or tenderness.   Lymphadenopathy:     She has no cervical adenopathy.     She has no axillary adenopathy.        Right: No supraclavicular adenopathy present.        Left: No supraclavicular adenopathy present.   Neurological: She is alert and oriented to person, place, and time. No cranial nerve deficit. Coordination abnormal.   Skin: Skin is dry. No rash noted. She is not diaphoretic. No erythema.   Psychiatric: She has a normal mood and affect. Her behavior is normal. Judgment and thought content normal.   Vitals reviewed.          Assessment:      1. Triple negative malignant neoplasm of breast    2. Neoplasm of abdomen    3. Malignant neoplasm of lower-outer quadrant of right breast of female, estrogen receptor negative            Plan:   Decision made with family to for go final dose of chemotherapy.  At this time repeat PET scan to be compared against 12/31/2018 for response to therapy referral made to Radiation Oncology and surgery for consideration of local therapy status post dose dense Taxol await results of PET scan will see patient back after completion of evaluation with PET scan and consultations with Radiation Oncology and surgery orders placed in reviewed hold chemo today discussed implications with family rationale for treatment decisions and discontinuation with declining performance status          Yaniv Pop Jr, MD FACP

## 2019-05-01 NOTE — TELEPHONE ENCOUNTER
----- Message from Yaniv Pop MD sent at 5/1/2019 11:55 AM CDT -----  She saw her originally but another surgeon is perfectly okay I think she put in her MediPort  ----- Message -----  From: Amber Tinsley LPN  Sent: 5/1/2019  10:56 AM  To: Yaniv Pop MD, Jerrica Elaine LPN    Is it ok for her to see another surgeon or does she only need to see Dr. Santa? She has an appointment with Dr. Plata tomorrow morning at the [facility].   ----- Message -----  From: Jerrica Elaine LPN  Sent: 5/1/2019  10:10 AM  To: ELIECER Cano Dr is leaving for Maternity leave. Her last day is this Thursday (tomorrow) at [facility] - Pt can be scheduled to see Dr Santa there any time that morning if she would like - Otherwise pt needs to be referred to one of the other General Surgeon's.     ----- Message -----  From: Amber Tinsley LPN  Sent: 5/1/2019  10:00 AM  To: Kiet Tsai Staff    Good morning!     This patient needs to be scheduled for a follow-up with Dr. Santa per Dr. Pop. Patient is active on the patient portal.

## 2019-05-02 ENCOUNTER — OFFICE VISIT (OUTPATIENT)
Dept: RADIATION ONCOLOGY | Facility: CLINIC | Age: 79
End: 2019-05-02
Payer: MEDICARE

## 2019-05-02 ENCOUNTER — OFFICE VISIT (OUTPATIENT)
Dept: CARDIOLOGY | Facility: CLINIC | Age: 79
End: 2019-05-02
Payer: MEDICARE

## 2019-05-02 VITALS
DIASTOLIC BLOOD PRESSURE: 70 MMHG | HEART RATE: 68 BPM | OXYGEN SATURATION: 99 % | TEMPERATURE: 98 F | SYSTOLIC BLOOD PRESSURE: 114 MMHG | BODY MASS INDEX: 36.83 KG/M2 | HEIGHT: 62 IN | RESPIRATION RATE: 18 BRPM | WEIGHT: 200.13 LBS

## 2019-05-02 VITALS
WEIGHT: 199.94 LBS | DIASTOLIC BLOOD PRESSURE: 66 MMHG | BODY MASS INDEX: 36.79 KG/M2 | HEART RATE: 80 BPM | SYSTOLIC BLOOD PRESSURE: 120 MMHG | HEIGHT: 62 IN

## 2019-05-02 DIAGNOSIS — R06.02 SOB (SHORTNESS OF BREATH): ICD-10-CM

## 2019-05-02 DIAGNOSIS — R07.89 OTHER CHEST PAIN: Primary | ICD-10-CM

## 2019-05-02 DIAGNOSIS — I10 ESSENTIAL HYPERTENSION: ICD-10-CM

## 2019-05-02 DIAGNOSIS — C50.919 TRIPLE NEGATIVE MALIGNANT NEOPLASM OF BREAST: Primary | ICD-10-CM

## 2019-05-02 DIAGNOSIS — R79.89 ELEVATED TROPONIN: ICD-10-CM

## 2019-05-02 PROCEDURE — 99214 PR OFFICE/OUTPT VISIT, EST, LEVL IV, 30-39 MIN: ICD-10-PCS | Mod: S$PBB,,, | Performed by: INTERNAL MEDICINE

## 2019-05-02 PROCEDURE — 99214 PR OFFICE/OUTPT VISIT, EST, LEVL IV, 30-39 MIN: ICD-10-PCS | Mod: S$PBB,,, | Performed by: RADIOLOGY

## 2019-05-02 PROCEDURE — 99213 OFFICE O/P EST LOW 20 MIN: CPT | Mod: PBBFAC,27 | Performed by: RADIOLOGY

## 2019-05-02 PROCEDURE — 99999 PR PBB SHADOW E&M-EST. PATIENT-LVL III: CPT | Mod: PBBFAC,,, | Performed by: RADIOLOGY

## 2019-05-02 PROCEDURE — 99999 PR PBB SHADOW E&M-EST. PATIENT-LVL III: ICD-10-PCS | Mod: PBBFAC,,, | Performed by: RADIOLOGY

## 2019-05-02 PROCEDURE — 99214 OFFICE O/P EST MOD 30 MIN: CPT | Mod: S$PBB,,, | Performed by: RADIOLOGY

## 2019-05-02 PROCEDURE — 99999 PR PBB SHADOW E&M-EST. PATIENT-LVL III: ICD-10-PCS | Mod: PBBFAC,,, | Performed by: INTERNAL MEDICINE

## 2019-05-02 PROCEDURE — 99213 OFFICE O/P EST LOW 20 MIN: CPT | Mod: PBBFAC | Performed by: INTERNAL MEDICINE

## 2019-05-02 PROCEDURE — 99999 PR PBB SHADOW E&M-EST. PATIENT-LVL III: CPT | Mod: PBBFAC,,, | Performed by: INTERNAL MEDICINE

## 2019-05-02 PROCEDURE — 99214 OFFICE O/P EST MOD 30 MIN: CPT | Mod: S$PBB,,, | Performed by: INTERNAL MEDICINE

## 2019-05-02 NOTE — PROGRESS NOTES
Subjective:   Patient ID:  Soo Holly is a 79 y.o. female who presents for cardiac consult of Hypertension (3 mo f/u)      HPI  The patient came in today for cardiac consult of Hypertension (3 mo f/u)    Referring Physician: Yaniv Pop MD   Reason for initial consult: pre-chemo CV eval    Soo Holly is a 79 y.o. female pt with recently diagnosed ER-SD-HER2- Breast cancer, HTN, obesity presents for follow up CV evaluation.      18  She had a 2D echo yesterday which revealed normal Bi V function and valves. She complains of being fatigue, has mild insomnia. Still active at home with cooking/cleaning etc. Mild ankle swelling occasionally, L worse than right has knee arthritis.   ECG today - NSR, neg for ischemia,     19  She presented to the ER last week due to chest pain, described periodic episodes of a pressure like sensation that starts at the right shoulder and radiates to the anterior neck and down to the left back area with associated shortness of breath sensation. She was sent from Dr. Macdonald's office for PE rule out and had echo and stress which were negative. Trop was mildly elevated. No recent CP episodes. Will have surgery eval soon and then radiation.     Patient feels no PND, no palpitation, no dizziness, no syncope, no CNS symptoms.    Patient has fairly decreased exercise tolerance.    Patient is compliant with medications.    FH - MI/strokes - mother side; sister  of cancer    2D ECHO 2019  Pre-chemo echo 2019  4D= 77%  Biplane= 70%  Average GPLS= -23.73%    CONCLUSIONS     1 - Normal left ventricular systolic function (EF 65-70%).     2 - Normal left ventricular diastolic function.     3 - Normal right ventricular systolic function .     Nuclear Stress  2019  Nuclear Quantitative Functional Analysis:   LVEF: 60 %    Impression: NORMAL MYOCARDIAL PERFUSION  1. The perfusion scan is free of evidence for myocardial ischemia or injury.   2. Resting wall  motion is physiologic.   3. Resting LV function is normal.   4. The ventricular volumes are normal at rest and stress.   5. The extracardiac distribution of radioactivity is normal.       2D ECHO 04/27/2019  CONCLUSIONS     1 - Moderate left atrial enlargement.     2 - No wall motion abnormalities.     3 - Normal left ventricular systolic function (EF 60-65%).     4 - Indeterminate LV diastolic function.     5 - Normal right ventricular systolic function .         Past Medical History:   Diagnosis Date    Hyperlipidemia     Hypertension        Past Surgical History:   Procedure Laterality Date    cataracts      HYSTERECTOMY      INSERTION, PORT-A-CATH Right 1/16/2019    [facility]    JOINT REPLACEMENT Right     Knee       Social History     Tobacco Use    Smoking status: Never Smoker    Smokeless tobacco: Never Used   Substance Use Topics    Alcohol use: No     Frequency: Never    Drug use: No       Family History   Problem Relation Age of Onset    Breast cancer Sister        Patient's Medications   New Prescriptions    No medications on file   Previous Medications    AMLODIPINE (NORVASC) 10 MG TABLET    amlodipine 10 mg tablet once daily    ASPIRIN (ECOTRIN) 81 MG EC TABLET    Take 1 tablet (81 mg total) by mouth once daily.    DIPHENHYDRAMINE-ALUMINUM-MAGNESIUM HYDROXIDE-SIMETHICONE-LIDOCAINE HCL 2%    Swish and spit 15 mLs every 4 (four) hours as needed.    ESOMEPRAZOLE MAGNESIUM (NEXIUM ORAL)    Take by mouth daily as needed.    METOPROLOL TARTRATE (LOPRESSOR) 25 MG TABLET    Take 1 tablet (25 mg total) by mouth 2 (two) times daily.    ONDANSETRON (ZOFRAN) 8 MG TABLET    TAKE ONE TABLET BY MOUTH EVERY 12 HOURS AS NEEDED FOR NAUSEA    PROCHLORPERAZINE (COMPAZINE) 10 MG TABLET    Take 1 tablet (10 mg total) by mouth every 6 (six) hours as needed.   Modified Medications    No medications on file   Discontinued Medications    DEXAMETHASONE (DECADRON) 4 MG TAB    Take 5 tablets  "(20 mg) by mouth every 6 hours. Take 5 tablets (20 mg) by mouth 6 & 12 hours prior to TAXOL       Review of Systems   Constitutional: Positive for malaise/fatigue.   HENT: Negative.    Eyes: Negative.    Respiratory: Negative.  Negative for shortness of breath.    Cardiovascular: Positive for leg swelling. Negative for chest pain and palpitations.   Gastrointestinal: Negative.    Genitourinary: Negative.    Musculoskeletal: Positive for joint pain.   Skin: Negative.    Neurological: Negative.    Endo/Heme/Allergies: Negative.    Psychiatric/Behavioral: Negative.    All 12 systems otherwise negative.      Wt Readings from Last 3 Encounters:   05/02/19 90.7 kg (199 lb 15.3 oz)   05/02/19 90.8 kg (200 lb 1.6 oz)   05/01/19 89.7 kg (197 lb 12 oz)     Temp Readings from Last 3 Encounters:   05/02/19 97.5 °F (36.4 °C)   05/01/19 97.6 °F (36.4 °C) (Oral)   04/28/19 98.1 °F (36.7 °C) (Oral)     BP Readings from Last 3 Encounters:   05/02/19 120/66   05/02/19 114/70   05/01/19 120/66     Pulse Readings from Last 3 Encounters:   05/02/19 80   05/02/19 68   05/01/19 75       /66 (BP Method: Large (Manual))   Pulse 80   Ht 5' 2" (1.575 m)   Wt 90.7 kg (199 lb 15.3 oz)   BMI 36.57 kg/m²     Objective:   Physical Exam   Constitutional: She is oriented to person, place, and time. She appears well-developed and well-nourished. No distress.   Obese   HENT:   Head: Normocephalic and atraumatic.   Nose: Nose normal.   Mouth/Throat: Oropharynx is clear and moist.   Eyes: Conjunctivae and EOM are normal. No scleral icterus.   Neck: Normal range of motion. Neck supple. No JVD present. No thyromegaly present.   Cardiovascular: Normal rate, regular rhythm, S1 normal and S2 normal. Exam reveals no gallop, no S3, no S4 and no friction rub.   No murmur heard.  Pulmonary/Chest: Effort normal and breath sounds normal. No stridor. No respiratory distress. She has no wheezes. She has no rales. She exhibits no tenderness.   Abdominal: " Soft. Bowel sounds are normal. She exhibits no distension and no mass. There is no tenderness. There is no rebound.   Genitourinary:   Genitourinary Comments: Deferred   Musculoskeletal: Normal range of motion. She exhibits no edema, tenderness or deformity.   Lymphadenopathy:     She has no cervical adenopathy.   Neurological: She is alert and oriented to person, place, and time. She exhibits normal muscle tone. Coordination normal.   Skin: Skin is warm and dry. No rash noted. She is not diaphoretic. No erythema. No pallor.   Psychiatric: She has a normal mood and affect. Her behavior is normal. Judgment and thought content normal.   Nursing note and vitals reviewed.      Lab Results   Component Value Date     05/01/2019    K 4.3 05/01/2019     05/01/2019    CO2 19 (L) 05/01/2019    BUN 12 05/01/2019    CREATININE 0.9 05/01/2019     (H) 05/01/2019    HGBA1C 6.7 (H) 04/26/2019    AST 24 05/01/2019    ALT 31 05/01/2019    ALBUMIN 3.0 (L) 05/01/2019    PROT 7.0 05/01/2019    BILITOT 0.3 05/01/2019    WBC 6.49 05/01/2019    HGB 9.7 (L) 05/01/2019    HCT 31.2 (L) 05/01/2019    MCV 99 (H) 05/01/2019     05/01/2019    CHOL 212 (H) 04/28/2019    HDL 39 (L) 04/28/2019    LDLCALC 146.6 04/28/2019    TRIG 132 04/28/2019    BNP 87 04/26/2019     Assessment:      1. Other chest pain    2. Essential hypertension    3. Elevated troponin    4. SOB (shortness of breath)        Plan:   1. Breast cancer  - proceed with therapy  - low cardiac risk for surgery/chemo/radiation  - f/u with Heme/onc and PCP/rad onc    2. HTN  - cont meds  - low salt diet     3. Chest pain/SOB - recent hosp with elevated trop, resolved   - recent stress and echo negative  - r/o non cardiac etiologies as well - GERD/MSK?      Thank you for allowing me to participate in this patient's care. Please do not hesitate to contact me with any questions or concerns. Consult note has been forwarded to the referral physician.

## 2019-05-07 ENCOUNTER — TELEPHONE (OUTPATIENT)
Dept: RADIOLOGY | Facility: HOSPITAL | Age: 79
End: 2019-05-07

## 2019-05-08 ENCOUNTER — HOSPITAL ENCOUNTER (OUTPATIENT)
Dept: RADIOLOGY | Facility: HOSPITAL | Age: 79
Discharge: HOME | End: 2019-05-08
Attending: INTERNAL MEDICINE
Payer: MEDICARE

## 2019-05-08 DIAGNOSIS — D49.89 NEOPLASM OF ABDOMEN: ICD-10-CM

## 2019-05-08 DIAGNOSIS — C50.511 MALIGNANT NEOPLASM OF LOWER-OUTER QUADRANT OF RIGHT BREAST OF FEMALE, ESTROGEN RECEPTOR NEGATIVE: ICD-10-CM

## 2019-05-08 DIAGNOSIS — Z17.1 MALIGNANT NEOPLASM OF LOWER-OUTER QUADRANT OF RIGHT BREAST OF FEMALE, ESTROGEN RECEPTOR NEGATIVE: ICD-10-CM

## 2019-05-08 DIAGNOSIS — C50.919 TRIPLE NEGATIVE MALIGNANT NEOPLASM OF BREAST: ICD-10-CM

## 2019-05-08 PROBLEM — R06.02 SOB (SHORTNESS OF BREATH): Status: RESOLVED | Noted: 2019-04-26 | Resolved: 2019-05-08

## 2019-05-08 PROCEDURE — 78815 PET IMAGE W/CT SKULL-THIGH: CPT | Mod: TC,PS

## 2019-05-08 PROCEDURE — 78815 PET IMAGE W/CT SKULL-THIGH: CPT | Mod: 26,PS,, | Performed by: RADIOLOGY

## 2019-05-08 PROCEDURE — 78815 NM PET CT ROUTINE: ICD-10-PCS | Mod: 26,PS,, | Performed by: RADIOLOGY

## 2019-05-08 PROCEDURE — A9552 F18 FDG: HCPCS

## 2019-05-08 NOTE — DISCHARGE SUMMARY
[facility] Hospital Medicine  Discharge Summary      Patient Name: Soo Holly  MRN: [id number]  Admission Date: 4/26/2019  Hospital Length of Stay: 1 days  Discharge Date and Time: 4/28/2019  5:52 PM  Attending Physician: Fatimah att. providers found   Discharging Provider: Osmar Rayo MD  Primary Care Provider: LANG Seals      HPI:   Soo Holly is a 79-year-old female with HTN who is presently undergoing chemotherapy for right-sided breast cancer. Today, she was scheduled for chemotherapy. However, she verbalized discomfort to her left shoulder blade area with shortness of breath. Dr. Pop was concerned about possible pulmonary embolism. Therefore, he sent her to the ED for further evaluation. The patient explains that for the past week she has noted periodic episodes of a pressure-like sensation that starts at her right shoulder and radiates throught the anterior neck and down the left posterior back at the shoulder blade region. The discomfort is described as dull & pressure-like. It causes the patient to have to take a deep breath due to shortness of breath. Associated symptoms include generalized weakness and presyncopal feelings. The duration of symptoms is brief. She denies any anterior chest pain, fever, chills, cough, palpitations, abdominal pain, nausea, diaphoresis, dizziness, lightheadedness and urinary symptoms. The patient does report a previous history of syncope which was evaluated approximately 1 year ago, but she is unsure of the findings. She states she has never had a cardiac stress test.  Vital signs on arrival include a temperature of 98.5°, pulse of 94, respirations of 16, blood pressure of 133/73 and oxygen saturation of 99% on room air. CTA of the chest was negative for pulmonary embolism. EKG showed normal sinus rhythm with a rate of 94. Labs find a normocytic anemia with an essentially baseline metabolic panel and initial troponin of 0.060. The patient is  being placed in Observation to evaluate her chest pain, SOB and presyncopal feelings.     * No surgery found *      Hospital Course:   On 4/26/19, patient was placed in Observation due to complaints of SOB, shoulder pain, elevated troponin and pre-syncope. The patient's troponin increased from 0.057, to 0.076 and then 0.070. Cardiology was consulted due to her unexplained SOB with concern for cardiac source. The patient underwent a nuclear stress test on 4/27/19 that was free of evidence for myocardial ischemia or injury. Carotid US and echocardiogram were essentially negative. Her symptoms improved and she agrees to follow up with her PCP and Oncology upon discharge. While hospitalized, the patient was noted to have leukocytosis and hyperglycemia. These are thought to be due to dexamethasone usage and will continue to be followed by Primary Care and Oncology.      Consults:   Consults (From admission, onward)        Status Ordering Provider     Inpatient consult to Cardiology  Once     Provider:  Mehrdad Wiley MD    Completed KERA JOYNER            Final Active Diagnoses:    Diagnosis Date Noted POA    PRINCIPAL PROBLEM:  Elevated troponin [R74.8] 04/26/2019 Yes    Leukocytosis [D72.829] 04/27/2019 Yes    Hyperglycemia [R73.9] 04/27/2019 Yes    Pre-syncope [R55] 04/26/2019 Yes    Essential hypertension [I10] 01/09/2019 Yes    Triple negative malignant neoplasm of breast [C50.919] 12/24/2018 Yes      Problems Resolved During this Admission:    Diagnosis Date Noted Date Resolved POA    SOB (shortness of breath) [R06.02] 04/26/2019 05/08/2019 Yes       Discharged Condition: stable    Disposition: Home or Self Care    Follow Up:  Follow-up Information     LANG Seals In 3 days.    Contact information:  [address] [place] [phone]                 Patient Instructions:     Activity as tolerated    Diet: Cardiac    Significant Diagnostic Studies: Labs: BMP: All labs within the past 24 hours have  been reviewed    Pending Diagnostic Studies:     None         Medications:  Reconciled Home Medications:      Medication List      START taking these medications    aspirin 81 MG EC tablet  Commonly known as:  ECOTRIN  Take 1 tablet (81 mg total) by mouth once daily.     metoprolol tartrate 25 MG tablet  Commonly known as:  LOPRESSOR  Take 1 tablet (25 mg total) by mouth 2 (two) times daily.        CONTINUE taking these medications    amLODIPine 10 MG tablet  Commonly known as:  NORVASC  amlodipine 10 mg tablet once daily     diphenhydrAMINE-aluminum-magnesium hydroxide-simethicone-lidocaine HCl 2%  Swish and spit 15 mLs every 4 (four) hours as needed.     NEXIUM ORAL  Take by mouth daily as needed.     ondansetron 8 MG tablet  Commonly known as:  ZOFRAN  TAKE ONE TABLET BY MOUTH EVERY 12 HOURS AS NEEDED FOR NAUSEA     prochlorperazine 10 MG tablet  Commonly known as:  COMPAZINE  Take 1 tablet (10 mg total) by mouth every 6 (six) hours as needed.            Indwelling Lines/Drains at time of discharge:   Lines/Drains/Airways     Central Venous Catheter Line                 Port A Cath Single Lumen 01/16/19 0842 right subclavian 111 days                Time spent on the discharge of patient: Greater than 30 minutes. Patient was seen and examined on the date of discharge and determined to be suitable for discharge.         Osmar Rayo MD  Department of Hospital Medicine  [facility]

## 2019-05-09 ENCOUNTER — OFFICE VISIT (OUTPATIENT)
Dept: HEMATOLOGY/ONCOLOGY | Facility: CLINIC | Age: 79
End: 2019-05-09
Payer: MEDICARE

## 2019-05-09 VITALS
DIASTOLIC BLOOD PRESSURE: 64 MMHG | BODY MASS INDEX: 36.39 KG/M2 | WEIGHT: 197.75 LBS | SYSTOLIC BLOOD PRESSURE: 107 MMHG | TEMPERATURE: 98 F | HEIGHT: 62 IN | OXYGEN SATURATION: 99 % | HEART RATE: 73 BPM | RESPIRATION RATE: 20 BRPM

## 2019-05-09 DIAGNOSIS — C50.919 TRIPLE NEGATIVE MALIGNANT NEOPLASM OF BREAST: Primary | ICD-10-CM

## 2019-05-09 DIAGNOSIS — I10 ESSENTIAL HYPERTENSION: ICD-10-CM

## 2019-05-09 PROCEDURE — 99999 PR PBB SHADOW E&M-EST. PATIENT-LVL IV: CPT | Mod: PBBFAC,,, | Performed by: INTERNAL MEDICINE

## 2019-05-09 PROCEDURE — 99999 PR PBB SHADOW E&M-EST. PATIENT-LVL IV: ICD-10-PCS | Mod: PBBFAC,,, | Performed by: INTERNAL MEDICINE

## 2019-05-09 PROCEDURE — 99214 OFFICE O/P EST MOD 30 MIN: CPT | Mod: PBBFAC | Performed by: INTERNAL MEDICINE

## 2019-05-09 PROCEDURE — 99214 OFFICE O/P EST MOD 30 MIN: CPT | Mod: S$PBB,,, | Performed by: INTERNAL MEDICINE

## 2019-05-09 PROCEDURE — 99214 PR OFFICE/OUTPT VISIT, EST, LEVL IV, 30-39 MIN: ICD-10-PCS | Mod: S$PBB,,, | Performed by: INTERNAL MEDICINE

## 2019-05-09 RX ORDER — SODIUM CHLORIDE 0.9 % (FLUSH) 0.9 %
10 SYRINGE (ML) INJECTION
Status: CANCELLED | OUTPATIENT
Start: 2019-05-09

## 2019-05-09 RX ORDER — HEPARIN 100 UNIT/ML
500 SYRINGE INTRAVENOUS
Status: CANCELLED | OUTPATIENT
Start: 2019-05-09

## 2019-05-09 NOTE — PROGRESS NOTES
Subjective:       Patient ID: Soo Holly is a 79 y.o. female.    Chief Complaint: Results and Breast Cancer    HPI 79-year-old female locally advanced breast cancer patient presents after completion of dose dense AC plus Neulasta last dose of Neulasta was held because of fatigue weakness and declined performance status patient returns after PET scan for response to therapy ECOG status 2    Past Medical History:   Diagnosis Date    Hyperlipidemia     Hypertension      Family History   Problem Relation Age of Onset    Breast cancer Sister      Social History     Socioeconomic History    Marital status:      Spouse name: Not on file    Number of children: Not on file    Years of education: Not on file    Highest education level: Not on file   Occupational History    Not on file   Social Needs    Financial resource strain: Not on file    Food insecurity:     Worry: Not on file     Inability: Not on file    Transportation needs:     Medical: Not on file     Non-medical: Not on file   Tobacco Use    Smoking status: Never Smoker    Smokeless tobacco: Never Used   Substance and Sexual Activity    Alcohol use: No     Frequency: Never    Drug use: No    Sexual activity: Not Currently   Lifestyle    Physical activity:     Days per week: Not on file     Minutes per session: Not on file    Stress: Not on file   Relationships    Social connections:     Talks on phone: Not on file     Gets together: Not on file     Attends Mu-ism service: Not on file     Active member of club or organization: Not on file     Attends meetings of clubs or organizations: Not on file     Relationship status: Not on file   Other Topics Concern    Not on file   Social History Narrative    Lives in [place], [place] (1.5h).  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     Past Surgical History:   Procedure Laterality Date    cataracts      HYSTERECTOMY      INSERTION, PORT-A-CATH Right 1/16/2019    Performed by [facility]     JOINT REPLACEMENT Right     Knee       Labs:  Lab Results   Component Value Date    WBC 6.49 05/01/2019    HGB 9.7 (L) 05/01/2019    HCT 31.2 (L) 05/01/2019    MCV 99 (H) 05/01/2019     05/01/2019     BMP  Lab Results   Component Value Date     05/01/2019    K 4.3 05/01/2019     05/01/2019    CO2 19 (L) 05/01/2019    BUN 12 05/01/2019    CREATININE 0.9 05/01/2019    CALCIUM 9.4 05/01/2019    ANIONGAP 13 05/01/2019    ESTGFRAFRICA >60 05/01/2019    EGFRNONAA >60 05/01/2019     Lab Results   Component Value Date    ALT 31 05/01/2019    AST 24 05/01/2019    ALKPHOS 138 (H) 05/01/2019    BILITOT 0.3 05/01/2019       Lab Results   Component Value Date    IRON 86 04/28/2019    TIBC 235 (L) 04/28/2019    FERRITIN 754 (H) 04/28/2019     No results found for: DVCKBFKH12  No results found for: FOLATE  No results found for: TSH      Review of Systems   Constitutional: Positive for fatigue. Negative for activity change, appetite change, chills, diaphoresis, fever and unexpected weight change.   HENT: Negative for congestion, dental problem, drooling, ear discharge, ear pain, facial swelling, hearing loss, mouth sores, nosebleeds, postnasal drip, rhinorrhea, sinus pressure, sneezing, sore throat, tinnitus, trouble swallowing and voice change.    Eyes: Negative for photophobia, pain, discharge, redness, itching and visual disturbance.   Respiratory: Negative for cough, choking, chest tightness, shortness of breath, wheezing and stridor.    Cardiovascular: Negative for chest pain, palpitations and leg swelling.   Gastrointestinal: Negative for abdominal distention, abdominal pain, anal bleeding, blood in stool, constipation, diarrhea, nausea, rectal pain and vomiting.   Endocrine: Negative for cold intolerance, heat intolerance, polydipsia, polyphagia and polyuria.   Genitourinary: Negative for decreased urine volume, difficulty urinating, dyspareunia, dysuria, enuresis, flank pain, frequency, genital sores,  hematuria, menstrual problem, pelvic pain, urgency, vaginal bleeding, vaginal discharge and vaginal pain.   Musculoskeletal: Positive for gait problem. Negative for arthralgias, back pain, joint swelling, myalgias, neck pain and neck stiffness.   Skin: Negative for color change, pallor and rash.   Allergic/Immunologic: Negative for environmental allergies, food allergies and immunocompromised state.   Neurological: Positive for weakness. Negative for dizziness, tremors, seizures, syncope, facial asymmetry, speech difficulty, light-headedness, numbness and headaches.   Hematological: Negative for adenopathy. Does not bruise/bleed easily.   Psychiatric/Behavioral: Positive for dysphoric mood. Negative for agitation, behavioral problems, confusion, decreased concentration, hallucinations, self-injury, sleep disturbance and suicidal ideas. The patient is nervous/anxious. The patient is not hyperactive.        Objective:      Physical Exam   Constitutional: She is oriented to person, place, and time. She has a sickly appearance. She appears ill. She appears distressed.   HENT:   Head: Normocephalic and atraumatic.   Right Ear: External ear normal.   Left Ear: External ear normal.   Nose: Nose normal. Right sinus exhibits no maxillary sinus tenderness and no frontal sinus tenderness. Left sinus exhibits no maxillary sinus tenderness and no frontal sinus tenderness.   Mouth/Throat: Oropharynx is clear and moist. No oropharyngeal exudate.   Eyes: Pupils are equal, round, and reactive to light. Conjunctivae, EOM and lids are normal. Right eye exhibits no discharge. Left eye exhibits no discharge. Right conjunctiva is not injected. Right conjunctiva has no hemorrhage. Left conjunctiva is not injected. Left conjunctiva has no hemorrhage. No scleral icterus.   Neck: Normal range of motion. Neck supple. No JVD present. No tracheal deviation present. No thyromegaly present.   Cardiovascular: Normal rate and regular rhythm.    Pulmonary/Chest: Effort normal. No stridor. No respiratory distress. She exhibits no tenderness.   Abdominal: Soft. She exhibits no distension and no mass. There is no splenomegaly or hepatomegaly. There is no tenderness. There is no rebound.   Musculoskeletal: Normal range of motion. She exhibits no edema or tenderness.   Lymphadenopathy:     She has no cervical adenopathy.     She has no axillary adenopathy.        Right: No supraclavicular adenopathy present.        Left: No supraclavicular adenopathy present.   Neurological: She is alert and oriented to person, place, and time. No cranial nerve deficit. Coordination normal.   Skin: Skin is dry. No rash noted. She is not diaphoretic. No erythema.   Psychiatric: Her behavior is normal. Judgment and thought content normal. Her mood appears anxious. She exhibits a depressed mood.   Vitals reviewed.          Assessment:      1. Triple negative malignant neoplasm of breast    2. Essential hypertension           Plan:     Patient has an excellent clinical response PET scan today demonstrates dramatic improvement reviewed information with them photographs taken I will refer back to surgery to be seen on week of 05/16/2019 and back to Radiation Oncology for assessment of local therapy port will be flushed in 1 month for review with laboratory studies        Yaniv Pop Jr, MD FACP

## 2019-05-15 ENCOUNTER — INFUSION (OUTPATIENT)
Dept: INFUSION THERAPY | Facility: HOSPITAL | Age: 79
End: 2019-05-15
Attending: INTERNAL MEDICINE
Payer: MEDICARE

## 2019-05-15 ENCOUNTER — TELEPHONE (OUTPATIENT)
Dept: HEMATOLOGY/ONCOLOGY | Facility: CLINIC | Age: 79
End: 2019-05-15

## 2019-05-15 ENCOUNTER — OFFICE VISIT (OUTPATIENT)
Dept: HEMATOLOGY/ONCOLOGY | Facility: CLINIC | Age: 79
End: 2019-05-15
Payer: MEDICARE

## 2019-05-15 VITALS
WEIGHT: 197.75 LBS | HEART RATE: 81 BPM | SYSTOLIC BLOOD PRESSURE: 123 MMHG | DIASTOLIC BLOOD PRESSURE: 72 MMHG | TEMPERATURE: 98 F | HEIGHT: 62 IN | RESPIRATION RATE: 20 BRPM | OXYGEN SATURATION: 98 % | BODY MASS INDEX: 36.39 KG/M2

## 2019-05-15 DIAGNOSIS — E87.6 HYPOKALEMIA: ICD-10-CM

## 2019-05-15 DIAGNOSIS — D53.9 MACROCYTIC ANEMIA: ICD-10-CM

## 2019-05-15 DIAGNOSIS — R10.30 LOWER ABDOMINAL PAIN: ICD-10-CM

## 2019-05-15 DIAGNOSIS — N30.01 ACUTE CYSTITIS WITH HEMATURIA: ICD-10-CM

## 2019-05-15 DIAGNOSIS — C50.919 TRIPLE NEGATIVE MALIGNANT NEOPLASM OF BREAST: Primary | ICD-10-CM

## 2019-05-15 DIAGNOSIS — T45.1X5A ANTINEOPLASTIC CHEMOTHERAPY INDUCED ANEMIA: ICD-10-CM

## 2019-05-15 DIAGNOSIS — D64.81 ANTINEOPLASTIC CHEMOTHERAPY INDUCED ANEMIA: ICD-10-CM

## 2019-05-15 DIAGNOSIS — R30.0 DYSURIA: Primary | ICD-10-CM

## 2019-05-15 DIAGNOSIS — E87.6 HYPOKALEMIA: Primary | ICD-10-CM

## 2019-05-15 DIAGNOSIS — C50.919 TRIPLE NEGATIVE MALIGNANT NEOPLASM OF BREAST: ICD-10-CM

## 2019-05-15 LAB
ALBUMIN SERPL BCP-MCNC: 3 G/DL (ref 3.5–5.2)
ALP SERPL-CCNC: 121 U/L (ref 55–135)
ALT SERPL W/O P-5'-P-CCNC: 19 U/L (ref 10–44)
ANION GAP SERPL CALC-SCNC: 9 MMOL/L (ref 8–16)
AST SERPL-CCNC: 18 U/L (ref 10–40)
BASOPHILS # BLD AUTO: 0.01 K/UL (ref 0–0.2)
BASOPHILS NFR BLD: 0.2 % (ref 0–1.9)
BILIRUB SERPL-MCNC: 0.2 MG/DL (ref 0.1–1)
BUN SERPL-MCNC: 9 MG/DL (ref 8–23)
CALCIUM SERPL-MCNC: 9.1 MG/DL (ref 8.7–10.5)
CHLORIDE SERPL-SCNC: 108 MMOL/L (ref 95–110)
CO2 SERPL-SCNC: 27 MMOL/L (ref 23–29)
CREAT SERPL-MCNC: 0.8 MG/DL (ref 0.5–1.4)
DIFFERENTIAL METHOD: ABNORMAL
EOSINOPHIL # BLD AUTO: 0.5 K/UL (ref 0–0.5)
EOSINOPHIL NFR BLD: 7.7 % (ref 0–8)
ERYTHROCYTE [DISTWIDTH] IN BLOOD BY AUTOMATED COUNT: 15.8 % (ref 11.5–14.5)
EST. GFR  (AFRICAN AMERICAN): >60 ML/MIN/1.73 M^2
EST. GFR  (NON AFRICAN AMERICAN): >60 ML/MIN/1.73 M^2
GLUCOSE SERPL-MCNC: 125 MG/DL (ref 70–110)
HCT VFR BLD AUTO: 30.5 % (ref 37–48.5)
HGB BLD-MCNC: 9.5 G/DL (ref 12–16)
LYMPHOCYTES # BLD AUTO: 1.5 K/UL (ref 1–4.8)
LYMPHOCYTES NFR BLD: 25.8 % (ref 18–48)
MCH RBC QN AUTO: 30.8 PG (ref 27–31)
MCHC RBC AUTO-ENTMCNC: 31.1 G/DL (ref 32–36)
MCV RBC AUTO: 99 FL (ref 82–98)
MONOCYTES # BLD AUTO: 0.4 K/UL (ref 0.3–1)
MONOCYTES NFR BLD: 7.2 % (ref 4–15)
NEUTROPHILS # BLD AUTO: 3.4 K/UL (ref 1.8–7.7)
NEUTROPHILS NFR BLD: 59.1 % (ref 38–73)
PLATELET # BLD AUTO: 220 K/UL (ref 150–350)
PMV BLD AUTO: 9 FL (ref 9.2–12.9)
POTASSIUM SERPL-SCNC: 3.2 MMOL/L (ref 3.5–5.1)
PROT SERPL-MCNC: 6.8 G/DL (ref 6–8.4)
RBC # BLD AUTO: 3.08 M/UL (ref 4–5.4)
SODIUM SERPL-SCNC: 144 MMOL/L (ref 136–145)
WBC # BLD AUTO: 5.81 K/UL (ref 3.9–12.7)

## 2019-05-15 PROCEDURE — 25000003 PHARM REV CODE 250: Performed by: INTERNAL MEDICINE

## 2019-05-15 PROCEDURE — 63600175 PHARM REV CODE 636 W HCPCS: Performed by: INTERNAL MEDICINE

## 2019-05-15 PROCEDURE — 99214 PR OFFICE/OUTPT VISIT, EST, LEVL IV, 30-39 MIN: ICD-10-PCS | Mod: S$PBB,,, | Performed by: NURSE PRACTITIONER

## 2019-05-15 PROCEDURE — 80053 COMPREHEN METABOLIC PANEL: CPT

## 2019-05-15 PROCEDURE — 99214 OFFICE O/P EST MOD 30 MIN: CPT | Mod: S$PBB,,, | Performed by: NURSE PRACTITIONER

## 2019-05-15 PROCEDURE — 96523 IRRIG DRUG DELIVERY DEVICE: CPT

## 2019-05-15 PROCEDURE — 99999 PR PBB SHADOW E&M-EST. PATIENT-LVL III: CPT | Mod: PBBFAC,,, | Performed by: NURSE PRACTITIONER

## 2019-05-15 PROCEDURE — 85025 COMPLETE CBC W/AUTO DIFF WBC: CPT

## 2019-05-15 PROCEDURE — 99999 PR PBB SHADOW E&M-EST. PATIENT-LVL III: ICD-10-PCS | Mod: PBBFAC,,, | Performed by: NURSE PRACTITIONER

## 2019-05-15 PROCEDURE — 99213 OFFICE O/P EST LOW 20 MIN: CPT | Mod: PBBFAC,25 | Performed by: NURSE PRACTITIONER

## 2019-05-15 PROCEDURE — A4216 STERILE WATER/SALINE, 10 ML: HCPCS | Performed by: INTERNAL MEDICINE

## 2019-05-15 RX ORDER — HEPARIN 100 UNIT/ML
500 SYRINGE INTRAVENOUS
Status: COMPLETED | OUTPATIENT
Start: 2019-05-15 | End: 2019-05-15

## 2019-05-15 RX ORDER — SODIUM CHLORIDE 0.9 % (FLUSH) 0.9 %
10 SYRINGE (ML) INJECTION
Status: COMPLETED | OUTPATIENT
Start: 2019-05-15 | End: 2019-05-15

## 2019-05-15 RX ORDER — HEPARIN 100 UNIT/ML
500 SYRINGE INTRAVENOUS
Status: CANCELLED | OUTPATIENT
Start: 2019-05-15

## 2019-05-15 RX ORDER — LEVOFLOXACIN 750 MG/1
750 TABLET ORAL DAILY
Qty: 7 TABLET | Refills: 0 | Status: ON HOLD | OUTPATIENT
Start: 2019-05-15 | End: 2019-05-23 | Stop reason: HOSPADM

## 2019-05-15 RX ORDER — POTASSIUM CHLORIDE 20 MEQ/1
40 TABLET, EXTENDED RELEASE ORAL ONCE
Qty: 6 TABLET | Refills: 0 | Status: SHIPPED | OUTPATIENT
Start: 2019-05-15 | End: 2019-05-19

## 2019-05-15 RX ORDER — LEVOFLOXACIN 500 MG/1
500 TABLET, FILM COATED ORAL DAILY
Qty: 5 TABLET | Refills: 0 | Status: SHIPPED | OUTPATIENT
Start: 2019-05-15 | End: 2019-05-15 | Stop reason: CLARIF

## 2019-05-15 RX ORDER — SODIUM CHLORIDE 0.9 % (FLUSH) 0.9 %
10 SYRINGE (ML) INJECTION
Status: CANCELLED | OUTPATIENT
Start: 2019-05-15

## 2019-05-15 RX ADMIN — HEPARIN 500 UNITS: 100 SYRINGE at 02:05

## 2019-05-15 RX ADMIN — SODIUM CHLORIDE, PRESERVATIVE FREE 10 ML: 5 INJECTION INTRAVENOUS at 02:05

## 2019-05-15 NOTE — TELEPHONE ENCOUNTER
----- Message from Liudmila Gooden sent at 5/15/2019  7:41 AM CDT -----  Contact: son  Type:  Same Day Appointment Request    Caller is requesting a same day appointment.  Caller declined first available appointment listed below.    Name of Caller:Son/Jasper  When is the first available appointment?5/27  Symptoms:Bladder Infection  Best Call Back Number:[phone]  Additional Information: pt need a appt today or tomorrow.

## 2019-05-15 NOTE — PROGRESS NOTES
Subjective:      Patient ID: Soo Holly is a 79 y.o. female.    Chief Complaint: Burning with urination    HPI:  Patient is a 79 year old AA female who presents today with complaints of dysuria, increase frequency, and lower abdominal pain.  She states started 3 days ago.  She denies fevers and flank pain.  She denies cloudy or foul smelling urine.  She recently finished chemotherapy for diagnosed triple negative breast cancer and is to follow up with surgery for plan to have right breast mastectomy tomorrow.       Social History     Socioeconomic History    Marital status:      Spouse name: Not on file    Number of children: Not on file    Years of education: Not on file    Highest education level: Not on file   Occupational History    Not on file   Social Needs    Financial resource strain: Not on file    Food insecurity:     Worry: Not on file     Inability: Not on file    Transportation needs:     Medical: Not on file     Non-medical: Not on file   Tobacco Use    Smoking status: Never Smoker    Smokeless tobacco: Never Used   Substance and Sexual Activity    Alcohol use: No     Frequency: Never    Drug use: No    Sexual activity: Not Currently   Lifestyle    Physical activity:     Days per week: Not on file     Minutes per session: Not on file    Stress: Not on file   Relationships    Social connections:     Talks on phone: Not on file     Gets together: Not on file     Attends Mandaen service: Not on file     Active member of club or organization: Not on file     Attends meetings of clubs or organizations: Not on file     Relationship status: Not on file   Other Topics Concern    Not on file   Social History Narrative    Lives in [place], [place] (1.5h).  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       Family History   Problem Relation Age of Onset    Breast cancer Sister        Past Surgical History:   Procedure Laterality Date    cataracts      HYSTERECTOMY      INSERTION, PORT-A-CATH Right  1/16/2019    Performed by [facility]    JOINT REPLACEMENT Right     Knee       Past Medical History:   Diagnosis Date    Hyperlipidemia     Hypertension        Review of Systems   Constitutional: Positive for fatigue. Negative for activity change, appetite change, chills, diaphoresis, fever and unexpected weight change.   HENT: Negative for congestion, dental problem, drooling, ear discharge, ear pain, facial swelling, hearing loss, mouth sores, nosebleeds, postnasal drip, rhinorrhea, sinus pressure, sneezing, sore throat, tinnitus, trouble swallowing and voice change.    Eyes: Negative for photophobia, pain, discharge, redness, itching and visual disturbance.   Respiratory: Negative for cough, choking, chest tightness, shortness of breath, wheezing and stridor.    Cardiovascular: Negative for chest pain, palpitations and leg swelling.   Gastrointestinal: Negative for abdominal distention, abdominal pain, anal bleeding, blood in stool, constipation, diarrhea, nausea, rectal pain and vomiting.   Endocrine: Negative for cold intolerance, heat intolerance, polydipsia, polyphagia and polyuria.   Genitourinary: Positive for dysuria, frequency and pelvic pain. Negative for decreased urine volume, difficulty urinating, dyspareunia, enuresis, flank pain, genital sores, hematuria, menstrual problem, urgency, vaginal bleeding, vaginal discharge and vaginal pain.   Musculoskeletal: Positive for gait problem. Negative for arthralgias, back pain, joint swelling, myalgias, neck pain and neck stiffness.   Skin: Negative for color change, pallor and rash.   Allergic/Immunologic: Negative for environmental allergies, food allergies and immunocompromised state.   Neurological: Positive for weakness. Negative for dizziness, tremors, seizures, syncope, facial asymmetry, speech difficulty, light-headedness, numbness and headaches.   Hematological: Negative for adenopathy. Does not bruise/bleed easily.    Psychiatric/Behavioral: Positive for dysphoric mood. Negative for agitation, behavioral problems, confusion, decreased concentration, hallucinations, self-injury, sleep disturbance and suicidal ideas. The patient is nervous/anxious. The patient is not hyperactive.           Medication List with Changes/Refills   New Medications    LEVOFLOXACIN (LEVAQUIN) 750 MG TABLET    Take 1 tablet (750 mg total) by mouth once daily. for 7 days   Current Medications    AMLODIPINE (NORVASC) 10 MG TABLET    amlodipine 10 mg tablet once daily    ASPIRIN (ECOTRIN) 81 MG EC TABLET    Take 1 tablet (81 mg total) by mouth once daily.    DIPHENHYDRAMINE-ALUMINUM-MAGNESIUM HYDROXIDE-SIMETHICONE-LIDOCAINE HCL 2%    Swish and spit 15 mLs every 4 (four) hours as needed.    ESOMEPRAZOLE MAGNESIUM (NEXIUM ORAL)    Take by mouth daily as needed.    METOPROLOL TARTRATE (LOPRESSOR) 25 MG TABLET    Take 1 tablet (25 mg total) by mouth 2 (two) times daily.    ONDANSETRON (ZOFRAN) 8 MG TABLET    TAKE ONE TABLET BY MOUTH EVERY 12 HOURS AS NEEDED FOR NAUSEA    PROCHLORPERAZINE (COMPAZINE) 10 MG TABLET    Take 1 tablet (10 mg total) by mouth every 6 (six) hours as needed.        Objective:     Vitals:    05/15/19 1346   BP: 123/72   Pulse: 81   Resp: 20   Temp: 98 °F (36.7 °C)       Physical Exam   Constitutional: She is oriented to person, place, and time. Vital signs are normal. She does not have a sickly appearance. She does not appear ill. No distress.   HENT:   Head: Normocephalic and atraumatic.   Right Ear: External ear normal.   Left Ear: External ear normal.   Nose: Nose normal. Right sinus exhibits no maxillary sinus tenderness and no frontal sinus tenderness. Left sinus exhibits no maxillary sinus tenderness and no frontal sinus tenderness.   Mouth/Throat: No oropharyngeal exudate.   Eyes: Pupils are equal, round, and reactive to light. Conjunctivae, EOM and lids are normal. Right eye exhibits no discharge. Left eye exhibits no discharge.  Right conjunctiva is not injected. Right conjunctiva has no hemorrhage. Left conjunctiva is not injected. Left conjunctiva has no hemorrhage. No scleral icterus.   Neck: Normal range of motion. Neck supple. No tracheal deviation present.   Cardiovascular: Normal rate and regular rhythm.   Pulmonary/Chest: Effort normal. No accessory muscle usage or stridor. No apnea, no tachypnea and no bradypnea. No respiratory distress. She exhibits no tenderness.   Abdominal: Soft. She exhibits no distension. There is no splenomegaly or hepatomegaly. There is no tenderness.   Musculoskeletal: Normal range of motion. She exhibits no edema or tenderness.   Lymphadenopathy:     She has no cervical adenopathy.     She has no axillary adenopathy.        Right: No supraclavicular adenopathy present.        Left: No supraclavicular adenopathy present.   Neurological: She is alert and oriented to person, place, and time.   Skin: Skin is dry. No rash noted. She is not diaphoretic. No erythema.   Psychiatric: She has a normal mood and affect. Her speech is normal and behavior is normal. Judgment and thought content normal. Her mood appears not anxious. Cognition and memory are normal. She does not exhibit a depressed mood. She is attentive.   Vitals reviewed.      Assessment:     Problem List Items Addressed This Visit     None      Visit Diagnoses     Dysuria    -  Primary    Relevant Medications    levoFLOXacin (LEVAQUIN) 750 MG tablet    Lower abdominal pain        Relevant Medications    levoFLOXacin (LEVAQUIN) 750 MG tablet    Acute cystitis with hematuria        Relevant Medications    levoFLOXacin (LEVAQUIN) 750 MG tablet    Antineoplastic chemotherapy induced anemia        Relevant Orders    CBC auto differential    Comprehensive metabolic panel    Macrocytic anemia        Relevant Orders    CBC auto differential    Comprehensive metabolic panel          Plan:   Dysuria  -     Discontinue: levoFLOXacin (LEVAQUIN) 500 MG tablet;  Take 1 tablet (500 mg total) by mouth once daily. for 5 days  Dispense: 5 tablet; Refill: 0  -     levoFLOXacin (LEVAQUIN) 750 MG tablet; Take 1 tablet (750 mg total) by mouth once daily. for 7 days  Dispense: 7 tablet; Refill: 0    Lower abdominal pain  -     Discontinue: levoFLOXacin (LEVAQUIN) 500 MG tablet; Take 1 tablet (500 mg total) by mouth once daily. for 5 days  Dispense: 5 tablet; Refill: 0  -     levoFLOXacin (LEVAQUIN) 750 MG tablet; Take 1 tablet (750 mg total) by mouth once daily. for 7 days  Dispense: 7 tablet; Refill: 0    Acute cystitis with hematuria  -     levoFLOXacin (LEVAQUIN) 750 MG tablet; Take 1 tablet (750 mg total) by mouth once daily. for 7 days  Dispense: 7 tablet; Refill: 0    Antineoplastic chemotherapy induced anemia  -     CBC auto differential; Future; Expected date: 05/15/2019  -     Comprehensive metabolic panel; Future; Expected date: 05/15/2019    Macrocytic anemia  -     CBC auto differential; Future; Expected date: 05/15/2019  -     Comprehensive metabolic panel; Future; Expected date: 05/15/2019    UA Positive for UTI - with greater than 100,000 WBC and + leukocyte esterase - reflexed to culture.  Will start on levaquin 500 mg PO x 7 days.  Son also states that patient is due for port flush and labs and next appointment with Dr. Pop isn't scheduled until June.  Will get labs and flush port today.        I will review assessment/plan with collaborating physician.    Thank You,  LUIS Urena

## 2019-05-15 NOTE — NURSING
"Pt here for Mediport Flush. ___R___ chestwall mediport accessed with a 20g 1" rizvi via sterile technique.  Excellent blood return noted.  Labs drawn per order. Flushed with 10ml NS and 5 ml heparin solution.  Needle D/C, site without redness, swelling, or drainage noted.  Dressing applied.  Patient tolerated well.  Patient to return to clinic on 6/12/19_____     "

## 2019-05-15 NOTE — TELEPHONE ENCOUNTER
Spoke to patients baltazar Hutchinson. Stated they will  prescription tomorrow when they come to see Dr. Carey. Confirmed lab appointment for Monday.

## 2019-05-15 NOTE — PROGRESS NOTES
Jamal Michael, can you please call her and let her know that her potassium is low.  I would like her to take potassium 40 meq daily for 3 days and we will recheck a CMP on Monday.  Thank you.  Will send to her pharmacy.

## 2019-05-15 NOTE — TELEPHONE ENCOUNTER
Spoke with patients son Jasper, stated his mom said it burns when she urinates and he thinks she may have a UTI. Scheduled patient to see Wilma Tate this afternoon. Confirmed appointment time and location.

## 2019-05-15 NOTE — TELEPHONE ENCOUNTER
----- Message from Wilma Tate NP sent at 5/15/2019  4:02 PM CDT -----  Jamal Michael, can you please call her and let her know that her potassium is low.  I would like her to take potassium 40 meq daily for 3 days and we will recheck a CMP on Monday.  Thank you.  Will send to her pharmacy.

## 2019-05-16 ENCOUNTER — OFFICE VISIT (OUTPATIENT)
Dept: SURGERY | Facility: CLINIC | Age: 79
End: 2019-05-16
Payer: MEDICARE

## 2019-05-16 VITALS
HEART RATE: 77 BPM | DIASTOLIC BLOOD PRESSURE: 74 MMHG | BODY MASS INDEX: 36.13 KG/M2 | TEMPERATURE: 99 F | WEIGHT: 197.56 LBS | SYSTOLIC BLOOD PRESSURE: 123 MMHG

## 2019-05-16 DIAGNOSIS — C50.919 TRIPLE NEGATIVE MALIGNANT NEOPLASM OF BREAST: Primary | ICD-10-CM

## 2019-05-16 PROCEDURE — 99999 PR PBB SHADOW E&M-EST. PATIENT-LVL IV: CPT | Mod: PBBFAC,,, | Performed by: SURGERY

## 2019-05-16 PROCEDURE — 99999 PR PBB SHADOW E&M-EST. PATIENT-LVL IV: ICD-10-PCS | Mod: PBBFAC,,, | Performed by: SURGERY

## 2019-05-16 PROCEDURE — 99214 PR OFFICE/OUTPT VISIT, EST, LEVL IV, 30-39 MIN: ICD-10-PCS | Mod: S$PBB,,, | Performed by: SURGERY

## 2019-05-16 PROCEDURE — 99214 OFFICE O/P EST MOD 30 MIN: CPT | Mod: PBBFAC | Performed by: SURGERY

## 2019-05-16 PROCEDURE — 99214 OFFICE O/P EST MOD 30 MIN: CPT | Mod: S$PBB,,, | Performed by: SURGERY

## 2019-05-16 RX ORDER — ONDANSETRON 4 MG/1
8 TABLET, ORALLY DISINTEGRATING ORAL EVERY 8 HOURS PRN
Status: CANCELLED | OUTPATIENT
Start: 2019-05-16

## 2019-05-16 RX ORDER — LIDOCAINE HYDROCHLORIDE 10 MG/ML
1 INJECTION, SOLUTION EPIDURAL; INFILTRATION; INTRACAUDAL; PERINEURAL ONCE
Status: DISCONTINUED | OUTPATIENT
Start: 2019-05-16 | End: 2019-05-22

## 2019-05-16 NOTE — PATIENT INSTRUCTIONS
"Surgery scheduled for May 22nd, next Wednesday at approximately noon.  The hospital call you with the time for arrival.    Please stop your aspirin today     There are no other medications that you have to stop before surgery.    No solid food after midnight on May 21st but you may have clear liquids up to 3 hr before the start time of your surgery.    There were no preoperative studies needed    [facility]  Instructions for Patients and Families    You are invited to share your experience with me and my staff.  If you receive a survey in the mail, please return it at your convenience, or complete a brief survey on [employer].  We value your opinion!        Did you know that [facility] can be used to make appointments?  Just select "Schedule Appointment" under the "Visits" menu.    Notify you if any of your preop laboratories show abnormalities.  I    Before surgery:  The pre-op nurse from the hospital will call you before the day of your surgery to confirm your arrival time.  The time of your surgery may change due to emergencies or other unforeseen events.  Do not eat or drink anything after midnight the night before your procedure, except for clear liquids up to three hours before your surgery time, and sips of water with medication.  If you are not diabetic, it is recommended that you drink a glass of clear fruit juice (apple, grape, cranberry, not orange) three hours before your surgery time, but nothing after that.  If you are diabetic, you may have water or sugar-free clear liquids such as Crystal Light up to three hours before your surgery time.    Day of Surgery:  · You will go to a pre-op area where an IV will be started and you will speak to the anesthesiologist and surgeon.  · Your family will be updated throughout the operation.  After surgery, your family member may be taken to a private room for consultation with the surgeon.  This is for the privacy of your medical " information and does not necessarily mean there is anything wrong.    If your incisions have:  · Glue:  You may take a bath or shower immediately and wash your skin as you normally do.  The glue will eventually crumble or peel off. Do not let your incisions soak under water.  · Strips: Leave them on, but it is OK if they fall off on their own. It is OK to get them wet 48 hours after surgery.  · Bandage: You may remove it 2 days after your surgery, and then you may leave the incision open and take a shower or bath, unless otherwise instructed. If your bandage has clear plastic, you may shower with it on and then remove it 2 days after surgery.    Activities  · Walking is recommended after surgery; bed rest is not recommended unless specifically ordered.  · If you have had abdominal surgery, do not lift over 20 pounds for 4 weeks after surgery.  · If you have had hernia surgery, do not lift over 20 pounds for 6 weeks after surgery.  · You may drive when you are off your post-operative pain medication.  · Do not smoke after surgery, it decreases your ability to heal and increases the risk of infection and pneumonia.    Diet:  Drink lots of fluids after surgery.  You might not have much of an appetite at first, you may eat regular food when you feel ready, unless you are given special diet instructions.    Post-operative symptoms and medications  · It is safe to take over-the-counter medications for constipation, heartburn, sleep, or itching if needed.  Prescription pain medication may contain acetaminophen (Tylenol), so you should not take additional acetaminophen (Tylenol) at the same time as your pain medication.  · You may experience nausea, low fever/chills, and clear drainage from your incision, sometimes up to a month after surgery.  Notify our office if you have fever over 101 degrees, worsening redness around your incision, thick cloudy drainage, or inability to drink any liquids.  · You will experience some  "level of pain after surgery.  Your pain medication should help with the pain, but may not be able to eliminate it entirely.  Pain will decrease with time, and most pain will be gone by 4 to 6 weeks after surgery.  · We are not able to call in prescriptions for pain medication after hours or on weekends.  If your pain medication is ineffective or you will run out soon and need a refill, please call our office at [phone].  We are not able to replace pain medication that has been lost or stolen.    After surgery, you will either be discharged home or admitted to the hospital.  If you are admitted to the hospital, one of the surgeons or a physician assistant will see you once a day.  Due to scheduled surgery, we may see you in the afternoon or at night; however, your nurse is able to page us at any time.  If you feel there is a situation that is not being addressed properly, please dial [phone] from the phone in your room.    Follow-up appointment  · You will see your surgeon or a physician assistant in clinic for a follow-up appointment at either our [facility] (off [facility]) or [facility] (off [facility]) locations, usually between one and four weeks after your surgery.  · The hospital nurses can make your follow up appointment, or you can make it online at [email or web address] or call [phone].  · If you have a smartphone with the [employer] adela, please let us know if you would like to do a phone visit instead of a post-op office visit.    If you are signed up for [facility], install the "[email or web address]" adela to access your test results, send messages to your doctors, and schedule appointments from your smartphone!        Mastectomy  Mastectomy is surgery to remove the breast. The most common mastectomies are called simple (or total) and modified radical. During these procedures, the chest muscle under the breast is not removed. As a result, arm strength remains. Keeping the chest muscle also makes reconstruction " easier.     Simple (total) mastectomy.       Modified radical mastectomy.      Simple (total) mastectomy  During a simple mastectomy, the breast tissue (lobules, ducts, and fatty tissue) and the nipple are removed. This surgery most often needs a hospital stay. Based on the results of surgery and follow-up tests, more treatment may be needed.  Modified radical mastectomy  This type of mastectomy is usually done to treat invasive cancer that has spread to the lymph nodes. During the mastectomy the breast tissue and a strip of skin with the nipple is removed. Some of the axillary lymph nodes are also removed. (These are lymph nodes in the arm pit.) The removed nodes are tested for cancer. Sometimes a surgical drain is used to keep fluid from building up. This drain usually stays in for 1 to 2 weeks after surgery. Modified radical mastectomy almost always needs a hospital stay. Based on the results of the surgery and follow-up tests, more treatment may also be needed.  Right after surgery  You will wake up in the recovery room. You may have an IV (intravenous) line for fluids and medicines. You will have a tight dressing wrapped around your chest and there may be a drain coming out of it. Pain medicines will be given to you as needed. A nurse will check your temperature, pulse, and blood pressure. You'll likely stay in the hospital for at least a day.  You will be given instructions on how to care for the dressing and drains, what kind of pain medicines you should use, and how to take care of yourself as you recover. Make sure you understand all the instructions and know when you need to next see the healthcare provider.  Risks and complications of mastectomy  These include:  · Pain or numbness  · Bleeding or infection  · Stiffness of the shoulder  · Fluid collection (seroma)  · Long-term swelling of the arm (lymphedema)  · Wound-healing problems  Talk to your healthcare provider about the risks related to your surgery  and what you can do to help prevent problems.   When to call your healthcare provider  Call your healthcare provider right away if you have any of the following after surgery:  · Fever  · Chills  · A change in the way the drainage looks or increased drainage  · Increased pain, warmth, swelling, or redness at the incision(s)  · Cough or shortness of breath  · Pain in the chest or calf  · Bleeding that soaks the dressing  · Any other problems your healthcare providers told you to watch for and report  Make sure you know how to reach your healthcare provider in case problems come up. Know how to get help after office hours, on weekends, and on holidays, too.   Date Last Reviewed: 10/31/2015  © 6139-2556 [employer]. [address] [place] [address], [place] [address]. All rights reserved. This information is not intended as a substitute for professional medical care. Always follow your healthcare professional's instructions.

## 2019-05-16 NOTE — PROGRESS NOTES
History & Physical    SUBJECTIVE:     History of Present Illness:  Patient is a 79 y.o. female presents after completing neoadjuvant chemotherapy for locally advanced inflammatory breast cancer.    The patient did all but the final chemotherapy treatment due to complications of chemotherapy.  She has now recovered from these.  A follow-up PET scan showed a dramatic improvement of her disease.  Patient could no longer feel the mass in the right breast.    Patient understands that she has inflammatory breast cancer and that she needs a modified radical mastectomy or removal of the breast and lymph nodes on the right side.    She would like to schedule surgery as soon as possible      Chief Complaint   Patient presents with    Consult     masectomy of right breast       Review of patient's allergies indicates:   Allergen Reactions    Iodine Other (See Comments)     Unknown  When eating shellfish       Sulfa (sulfonamide antibiotics) Other (See Comments)     Unknown         Current Outpatient Medications   Medication Sig Dispense Refill    amLODIPine (NORVASC) 10 MG tablet amlodipine 10 mg tablet once daily      aspirin (ECOTRIN) 81 MG EC tablet Take 1 tablet (81 mg total) by mouth once daily.  0    diphenhydrAMINE-aluminum-magnesium hydroxide-simethicone-lidocaine HCl 2% Swish and spit 15 mLs every 4 (four) hours as needed. 500 mL 2    esomeprazole magnesium (NEXIUM ORAL) Take by mouth daily as needed.      levoFLOXacin (LEVAQUIN) 750 MG tablet Take 1 tablet (750 mg total) by mouth once daily. for 7 days 7 tablet 0    metoprolol tartrate (LOPRESSOR) 25 MG tablet Take 1 tablet (25 mg total) by mouth 2 (two) times daily. 60 tablet 1    ondansetron (ZOFRAN) 8 MG tablet TAKE ONE TABLET BY MOUTH EVERY 12 HOURS AS NEEDED FOR NAUSEA 30 tablet 2    potassium chloride SA (K-DUR,KLOR-CON) 20 MEQ tablet Take 2 tablets (40 mEq total) by mouth once. for 1 dose 6 tablet 0    prochlorperazine (COMPAZINE) 10 MG tablet Take  1 tablet (10 mg total) by mouth every 6 (six) hours as needed. 30 tablet 1     No current facility-administered medications for this visit.        Past Medical History:   Diagnosis Date    Hyperlipidemia     Hypertension      Past Surgical History:   Procedure Laterality Date    cataracts      HYSTERECTOMY      INSERTION, PORT-A-CATH Right 1/16/2019    [facility]    JOINT REPLACEMENT Right     Knee     Family History   Problem Relation Age of Onset    Breast cancer Sister      Social History     Tobacco Use    Smoking status: Never Smoker    Smokeless tobacco: Never Used   Substance Use Topics    Alcohol use: No     Frequency: Never    Drug use: No        Review of Systems:  Review of Systems   Constitutional: Negative for appetite change, chills, fatigue, fever and unexpected weight change.   HENT: Negative for hearing loss and rhinorrhea.    Eyes: Negative for visual disturbance.   Respiratory: Negative for apnea, cough, shortness of breath and wheezing.    Cardiovascular: Negative for chest pain and palpitations.   Gastrointestinal: Negative for abdominal distention, abdominal pain, blood in stool, constipation, diarrhea, nausea and vomiting.   Genitourinary: Negative for dysuria, frequency and urgency.   Musculoskeletal: Negative for arthralgias and neck pain.   Skin: Negative for rash.        Mass is gone  right breast is larger   Neurological: Negative for seizures, weakness, numbness and headaches.   Hematological: Negative for adenopathy. Does not bruise/bleed easily.   Psychiatric/Behavioral: Negative for hallucinations. The patient is not nervous/anxious.        OBJECTIVE:     Vital Signs (Most Recent)  Temp: 98.6 °F (37 °C) (05/16/19 1144)  Pulse: 77 (05/16/19 1144)  BP: 123/74 (05/16/19 1144)     89.6 kg (197 lb 8.5 oz)     Physical Exam:  Physical Exam   Constitutional: No distress.   Overweight   HENT:   Head: Normocephalic and atraumatic.   Neck: Normal range of  motion. Neck supple. No JVD present. No tracheal deviation present.   Cardiovascular: Normal rate, regular rhythm, normal heart sounds and intact distal pulses.   Pulmonary/Chest: Effort normal and breath sounds normal.   Abdominal: Soft. Bowel sounds are normal.   Lymphadenopathy:     She has no cervical adenopathy.   Skin: Skin is warm and dry.   Bilateral breast exam was performed.  The right breast is thickened compared to the left.  There are Peau du orange changes in the central region of the breast.  There is no palpable masses.  There is no axillary adenopathy    Left breast exam showed no skin changes masses, nipple discharges nor axillary adenopathy   Vitals reviewed.      Laboratory  CBC: Reviewed  CMP: Reviewed       Diagnostic Results:  Patient's mammogram, ultrasound, PET scan, echocardiogram, nuclear medicine status were all reviewed    ASSESSMENT/PLAN:     Inflammatory breast cancer    Patient Active Problem List   Diagnosis    Triple negative malignant neoplasm of breast    Essential hypertension    Stomatitis    Elevated troponin    Hyperglycemia     Medical issues as listed above    PLAN:Plan     Right modified radical mastectomy.  The rationale for the modified radical mastectomy, inflammatory breast cancer was discussed with the patient and her son.    If the possibility of reconstruction was discussed, the patient declined this.    The risks of the surgery include infection, bleeding, hematoma, seroma, numbness of the arm, difficulty moving the shoulder due to nerve injury and lymphedema.    The patient will see physical therapy preoperatively and postoperatively.    We will have her stop her aspirin today.  Surgery be scheduled for May 22nd at approximately 12 noon.  No preop studies are required.  Consent was obtained.  Preop instructions were provided    Her hypertension will be controlled with her home medications and hyperglycemia will be monitored with an insulin sliding scale

## 2019-05-16 NOTE — H&P (VIEW-ONLY)
History & Physical    SUBJECTIVE:     History of Present Illness:  Patient is a 79 y.o. female presents after completing neoadjuvant chemotherapy for locally advanced inflammatory breast cancer.    The patient did all but the final chemotherapy treatment due to complications of chemotherapy.  She has now recovered from these.  A follow-up PET scan showed a dramatic improvement of her disease.  Patient could no longer feel the mass in the right breast.    Patient understands that she has inflammatory breast cancer and that she needs a modified radical mastectomy or removal of the breast and lymph nodes on the right side.    She would like to schedule surgery as soon as possible      Chief Complaint   Patient presents with    Consult     masectomy of right breast       Review of patient's allergies indicates:   Allergen Reactions    Iodine Other (See Comments)     Unknown  When eating shellfish       Sulfa (sulfonamide antibiotics) Other (See Comments)     Unknown         Current Outpatient Medications   Medication Sig Dispense Refill    amLODIPine (NORVASC) 10 MG tablet amlodipine 10 mg tablet once daily      aspirin (ECOTRIN) 81 MG EC tablet Take 1 tablet (81 mg total) by mouth once daily.  0    diphenhydrAMINE-aluminum-magnesium hydroxide-simethicone-lidocaine HCl 2% Swish and spit 15 mLs every 4 (four) hours as needed. 500 mL 2    esomeprazole magnesium (NEXIUM ORAL) Take by mouth daily as needed.      levoFLOXacin (LEVAQUIN) 750 MG tablet Take 1 tablet (750 mg total) by mouth once daily. for 7 days 7 tablet 0    metoprolol tartrate (LOPRESSOR) 25 MG tablet Take 1 tablet (25 mg total) by mouth 2 (two) times daily. 60 tablet 1    ondansetron (ZOFRAN) 8 MG tablet TAKE ONE TABLET BY MOUTH EVERY 12 HOURS AS NEEDED FOR NAUSEA 30 tablet 2    potassium chloride SA (K-DUR,KLOR-CON) 20 MEQ tablet Take 2 tablets (40 mEq total) by mouth once. for 1 dose 6 tablet 0    prochlorperazine (COMPAZINE) 10 MG tablet Take  1 tablet (10 mg total) by mouth every 6 (six) hours as needed. 30 tablet 1     No current facility-administered medications for this visit.        Past Medical History:   Diagnosis Date    Hyperlipidemia     Hypertension      Past Surgical History:   Procedure Laterality Date    cataracts      HYSTERECTOMY      INSERTION, PORT-A-CATH Right 1/16/2019    [facility]    JOINT REPLACEMENT Right     Knee     Family History   Problem Relation Age of Onset    Breast cancer Sister      Social History     Tobacco Use    Smoking status: Never Smoker    Smokeless tobacco: Never Used   Substance Use Topics    Alcohol use: No     Frequency: Never    Drug use: No        Review of Systems:  Review of Systems   Constitutional: Negative for appetite change, chills, fatigue, fever and unexpected weight change.   HENT: Negative for hearing loss and rhinorrhea.    Eyes: Negative for visual disturbance.   Respiratory: Negative for apnea, cough, shortness of breath and wheezing.    Cardiovascular: Negative for chest pain and palpitations.   Gastrointestinal: Negative for abdominal distention, abdominal pain, blood in stool, constipation, diarrhea, nausea and vomiting.   Genitourinary: Negative for dysuria, frequency and urgency.   Musculoskeletal: Negative for arthralgias and neck pain.   Skin: Negative for rash.        Mass is gone  right breast is larger   Neurological: Negative for seizures, weakness, numbness and headaches.   Hematological: Negative for adenopathy. Does not bruise/bleed easily.   Psychiatric/Behavioral: Negative for hallucinations. The patient is not nervous/anxious.        OBJECTIVE:     Vital Signs (Most Recent)  Temp: 98.6 °F (37 °C) (05/16/19 1144)  Pulse: 77 (05/16/19 1144)  BP: 123/74 (05/16/19 1144)     89.6 kg (197 lb 8.5 oz)     Physical Exam:  Physical Exam   Constitutional: No distress.   Overweight   HENT:   Head: Normocephalic and atraumatic.   Neck: Normal range of  motion. Neck supple. No JVD present. No tracheal deviation present.   Cardiovascular: Normal rate, regular rhythm, normal heart sounds and intact distal pulses.   Pulmonary/Chest: Effort normal and breath sounds normal.   Abdominal: Soft. Bowel sounds are normal.   Lymphadenopathy:     She has no cervical adenopathy.   Skin: Skin is warm and dry.   Bilateral breast exam was performed.  The right breast is thickened compared to the left.  There are Peau du orange changes in the central region of the breast.  There is no palpable masses.  There is no axillary adenopathy    Left breast exam showed no skin changes masses, nipple discharges nor axillary adenopathy   Vitals reviewed.      Laboratory  CBC: Reviewed  CMP: Reviewed       Diagnostic Results:  Patient's mammogram, ultrasound, PET scan, echocardiogram, nuclear medicine status were all reviewed    ASSESSMENT/PLAN:     Inflammatory breast cancer    Patient Active Problem List   Diagnosis    Triple negative malignant neoplasm of breast    Essential hypertension    Stomatitis    Elevated troponin    Hyperglycemia     Medical issues as listed above    PLAN:Plan     Right modified radical mastectomy.  The rationale for the modified radical mastectomy, inflammatory breast cancer was discussed with the patient and her son.    If the possibility of reconstruction was discussed, the patient declined this.    The risks of the surgery include infection, bleeding, hematoma, seroma, numbness of the arm, difficulty moving the shoulder due to nerve injury and lymphedema.    The patient will see physical therapy preoperatively and postoperatively.    We will have her stop her aspirin today.  Surgery be scheduled for May 22nd at approximately 12 noon.  No preop studies are required.  Consent was obtained.  Preop instructions were provided    Her hypertension will be controlled with her home medications and hyperglycemia will be monitored with an insulin sliding scale

## 2019-05-20 ENCOUNTER — CLINICAL SUPPORT (OUTPATIENT)
Dept: REHABILITATION | Facility: HOSPITAL | Age: 79
End: 2019-05-20
Attending: SURGERY
Payer: MEDICARE

## 2019-05-20 ENCOUNTER — LAB VISIT (OUTPATIENT)
Dept: LAB | Facility: HOSPITAL | Age: 79
End: 2019-05-20
Attending: NURSE PRACTITIONER
Payer: MEDICARE

## 2019-05-20 DIAGNOSIS — C50.919 MALIGNANT NEOPLASM OF FEMALE BREAST, UNSPECIFIED ESTROGEN RECEPTOR STATUS, UNSPECIFIED LATERALITY, UNSPECIFIED SITE OF BREAST: Primary | ICD-10-CM

## 2019-05-20 DIAGNOSIS — E87.6 HYPOKALEMIA: ICD-10-CM

## 2019-05-20 LAB
ALBUMIN SERPL BCP-MCNC: 3.3 G/DL (ref 3.5–5.2)
ALP SERPL-CCNC: 129 U/L (ref 55–135)
ALT SERPL W/O P-5'-P-CCNC: 19 U/L (ref 10–44)
ANION GAP SERPL CALC-SCNC: 10 MMOL/L (ref 8–16)
AST SERPL-CCNC: 21 U/L (ref 10–40)
BILIRUB SERPL-MCNC: 0.2 MG/DL (ref 0.1–1)
BUN SERPL-MCNC: 7 MG/DL (ref 8–23)
CALCIUM SERPL-MCNC: 9.5 MG/DL (ref 8.7–10.5)
CHLORIDE SERPL-SCNC: 107 MMOL/L (ref 95–110)
CO2 SERPL-SCNC: 23 MMOL/L (ref 23–29)
CREAT SERPL-MCNC: 0.8 MG/DL (ref 0.5–1.4)
EST. GFR  (AFRICAN AMERICAN): >60 ML/MIN/1.73 M^2
EST. GFR  (NON AFRICAN AMERICAN): >60 ML/MIN/1.73 M^2
GLUCOSE SERPL-MCNC: 110 MG/DL (ref 70–110)
POTASSIUM SERPL-SCNC: 3.7 MMOL/L (ref 3.5–5.1)
PROT SERPL-MCNC: 7.2 G/DL (ref 6–8.4)
SODIUM SERPL-SCNC: 140 MMOL/L (ref 136–145)

## 2019-05-20 PROCEDURE — 97161 PT EVAL LOW COMPLEX 20 MIN: CPT

## 2019-05-20 PROCEDURE — 80053 COMPREHEN METABOLIC PANEL: CPT

## 2019-05-20 PROCEDURE — 36415 COLL VENOUS BLD VENIPUNCTURE: CPT

## 2019-05-20 NOTE — PROGRESS NOTES
OUTPATIENT PHYSICAL THERAPY   EVALUATION    [facility]  Cl[facility] Number: [id number]    Therapy Diagnosis:   Encounter Diagnosis   Name Primary?    Malignant neoplasm of female breast, unspecified estrogen receptor status, unspecified laterality, unspecified site of breast Yes     Physician: Paul Carey MD    Physician Orders: PT Eval and Treat   Medical Diagnosis: malignant neoplasm of female breast  Evaluation Date: 5/20/2019  Authorization period Expiration: 12/31/19  Plan of Care Certification Period: 6/20/2019  Insurance: medicare    Visit #: 1 Visits authorized: 1  Time In: 1:00  Time Out: 1:40  Total Billable Time: 40 minutes    Precautions: Standard and cancer    History   History of Present Illness: Soo is a 79 y.o. female that presents to  [facility] the [facility] secondary to dx of malignant breast cancer.  Patient is a 79 y.o. female presents after completing neoadjuvant chemotherapy for locally advanced inflammatory breast cancer. The patient did all but the final chemotherapy treatment due to complications of chemotherapy.  She has now recovered from these complications. The Last chemotherapy treatment was  4/12/2019. She is scheduled for surgery on Wednesday.  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 Circumferential measurements will also be taken pre-surgery of BL UEs for early detection of lymphedema post surgery. Pt will also be instructed in exercises to perform pre-surgery to insure best outcomes post surgery.       Past Medical History:   Past Medical History:   Diagnosis Date    Hyperlipidemia     Hypertension        Past Surgical History:   Soo  Indra Holly  has a past surgical history that includes Hysterectomy; Joint replacement (Right); cataracts; and Insertion of tunneled central venous catheter (CVC) with subcutaneous port (Right, 1/16/2019).    Medications:  Soo has a current medication list which includes the following prescription(s): amlodipine, aspirin, diphenhydrAMINE-aluminum-magnesium hydroxide-simethicone-lidocaine HCl 2%, esomeprazole magnesium, levofloxacin, metoprolol tartrate, ondansetron, potassium chloride sa, and prochlorperazine, and the following Facility-Administered Medications: lidocaine (pf) 10 mg/ml (1%).    Allergies:  Review of patient's allergies indicates:   Allergen Reactions    Iodine Other (See Comments)     Unknown  When eating shellfish       Sulfa (sulfonamide antibiotics) Other (See Comments)     Unknown            Hand dominance: right handed  Prior Therapy: none  Nutrition:  Overweight  Social History: lives with son  Place of Residence (Steps/Adaptations): 3 steps leading to porch  Current functional status:  WNL  Exercise routine prior to onset : sedentary  DME owned: none  Work:  retired                         Subjective   Pt states: weak legs since chemo treatments. She feels like she is getting better.  Pain: 0/10 on VAS.     Objective   Mental status :attentive    Posture/Alignment   Postural examination/scapula alignment: wnl  Joint integrity: WFLs  Skin integrity: intact  Edema: none noted    Sensation: Light Touch: Intact           Proprioception: Intact  - appearance: well groomed     ROM:   UPPER EXTREMITY--AROM/PROM  (R) UE: WNLs  (L) UE: WNLs     Shoulder Range of Motion:   ACTIVE ROM LEFT RIGHT   Flexion 160 160   Abduction 160 160   Horizontal Abd wnl wnl   Extension wnl wnl   IR wnl wnl   ER wnl wnl   IR/90deg wnl wnl   ER/90deg wnl wnl     Strength: manual muscle test grades below   Upper Extremity Strength   (L) UE (R) UE   Shoulder flexion: 3+/5 3+/5   Shoulder Abduction: 3-/5 3+/5  "  Shoulder IR 3+/5 3+/5   Shoulder ER 3+/5 3+/5   Elbow flexion: 4/5 4/5   Elbow extension: 4/5 4/5   Lower Trap: 3+/5 3+/5   Middle Trap: 3+/5 3+/5    4/5 4/5       Baseline Measurements of BL UE's for early detection of Lymphedema:     LANDMARK RIGHT UE LEFT UE DIFFERENCE   E + 8" 39 cm 40 cm 1 cm   E + 6" 40 cm 41 cm 1 cm   E + 4" 37 cm 36.5 cm .5 cm   E + 2" 32 cm 34 cm 2 cm   Elbow 28 cm 28 cm 0 cm   W+ 8" 26 cm 27 cm 1 cm   W +  6" 25 cm 23.5 cm 1,5 cm   W + 4" 22 cm 20 cm 2 cm   Wrist 17.5 cm 17 cm .5 cm   DPC 20 20.5 cm .5 cm   IP Thumb 7 cm 9.5 cm 1.5 cm       Coordination:   - fine motor: WFL  - UE coordination: intact     - LE coordination:  Not tested     Functional Mobility (Bed mobility, transfers)  Bed mobility: I =  independent   Roll to left: I  Roll to right: I  Supine to prone: I  Scooting to edge of bed: I  Supine to sit: I  Sit to supine: I  Transfers to bed: I  Transfers to toilet: I  Sit to stand:  I  Stand pivot:  I  Car transfers: I      ADL's:  Feeding: I = independent   Grooming: I  Hygiene: I  UB Dressing: I  LB Dressing: I  Toileting: I  Bathing: I    Gait Assessment:   - AD used: none  - Assistance: independent  - Distance: household distances     Endurance Deficit: slight endurance deficit due to recent chemotherapy treatments and sedentary lifestyle    Patient Education   - role of PT in multi - disciplinary team, goals for PT  - Pt was educated in lymphedema etiology and management plans.    - Pt was provided with written risk reductions and precautions for managing lymphedema.   - Reviewed MARILEE drain care instructions.     ROM/lifting Precautions post surgery discussed -  until drains have been removed:  - do not lift affected arm above 90 degrees of shoulder flexion  - do not lift over 5 lbs  - do not pull or push heavy objects  - do not sleep on your stomach or surgery side     Written Home Exercises Provided and Patient Education: Handouts given   Pt was instructed in and " performed therapeutic exercise for postural correction and alignment, stretching and soft tissue mobility, and strengthening.     Exercises included: handout given    - exaggerated deep breathing and relaxation  - scapular retractions  - wrist circles  - elbow flexion/extension      Pt was able to demonstrate and report understanding and performance    Pt has no cultural, educational or language barriers to learning provided.    Functional Limitations Reporting         Assessment   This is a 79 y.o. female referred to outpatient physical therapy and presents with a medical diagnosis of right breast cancer and was seen today pre-operatively to assess strength and ROM of BL UEs, to take baseline circumferential measurements of BL UEs to aid in the early detection of lymphedema and provide pt education on exercises/precations post breast surgery. Pt does not exhibit any ROM impairments  Pt educated in lymphedema risks/precautions as well as ROM/lifting precautions post surgery - pt demonstrated/verbalized understanding. No goals established this visit as goals for PT will be established post surgery at follow up.      Anticipated barriers to physical therapy: none anticipated     Pt's spiritual, cultural and educational needs considered and pt agreeable to plan of care and goals as stated below:     Medical necessity is demonstrated by the following IMPAIRMENTS/PROMBLEM LIST:  History  Co-morbidities and personal factors that may impact the plan of care Co-morbidities:   history of cancer    Personal Factors:   no deficits     low   Examination  Body Structures and Functions, activity limitations and participation restrictions that may impact the plan of care Body Regions:   n/a    Body Systems:    n/a    Participation Restrictions:   none    Activity limitations:   Learning and applying knowledge  no deficits    General Tasks and Commands  no deficits    Communication  no deficits    Mobility  no deficits    Self  care  no deficits    Domestic Life  no deficits    Interactions/Relationships  no deficits    Life Areas  no deficits    Community and Social Life  no deficits         low   Clinical Presentation stable and uncomplicated low   Decision Making/ Complexity Score: low         Plan   Schedule patient for follow up with Physical therapy post surgery. Goals for therapy post surgery will be established at that time.     Therapist: Day Conde, PT     5/20/2019

## 2019-05-20 NOTE — PRE ADMISSION SCREENING
Pre op instructions reviewed with patient per phone:    To confirm, Your surgeon has instructed you:  Surgery is scheduled 05/22/19 at 1215.  Pre admit office to call afternoon prior to surgery with final arrival time.  If surgery is on Monday, Pre admit office to call Friday afternoon with with final arrival time      Please report to [facility] 1st floor main lobby by 1045.      INSTRUCTIONS IMPORTANT!!!  ¨ No smoking after 12 midnight, the night before surgery.  ¨ No solid food after 12 midnight, but you may have clear liquids up until 3 hours prior to surgery.  This includes: grape, cranberry, and apple juice (not orange, and no coffee.)   ¨ OK to brush teeth, but no gum, candy or mints!    ¨ Take only these medicines with a small swallow of water-morning of surgery.  Amlodipine, Metoprolol          ____  Do not wear makeup, including mascara.  ____  No powder, lotions or creams to surgical area.  ____  Please remove all jewelry, including piercings and leave at home.  ____  No money or valuables needed. Please leave at home.  ____  Please bring identification and insurance information to hospital.  ____  If going home the same day, arrange for a ride home. You will not be able to   drive if Anesthesia was used.  ____  Children, under 12 years old, must remain in the waiting room with an adult.  They are not allowed in patient areas.  ____  Wear loose fitting clothing. Allow for dressings, bandages.  ____  Stop Aspirin, Ibuprofen, Motrin and Aleve at least 5-7 days before surgery, unless otherwise instructed by your doctor, or the nurse.   You MAY use Tylenol/acetaminophen until day of surgery.  ____  If you take diabetic medication, do not take am of surgery unless instructed by   Doctor.  ____ Stop taking any Fish Oil supplement or any Vitamins that contain Vitamin E at least 5 days prior to surgery.          Bathing Instructions-- The night before surgery and the morning prior to  coming to the hospital:   -Do not shave the surgical area.   -Shower and wash your hair and body as usual with your regular soap and shampoo.   -Rinse your hair and body completely.   -Use one packet of hibiclens to wash the surgical site (using your hand) gently for 5 minutes.  Do not scrub you skin too hard.   -Do not use hibiclens on your head, face, or genitals.   -Do not wash with regular soap after you use the hibiclens.   -Rinse your body thoroughly.   -Dry with clean, soft towel.  Do not use lotion, cream, deodorant, or powders on   the surgical site.    Use antibacterial soap in place of hibiclens if your surgery is on the head, face or genitals.         Surgical Site Infection    Prevention of surgical site infections:     -Keep incisions clean and dry.   -Do not soak/submerge incisions in water until completely healed.   -Do not apply lotions, powders, creams, or deodorants to site.   -Always make sure hands are cleaned with antibacterial soap/ alcohol-based   prior to touching the surgical site.  (This includes doctors, nurses, staff, and yourself.)    Signs and symptoms:   -Redness and pain around the area where you had surgery   -Drainage of cloudy fluid from your surgical wound   -Fever over 100.4  I have read or had read and explained to me, and understand the above information.

## 2019-05-21 ENCOUNTER — ANESTHESIA EVENT (OUTPATIENT)
Dept: SURGERY | Facility: HOSPITAL | Age: 79
End: 2019-05-21
Payer: MEDICARE

## 2019-05-22 ENCOUNTER — HOSPITAL ENCOUNTER (OUTPATIENT)
Facility: HOSPITAL | Age: 79
Discharge: HOME | End: 2019-05-23
Attending: SURGERY | Admitting: SURGERY
Payer: MEDICARE

## 2019-05-22 ENCOUNTER — ANESTHESIA (OUTPATIENT)
Dept: SURGERY | Facility: HOSPITAL | Age: 79
End: 2019-05-22
Payer: MEDICARE

## 2019-05-22 DIAGNOSIS — I10 ESSENTIAL HYPERTENSION: Primary | ICD-10-CM

## 2019-05-22 DIAGNOSIS — C50.919 TRIPLE NEGATIVE MALIGNANT NEOPLASM OF BREAST: ICD-10-CM

## 2019-05-22 PROCEDURE — 36000706: Performed by: SURGERY

## 2019-05-22 PROCEDURE — 88342 TISSUE SPECIMEN TO PATHOLOGY - SURGERY: ICD-10-PCS | Mod: 26,,, | Performed by: PATHOLOGY

## 2019-05-22 PROCEDURE — 37000009 HC ANESTHESIA EA ADD 15 MINS: Performed by: SURGERY

## 2019-05-22 PROCEDURE — 71000033 HC RECOVERY, INTIAL HOUR: Performed by: SURGERY

## 2019-05-22 PROCEDURE — 88305 TISSUE SPECIMEN TO PATHOLOGY - SURGERY: ICD-10-PCS | Mod: 26,,, | Performed by: PATHOLOGY

## 2019-05-22 PROCEDURE — 88309 TISSUE SPECIMEN TO PATHOLOGY - SURGERY: ICD-10-PCS | Mod: 26,,, | Performed by: PATHOLOGY

## 2019-05-22 PROCEDURE — 27201423 OPTIME MED/SURG SUP & DEVICES STERILE SUPPLY: Performed by: SURGERY

## 2019-05-22 PROCEDURE — 88305 TISSUE EXAM BY PATHOLOGIST: CPT | Mod: 26,,, | Performed by: PATHOLOGY

## 2019-05-22 PROCEDURE — G0378 HOSPITAL OBSERVATION PER HR: HCPCS

## 2019-05-22 PROCEDURE — 25000003 PHARM REV CODE 250: Performed by: ANESTHESIOLOGY

## 2019-05-22 PROCEDURE — 63600175 PHARM REV CODE 636 W HCPCS: Performed by: NURSE ANESTHETIST, CERTIFIED REGISTERED

## 2019-05-22 PROCEDURE — 88305 TISSUE EXAM BY PATHOLOGIST: CPT | Performed by: PATHOLOGY

## 2019-05-22 PROCEDURE — 94760 N-INVAS EAR/PLS OXIMETRY 1: CPT

## 2019-05-22 PROCEDURE — 37000008 HC ANESTHESIA 1ST 15 MINUTES: Performed by: SURGERY

## 2019-05-22 PROCEDURE — 88342 IMHCHEM/IMCYTCHM 1ST ANTB: CPT | Performed by: PATHOLOGY

## 2019-05-22 PROCEDURE — C1729 CATH, DRAINAGE: HCPCS | Performed by: SURGERY

## 2019-05-22 PROCEDURE — 36000707: Performed by: SURGERY

## 2019-05-22 PROCEDURE — 88307 TISSUE EXAM BY PATHOLOGIST: CPT | Mod: 26,,, | Performed by: PATHOLOGY

## 2019-05-22 PROCEDURE — 88309 TISSUE EXAM BY PATHOLOGIST: CPT | Mod: 26,,, | Performed by: PATHOLOGY

## 2019-05-22 PROCEDURE — 25000003 PHARM REV CODE 250: Performed by: NURSE ANESTHETIST, CERTIFIED REGISTERED

## 2019-05-22 PROCEDURE — 19307 MAST MOD RAD: CPT | Mod: RT,,, | Performed by: SURGERY

## 2019-05-22 PROCEDURE — 19307 PR MASTECTOMY, MODIFIED RADICAL: ICD-10-PCS | Mod: RT,,, | Performed by: SURGERY

## 2019-05-22 PROCEDURE — 63600175 PHARM REV CODE 636 W HCPCS: Performed by: SURGERY

## 2019-05-22 PROCEDURE — 88307 TISSUE SPECIMEN TO PATHOLOGY - SURGERY: ICD-10-PCS | Mod: 26,,, | Performed by: PATHOLOGY

## 2019-05-22 PROCEDURE — 25000003 PHARM REV CODE 250: Performed by: SURGERY

## 2019-05-22 PROCEDURE — 88342 IMHCHEM/IMCYTCHM 1ST ANTB: CPT | Mod: 26,,, | Performed by: PATHOLOGY

## 2019-05-22 RX ORDER — SUCCINYLCHOLINE CHLORIDE 20 MG/ML
INJECTION INTRAMUSCULAR; INTRAVENOUS
Status: DISCONTINUED | OUTPATIENT
Start: 2019-05-22 | End: 2019-05-22

## 2019-05-22 RX ORDER — METOCLOPRAMIDE HYDROCHLORIDE 5 MG/ML
5 INJECTION INTRAMUSCULAR; INTRAVENOUS EVERY 6 HOURS PRN
Status: DISCONTINUED | OUTPATIENT
Start: 2019-05-22 | End: 2019-05-23 | Stop reason: HOSPADM

## 2019-05-22 RX ORDER — PANTOPRAZOLE SODIUM 40 MG/1
40 FOR SUSPENSION ORAL DAILY
Status: DISCONTINUED | OUTPATIENT
Start: 2019-05-23 | End: 2019-05-22

## 2019-05-22 RX ORDER — SODIUM CHLORIDE, SODIUM LACTATE, POTASSIUM CHLORIDE, CALCIUM CHLORIDE 600; 310; 30; 20 MG/100ML; MG/100ML; MG/100ML; MG/100ML
INJECTION, SOLUTION INTRAVENOUS CONTINUOUS PRN
Status: DISCONTINUED | OUTPATIENT
Start: 2019-05-22 | End: 2019-05-22

## 2019-05-22 RX ORDER — MEPERIDINE HYDROCHLORIDE 50 MG/ML
12.5 INJECTION INTRAMUSCULAR; INTRAVENOUS; SUBCUTANEOUS ONCE AS NEEDED
Status: DISCONTINUED | OUTPATIENT
Start: 2019-05-22 | End: 2019-05-22 | Stop reason: HOSPADM

## 2019-05-22 RX ORDER — HYDROCODONE BITARTRATE AND ACETAMINOPHEN 7.5; 325 MG/1; MG/1
1 TABLET ORAL EVERY 6 HOURS PRN
Status: DISCONTINUED | OUTPATIENT
Start: 2019-05-22 | End: 2019-05-23 | Stop reason: HOSPADM

## 2019-05-22 RX ORDER — SODIUM CHLORIDE, SODIUM LACTATE, POTASSIUM CHLORIDE, CALCIUM CHLORIDE 600; 310; 30; 20 MG/100ML; MG/100ML; MG/100ML; MG/100ML
INJECTION, SOLUTION INTRAVENOUS CONTINUOUS
Status: DISCONTINUED | OUTPATIENT
Start: 2019-05-22 | End: 2019-05-23 | Stop reason: HOSPADM

## 2019-05-22 RX ORDER — BUPIVACAINE HYDROCHLORIDE 2.5 MG/ML
INJECTION, SOLUTION EPIDURAL; INFILTRATION; INTRACAUDAL
Status: DISCONTINUED | OUTPATIENT
Start: 2019-05-22 | End: 2019-05-22 | Stop reason: HOSPADM

## 2019-05-22 RX ORDER — HYDROCODONE BITARTRATE AND ACETAMINOPHEN 5; 325 MG/1; MG/1
1 TABLET ORAL EVERY 4 HOURS PRN
Status: DISCONTINUED | OUTPATIENT
Start: 2019-05-22 | End: 2019-05-23 | Stop reason: HOSPADM

## 2019-05-22 RX ORDER — FENTANYL CITRATE 50 UG/ML
INJECTION, SOLUTION INTRAMUSCULAR; INTRAVENOUS
Status: DISCONTINUED | OUTPATIENT
Start: 2019-05-22 | End: 2019-05-22

## 2019-05-22 RX ORDER — ONDANSETRON 2 MG/ML
INJECTION INTRAMUSCULAR; INTRAVENOUS
Status: DISCONTINUED | OUTPATIENT
Start: 2019-05-22 | End: 2019-05-22

## 2019-05-22 RX ORDER — LIDOCAINE HYDROCHLORIDE 10 MG/ML
1 INJECTION, SOLUTION EPIDURAL; INFILTRATION; INTRACAUDAL; PERINEURAL ONCE
Status: DISCONTINUED | OUTPATIENT
Start: 2019-05-22 | End: 2019-05-22 | Stop reason: HOSPADM

## 2019-05-22 RX ORDER — CEFAZOLIN SODIUM 2 G/50ML
2 SOLUTION INTRAVENOUS
Status: COMPLETED | OUTPATIENT
Start: 2019-05-22 | End: 2019-05-22

## 2019-05-22 RX ORDER — ENOXAPARIN SODIUM 100 MG/ML
40 INJECTION SUBCUTANEOUS EVERY 24 HOURS
Status: DISCONTINUED | OUTPATIENT
Start: 2019-05-23 | End: 2019-05-23 | Stop reason: HOSPADM

## 2019-05-22 RX ORDER — LIDOCAINE HYDROCHLORIDE 10 MG/ML
INJECTION INFILTRATION; PERINEURAL
Status: DISCONTINUED | OUTPATIENT
Start: 2019-05-22 | End: 2019-05-22

## 2019-05-22 RX ORDER — METOCLOPRAMIDE HYDROCHLORIDE 5 MG/ML
10 INJECTION INTRAMUSCULAR; INTRAVENOUS EVERY 10 MIN PRN
Status: DISCONTINUED | OUTPATIENT
Start: 2019-05-22 | End: 2019-05-22 | Stop reason: HOSPADM

## 2019-05-22 RX ORDER — ACETAMINOPHEN 500 MG
1000 TABLET ORAL ONCE AS NEEDED
Status: COMPLETED | OUTPATIENT
Start: 2019-05-22 | End: 2019-05-22

## 2019-05-22 RX ORDER — PANTOPRAZOLE SODIUM 40 MG/1
40 TABLET, DELAYED RELEASE ORAL DAILY
Status: DISCONTINUED | OUTPATIENT
Start: 2019-05-23 | End: 2019-05-23 | Stop reason: HOSPADM

## 2019-05-22 RX ORDER — SODIUM CHLORIDE 0.9 % (FLUSH) 0.9 %
3 SYRINGE (ML) INJECTION
Status: DISCONTINUED | OUTPATIENT
Start: 2019-05-22 | End: 2019-05-22

## 2019-05-22 RX ORDER — ASPIRIN 81 MG/1
81 TABLET ORAL DAILY
Status: DISCONTINUED | OUTPATIENT
Start: 2019-05-23 | End: 2019-05-23 | Stop reason: HOSPADM

## 2019-05-22 RX ORDER — ONDANSETRON 8 MG/1
8 TABLET, ORALLY DISINTEGRATING ORAL EVERY 8 HOURS PRN
Status: DISCONTINUED | OUTPATIENT
Start: 2019-05-22 | End: 2019-05-23 | Stop reason: HOSPADM

## 2019-05-22 RX ORDER — AMLODIPINE BESYLATE 10 MG/1
10 TABLET ORAL DAILY
Status: DISCONTINUED | OUTPATIENT
Start: 2019-05-23 | End: 2019-05-23 | Stop reason: HOSPADM

## 2019-05-22 RX ORDER — KETOROLAC TROMETHAMINE 30 MG/ML
INJECTION, SOLUTION INTRAMUSCULAR; INTRAVENOUS
Status: DISCONTINUED | OUTPATIENT
Start: 2019-05-22 | End: 2019-05-22

## 2019-05-22 RX ORDER — SODIUM CHLORIDE 0.9 % (FLUSH) 0.9 %
3 SYRINGE (ML) INJECTION EVERY 8 HOURS
Status: DISCONTINUED | OUTPATIENT
Start: 2019-05-22 | End: 2019-05-22 | Stop reason: HOSPADM

## 2019-05-22 RX ORDER — PROPOFOL 10 MG/ML
VIAL (ML) INTRAVENOUS
Status: DISCONTINUED | OUTPATIENT
Start: 2019-05-22 | End: 2019-05-22

## 2019-05-22 RX ORDER — CHLORHEXIDINE GLUCONATE ORAL RINSE 1.2 MG/ML
10 SOLUTION DENTAL 2 TIMES DAILY
Status: DISCONTINUED | OUTPATIENT
Start: 2019-05-22 | End: 2019-05-23 | Stop reason: HOSPADM

## 2019-05-22 RX ORDER — MORPHINE SULFATE 4 MG/ML
2 INJECTION, SOLUTION INTRAMUSCULAR; INTRAVENOUS EVERY 5 MIN PRN
Status: DISCONTINUED | OUTPATIENT
Start: 2019-05-22 | End: 2019-05-22 | Stop reason: HOSPADM

## 2019-05-22 RX ORDER — LIDOCAINE HYDROCHLORIDE 20 MG/ML
JELLY TOPICAL
Status: DISCONTINUED | OUTPATIENT
Start: 2019-05-22 | End: 2019-05-22

## 2019-05-22 RX ORDER — ACETAMINOPHEN 500 MG
1000 TABLET ORAL EVERY 8 HOURS
Status: DISCONTINUED | OUTPATIENT
Start: 2019-05-22 | End: 2019-05-23 | Stop reason: HOSPADM

## 2019-05-22 RX ORDER — HYDROMORPHONE HYDROCHLORIDE 1 MG/ML
1 INJECTION, SOLUTION INTRAMUSCULAR; INTRAVENOUS; SUBCUTANEOUS EVERY 4 HOURS PRN
Status: DISCONTINUED | OUTPATIENT
Start: 2019-05-22 | End: 2019-05-23 | Stop reason: HOSPADM

## 2019-05-22 RX ORDER — METOPROLOL TARTRATE 25 MG/1
25 TABLET, FILM COATED ORAL 2 TIMES DAILY
Status: DISCONTINUED | OUTPATIENT
Start: 2019-05-22 | End: 2019-05-23 | Stop reason: HOSPADM

## 2019-05-22 RX ORDER — ONDANSETRON 8 MG/1
8 TABLET, ORALLY DISINTEGRATING ORAL EVERY 8 HOURS PRN
Status: DISCONTINUED | OUTPATIENT
Start: 2019-05-22 | End: 2019-05-22 | Stop reason: HOSPADM

## 2019-05-22 RX ORDER — ROCURONIUM BROMIDE 10 MG/ML
INJECTION, SOLUTION INTRAVENOUS
Status: DISCONTINUED | OUTPATIENT
Start: 2019-05-22 | End: 2019-05-22

## 2019-05-22 RX ORDER — EPHEDRINE SULFATE 50 MG/ML
INJECTION, SOLUTION INTRAVENOUS
Status: DISCONTINUED | OUTPATIENT
Start: 2019-05-22 | End: 2019-05-22

## 2019-05-22 RX ORDER — OXYCODONE HYDROCHLORIDE 5 MG/1
5 TABLET ORAL
Status: DISCONTINUED | OUTPATIENT
Start: 2019-05-22 | End: 2019-05-22 | Stop reason: HOSPADM

## 2019-05-22 RX ADMIN — EPHEDRINE SULFATE 10 MG: 50 INJECTION INTRAMUSCULAR; INTRAVENOUS; SUBCUTANEOUS at 12:05

## 2019-05-22 RX ADMIN — SODIUM CHLORIDE, SODIUM LACTATE, POTASSIUM CHLORIDE, AND CALCIUM CHLORIDE: .6; .31; .03; .02 INJECTION, SOLUTION INTRAVENOUS at 04:05

## 2019-05-22 RX ADMIN — CHLORHEXIDINE GLUCONATE 0.12% ORAL RINSE 10 ML: 1.2 LIQUID ORAL at 08:05

## 2019-05-22 RX ADMIN — SODIUM CHLORIDE, SODIUM LACTATE, POTASSIUM CHLORIDE, AND CALCIUM CHLORIDE: .6; .31; .03; .02 INJECTION, SOLUTION INTRAVENOUS at 12:05

## 2019-05-22 RX ADMIN — ONDANSETRON 4 MG: 2 INJECTION, SOLUTION INTRAMUSCULAR; INTRAVENOUS at 02:05

## 2019-05-22 RX ADMIN — ROCURONIUM BROMIDE 5 MG: 10 INJECTION, SOLUTION INTRAVENOUS at 12:05

## 2019-05-22 RX ADMIN — LIDOCAINE HYDROCHLORIDE 5 ML: 20 JELLY TOPICAL at 12:05

## 2019-05-22 RX ADMIN — CEFAZOLIN SODIUM 2 G: 2 SOLUTION INTRAVENOUS at 12:05

## 2019-05-22 RX ADMIN — KETOROLAC TROMETHAMINE 30 MG: 30 INJECTION, SOLUTION INTRAMUSCULAR; INTRAVENOUS at 02:05

## 2019-05-22 RX ADMIN — PROPOFOL 100 MG: 10 INJECTION, EMULSION INTRAVENOUS at 12:05

## 2019-05-22 RX ADMIN — LIDOCAINE HYDROCHLORIDE 50 MG: 10 INJECTION, SOLUTION INFILTRATION; PERINEURAL at 12:05

## 2019-05-22 RX ADMIN — SODIUM CHLORIDE, SODIUM LACTATE, POTASSIUM CHLORIDE, AND CALCIUM CHLORIDE: .6; .31; .03; .02 INJECTION, SOLUTION INTRAVENOUS at 02:05

## 2019-05-22 RX ADMIN — METOPROLOL TARTRATE 25 MG: 25 TABLET ORAL at 08:05

## 2019-05-22 RX ADMIN — OXYCODONE HYDROCHLORIDE 5 MG: 5 TABLET ORAL at 03:05

## 2019-05-22 RX ADMIN — PROPOFOL 50 MG: 10 INJECTION, EMULSION INTRAVENOUS at 12:05

## 2019-05-22 RX ADMIN — FENTANYL CITRATE 50 MCG: 50 INJECTION, SOLUTION INTRAMUSCULAR; INTRAVENOUS at 12:05

## 2019-05-22 RX ADMIN — SUCCINYLCHOLINE CHLORIDE 100 MG: 20 INJECTION, SOLUTION INTRAMUSCULAR; INTRAVENOUS at 12:05

## 2019-05-22 RX ADMIN — ACETAMINOPHEN 1000 MG: 500 TABLET ORAL at 12:05

## 2019-05-22 RX ADMIN — FENTANYL CITRATE 25 MCG: 50 INJECTION, SOLUTION INTRAMUSCULAR; INTRAVENOUS at 01:05

## 2019-05-22 RX ADMIN — ACETAMINOPHEN 1000 MG: 500 TABLET ORAL at 09:05

## 2019-05-22 RX ADMIN — EPHEDRINE SULFATE 10 MG: 50 INJECTION INTRAMUSCULAR; INTRAVENOUS; SUBCUTANEOUS at 02:05

## 2019-05-22 NOTE — ANESTHESIA PREPROCEDURE EVALUATION
05/22/2019  Soo Holly is a 79 y.o., female.    Anesthesia Evaluation    I have reviewed the Patient Summary Reports.    I have reviewed the Nursing Notes.   I have reviewed the Medications.     Review of Systems  Anesthesia Hx:  No problems with previous Anesthesia    Social:  Non-Smoker    Hematology/Oncology:         Breast Current/Recent Cancer.   Cardiovascular:   Hypertension ECG has been reviewed. CONCLUSIONS     1 - Moderate left atrial enlargement.     2 - No wall motion abnormalities.     3 - Normal left ventricular systolic function (EF 60-65%).     4 - Indeterminate LV diastolic function.     5 - Normal right ventricular systolic function .  Hypertension, Essential Hypertension    Pulmonary:  Pulmonary Normal    Endocrine:  Metabolic Disorders, Obesity / BMI > 30      Physical Exam  General:  Obesity    Airway/Jaw/Neck:  Airway Findings: Mouth Opening: Normal Tongue: Normal  General Airway Assessment: Adult       Chest/Lungs:  Chest/Lungs Findings: Normal Respiratory Rate     Heart/Vascular:  Heart Findings: Rate: Normal             Anesthesia Plan  Type of Anesthesia, risks & benefits discussed:  Anesthesia Type:  general  Patient's Preference:   Intra-op Monitoring Plan: standard ASA monitors  Intra-op Monitoring Plan Comments:   Post Op Pain Control Plan:   Post Op Pain Control Plan Comments:   Induction:   IV  Beta Blocker:  Patient is on a Beta-Blocker and has received one dose within the past 24 hours (No further documentation required).       Informed Consent: Patient understands risks and agrees with Anesthesia plan.  Questions answered. Anesthesia consent signed with patient.  ASA Score: 3     Day of Surgery Review of History & Physical: I have interviewed and examined the patient. I have reviewed the patient's H&P dated:  There are no significant changes.          Ready For  Surgery From Anesthesia Perspective.

## 2019-05-22 NOTE — TRANSFER OF CARE
"Anesthesia Transfer of Care Note    Patient: Soo Holly    Procedure(s) Performed: Procedure(s) (LRB):  MASTECTOMY, MODIFIED RADICAL (Right)    Patient location: PACU    Anesthesia Type: general    Transport from OR: Transported from OR on room air with adequate spontaneous ventilation    Post pain: adequate analgesia    Post assessment: no apparent anesthetic complications    Post vital signs: stable    Level of consciousness: awake, alert and oriented    Nausea/Vomiting: no nausea/vomiting    Complications: none    Transfer of care protocol was followed      Last vitals:   Visit Vitals  /72 (BP Location: Right arm, Patient Position: Sitting)   Pulse 77   Temp 36.6 °C (97.9 °F) (Skin)   Resp 18   Ht 5' 2" (1.575 m)   Wt 88.8 kg (195 lb 12.3 oz)   SpO2 99%   Breastfeeding? No   BMI 35.81 kg/m²     "

## 2019-05-22 NOTE — OP NOTE
[facility]  Surgery Department  Operative Note    SUMMARY     Date of Procedure: 5/22/2019     Procedure: Procedure(s) (LRB):  MASTECTOMY, MODIFIED RADICAL (Right)     Surgeon(s) and Role:     * Paul Carey MD - Primary    Assisting Surgeon: None    Pre-Operative Diagnosis: Triple negative malignant neoplasm of breast [C50.919]    Post-Operative Diagnosis: Post-Op Diagnosis Codes:     * Triple negative malignant neoplasm of breast [C50.919]    Anesthesia: General    Technical Procedures Used:  Modified radical mastectomy    Description of the Findings of the Procedure:     The patient was brought into the operative room placed on the operative table supine position.  General endotracheal anesthesia was induced.  IV antibiotics were administered.  Pneumatic compression devices placed on the lower extremities.  The right breast and arm were prepped and draped in a standard fashion.    A time-out was performed.    On elliptical incision was made around the right breast.  Superior and inferior skin flaps were raised.  The breast tissue was then  from the skin at the level of Remi's fascia. This was done circumferentially.  Superiorly the margin was the clavicle with the MediPort being spared.  Medially the margin was the sternum.  Laterally was the latissimus dorsi and inferiorly was inframammary ridge.  We then  the breast tissue from the pectoralis muscle to include the pectoralis fascia.    Breast was tagged with a short suture superiorly and long suture laterally    Attention was then turned to the axilla.  The axilla was entered along the pectoralis major.  We identified the axillary vein.  We dissected the fascia above the axillary vein to obtain access to it. We identified the long thoracic and thoracodorsal nerve. We identified the brachial cutaneous nerve and it was sacrificed.  The fatty tissue in the axilla between the long thoracic and thoracodorsal nerves was then  removed using the Harmonic scalpel.    At the completion of the procedure the long thoracic and thoracodorsal nerves were visualized and functional.    The wound was irrigated.    Two Bobo-Alvarado drains were placed.  The medial drain went across the chest wall in the lateral drain went to the axilla    The wound was closed after ensuring hemostasis with interrupted 3 0 Vicryl was in the deep dermis and 4 0 Monocryl in the skin Steri-Strips and dry sterile dressings were placed. Patient tolerated procedure well.  Transferred to the recovery room in stable condition.  Sponge and instrument counts were were correct      Significant Surgical Tasks Conducted by the Assistant(s), if Applicable:  None    Complications: No    Estimated Blood Loss (EBL):    The 150 mL  IV fluids approximately 1200 mL           Implants: * No implants in log *    Specimens:   Specimen (12h ago, onward)    Start     Ordered    05/22/19 6099  Specimen to Pathology - Surgery  Once     Comments:  1.  Right breast and axillary contents2.  Right axillary contents3.  Possible subpectoral lymph nodeDx:  Right breast cancer     Start Status     05/22/19 1352 Collected (05/22/19 9184) Order ID: [id number]       05/22/19 1404                  Condition: Stable    Disposition: PACU - hemodynamically stable.    Attestation: I performed the procedure.

## 2019-05-22 NOTE — PLAN OF CARE
Problem: Adult Inpatient Plan of Care  Goal: Plan of Care Review  Outcome: Ongoing (interventions implemented as appropriate)  Received to room 564 from PACU. transferred to bed per self. HOB elevated. dsg to right breast CDI with Jobst bra in place. Family at bedside. Will monitor.

## 2019-05-22 NOTE — PLAN OF CARE
Pt resting on stretcher s/p rt modified radical mastectomy performed under general anesthesia by Dr. Carey. Respirations even and unlabored on 98% face mask with O2 sats of 100%. VSS. Will cont to monitor. See flow sheet for detailed assessment. Will cont to monitor. See flow sheet for detailed assessment.

## 2019-05-23 ENCOUNTER — TELEPHONE (OUTPATIENT)
Dept: SURGERY | Facility: CLINIC | Age: 79
End: 2019-05-23

## 2019-05-23 VITALS
DIASTOLIC BLOOD PRESSURE: 55 MMHG | OXYGEN SATURATION: 98 % | BODY MASS INDEX: 36.02 KG/M2 | HEIGHT: 62 IN | SYSTOLIC BLOOD PRESSURE: 113 MMHG | TEMPERATURE: 98 F | HEART RATE: 75 BPM | WEIGHT: 195.75 LBS | RESPIRATION RATE: 16 BRPM

## 2019-05-23 LAB
ANION GAP SERPL CALC-SCNC: 10 MMOL/L (ref 8–16)
BASOPHILS # BLD AUTO: 0.01 K/UL (ref 0–0.2)
BASOPHILS NFR BLD: 0.2 % (ref 0–1.9)
BUN SERPL-MCNC: 6 MG/DL (ref 8–23)
CALCIUM SERPL-MCNC: 8.2 MG/DL (ref 8.7–10.5)
CHLORIDE SERPL-SCNC: 109 MMOL/L (ref 95–110)
CO2 SERPL-SCNC: 21 MMOL/L (ref 23–29)
CREAT SERPL-MCNC: 0.6 MG/DL (ref 0.5–1.4)
DIFFERENTIAL METHOD: ABNORMAL
EOSINOPHIL # BLD AUTO: 0.1 K/UL (ref 0–0.5)
EOSINOPHIL NFR BLD: 1.6 % (ref 0–8)
ERYTHROCYTE [DISTWIDTH] IN BLOOD BY AUTOMATED COUNT: 14.9 % (ref 11.5–14.5)
EST. GFR  (AFRICAN AMERICAN): >60 ML/MIN/1.73 M^2
EST. GFR  (NON AFRICAN AMERICAN): >60 ML/MIN/1.73 M^2
GLUCOSE SERPL-MCNC: 88 MG/DL (ref 70–110)
HCT VFR BLD AUTO: 24.6 % (ref 37–48.5)
HGB BLD-MCNC: 7.7 G/DL (ref 12–16)
LYMPHOCYTES # BLD AUTO: 1 K/UL (ref 1–4.8)
LYMPHOCYTES NFR BLD: 19.7 % (ref 18–48)
MCH RBC QN AUTO: 30.4 PG (ref 27–31)
MCHC RBC AUTO-ENTMCNC: 31.3 G/DL (ref 32–36)
MCV RBC AUTO: 97 FL (ref 82–98)
MONOCYTES # BLD AUTO: 0.4 K/UL (ref 0.3–1)
MONOCYTES NFR BLD: 7.8 % (ref 4–15)
NEUTROPHILS # BLD AUTO: 3.6 K/UL (ref 1.8–7.7)
NEUTROPHILS NFR BLD: 70.7 % (ref 38–73)
PLATELET # BLD AUTO: 135 K/UL (ref 150–350)
PMV BLD AUTO: 8.8 FL (ref 9.2–12.9)
POTASSIUM SERPL-SCNC: 3.3 MMOL/L (ref 3.5–5.1)
RBC # BLD AUTO: 2.53 M/UL (ref 4–5.4)
SODIUM SERPL-SCNC: 140 MMOL/L (ref 136–145)
WBC # BLD AUTO: 5.13 K/UL (ref 3.9–12.7)

## 2019-05-23 PROCEDURE — 97530 THERAPEUTIC ACTIVITIES: CPT

## 2019-05-23 PROCEDURE — 80048 BASIC METABOLIC PNL TOTAL CA: CPT

## 2019-05-23 PROCEDURE — 97165 OT EVAL LOW COMPLEX 30 MIN: CPT

## 2019-05-23 PROCEDURE — 36415 COLL VENOUS BLD VENIPUNCTURE: CPT

## 2019-05-23 PROCEDURE — 25000003 PHARM REV CODE 250: Performed by: SURGERY

## 2019-05-23 PROCEDURE — 85025 COMPLETE CBC W/AUTO DIFF WBC: CPT

## 2019-05-23 PROCEDURE — G0378 HOSPITAL OBSERVATION PER HR: HCPCS

## 2019-05-23 RX ORDER — HYDROCODONE BITARTRATE AND ACETAMINOPHEN 5; 325 MG/1; MG/1
1 TABLET ORAL EVERY 4 HOURS PRN
Qty: 20 TABLET | Refills: 0 | Status: SHIPPED | OUTPATIENT
Start: 2019-05-23 | End: 2019-06-03

## 2019-05-23 RX ORDER — ASPIRIN 81 MG/1
81 TABLET ORAL DAILY
Refills: 0 | COMMUNITY
Start: 2019-05-23 | End: 2020-05-22

## 2019-05-23 RX ORDER — FERROUS SULFATE 325(65) MG
325 TABLET, DELAYED RELEASE (ENTERIC COATED) ORAL 2 TIMES DAILY
Refills: 0
Start: 2019-05-23 | End: 2019-06-22

## 2019-05-23 RX ADMIN — ACETAMINOPHEN 1000 MG: 500 TABLET ORAL at 06:05

## 2019-05-23 RX ADMIN — AMLODIPINE BESYLATE 10 MG: 10 TABLET ORAL at 09:05

## 2019-05-23 RX ADMIN — PANTOPRAZOLE SODIUM 40 MG: 40 TABLET, DELAYED RELEASE ORAL at 09:05

## 2019-05-23 RX ADMIN — ASPIRIN 81 MG: 81 TABLET, COATED ORAL at 09:05

## 2019-05-23 RX ADMIN — METOPROLOL TARTRATE 25 MG: 25 TABLET ORAL at 09:05

## 2019-05-23 RX ADMIN — CHLORHEXIDINE GLUCONATE 0.12% ORAL RINSE 10 ML: 1.2 LIQUID ORAL at 09:05

## 2019-05-23 NOTE — DISCHARGE INSTRUCTIONS
Please call for any fever, increase in pain, nausea or vomiting or redness or drainage from incision(s).      Please obtain over-the-counter and iron pills and take these twice a day.  If they cause constipation use over-the-counter laxative    No lifting more than 30 pounds for 2 weeks with your right arm    Remove all the dressings on Saturday.  You may shower and then replaced the dressings. You may then remove the dressings on Monday.  At this point you can shower daily and cover the drains with a small amount of gauze      If you become constipated from the pain medication you can use over the counter laxatives,  Miralax or Glycolax, or Magnesium Citrate for severe constipation.

## 2019-05-23 NOTE — SUBJECTIVE & OBJECTIVE
Interval History:  Status post modified radical mastectomy.  Slight drop in hemoglobin otherwise stable    Medications:  Continuous Infusions:   lactated ringers 80 mL/hr at 05/22/19 1614     Scheduled Meds:   acetaminophen  1,000 mg Oral Q8H    amLODIPine  10 mg Oral Daily    aspirin  81 mg Oral Daily    chlorhexidine  10 mL Mouth/Throat BID    enoxaparin  40 mg Subcutaneous Daily    metoprolol tartrate  25 mg Oral BID    nozaseptin   Each Nare BID    pantoprazole  40 mg Oral Daily     PRN Meds:HYDROcodone-acetaminophen, HYDROcodone-acetaminophen, HYDROmorphone, metoclopramide HCl, ondansetron     Review of patient's allergies indicates:   Allergen Reactions    Iodine Other (See Comments)     Unknown  When eating shellfish       Sulfa (sulfonamide antibiotics) Other (See Comments)     Unknown       Objective:     Vital Signs (Most Recent):  Temp: 97.5 °F (36.4 °C) (05/23/19 0801)  Pulse: 75 (05/23/19 0801)  Resp: 16 (05/23/19 0801)  BP: (!) 113/55 (05/23/19 0801)  SpO2: 98 % (05/23/19 0801) Vital Signs (24h Range):  Temp:  [97.2 °F (36.2 °C)-97.9 °F (36.6 °C)] 97.5 °F (36.4 °C)  Pulse:  [73-90] 75  Resp:  [11-18] 16  SpO2:  [95 %-100 %] 98 %  BP: (113-162)/(55-76) 113/55     Weight: 88.8 kg (195 lb 12.3 oz)  Body mass index is 35.81 kg/m².    Intake/Output - Last 3 Shifts       05/21 0700 - 05/22 0659 05/22 0700 - 05/23 0659 05/23 0700 - 05/24 0659    I.V. (mL/kg)  1266.3 (14.3)     Total Intake(mL/kg)  1266.3 (14.3)     Urine (mL/kg/hr)  0     Drains  55 25    Blood  300     Total Output  355 25    Net  +911.3 -25           Urine Occurrence  1 x           Physical Exam   Constitutional: No distress.   Overweight   Neck: Normal range of motion. Neck supple.   Cardiovascular: Normal rate, regular rhythm, normal heart sounds and intact distal pulses.   Pulmonary/Chest: Effort normal and breath sounds normal.   Abdominal: Soft. Bowel sounds are normal.   Obese   Skin:   Right chest wall incision is clean.   MARILEE is are serosanguineous   Vitals reviewed.      Significant Labs:  CBC:   Recent Labs   Lab 05/23/19 0618   WBC 5.13   RBC 2.53*   HGB 7.7*   HCT 24.6*   *   MCV 97   MCH 30.4   MCHC 31.3*     BMP:   Recent Labs   Lab 05/23/19 0618   GLU 88      K 3.3*      CO2 21*   BUN 6*   CREATININE 0.6   CALCIUM 8.2*     CMP:   Recent Labs   Lab 05/20/19  1406 05/23/19 0618    88   CALCIUM 9.5 8.2*   ALBUMIN 3.3*  --    PROT 7.2  --     140   K 3.7 3.3*   CO2 23 21*    109   BUN 7* 6*   CREATININE 0.8 0.6   ALKPHOS 129  --    ALT 19  --    AST 21  --    BILITOT 0.2  --        Significant Diagnostics:  None

## 2019-05-23 NOTE — TELEPHONE ENCOUNTER
Called and spoke to pt's family, Scheduled her a Post Op appt with Dr Carey on Monday Alexandrea 3, 2019 @ 1040. Family member stated understanding      ----- Message from Priscilla Monge RN sent at 5/23/2019  8:47 AM CDT -----  Patient needs a 10 day post op appointment.  Patient will discharge today.      Thanks

## 2019-05-23 NOTE — PT/OT/SLP EVAL
Occupational Therapy   Evaluation    Name: Soo Holly  MRN: [id number]  Admitting Diagnosis:  Triple negative malignant neoplasm of breast 1 Day Post-Op    Recommendations:     Discharge Recommendations:    Discharge Equipment Recommendations:  none  Barriers to discharge:       Assessment:     Soo Holly is a 79 y.o. female with a medical diagnosis of Triple negative malignant neoplasm of breast.  She presents with debility and generalized weakness. Performance deficits affecting function: weakness, impaired endurance, impaired self care skills, impaired functional mobilty, gait instability, impaired balance.      Rehab Prognosis: Fair; patient would benefit from acute skilled OT services to address these deficits and reach maximum level of function.       Plan:     Patient to be seen 3 x/week to address the above listed problems via self-care/home management, therapeutic activities, therapeutic exercises  · Plan of Care Expires:    · Plan of Care Reviewed with: patient    Subjective     Chief Complaint: debility and generalized weakness  Patient/Family Comments/goals:     Occupational Profile:  Living Environment: lives with daughter in 1 story house with no steps  Previous level of function: (I) with adl's and functional mobility. Pt reports stills drives  Roles and Routines: occupational therapy  Equipment Used at Home:  cane, quad, walker, rolling  Assistance upon Discharge:     Pain/Comfort:  · Pain Rating 1: 7/10  · Location - Side 1: Right  · Location - Orientation 1: upper  · Location 1: breast(during movement only)    Patients cultural, spiritual, Judaism conflicts given the current situation:      Objective:     Communicated with: nurse Wise and epic chart review prior to session.  Patient found up in chair with   upon OT entry to room.    General Precautions: Standard, fall   Orthopedic Precautions:N/A   Braces: N/A     Occupational Performance:    Bed Mobility:    · Patient completed  Rolling/Turning to Right with minimum assistance  · Patient completed Scooting/Bridging with stand by assistance  · Patient completed Supine to Sit with stand by assistance    Functional Mobility/Transfers:  · Patient completed Sit <> Stand Transfer with contact guard assistance  with  hand-held assist   · Patient completed Bed <> Chair Transfer using Step Transfer technique with minimum assistance with rolling walker  · Functional Mobility:     Activities of Daily Living:  · Upper Body Dressing: minimum assistance .  · Lower Body Dressing: minimum assistance .    Cognitive/Visual Perceptual:  Cognitive/Psychosocial Skills:     -       Oriented to: Person, Place, Time and Situation   -       Follows Commands/attention:Follows multistep  commands  -       Communication: clear/fluent  -       Memory: No Deficits noted  -       Safety awareness/insight to disability: intact   Visual/Perceptual:      -Intact .    Physical Exam:  Upper Extremity Range of Motion:     -       Left Upper Extremity: WFL    AMPAC 6 Click ADL:  AMPAC Total Score: 20    Treatment & Education:  Pt educated on hep and not perform post mastectomy program til further notice from MD. Pt verbalized understanding  Education:    Patient left up in chair with all lines intact, call button in reach, chair alarm on and nurse notified    GOALS:   Multidisciplinary Problems     Occupational Therapy Goals     Not on file                History:     Past Medical History:   Diagnosis Date    Cancer     R Breast cancer    Chest pain     Hyperlipidemia     Hypertension        Past Surgical History:   Procedure Laterality Date    cataracts      EYE SURGERY      HYSTERECTOMY      INSERTION, PORT-A-CATH Right 1/16/2019    Performed by Prem Massey MD at [facility]    JOINT REPLACEMENT Right     Knee    MASTECTOMY, MODIFIED RADICAL Right 5/22/2019    Performed by Paul Carey MD at [facility] OR       Time Tracking:     OT Date of Treatment: 05/23/19  OT Start  Time: 1005  OT Stop Time: 1030  OT Total Time (min): 25 min    Billable Minutes:Evaluation 10 minutes  Therapeutic Activity 15 minutes    Antonia Castro, OT  5/23/2019

## 2019-05-23 NOTE — DISCHARGE SUMMARY
[facility] -   General Surgery  Discharge Summary      Patient Name: Soo Holly  MRN: [id number]  Admission Date: 5/22/2019  Hospital Length of Stay: 0 days  Discharge Date and Time:  05/23/2019 8:41 AM  Attending Physician: Paul Carey MD   Discharging Provider: Paul Carey MD  Primary Care Provider: LANG Seals    HPI:   Patient is a 79 y.o. female presents after completing neoadjuvant chemotherapy for locally advanced inflammatory breast cancer.     The patient did all but the final chemotherapy treatment due to complications of chemotherapy.  She has now recovered from these.  A follow-up PET scan showed a dramatic improvement of her disease.  Patient could no longer feel the mass in the right breast.     Patient understands that she has inflammatory breast cancer and that she needs a modified radical mastectomy or removal of the breast and lymph nodes on the right side.     She presents for modified radical mastectomy    Procedure(s) (LRB):  MASTECTOMY, MODIFIED RADICAL (Right)      Indwelling Lines/Drains at time of discharge:   Lines/Drains/Airways     Central Venous Catheter Line                 Port A Cath Single Lumen 01/16/19 0842 right subclavian 126 days          Drain                 Closed/Suction Drain 05/22/19 1356 Right Breast Bulb 10 Fr. less than 1 day         Closed/Suction Drain 05/22/19 1357 Right Breast Bulb 10 Fr. less than 1 day              Hospital Course: Postop day 1.  Pain is 8-10 with movement but almost non-existent at rest.  No significant swelling.  Minimal drain output    Consults:     Significant Diagnostic Studies: Labs:   BMP:   Recent Labs   Lab 05/23/19  0618   GLU 88      K 3.3*      CO2 21*   BUN 6*   CREATININE 0.6   CALCIUM 8.2*   , CMP   Recent Labs   Lab 05/23/19  0618      K 3.3*      CO2 21*   GLU 88   BUN 6*   CREATININE 0.6   CALCIUM 8.2*   ANIONGAP 10   ESTGFRAFRICA >60   EGFRNONAA >60    and CBC   Recent  Labs   Lab 05/23/19  0618   WBC 5.13   HGB 7.7*   HCT 24.6*   *       Pending Diagnostic Studies:     None        Final Active Diagnoses:    Diagnosis Date Noted POA    PRINCIPAL PROBLEM:  Triple negative malignant neoplasm of breast [C50.919] 12/24/2018 Yes    Essential hypertension [I10] 01/09/2019 Yes      Problems Resolved During this Admission:      Discharged Condition: stable    Disposition: Home or Self Care    Follow Up:  Follow-up Information     Paul Carey MD In 10 days.    Specialty:  General Surgery  Why:  post op appt, or as scheduled  Contact information:  [address]  [place] [address]  [phone]                 Patient Instructions:      Diet Adult Regular     Lifting restrictions   Order Comments: No lifting more than 30 pounds for 2 weeks with your right arm     Notify your health care provider if you experience any of the following:  temperature >100.4     Notify your health care provider if you experience any of the following:  persistent nausea and vomiting or diarrhea     Notify your health care provider if you experience any of the following:  severe uncontrolled pain     Notify your health care provider if you experience any of the following:  redness, tenderness, or signs of infection (pain, swelling, redness, odor or green/yellow discharge around incision site)     Change dressing (specify)   Order Comments: Dressing change:  On Saturday and Monday.  Remove all the dressing material and replaced with new material.  You may shower with the dressings off.  On Monday you can take all the dressings off in you only need some gauze over the drain sites     Medications:  Reconciled Home Medications:      Medication List      START taking these medications    ferrous sulfate 325 (65 FE) MG EC tablet  Take 1 tablet (325 mg total) by mouth 2 (two) times daily.     HYDROcodone-acetaminophen 5-325 mg per tablet  Commonly known as:  NORCO  Take 1 tablet by mouth every 4  (four) hours as needed.        CHANGE how you take these medications    * aspirin 81 MG EC tablet  Commonly known as:  ECOTRIN  Take 1 tablet (81 mg total) by mouth once daily.  What changed:  Another medication with the same name was added. Make sure you understand how and when to take each.     * aspirin 81 MG EC tablet  Commonly known as:  ECOTRIN  Take 1 tablet (81 mg total) by mouth once daily.  What changed:  You were already taking a medication with the same name, and this prescription was added. Make sure you understand how and when to take each.         * This list has 2 medication(s) that are the same as other medications prescribed for you. Read the directions carefully, and ask your doctor or other care provider to review them with you.            CONTINUE taking these medications    amLODIPine 10 MG tablet  Commonly known as:  NORVASC  amlodipine 10 mg tablet once daily     diphenhydrAMINE-aluminum-magnesium hydroxide-simethicone-lidocaine HCl 2%  Swish and spit 15 mLs every 4 (four) hours as needed.     metoprolol tartrate 25 MG tablet  Commonly known as:  LOPRESSOR  Take 1 tablet (25 mg total) by mouth 2 (two) times daily.     NEXIUM ORAL  Take by mouth daily as needed.     ondansetron 8 MG tablet  Commonly known as:  ZOFRAN  TAKE ONE TABLET BY MOUTH EVERY 12 HOURS AS NEEDED FOR NAUSEA     prochlorperazine 10 MG tablet  Commonly known as:  COMPAZINE  Take 1 tablet (10 mg total) by mouth every 6 (six) hours as needed.        STOP taking these medications    levoFLOXacin 750 MG tablet  Commonly known as:  LEVAQUIN          Time spent on the discharge of patient: 5 minutes    Paul Carey MD  General Surgery  [facility]

## 2019-05-23 NOTE — PLAN OF CARE
Jun3 Post OP with Paul Carey MD   Monday Giorgi 3, 2019 10:40 AM   Arrive at check-in approximately 15 minutes before your scheduled appointment time. Bring all outside medical records and imaging, along with a list of your current medications and insurance card.  (off [facility]) 2nd floor  [facility] - General Surgery   [id number] [place] [id number]   [phone]        05/23/19 1537   Final Note   Assessment Type Final Discharge Note   Anticipated Discharge Disposition Home   Hospital Follow Up  Appt(s) scheduled? Yes   Right Care Referral Info   Post Acute Recommendation No Care

## 2019-05-23 NOTE — HOSPITAL COURSE
Postop day 1.  Pain is 8-10 with movement but almost non-existent at rest.  No significant swelling.  Minimal drain output

## 2019-05-23 NOTE — PROGRESS NOTES
[facility] -   General Surgery  Progress Note    Subjective:     History of Present Illness:  Patient is a 79 y.o. female presents after completing neoadjuvant chemotherapy for locally advanced inflammatory breast cancer.     The patient did all but the final chemotherapy treatment due to complications of chemotherapy.  She has now recovered from these.  A follow-up PET scan showed a dramatic improvement of her disease.  Patient could no longer feel the mass in the right breast.     Patient understands that she has inflammatory breast cancer and that she needs a modified radical mastectomy or removal of the breast and lymph nodes on the right side.     She presents for modified radical mastectomy    Post-Op Info:  Procedure(s) (LRB):  MASTECTOMY, MODIFIED RADICAL (Right)   1 Day Post-Op     Interval History:  Status post modified radical mastectomy.  Slight drop in hemoglobin otherwise stable    Medications:  Continuous Infusions:   lactated ringers 80 mL/hr at 05/22/19 1614     Scheduled Meds:   acetaminophen  1,000 mg Oral Q8H    amLODIPine  10 mg Oral Daily    aspirin  81 mg Oral Daily    chlorhexidine  10 mL Mouth/Throat BID    enoxaparin  40 mg Subcutaneous Daily    metoprolol tartrate  25 mg Oral BID    nozaseptin   Each Nare BID    pantoprazole  40 mg Oral Daily     PRN Meds:HYDROcodone-acetaminophen, HYDROcodone-acetaminophen, HYDROmorphone, metoclopramide HCl, ondansetron     Review of patient's allergies indicates:   Allergen Reactions    Iodine Other (See Comments)     Unknown  When eating shellfish       Sulfa (sulfonamide antibiotics) Other (See Comments)     Unknown       Objective:     Vital Signs (Most Recent):  Temp: 97.5 °F (36.4 °C) (05/23/19 0801)  Pulse: 75 (05/23/19 0801)  Resp: 16 (05/23/19 0801)  BP: (!) 113/55 (05/23/19 0801)  SpO2: 98 % (05/23/19 0801) Vital Signs (24h Range):  Temp:  [97.2 °F (36.2 °C)-97.9 °F (36.6 °C)] 97.5 °F (36.4 °C)  Pulse:  [73-90] 75  Resp:   [11-18] 16  SpO2:  [95 %-100 %] 98 %  BP: (113-162)/(55-76) 113/55     Weight: 88.8 kg (195 lb 12.3 oz)  Body mass index is 35.81 kg/m².    Intake/Output - Last 3 Shifts       05/21 0700 - 05/22 0659 05/22 0700 - 05/23 0659 05/23 0700 - 05/24 0659    I.V. (mL/kg)  1266.3 (14.3)     Total Intake(mL/kg)  1266.3 (14.3)     Urine (mL/kg/hr)  0     Drains  55 25    Blood  300     Total Output  355 25    Net  +911.3 -25           Urine Occurrence  1 x           Physical Exam   Constitutional: No distress.   Overweight   Neck: Normal range of motion. Neck supple.   Cardiovascular: Normal rate, regular rhythm, normal heart sounds and intact distal pulses.   Pulmonary/Chest: Effort normal and breath sounds normal.   Abdominal: Soft. Bowel sounds are normal.   Obese   Skin:   Right chest wall incision is clean.  MARILEE is are serosanguineous   Vitals reviewed.      Significant Labs:  CBC:   Recent Labs   Lab 05/23/19 0618   WBC 5.13   RBC 2.53*   HGB 7.7*   HCT 24.6*   *   MCV 97   MCH 30.4   MCHC 31.3*     BMP:   Recent Labs   Lab 05/23/19 0618   GLU 88      K 3.3*      CO2 21*   BUN 6*   CREATININE 0.6   CALCIUM 8.2*     CMP:   Recent Labs   Lab 05/20/19  1406 05/23/19 0618    88   CALCIUM 9.5 8.2*   ALBUMIN 3.3*  --    PROT 7.2  --     140   K 3.7 3.3*   CO2 23 21*    109   BUN 7* 6*   CREATININE 0.8 0.6   ALKPHOS 129  --    ALT 19  --    AST 21  --    BILITOT 0.2  --        Significant Diagnostics:  None    Assessment/Plan:     * Triple negative malignant neoplasm of breast  Postop day 1 modified radical mastectomy.  Smiled anemia from blood loss.  Will discharge home have her add iron to her home medication    Essential hypertension  Home medication        Paul Carey MD  General Surgery  [facility]

## 2019-05-23 NOTE — HPI
Patient is a 79 y.o. female presents after completing neoadjuvant chemotherapy for locally advanced inflammatory breast cancer.     The patient did all but the final chemotherapy treatment due to complications of chemotherapy.  She has now recovered from these.  A follow-up PET scan showed a dramatic improvement of her disease.  Patient could no longer feel the mass in the right breast.     Patient understands that she has inflammatory breast cancer and that she needs a modified radical mastectomy or removal of the breast and lymph nodes on the right side.     She presents for modified radical mastectomy

## 2019-05-23 NOTE — PLAN OF CARE
Problem: Adult Inpatient Plan of Care  Goal: Plan of Care Review  Outcome: Ongoing (interventions implemented as appropriate)  No acute distress noted. IV fluids infusing. MARILEE drains in place. Safety measures are in place. Call bell within reach. Pain being managed. Pt free of falls. Will continue to monitor. Chart check complete.

## 2019-05-23 NOTE — ASSESSMENT & PLAN NOTE
Postop day 1 modified radical mastectomy.  Smiled anemia from blood loss.  Will discharge home have her add iron to her home medication

## 2019-05-23 NOTE — PLAN OF CARE
Problem: Adult Inpatient Plan of Care  Goal: Absence of Hospital-Acquired Illness or Injury  Outcome: Ongoing (interventions implemented as appropriate)  Intervention: Prevent Skin Injury  Turn and repositioning scheduled every two hours.  Intervention: Prevent Infection  Aseptic technique maintained.    Goal: Optimal Comfort and Wellbeing  Outcome: Ongoing (interventions implemented as appropriate)  Intervention: Monitor Pain and Promote Comfort  Patient voice no complaints of pain or discomfort upon assessment. Will continue to monitor.      Comments: Patients chart reviewed. AAOx 3. Speech clear. Verbalize concerns without difficulty. No signs or symptoms of acute distress noted. Monitoring in progress.

## 2019-05-27 NOTE — ANESTHESIA POSTPROCEDURE EVALUATION
Anesthesia Post Evaluation    Patient: Soo Holly    Procedure(s) Performed: Procedure(s) (LRB):  MASTECTOMY, MODIFIED RADICAL (Right)    Final Anesthesia Type: general  Patient location during evaluation: PACU  Patient participation: Yes- Able to Participate  Level of consciousness: awake and alert  Post-procedure vital signs: reviewed and stable  Pain management: adequate  Airway patency: patent  PONV status at discharge: No PONV  Anesthetic complications: no      Cardiovascular status: blood pressure returned to baseline  Respiratory status: unassisted  Hydration status: euvolemic  Follow-up not needed.          Vitals Value Taken Time   BP  5/27/2019  9:29 AM   Temp  5/27/2019  9:29 AM   Pulse  5/27/2019  9:29 AM   Resp  5/27/2019  9:29 AM   SpO2  5/27/2019  9:29 AM         Event Time     Out of Recovery 15:53:00          Pain/Mayuri Score: No data recorded

## 2019-06-03 ENCOUNTER — OFFICE VISIT (OUTPATIENT)
Dept: SURGERY | Facility: CLINIC | Age: 79
End: 2019-06-03
Payer: MEDICARE

## 2019-06-03 VITALS
WEIGHT: 195.31 LBS | DIASTOLIC BLOOD PRESSURE: 59 MMHG | SYSTOLIC BLOOD PRESSURE: 110 MMHG | TEMPERATURE: 99 F | HEART RATE: 67 BPM | BODY MASS INDEX: 35.73 KG/M2

## 2019-06-03 DIAGNOSIS — C77.3 BREAST CANCER METASTASIZED TO AXILLARY LYMPH NODE, RIGHT: Primary | ICD-10-CM

## 2019-06-03 DIAGNOSIS — C50.911 BREAST CANCER METASTASIZED TO AXILLARY LYMPH NODE, RIGHT: Primary | ICD-10-CM

## 2019-06-03 PROCEDURE — 99999 PR PBB SHADOW E&M-EST. PATIENT-LVL III: ICD-10-PCS | Mod: PBBFAC,,, | Performed by: SURGERY

## 2019-06-03 PROCEDURE — 99024 PR POST-OP FOLLOW-UP VISIT: ICD-10-PCS | Mod: POP,,, | Performed by: SURGERY

## 2019-06-03 PROCEDURE — 99024 POSTOP FOLLOW-UP VISIT: CPT | Mod: POP,,, | Performed by: SURGERY

## 2019-06-03 PROCEDURE — 99999 PR PBB SHADOW E&M-EST. PATIENT-LVL III: CPT | Mod: PBBFAC,,, | Performed by: SURGERY

## 2019-06-03 PROCEDURE — 99213 OFFICE O/P EST LOW 20 MIN: CPT | Mod: PBBFAC | Performed by: SURGERY

## 2019-06-03 RX ORDER — HYDROCODONE BITARTRATE AND ACETAMINOPHEN 5; 325 MG/1; MG/1
TABLET ORAL
Qty: 20 TABLET | Refills: 0 | Status: SHIPPED | OUTPATIENT
Start: 2019-06-03 | End: 2019-07-31 | Stop reason: SDUPTHER

## 2019-06-03 NOTE — PATIENT INSTRUCTIONS
When you MD the drains please right down how much comes out of each nicky the drain    [facility] and likely remove the drains at that time    Discharge Instructions: Caring for Your Bobo-Alvarado Drainage Tube  Your doctor discharges you with a Bobo-Alvarado drainage tube. Doctors commonly leave this drain within the abdominal cavity after surgery. It helps drain and collect blood and body fluid after surgery. This can prevent swelling and reduces the risk for infection. The tube is held in place by a few stitches. It is covered with a bandage. Your doctor will remove the drain when he or she determines you no longer need it.  Home care  · Dont sleep on the same side as the tube.  · Secure the tube and bag inside your clothing with a safety pin. This helps keep the tube from being pulled out.  · Empty your drain at least twice a day. Empty it more often if the drain is full. Wash  and dry your hands before emptying the drain.  ¨ Lift the opening on the drain.  ¨ Drain the fluid into a measuring cup.  ¨ Record the amount of fluid each time you empty the drain. Include the date and time it was emptied. Share this information with your doctor on your next visit.  ¨ Squeeze the bulb with your hands until you hear air coming out of the bulb if your doctor has instructed you to do so (sometimes the bulb is used as a reservoir without suction). Check with your doctor about specific drain instructions.  ¨ Close the opening.  · Change the dressing around the tube every day.  ¨ Wash your hands.  ¨ Remove the old bandage.  ¨ Wash your hands again.  ¨ Wet a cotton swab and clean the skin around the incision and tube site. Use normal saline solution (salt and water). Or, you can use warm, soapy water.  ¨ Put a new bandage on the incision and tube site. Make the bandage large enough to cover the whole incision area.  ¨ Tape the bandage in place.  · Keep the bandage and tube site dry when you  shower. Ask your healthcare provider about the best way to do this.  · Stripping the tube helps keep blood clots from blocking the tube. Ask your nurse how often you should strip the tube. Stripping may not be needed, depending on where and why your doctor placed the tube. It may even be dangerous in some cases.   ¨ Hold the tubing where it leaves the skin, with one hand. This keeps it from pulling on the skin.  ¨ Pinch the tubing with the thumb and first finger of your other hand.  ¨ Slowly and firmly pull your thumb and first finger down the tubing. You may find it helpful to hold an alcohol swab between your fingers and the tube to lubricate the tubing.  ¨ If the pulling hurts or feels like its coming out of the skin, stop. Begin again more gently.  Follow-up care  Make a follow-up appointment as directed by our staff.     When to seek medical care  Call your healthcare provider right away if you have any of the following:  · New or increased pain around the tube  · Redness, swelling, or warmth around the incision or tube  · Drainage that is foul-smelling  · Vomiting  · Fever of 100.4°F (38°C)  · Fluid leaking around the tube  · Incision seems not to be healing  · Stitches become loose  · Tube falls out or breaks  · Drainage that changes from light pink to dark red  · Blood clots in the drainage bulb  · A sudden increase or decrease in the amount of drainage (over 30 mL)   Date Last Reviewed: 2/1/2017  © 8551-0175 [employer]. [address] [place] [address]. All rights reserved. This information is not intended as a substitute for professional medical care. Always follow your healthcare professional's instructions.        Caring for a Closed Suction Drainage Tube  A drainage tube removes fluid from around an incision. This helps prevent infection and promotes healing. The collection bulb at the end of the tube is squeezed and plugged to create suction. The bulb should be emptied and  "reset when half full to maintain adequate suction. You need to empty the bulb and clean the skin around the drain as often as your healthcare provider tells you to. Follow the steps below.     What youll need  Have the following items ready:  · Disposable gloves  · Measuring cup  · Record sheet  · Gauze or paper towel  · Sterile cotton swabs or 4" x 4" gauze pads  · Sterile saline or soap and water       Step 1. Empty the bulb  · Wash your hands and put on a new pair of disposable gloves.  · Point the top of the bulb away from you and remove the stopper.  · Turn the bulb upside down over a measuring cup. Squeeze the fluid into the cup. Make sure the bulb is totally empty.  · Put the cup to one side. You can record the volume of liquid in the cup after you clean and reconnect the bulb in step 2.    Step 2. Clean and reconnect the bulb  · Clean the top of the bulb with clean gauze or a paper towel, if needed.  · Squeeze the bulb tight, and put the stopper back on the top.  · Record the amount of fluid in the cup. Then, empty the cup as directed.    Step 3. Clean the site  · Remove your disposable gloves and wash your hands before cleaning the site.  · Put on a new pair of disposable gloves.  · Wet a sterile cotton swab or 4" x 4" gauze pad with sterile saline or soap and water.  · Gently clean the skin around the drain. Always wipe away from the incision.  · Apply an antibacterial ointment if directed.   When to call your healthcare provider  Call your healthcare provider if you notice any of these changes:  · The amount of fluid increases or decreases suddenly  · Large amount of blood or a clot in drainage  · Color, odor, or thickness of the fluid changes  · Tube falls out or the incision opens  · Skin around the drain is red, swollen, painful, or seeping pus  · You have a fever of 100.4°F (38°C) or higher, or as directed by your healthcare provider     If the tube isn't draining  Here are tips to drain the " "tube:  · Uncurl any kinks in the tube.  · With one hand, firmly hold the base of the tube between your thumb and index finger. Do not touch the incision.  · Put the thumb and index finger of your other hand on the tube, next to the first hand. Pinch your fingers together. Then pull them along the tube toward the bag. This will help push any clogged fluid through the tube. This is called "stripping the tube." You may find it helpful to hold an alcohol swab between your fingers and the tube to lubricate the tubing.  · If the tube still does not drain, call your healthcare provider.   Date Last Reviewed: 12/1/2016  © 9804-2623 [employer]. [address] [place] [address], [place] [address]. All rights reserved. This information is not intended as a substitute for professional medical care. Always follow your healthcare professional's instructions.        "

## 2019-06-03 NOTE — PROGRESS NOTES
Subjective:       Patient ID: Soo Holly is a 79 y.o. female.    Post modified radical mastectomy on the right    Chief Complaint: Post-op Evaluation    Patient underwent a modified right radical mastectomy for inflammatory breast cancer.  She has some chest wall discomfort but this is improving.    Her drains are in place.  The family was unaware that they needed to record the output from the drains.  They say it is getting less every day.    Review of Systems   Skin:        Some incisional pain       Objective:      Physical Exam   Constitutional:   Obese   Skin:   The right mastectomy incision is clean.  There are 2 drains in place with serous output   Vitals reviewed.      Pathology was reviewed.    FINAL PATHOLOGIC DIAGNOSIS  1. Right breast and axillary contents, mastectomy:  Invasive carcinoma no special type (invasive ductal carcinoma, not otherwise specified), high-grade, 49 mm,  ypT2 N2a.  Tumor is present focally in dermal lymphatics.  Five of five lymph nodes positive for carcinoma (5/5).  See note and synoptic report below.  2. Right axillary contents:  Four of eight lymph nodes positive for carcinoma (4/8).  3. Possible subpectoral lymph node:  Benign fibroadipose tissue.  Negative for carcinoma.  No lymphoid tissue identified.  Note: There is significant treatment effect    Assessment:      status post right modified radical mastectomy.  Drain output unknown     Plan:       Leave the drains in place.  Follow-up on Thursday.  We have asked the family to record the output

## 2019-06-06 ENCOUNTER — OFFICE VISIT (OUTPATIENT)
Dept: SURGERY | Facility: CLINIC | Age: 79
End: 2019-06-06
Payer: MEDICARE

## 2019-06-06 VITALS
BODY MASS INDEX: 35.67 KG/M2 | HEART RATE: 69 BPM | DIASTOLIC BLOOD PRESSURE: 70 MMHG | TEMPERATURE: 98 F | WEIGHT: 195 LBS | SYSTOLIC BLOOD PRESSURE: 123 MMHG

## 2019-06-06 DIAGNOSIS — Z09 POSTOP CHECK: ICD-10-CM

## 2019-06-06 DIAGNOSIS — C50.919 TRIPLE NEGATIVE MALIGNANT NEOPLASM OF BREAST: Primary | ICD-10-CM

## 2019-06-06 DIAGNOSIS — Z48.03 CHANGE OR REMOVAL OF DRAINS: ICD-10-CM

## 2019-06-06 PROCEDURE — 99213 OFFICE O/P EST LOW 20 MIN: CPT | Mod: PBBFAC | Performed by: NURSE PRACTITIONER

## 2019-06-06 PROCEDURE — 99999 PR PBB SHADOW E&M-EST. PATIENT-LVL III: ICD-10-PCS | Mod: PBBFAC,,, | Performed by: NURSE PRACTITIONER

## 2019-06-06 PROCEDURE — 99999 PR PBB SHADOW E&M-EST. PATIENT-LVL III: CPT | Mod: PBBFAC,,, | Performed by: NURSE PRACTITIONER

## 2019-06-06 PROCEDURE — 99024 POSTOP FOLLOW-UP VISIT: CPT | Mod: POP,,, | Performed by: NURSE PRACTITIONER

## 2019-06-06 PROCEDURE — 99024 PR POST-OP FOLLOW-UP VISIT: ICD-10-PCS | Mod: POP,,, | Performed by: NURSE PRACTITIONER

## 2019-06-07 NOTE — PROGRESS NOTES
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 ypT2 ypN2a   1. 18 had a diagnostic bilateral mammogram due to palpable fullness in the right breast.  This showed an area 10 cm deep to the nipple at 3:00 position irregular asymmetric increased density with spiculation which was the area marked with a skin marker and measured 27 mm. This was not present in 2013.    2. 12/3/18 right breast core biopsy returned invasive ductal carcinoma, grade 3, ER negative, CT negative, her 2 Stormy nonamplified, Ki-67 60%.    3. She was seen by Dr. Pop who noted on physical exam inflammatory breast cancer with edema of the skin.    4. 18 PET scan showed multiple FDG avid right axillary lymph nodes in level 1 and 2 at up to 17.2 SUV and possibly tiny mildly FDG avid level 3 lymph nodes more superiorly at 2.5 SUV.  There were FDG avid lymph nodes in the anterior mediastinum/superior right internal mammary chain at 5.3 SUV.  There was a focal area of uptake inferior to the medial clavicle just above the costochondral junction of the 1st rib likely an FDG avid lymph node at 8.8 SUV.  There was diffuse uptake in the right central breast and diffusely in the skin and inferior breast at 6.8 SUV and a focal uptake superior right breast.  There was intense uptake in the left deltoid muscle at 7.1 SUV which was focal.  There was also focal 4.8 SUV right deltoid uptake.  On my review there are at least 10 suspicious right axillary lymph nodes, and at least 3 right superior internal mammary lymph nodes.    5. 19 skin punch biopsy of the right breast returned invasive ductal carcinoma in the subcutaneous tissue and dermal lymphatics..     6. 18 [facility] testing ordered.    7. 19 started chemotherapy with dose dense AC with Taxol.    8. 19 right axillary  ultrasound-guided biopsy with clip placement was performed of an abnormal lymph node.  This returned invasive ductal carcinoma.  9. 4/12/19 completed ddAC + T (3 cycles taxol)  10. 5/8/19 PET showed resolution of size and FDG avidity of right axillary and internal mammary nodes.  There were no FDG avid right breast masses.  There was persistent right breast skin thickening at 3 SUV.  There was no metastatic disease.  11. 5/22/19 right modified radical mastectomy 4.9 cm invasive ductal carcinoma, grade 3, focal dermal lymphatic invasion, 9/13 lymph nodes involved largest deposit 4 mm, no extranodal extension, negative margins 10 mm closest margin deep, lymph-vascular invasion.  There was a probable for definite tumor response in the breast and lymph nodes. ypT2 ypN2a     CURRENT STATUS: Soo Holly is a pleasant 79 y.o. female who presents today for follow-up.  She had a PET scan which I reviewed.  She also had surgery as above.  She has not noticed any wound healing problems.  He is undergoing physical therapy.  She has noticed lump under her right arm since surgery.    PHYSICAL EXAM:   Constitutional: well appearing, no acute distress, ECOG 0 - Fully Active  Vitals:    /65   Pulse 68   Temp 97.4 °F (36.3 °C)   Resp 18   Wt 87 kg (191 lb 11.2 oz)   SpO2 100%   BMI 35.06 kg/m²   Lymphatic: no cervical, supraclavicular or axillary adenopathy.  Right axillary seroma.  Nontender, no drainage.  Breast:  Right mastectomy incision healed well.  No skin changes or masses to suggest recurrence.    Laboratory & X-Ray Findings: Per above.      ASSESSMENT:  Inflammatory breast cancer with partial response to neoadjuvant chemotherapy    PLAN:  We discussed the role of radiation in the setting of inflammatory breast cancer.  She requires right chest wall and comprehensive regional xochilt radiation 50 Gy in 25 fractions followed by a boost of 10-16 Gy to the mastectomy incision.  She had extensive xochilt  involvement on her pre neoadjuvant PET scan including the upper right internal mammary chain.  Due to her body habitus this area may be difficult to treat with standard 3D conformal techniques and she may benefit from IMRT.  Radiation planning will be performed soon and we will begin in the next week.  We discussed the anticipated and possible acute and late toxicities of radiation.    REBECCA Plata M.D.  Radiation Oncology  [facility] [address] [place] [address]  Ph: [phone]  [email or web address]

## 2019-06-10 ENCOUNTER — CLINICAL SUPPORT (OUTPATIENT)
Dept: REHABILITATION | Facility: HOSPITAL | Age: 79
End: 2019-06-10
Attending: SURGERY
Payer: MEDICARE

## 2019-06-10 DIAGNOSIS — C50.919 MALIGNANT NEOPLASM OF FEMALE BREAST, UNSPECIFIED ESTROGEN RECEPTOR STATUS, UNSPECIFIED LATERALITY, UNSPECIFIED SITE OF BREAST: Primary | ICD-10-CM

## 2019-06-10 PROCEDURE — 97110 THERAPEUTIC EXERCISES: CPT

## 2019-06-10 NOTE — PROGRESS NOTES
Physical Therapy Daily Treatment Note     Name: Soo Holly  Clinic Number: [id number]    Therapy Diagnosis:   No diagnosis found.  Physician: Paul Carey MD    Visit Date: 6/10/2019     Physician Orders: PT Eval and Treat   Medical Diagnosis: malignant neoplasm of female breast  Evaluation Date: 5/20/2019  Authorization period Expiration: 12/31/19  Plan of Care Certification Period: 6/20/2019  Visit#/authorized: 2/20    Time In:0930  Time Out: 1015  Total Billable Time: 45 minutes    Precautions: Standard and cancer    Subjective     Pt reports: feeling good since surgery but still feels tired  She was compliant with home exercise program.  Response to previous treatment: good  Functional change: patient has a decrease in right shoulder ROM and strength since sx    Pain: 6/10  Location: right shoulder      Objective   Mental status :attentive     Posture/Alignment   Postural examination/scapula alignment: wnl  Joint integrity: WFLs  Skin integrity: intact  Edema: none noted     Sensation: Light Touch: Intact                      Proprioception: Intact  - appearance: well groomed      ROM:   UPPER EXTREMITY--AROM/PROM  (R) UE: WNLs  (L) UE: WNLs       Strength: manual muscle test grades below   Upper Extremity Strength    (L) UE (R) UE   Shoulder flexion: 3+/5 3-/5   Shoulder Abduction: 3-/5 3-/5   Shoulder IR 3+/5 3/5   Shoulder ER 3+/5 3/5   Elbow flexion: 4/5 4/5   Elbow extension: 4/5 4/5   Lower Trap: 3+/5 3/5   Middle Trap: 3+/5 3/5    4/5 3/5       Shoulder Range of Motion:   ACTIVE ROM LEFT RIGHT   Flexion 160 150   Abduction 160 120   Horizontal Abd wnl wnl   Extension wnl wnl   IR wnl 50   ER wnl 50   IR/90deg wnl 45   ER/90deg wnl 45       Coordination:   - fine motor: WFL  - UE coordination: intact     - LE coordination:  Not tested      Functional Mobility (Bed mobility, transfers)  Bed mobility: I =  independent   Roll to left: I  Roll to right: I  Supine to prone: I  Scooting to  edge of bed: I  Supine to sit: MINIMAL ASSIST  Sit to supine: I  Transfers to bed: I  Transfers to toilet: I  Sit to stand:  I  Stand pivot:  I  Car transfers: I        ADL's:  Feeding: I = independent   Grooming: MINIMAL ASSIST  Hygiene: I  UB Dressing: MINIMAL ASSIT  LB Dressing: I  Toileting: I  Bathing: MINIMAL ASSIST     Gait Assessment:   - AD used: none  - Assistance: independent  - Distance: household distances      Endurance Deficit: slight endurance deficit due to recent chemotherapy treatments and sedentary lifestyle    Shoulder Range of Motion:   ACTIVE ROM LEFT RIGHT   Flexion 160 150   Abduction 160 120   Horizontal Abd wnl wnl   Extension wnl wnl   IR wnl 50   ER wnl 50   IR/90deg wnl 45   ER/90deg wnl 45      UPPER EXTREMITY FUNCTIONAL SCALE         1. Any of your usual work, housework or school activities   2/4  2. Your usual hobbies, sporting     0/4  3. Lifting bag of groceries to waist     2/4  4. Lifting above your head      1/4  5. Grooming hair       1/4  6. Pushing up from a chair to stand     1/4  7. Preparing food       4/4  8. Driving        0/4  9. Vacuuming, sweeping, or raking     0/4  10. Getting dressed       1/4  11. Doing up buttons       1/4  12. Using tools or appliances      0/4  13. Opening doors       4/4  14. Cleaning        2/4  15. Tying shoes       4/4  16. Sleeping        3/4  17. Laundering clothes      0/4  18. Opening a jar       0/4  19. Throwing a ball       0/4  20. Carrying a suitcase      0/4     25/80 (score) - 70% disability     Patient Education   Written Home Exercises Provided and Patient Education: Handouts given   Pt was instructed in and performed therapeutic exercise for postural correction and alignment, stretching and soft tissue mobility, and strengthening.      Exercises included: son wrote down all new exercises  SEE THERE EX SECTION      Pt was able to demonstrate and report understanding and performance     Pt has no cultural, educational or  language barriers to learning provided.     Functional Limitations Reporting            Assessment   This is a 79 y.o. female referred to outpatient physical therapy and presents with a medical diagnosis of right breast cancer and was seen today post-operatively to assess strength and ROM of BL UEs and provide pt education on exercises/precautions post breast surgery. Pt does exhibit impaired ROM and strength of the RUE since surgery.        Anticipated barriers to physical therapy: none anticipated     Pt's spiritual, cultural and educational needs considered and pt agreeable to plan of care and goals as stated below:      Medical necessity is demonstrated by the following IMPAIRMENTS/PROMBLEM LIST:  History  Co-morbidities and personal factors that may impact the plan of care Co-morbidities:   history of cancer     Personal Factors:   no deficits       low   Examination  Body Structures and Functions, activity limitations and participation restrictions that may impact the plan of care Body Regions:   Upper extremity     Body Systems:    n/a     Participation Restrictions:   none     Activity limitations:   Learning and applying knowledge  no deficits     General Tasks and Commands  no deficits     Communication  no deficits     Mobility  Lifting, reaching overhead     Self care  Grooming, dressing, bathing     Domestic Life  no deficits     Interactions/Relationships  no deficits     Life Areas  no deficits     Community and Social Life  no deficits             low   Clinical Presentation stable and uncomplicated low   Decision Making/ Complexity Score: low               [place] received therapeutic exercises to develop strength, endurance and ROM of RUE for 40 minutes including:  -UBE x 2' for shoulder muscle endurance  -pulleys x 2' for flexion and abduction for ROM  -Wall slides for shoulder flexion x 10, wall slides for abduction with assist x 10  -AAROM with a dowel in supine: shoulder flexion 2x10 and shoulder  abduction 2x10  -Chest press with a dowel x 10  -ER stretch of right shoulder in supine (arm behind head)  -scapular retraction with YTB 2x10 reps    Soo received the following manual therapy techniques:  were applied to the: right shoulder for 0 minutes, including:  N/A    Soo received the following supervised modalities after being cleared for contradictions: N/A    Home Exercises Provided and Patient Education Provided     Education provided:   - UPDATED HOME PROGRAM (Son was present and took notes)    Written Home Exercises Provided: yes.  Exercises were reviewed and Soo was able to demonstrate them prior to the end of the session.  Soo demonstrated good  understanding of the education provided.       Assessment   Soo presented with impaired right shoulder flex, abduction, ER, and IR since her sx. Her drains have been removed and she no longer has shoulder precautions. She does report pain mainly when using the right arm and when stretching the shoulder.    Soo is progressing well towards her goals.   Pt prognosis is Excellent.     Pt will continue to benefit from skilled outpatient physical therapy to address the deficits listed in the problem list box on initial evaluation, provide pt/family education and to maximize pt's level of independence in the home and community environment.     Pt's spiritual, cultural and educational needs considered and pt agreeable to plan of care and goals.     Anticipated barriers to physical therapy: NONE    Goals:  Short Term Goals: In 4 weeks   1.I with HEP  2.Patient to increase right shoulder flexion, extension, ER, IR by 20 degrees  3.Patient to increase MMT strength to 3/5  4.Patient to have pain less than 3/10 at all times.  5.Patient to score less than 50% impaired on the UEFS    Long Term Goals: In 10 weeks  1. Patient to score less than 25% impaired on the UEFS  2. Patient to demo increase in UE strength to 4/5  3. Patient to have decreased pain to 1/10 at  all times.  4. Patient to demo increase GH ROM to WNL all planes  5. Patient to perform daily activities including grooming, cleaning, bathing, bed mobility without limitation.      Plan     Recommend continued PT 2x/week x 10 weeks to improve ROM and strength and decrease pain of the RULENNY oCnde, PT

## 2019-06-12 ENCOUNTER — INFUSION (OUTPATIENT)
Dept: INFUSION THERAPY | Facility: HOSPITAL | Age: 79
End: 2019-06-12
Attending: INTERNAL MEDICINE
Payer: MEDICARE

## 2019-06-12 ENCOUNTER — OFFICE VISIT (OUTPATIENT)
Dept: HEMATOLOGY/ONCOLOGY | Facility: CLINIC | Age: 79
End: 2019-06-12
Payer: MEDICARE

## 2019-06-12 VITALS
WEIGHT: 190.25 LBS | BODY MASS INDEX: 35.01 KG/M2 | OXYGEN SATURATION: 98 % | DIASTOLIC BLOOD PRESSURE: 76 MMHG | HEART RATE: 72 BPM | TEMPERATURE: 98 F | RESPIRATION RATE: 18 BRPM | SYSTOLIC BLOOD PRESSURE: 130 MMHG | HEIGHT: 62 IN

## 2019-06-12 DIAGNOSIS — C77.3 BREAST CANCER METASTASIZED TO AXILLARY LYMPH NODE, RIGHT: Primary | ICD-10-CM

## 2019-06-12 DIAGNOSIS — C50.919 TRIPLE NEGATIVE MALIGNANT NEOPLASM OF BREAST: ICD-10-CM

## 2019-06-12 DIAGNOSIS — C50.919 TRIPLE NEGATIVE MALIGNANT NEOPLASM OF BREAST: Primary | ICD-10-CM

## 2019-06-12 DIAGNOSIS — C50.911 BREAST CANCER METASTASIZED TO AXILLARY LYMPH NODE, RIGHT: Primary | ICD-10-CM

## 2019-06-12 DIAGNOSIS — D53.9 MACROCYTIC ANEMIA: ICD-10-CM

## 2019-06-12 LAB
ALBUMIN SERPL BCP-MCNC: 3.2 G/DL (ref 3.5–5.2)
ALP SERPL-CCNC: 100 U/L (ref 55–135)
ALT SERPL W/O P-5'-P-CCNC: 15 U/L (ref 10–44)
ANION GAP SERPL CALC-SCNC: 10 MMOL/L (ref 8–16)
AST SERPL-CCNC: 20 U/L (ref 10–40)
BASOPHILS # BLD AUTO: 0.02 K/UL (ref 0–0.2)
BASOPHILS NFR BLD: 0.4 % (ref 0–1.9)
BILIRUB SERPL-MCNC: 0.2 MG/DL (ref 0.1–1)
BUN SERPL-MCNC: 9 MG/DL (ref 8–23)
CALCIUM SERPL-MCNC: 9.5 MG/DL (ref 8.7–10.5)
CHLORIDE SERPL-SCNC: 106 MMOL/L (ref 95–110)
CO2 SERPL-SCNC: 25 MMOL/L (ref 23–29)
CREAT SERPL-MCNC: 0.7 MG/DL (ref 0.5–1.4)
DIFFERENTIAL METHOD: ABNORMAL
EOSINOPHIL # BLD AUTO: 0.1 K/UL (ref 0–0.5)
EOSINOPHIL NFR BLD: 1.9 % (ref 0–8)
ERYTHROCYTE [DISTWIDTH] IN BLOOD BY AUTOMATED COUNT: 13.7 % (ref 11.5–14.5)
EST. GFR  (AFRICAN AMERICAN): >60 ML/MIN/1.73 M^2
EST. GFR  (NON AFRICAN AMERICAN): >60 ML/MIN/1.73 M^2
GLUCOSE SERPL-MCNC: 98 MG/DL (ref 70–110)
HCT VFR BLD AUTO: 31.1 % (ref 37–48.5)
HGB BLD-MCNC: 9.9 G/DL (ref 12–16)
IMM GRANULOCYTES # BLD AUTO: 0.01 K/UL (ref 0–0.04)
IMM GRANULOCYTES NFR BLD AUTO: 0.2 % (ref 0–0.5)
LYMPHOCYTES # BLD AUTO: 1.8 K/UL (ref 1–4.8)
LYMPHOCYTES NFR BLD: 33.8 % (ref 18–48)
MCH RBC QN AUTO: 29.7 PG (ref 27–31)
MCHC RBC AUTO-ENTMCNC: 31.8 G/DL (ref 32–36)
MCV RBC AUTO: 93 FL (ref 82–98)
MONOCYTES # BLD AUTO: 0.6 K/UL (ref 0.3–1)
MONOCYTES NFR BLD: 12.2 % (ref 4–15)
NEUTROPHILS # BLD AUTO: 2.7 K/UL (ref 1.8–7.7)
NEUTROPHILS NFR BLD: 51.7 % (ref 38–73)
NRBC BLD-RTO: 0 /100 WBC
PLATELET # BLD AUTO: 173 K/UL (ref 150–350)
PLATELET BLD QL SMEAR: ABNORMAL
PMV BLD AUTO: 8.7 FL (ref 9.2–12.9)
POTASSIUM SERPL-SCNC: 3.5 MMOL/L (ref 3.5–5.1)
PROT SERPL-MCNC: 7.2 G/DL (ref 6–8.4)
RBC # BLD AUTO: 3.33 M/UL (ref 4–5.4)
SODIUM SERPL-SCNC: 141 MMOL/L (ref 136–145)
WBC # BLD AUTO: 5.23 K/UL (ref 3.9–12.7)

## 2019-06-12 PROCEDURE — A4216 STERILE WATER/SALINE, 10 ML: HCPCS | Performed by: NURSE PRACTITIONER

## 2019-06-12 PROCEDURE — 99214 OFFICE O/P EST MOD 30 MIN: CPT | Mod: 25,S$PBB,, | Performed by: INTERNAL MEDICINE

## 2019-06-12 PROCEDURE — 25000003 PHARM REV CODE 250: Performed by: NURSE PRACTITIONER

## 2019-06-12 PROCEDURE — 99999 PR PBB SHADOW E&M-EST. PATIENT-LVL III: CPT | Mod: PBBFAC,,, | Performed by: INTERNAL MEDICINE

## 2019-06-12 PROCEDURE — 99214 PR OFFICE/OUTPT VISIT, EST, LEVL IV, 30-39 MIN: ICD-10-PCS | Mod: 25,S$PBB,, | Performed by: INTERNAL MEDICINE

## 2019-06-12 PROCEDURE — 80053 COMPREHEN METABOLIC PANEL: CPT

## 2019-06-12 PROCEDURE — 99213 OFFICE O/P EST LOW 20 MIN: CPT | Mod: PBBFAC,25 | Performed by: INTERNAL MEDICINE

## 2019-06-12 PROCEDURE — 99999 PR PBB SHADOW E&M-EST. PATIENT-LVL III: ICD-10-PCS | Mod: PBBFAC,,, | Performed by: INTERNAL MEDICINE

## 2019-06-12 PROCEDURE — 85025 COMPLETE CBC W/AUTO DIFF WBC: CPT

## 2019-06-12 PROCEDURE — 63600175 PHARM REV CODE 636 W HCPCS: Performed by: NURSE PRACTITIONER

## 2019-06-12 PROCEDURE — 36591 DRAW BLOOD OFF VENOUS DEVICE: CPT

## 2019-06-12 RX ORDER — SODIUM CHLORIDE 0.9 % (FLUSH) 0.9 %
10 SYRINGE (ML) INJECTION
Status: CANCELLED | OUTPATIENT
Start: 2019-06-12

## 2019-06-12 RX ORDER — HEPARIN 100 UNIT/ML
500 SYRINGE INTRAVENOUS
Status: CANCELLED | OUTPATIENT
Start: 2019-06-12

## 2019-06-12 RX ORDER — SODIUM CHLORIDE 0.9 % (FLUSH) 0.9 %
10 SYRINGE (ML) INJECTION
Status: COMPLETED | OUTPATIENT
Start: 2019-06-12 | End: 2019-06-12

## 2019-06-12 RX ORDER — HEPARIN 100 UNIT/ML
500 SYRINGE INTRAVENOUS
Status: COMPLETED | OUTPATIENT
Start: 2019-06-12 | End: 2019-06-12

## 2019-06-12 RX ADMIN — HEPARIN SODIUM (PORCINE) LOCK FLUSH IV SOLN 100 UNIT/ML 500 UNITS: 100 SOLUTION at 01:06

## 2019-06-12 RX ADMIN — SODIUM CHLORIDE, PRESERVATIVE FREE 10 ML: 5 INJECTION INTRAVENOUS at 01:06

## 2019-06-12 NOTE — PROGRESS NOTES
Subjective:       Patient ID: Soo Holly is a 79 y.o. female.    Chief Complaint: Follow-up; Results; and Breast Cancer    HPI 79-year-old female triple negative breast cancer status post neoadjuvant therapy with residual xochilt presents.  Presents for review complains of discomfort under right arm ECOG status 2    Past Medical History:   Diagnosis Date    Cancer     R Breast cancer    Chest pain     Hyperlipidemia     Hypertension      Family History   Problem Relation Age of Onset    Breast cancer Sister      Social History     Socioeconomic History    Marital status:      Spouse name: Not on file    Number of children: Not on file    Years of education: Not on file    Highest education level: Not on file   Occupational History    Not on file   Social Needs    Financial resource strain: Not on file    Food insecurity:     Worry: Not on file     Inability: Not on file    Transportation needs:     Medical: Not on file     Non-medical: Not on file   Tobacco Use    Smoking status: Never Smoker    Smokeless tobacco: Never Used   Substance and Sexual Activity    Alcohol use: No     Frequency: Never    Drug use: No    Sexual activity: Not Currently   Lifestyle    Physical activity:     Days per week: Not on file     Minutes per session: Not on file    Stress: Not on file   Relationships    Social connections:     Talks on phone: Not on file     Gets together: Not on file     Attends Druze service: Not on file     Active member of club or organization: Not on file     Attends meetings of clubs or organizations: Not on file     Relationship status: Not on file   Other Topics Concern    Not on file   Social History Narrative    Lives in [place], [place] (1.5h).  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     Past Surgical History:   Procedure Laterality Date    cataracts      EYE SURGERY      HYSTERECTOMY      INSERTION, PORT-A-CATH Right 1/16/2019    [facility]    JOINT  REPLACEMENT Right     Knee    MASTECTOMY, MODIFIED RADICAL Right 5/22/2019    [facility] Paul [facility] OR       Labs:  Lab Results   Component Value Date    WBC 5.23 06/12/2019    HGB 9.9 (L) 06/12/2019    HCT 31.1 (L) 06/12/2019    MCV 93 06/12/2019     06/12/2019     BMP  Lab Results   Component Value Date     05/23/2019    K 3.3 (L) 05/23/2019     05/23/2019    CO2 21 (L) 05/23/2019    BUN 6 (L) 05/23/2019    CREATININE 0.6 05/23/2019    CALCIUM 8.2 (L) 05/23/2019    ANIONGAP 10 05/23/2019    ESTGFRAFRICA >60 05/23/2019    EGFRNONAA >60 05/23/2019     Lab Results   Component Value Date    ALT 19 05/20/2019    AST 21 05/20/2019    ALKPHOS 129 05/20/2019    BILITOT 0.2 05/20/2019       Lab Results   Component Value Date    IRON 86 04/28/2019    TIBC 235 (L) 04/28/2019    FERRITIN 754 (H) 04/28/2019     No results found for: [id number]  No results found for: FOLATE  No results found for: TSH      Review of Systems   Constitutional: Positive for activity change and fatigue. Negative for appetite change, chills, diaphoresis, fever and unexpected weight change.   HENT: Negative for congestion, dental problem, drooling, ear discharge, ear pain, facial swelling, hearing loss, mouth sores, nosebleeds, postnasal drip, rhinorrhea, sinus pressure, sneezing, sore throat, tinnitus, trouble swallowing and voice change.    Eyes: Negative for photophobia, pain, discharge, redness, itching and visual disturbance.   Respiratory: Negative for cough, choking, chest tightness, shortness of breath, wheezing and stridor.    Cardiovascular: Negative for chest pain, palpitations and leg swelling.   Gastrointestinal: Negative for abdominal distention, abdominal pain, anal bleeding, blood in stool, constipation, diarrhea, nausea, rectal pain and vomiting.   Endocrine: Negative for cold intolerance, heat intolerance, polydipsia, polyphagia and polyuria.   Genitourinary: Negative for decreased urine  volume, difficulty urinating, dyspareunia, dysuria, enuresis, flank pain, frequency, genital sores, hematuria, menstrual problem, pelvic pain, urgency, vaginal bleeding, vaginal discharge and vaginal pain.   Musculoskeletal: Negative for arthralgias, back pain, gait problem, joint swelling, myalgias, neck pain and neck stiffness.   Skin: Negative for color change, pallor and rash.   Allergic/Immunologic: Negative for environmental allergies, food allergies and immunocompromised state.   Neurological: Positive for weakness. Negative for dizziness, tremors, seizures, syncope, facial asymmetry, speech difficulty, light-headedness, numbness and headaches.   Hematological: Negative for adenopathy. Does not bruise/bleed easily.   Psychiatric/Behavioral: Positive for dysphoric mood. Negative for agitation, behavioral problems, confusion, decreased concentration, hallucinations, self-injury, sleep disturbance and suicidal ideas. The patient is nervous/anxious. The patient is not hyperactive.        Objective:      Physical Exam   Constitutional: She is oriented to person, place, and time. She appears well-developed and well-nourished. She appears distressed.   HENT:   Head: Normocephalic and atraumatic.   Right Ear: External ear normal.   Left Ear: External ear normal.   Nose: Nose normal. Right sinus exhibits no maxillary sinus tenderness and no frontal sinus tenderness. Left sinus exhibits no maxillary sinus tenderness and no frontal sinus tenderness.   Mouth/Throat: Oropharynx is clear and moist. No oropharyngeal exudate.   Eyes: Pupils are equal, round, and reactive to light. Conjunctivae, EOM and lids are normal. Right eye exhibits no discharge. Left eye exhibits no discharge. Right conjunctiva is not injected. Right conjunctiva has no hemorrhage. Left conjunctiva is not injected. Left conjunctiva has no hemorrhage. No scleral icterus.   Neck: Normal range of motion. Neck supple. No JVD present. No tracheal deviation  present. No thyromegaly present.   Cardiovascular: Normal rate and regular rhythm.   Pulmonary/Chest: Effort normal. No stridor. No respiratory distress. She exhibits no tenderness.       Abdominal: Soft. She exhibits no distension and no mass. There is no splenomegaly or hepatomegaly. There is no tenderness. There is no rebound.   Musculoskeletal: Normal range of motion. She exhibits no edema or tenderness.   Lymphadenopathy:     She has no cervical adenopathy.     She has no axillary adenopathy.        Right: No supraclavicular adenopathy present.        Left: No supraclavicular adenopathy present.   Neurological: She is alert and oriented to person, place, and time. No cranial nerve deficit. Coordination normal.   Skin: Skin is dry. No rash noted. She is not diaphoretic. No erythema.   Psychiatric: She has a normal mood and affect. Her behavior is normal. Judgment and thought content normal.   Vitals reviewed.          Assessment:      1. Triple negative malignant neoplasm of breast    2. Macrocytic anemia           Plan:     Flush today patient will be seen and have seroma evacuated.  By nurse practitioner.  For follow-up in 3 months flush port in 6 weeks scheduled for follow-up with radiation oncology for node positive disease        Yaniv Pop Jr, MD FACP

## 2019-06-17 ENCOUNTER — OFFICE VISIT (OUTPATIENT)
Dept: RADIATION ONCOLOGY | Facility: CLINIC | Age: 79
End: 2019-06-17
Payer: MEDICARE

## 2019-06-17 ENCOUNTER — HOSPITAL ENCOUNTER (OUTPATIENT)
Dept: RADIOLOGY | Facility: HOSPITAL | Age: 79
Discharge: HOME | End: 2019-06-17
Attending: RADIOLOGY
Payer: MEDICARE

## 2019-06-17 ENCOUNTER — HOSPITAL ENCOUNTER (OUTPATIENT)
Dept: RADIATION THERAPY | Facility: HOSPITAL | Age: 79
Discharge: HOME | End: 2019-06-17
Attending: RADIOLOGY
Payer: MEDICARE

## 2019-06-17 VITALS
SYSTOLIC BLOOD PRESSURE: 117 MMHG | BODY MASS INDEX: 35.06 KG/M2 | WEIGHT: 191.69 LBS | RESPIRATION RATE: 18 BRPM | HEART RATE: 68 BPM | TEMPERATURE: 97 F | OXYGEN SATURATION: 100 % | DIASTOLIC BLOOD PRESSURE: 65 MMHG

## 2019-06-17 DIAGNOSIS — C50.919 TRIPLE NEGATIVE MALIGNANT NEOPLASM OF BREAST: Primary | ICD-10-CM

## 2019-06-17 PROCEDURE — 77334 RADIATION TREATMENT AID(S): CPT | Mod: 26,,, | Performed by: RADIOLOGY

## 2019-06-17 PROCEDURE — 99214 OFFICE O/P EST MOD 30 MIN: CPT | Mod: S$PBB,25,, | Performed by: RADIOLOGY

## 2019-06-17 PROCEDURE — 99214 PR OFFICE/OUTPT VISIT, EST, LEVL IV, 30-39 MIN: ICD-10-PCS | Mod: S$PBB,25,, | Performed by: RADIOLOGY

## 2019-06-17 PROCEDURE — 77263 PR  RADIATION THERAPY PLAN COMPLEX: ICD-10-PCS | Mod: ,,, | Performed by: RADIOLOGY

## 2019-06-17 PROCEDURE — 77334 PR  RADN TREATMENT AID(S) COMPLX: ICD-10-PCS | Mod: 26,,, | Performed by: RADIOLOGY

## 2019-06-17 PROCEDURE — 77290 THER RAD SIMULAJ FIELD CPLX: CPT | Mod: TC | Performed by: RADIOLOGY

## 2019-06-17 PROCEDURE — 77263 THER RADIOLOGY TX PLNG CPLX: CPT | Mod: ,,, | Performed by: RADIOLOGY

## 2019-06-17 PROCEDURE — 77290 PR  SET RADN THERAPY FIELD COMPLEX: ICD-10-PCS | Mod: 26,,, | Performed by: RADIOLOGY

## 2019-06-17 PROCEDURE — 99999 PR PBB SHADOW E&M-EST. PATIENT-LVL III: CPT | Mod: PBBFAC,,, | Performed by: RADIOLOGY

## 2019-06-17 PROCEDURE — 99213 OFFICE O/P EST LOW 20 MIN: CPT | Mod: PBBFAC,25 | Performed by: RADIOLOGY

## 2019-06-17 PROCEDURE — 99999 PR PBB SHADOW E&M-EST. PATIENT-LVL III: ICD-10-PCS | Mod: PBBFAC,,, | Performed by: RADIOLOGY

## 2019-06-17 PROCEDURE — 77290 THER RAD SIMULAJ FIELD CPLX: CPT | Mod: 26,,, | Performed by: RADIOLOGY

## 2019-06-17 PROCEDURE — 77334 RADIATION TREATMENT AID(S): CPT | Mod: TC | Performed by: RADIOLOGY

## 2019-06-17 PROCEDURE — 77014 HC CT GUIDANCE RADIATION THERAPY FLDS PLACEMENT: CPT | Mod: TC | Performed by: RADIOLOGY

## 2019-06-17 NOTE — LETTER
June 17, 2019      Yaniv Pop MD  [address]  [place] [address]            [facility]  [address] [facility] [address]  Phone: [phone]  Fax: [phone]          Patient: Soo Holly   MR Number: [id number]   YOB: 1940   Date of Visit: 6/17/2019       Dear Dr. Yaniv Pop:    Thank you for referring Soo Holly to me for evaluation. Attached you will find relevant portions of my assessment and plan of care.    If you have questions, please do not hesitate to call me. I look forward to following Soo Holly along with you.    Sincerely,    Devaughn Plata II, MD    Enclosure  CC:  No Recipients    If you would like to receive this communication electronically, please contact [email or web address] or [phone] to request more information on [facility] access.    For providers and/or their staff who would like to refer a patient to [facility], please contact us through our one-stop-shop provider referral line, [facility], at [phone].    If you feel you have received this communication in error or would no longer like to receive these types of communications, please e-mail [email or web address]

## 2019-06-20 ENCOUNTER — OFFICE VISIT (OUTPATIENT)
Dept: SURGERY | Facility: CLINIC | Age: 79
End: 2019-06-20
Payer: MEDICARE

## 2019-06-20 VITALS
HEART RATE: 70 BPM | SYSTOLIC BLOOD PRESSURE: 128 MMHG | DIASTOLIC BLOOD PRESSURE: 73 MMHG | TEMPERATURE: 98 F | WEIGHT: 192.69 LBS | BODY MASS INDEX: 35.24 KG/M2

## 2019-06-20 DIAGNOSIS — C50.919 TRIPLE NEGATIVE MALIGNANT NEOPLASM OF BREAST: ICD-10-CM

## 2019-06-20 DIAGNOSIS — Z17.1 MALIGNANT NEOPLASM OF UPPER-OUTER QUADRANT OF RIGHT BREAST IN FEMALE, ESTROGEN RECEPTOR NEGATIVE: Primary | ICD-10-CM

## 2019-06-20 DIAGNOSIS — C50.411 MALIGNANT NEOPLASM OF UPPER-OUTER QUADRANT OF RIGHT BREAST IN FEMALE, ESTROGEN RECEPTOR NEGATIVE: Primary | ICD-10-CM

## 2019-06-20 PROCEDURE — 99999 PR PBB SHADOW E&M-EST. PATIENT-LVL III: ICD-10-PCS | Mod: PBBFAC,,, | Performed by: SURGERY

## 2019-06-20 PROCEDURE — 99999 PR PBB SHADOW E&M-EST. PATIENT-LVL III: CPT | Mod: PBBFAC,,, | Performed by: SURGERY

## 2019-06-20 PROCEDURE — 99213 OFFICE O/P EST LOW 20 MIN: CPT | Mod: PBBFAC | Performed by: SURGERY

## 2019-06-20 PROCEDURE — 99024 PR POST-OP FOLLOW-UP VISIT: ICD-10-PCS | Mod: POP,,, | Performed by: SURGERY

## 2019-06-20 PROCEDURE — 99024 POSTOP FOLLOW-UP VISIT: CPT | Mod: POP,,, | Performed by: SURGERY

## 2019-06-20 NOTE — PATIENT INSTRUCTIONS
There is no seroma  There is excess tissue do to your body habitus  Will see you back in  6 to 8 weeks

## 2019-06-20 NOTE — PROGRESS NOTES
Subjective:       Patient ID: Soo Holly is a 79 y.o. female   Returns after modified radical mastectomy.    Chief Complaint: Post-op Evaluation    Postoperative pain is resolved.  There was concerned about a seroma.  The patient however has redundant tissue in the axillary area due to her body habitus    Review of Systems   Skin: Negative.        Objective:      Physical Exam   Constitutional: She appears distressed.   Obese   Skin:   The right mastectomy incision is healing well.  There is no evidence of a seroma.  There is redundant tissue in the axilla    Vitals reviewed.      Assessment:       1. Malignant neoplasm of upper-outer quadrant of right breast in female, estrogen receptor negative      redundant tissue in the axilla.  This could be addressed in the future however the scar would need to be extended approximately penitentiary across the back on the right side  No evidence of a seroma  Plan:       Continue treatment per Oncology and Radiation Oncology.  Follow up with surgery in 6-8 weeks to recheck the incision

## 2019-06-25 PROCEDURE — 77334 RADIATION TREATMENT AID(S): CPT | Mod: 26,,, | Performed by: RADIOLOGY

## 2019-06-25 PROCEDURE — 77295 3-D RADIOTHERAPY PLAN: CPT | Mod: TC | Performed by: RADIOLOGY

## 2019-06-25 PROCEDURE — 77295 PR 3D RADIOTHERAPY PLAN: ICD-10-PCS | Mod: 26,,, | Performed by: RADIOLOGY

## 2019-06-25 PROCEDURE — 77334 RADIATION TREATMENT AID(S): CPT | Mod: TC | Performed by: RADIOLOGY

## 2019-06-25 PROCEDURE — 77300 RADIATION THERAPY DOSE PLAN: CPT | Mod: 26,,, | Performed by: RADIOLOGY

## 2019-06-25 PROCEDURE — 77293 RESPIRATOR MOTION MGMT SIMUL: CPT | Mod: 26,,, | Performed by: RADIOLOGY

## 2019-06-25 PROCEDURE — 77295 3-D RADIOTHERAPY PLAN: CPT | Mod: 26,,, | Performed by: RADIOLOGY

## 2019-06-25 PROCEDURE — 77334 PR  RADN TREATMENT AID(S) COMPLX: ICD-10-PCS | Mod: 26,,, | Performed by: RADIOLOGY

## 2019-06-25 PROCEDURE — 77293 PR RESPIRATORY MOTION MGMT SIMULATION: ICD-10-PCS | Mod: 26,,, | Performed by: RADIOLOGY

## 2019-06-25 PROCEDURE — 77300 PR RADIATION THERAPY,DOSIMETRY PLAN: ICD-10-PCS | Mod: 26,,, | Performed by: RADIOLOGY

## 2019-06-25 PROCEDURE — 77300 RADIATION THERAPY DOSE PLAN: CPT | Mod: TC | Performed by: RADIOLOGY

## 2019-06-25 PROCEDURE — 77293 RESPIRATOR MOTION MGMT SIMUL: CPT | Mod: TC | Performed by: RADIOLOGY

## 2019-06-26 ENCOUNTER — DOCUMENTATION ONLY (OUTPATIENT)
Dept: RADIATION ONCOLOGY | Facility: CLINIC | Age: 79
End: 2019-06-26

## 2019-06-26 PROCEDURE — 77280 PR  SET RADN THERAPY FIELD SIMPLE: ICD-10-PCS | Mod: 26,,, | Performed by: RADIOLOGY

## 2019-06-26 PROCEDURE — 77280 THER RAD SIMULAJ FIELD SMPL: CPT | Mod: TC | Performed by: RADIOLOGY

## 2019-06-26 PROCEDURE — 77280 THER RAD SIMULAJ FIELD SMPL: CPT | Mod: 26,,, | Performed by: RADIOLOGY

## 2019-06-26 NOTE — PLAN OF CARE
Problem: Adult Inpatient Plan of Care  Goal: Plan of Care Review  Outcome: Ongoing (interventions implemented as appropriate)  Day 1 outpatient xrt to right breast. Breast handout and verbal instructions given. Contact info provided. Skin care and side effects reviewed. patient verbalized understanding.

## 2019-06-27 PROCEDURE — 77417 THER RADIOLOGY PORT IMAGE(S): CPT | Performed by: RADIOLOGY

## 2019-06-27 PROCEDURE — 77387 GUIDANCE FOR RADJ TX DLVR: CPT | Mod: TC | Performed by: RADIOLOGY

## 2019-06-27 PROCEDURE — G6002 STEREOSCOPIC X-RAY GUIDANCE: HCPCS | Mod: 26,,, | Performed by: RADIOLOGY

## 2019-06-27 PROCEDURE — 77412 RADIATION TX DELIVERY LVL 3: CPT | Performed by: RADIOLOGY

## 2019-06-27 PROCEDURE — G6002 PR STEREOSCOPIC XRAY GUIDE FOR RADIATION TX DELIV: ICD-10-PCS | Mod: 26,,, | Performed by: RADIOLOGY

## 2019-06-28 ENCOUNTER — CLINICAL SUPPORT (OUTPATIENT)
Dept: REHABILITATION | Facility: HOSPITAL | Age: 79
End: 2019-06-28
Attending: SURGERY
Payer: MEDICARE

## 2019-06-28 DIAGNOSIS — C50.919 MALIGNANT NEOPLASM OF FEMALE BREAST, UNSPECIFIED ESTROGEN RECEPTOR STATUS, UNSPECIFIED LATERALITY, UNSPECIFIED SITE OF BREAST: Primary | ICD-10-CM

## 2019-06-28 PROCEDURE — 77417 THER RADIOLOGY PORT IMAGE(S): CPT | Performed by: RADIOLOGY

## 2019-06-28 PROCEDURE — 97110 THERAPEUTIC EXERCISES: CPT

## 2019-06-28 PROCEDURE — 77387 GUIDANCE FOR RADJ TX DLVR: CPT | Mod: TC | Performed by: RADIOLOGY

## 2019-06-28 PROCEDURE — G6002 PR STEREOSCOPIC XRAY GUIDE FOR RADIATION TX DELIV: ICD-10-PCS | Mod: 26,,, | Performed by: RADIOLOGY

## 2019-06-28 PROCEDURE — 77412 RADIATION TX DELIVERY LVL 3: CPT | Performed by: RADIOLOGY

## 2019-06-28 PROCEDURE — G6002 STEREOSCOPIC X-RAY GUIDANCE: HCPCS | Mod: 26,,, | Performed by: RADIOLOGY

## 2019-06-28 NOTE — PROGRESS NOTES
Outpatient Therapy Updated Plan of Care     Visit Date: 6/28/2019  Name: Soo Burrell [facility] Number: [id number]    Therapy Diagnosis:   Encounter Diagnosis   Name Primary?    Malignant neoplasm of female breast, unspecified estrogen receptor status, unspecified laterality, unspecified site of breast Yes     Physician: Paul Carey MD    Physician Orders: PT eval and treat  Medical Diagnosis: Malignant neoplasm of female breast  Evaluation Date: 5/20/2019    Total Visits Received: 3  Cancelled Visits: 5  No Show Visits: 0    Current Certification Period:  5/20/2019 to 6/19/2019  Precautions:  cancer  Visits from Evaluation Date: 3  Functional Level Prior to Evaluation:  Independent without limitation of UE    Subjective     Update: less pain right arm.  Able to use right arm more during ADLs    Objective     Update:   Strength: manual muscle test grades below   Upper Extremity Strength    (L) UE (R) UE   Shoulder flexion: 3+/5 3/5   Shoulder Abduction: 3-/5 3/5   Shoulder IR 3+/5 3+/5   Shoulder ER 3+/5 3/5   Elbow flexion: 4/5 4/5   Elbow extension: 4/5 4/5   Lower Trap: 3+/5 3/5   Middle Trap: 3+/5 3/5    4/5 3/5       Shoulder Range of Motion:   ACTIVE ROM LEFT RIGHT   Flexion 160 160   Abduction 160 140   Horizontal Abd wnl wnl   Extension wnl wnl   IR wnl wnl   ER wnl 60   IR/90deg wnl wnl   ER/90deg wnl 60         Coordination:   - fine motor: WFL  - UE coordination: intact     - LE coordination:  Not tested      Functional Mobility (Bed mobility, transfers)  Bed mobility: I =  independent   Roll to left: I  Roll to right: I  Supine to prone: I  Scooting to edge of bed: I  Supine to sit: I  Sit to supine: I  Transfers to bed: I  Transfers to toilet: I  Sit to stand:  I  Stand pivot:  I  Car transfers: I        ADL's:  Feeding: I = independent   Grooming: set up assist  Hygiene: I  UB Dressing: I  LB Dressing: I  Toileting: I  Bathing: set up assist     Gait Assessment:   - AD used: none  -  Assistance: independent  - Distance: household distances      Endurance Deficit: slight endurance deficit due to recent chemotherapy treatments and sedentary lifestyle          Assessment     Update:     Goals:  Short Term Goals: In 2 weeks   1.I with HEP- ongoing goal  2.Patient to increase right shoulder flexion, extension, ER, IR by 20 degrees- goal met  3.Patient to increase MMT strength to 3/5- goal met  4.Patient to have pain less than 3/10 at all times.-goal met  5.Patient to score less than 50% impaired on the UEFS- not assessed during session     Long Term Goals: In 5 weeks  1. Patient to score less than 25% impaired on the UEFS- ongoing  2. Patient to demo increase in UE strength to 4/5-ongoing  3. Patient to have decreased pain to 1/10 at all times.- ongoing  4. Patient to demo increase GH ROM to WNL all planes- partially met/ongoing  5. Patient to perform daily activities including grooming, cleaning, bathing, bed mobility without limitation.- ongoing         Previous Short Term Goals Status:     New Short Term Goals Status:   Increase RUE shoulder ROM by 20 degrees  Long Term Goal Status:   continue per initial plan of care.  Reasons for Recertification of Therapy:   RUE ROM and strengthening    Plan     Updated Certification Period: 6/28/2019 to 7/28/2019  Recommended Treatment Plan: 2x times per week for 5 weeks: Therapeutic Exercise  Other Recommendations: n/a    Day Conde, PT  6/28/2019      I CERTIFY THE NEED FOR THESE SERVICES FURNISHED UNDER THIS PLAN OF TREATMENT AND WHILE UNDER MY CARE    Physician's comments:        Physician's Signature: ___________________________________________________

## 2019-06-28 NOTE — PROGRESS NOTES
Physical Therapy Daily Treatment Note     Name: Soo Burrell [facility]  Clinic Number: [id number]    Therapy Diagnosis:   Encounter Diagnosis   Name Primary?    Malignant neoplasm of female breast, unspecified estrogen receptor status, unspecified laterality, unspecified site of breast Yes     Physician: Paul Carey MD    Visit Date: 6/28/2019    Physician Orders: PT Eval and Treat   Medical Diagnosis: malignant neoplasm of female breast  Evaluation Date: 5/20/2019  Authorization period Expiration: 12/31/19  Plan of Care Certification Period: 6/20/2019 REASSESSMENT DUE TODAY  Visit#/authorized: 3/20    Time In: 11:00am  Time Out: 11:45pm  Total Billable Time: 45 minutes    Precautions: cancer    Subjective     Pt reports: less pain in right arm.  She was compliant with home exercise program.  Response to previous treatment: good  Functional change: able to use her right arm more with less pain    Pain: 2/10  Location: right shoulder      Objective     Soo received therapeutic exercises to develop strength, endurance, ROM and posture for 45 minutes including:  -UBE x 5' for shoulder muscle endurance  -pulleys x 2' for flexion and abduction for ROM  -Wall slides for shoulder flexion x 10, wall slides for abduction with assist x 10  -scap retraction with YTB 2x10  -ER with YTB 2x10 with assist  -IR with YTB 2x10  -right shoulder extension with YTB 2x10  -chair push-ups 2x10  -pec stretch at corner    Home Exercises Provided and Patient Education Provided     Education provided:   - updated home program    Written Home Exercises Provided: yes.  Exercises were reviewed and Soo was able to demonstrate them prior to the end of the session.  Soo demonstrated good  understanding of the education provided.       Assessment   Soo has full shoulder flexion since last visit. Her LUE abduction is limited by 20 degrees. She tolerated all exercises without pain. She had a decrease in pain after exercises. Family  present during the session and will cue her during exercises at home    Soo is progressing well towards her goals.   Pt prognosis is Excellent.     Pt will continue to benefit from skilled outpatient physical therapy to address the deficits listed in the problem list box on initial evaluation, provide pt/family education and to maximize pt's level of independence in the home and community environment.     Pt's spiritual, cultural and educational needs considered and pt agreeable to plan of care and goals.     Anticipated barriers to physical therapy: none anticipated    Goals:  Short Term Goals: In 4 weeks   1.I with HEP- ongoing goal  2.Patient to increase right shoulder flexion, extension, ER, IR by 20 degrees- goal met  3.Patient to increase MMT strength to 3/5- goal met  4.Patient to have pain less than 3/10 at all times.-goal met  5.Patient to score less than 50% impaired on the UEFS- not assessed during session     Long Term Goals: In 10 weeks  1. Patient to score less than 25% impaired on the UEFS- ongoing  2. Patient to demo increase in UE strength to 4/5-ongoing  3. Patient to have decreased pain to 1/10 at all times.- ongoing  4. Patient to demo increase GH ROM to WNL all planes- partially met/ongoing  5. Patient to perform daily activities including grooming, cleaning, bathing, bed mobility without limitation.- ongoing        Plan      Recommend continued PT 2x week to improve ROM left shoulder abduction and increase overall strength of left shoulder.       Day Conde, PT

## 2019-07-01 ENCOUNTER — HOSPITAL ENCOUNTER (OUTPATIENT)
Dept: RADIATION THERAPY | Facility: HOSPITAL | Age: 79
Discharge: HOME | End: 2019-07-01
Attending: RADIOLOGY
Payer: MEDICARE

## 2019-07-01 PROCEDURE — 77387 GUIDANCE FOR RADJ TX DLVR: CPT | Mod: TC | Performed by: RADIOLOGY

## 2019-07-01 PROCEDURE — 77417 THER RADIOLOGY PORT IMAGE(S): CPT | Performed by: RADIOLOGY

## 2019-07-01 PROCEDURE — G6002 STEREOSCOPIC X-RAY GUIDANCE: HCPCS | Mod: 26,,, | Performed by: RADIOLOGY

## 2019-07-01 PROCEDURE — G6002 PR STEREOSCOPIC XRAY GUIDE FOR RADIATION TX DELIV: ICD-10-PCS | Mod: 26,,, | Performed by: RADIOLOGY

## 2019-07-01 PROCEDURE — 77412 RADIATION TX DELIVERY LVL 3: CPT | Performed by: RADIOLOGY

## 2019-07-02 PROCEDURE — G6002 PR STEREOSCOPIC XRAY GUIDE FOR RADIATION TX DELIV: ICD-10-PCS | Mod: 26,,, | Performed by: RADIOLOGY

## 2019-07-02 PROCEDURE — G6002 STEREOSCOPIC X-RAY GUIDANCE: HCPCS | Mod: 26,,, | Performed by: RADIOLOGY

## 2019-07-02 PROCEDURE — 77387 GUIDANCE FOR RADJ TX DLVR: CPT | Mod: TC | Performed by: RADIOLOGY

## 2019-07-02 PROCEDURE — 77417 THER RADIOLOGY PORT IMAGE(S): CPT | Performed by: RADIOLOGY

## 2019-07-02 PROCEDURE — 77412 RADIATION TX DELIVERY LVL 3: CPT | Performed by: RADIOLOGY

## 2019-07-03 ENCOUNTER — DOCUMENTATION ONLY (OUTPATIENT)
Dept: RADIATION ONCOLOGY | Facility: CLINIC | Age: 79
End: 2019-07-03

## 2019-07-03 PROCEDURE — 77417 THER RADIOLOGY PORT IMAGE(S): CPT | Performed by: RADIOLOGY

## 2019-07-03 PROCEDURE — 77412 RADIATION TX DELIVERY LVL 3: CPT | Performed by: RADIOLOGY

## 2019-07-03 PROCEDURE — 77336 RADIATION PHYSICS CONSULT: CPT | Performed by: RADIOLOGY

## 2019-07-03 PROCEDURE — G6002 STEREOSCOPIC X-RAY GUIDANCE: HCPCS | Mod: 26,,, | Performed by: RADIOLOGY

## 2019-07-03 PROCEDURE — G6002 PR STEREOSCOPIC XRAY GUIDE FOR RADIATION TX DELIV: ICD-10-PCS | Mod: 26,,, | Performed by: RADIOLOGY

## 2019-07-03 PROCEDURE — 77387 GUIDANCE FOR RADJ TX DLVR: CPT | Mod: TC | Performed by: RADIOLOGY

## 2019-07-03 NOTE — PLAN OF CARE
Problem: Adult Inpatient Plan of Care  Goal: Plan of Care Review  Outcome: Ongoing (interventions implemented as appropriate)  Day 4 xrt to right chest wall. No skin issues, slight pain around incision site. Reviewed skin care. Will continue to monitor.

## 2019-07-05 ENCOUNTER — CLINICAL SUPPORT (OUTPATIENT)
Dept: REHABILITATION | Facility: HOSPITAL | Age: 79
End: 2019-07-05
Attending: SURGERY
Payer: MEDICARE

## 2019-07-05 DIAGNOSIS — C50.919 MALIGNANT NEOPLASM OF FEMALE BREAST, UNSPECIFIED ESTROGEN RECEPTOR STATUS, UNSPECIFIED LATERALITY, UNSPECIFIED SITE OF BREAST: Primary | ICD-10-CM

## 2019-07-05 PROCEDURE — 97110 THERAPEUTIC EXERCISES: CPT

## 2019-07-05 PROCEDURE — G6002 PR STEREOSCOPIC XRAY GUIDE FOR RADIATION TX DELIV: ICD-10-PCS | Mod: 26,,, | Performed by: RADIOLOGY

## 2019-07-05 PROCEDURE — 77387 GUIDANCE FOR RADJ TX DLVR: CPT | Mod: TC | Performed by: RADIOLOGY

## 2019-07-05 PROCEDURE — 77412 RADIATION TX DELIVERY LVL 3: CPT | Performed by: RADIOLOGY

## 2019-07-05 PROCEDURE — G6002 STEREOSCOPIC X-RAY GUIDANCE: HCPCS | Mod: 26,,, | Performed by: RADIOLOGY

## 2019-07-05 NOTE — PROGRESS NOTES
Physical Therapy Daily Treatment Note     Name: Soo Burrell [facility] Number: [id number]    Therapy Diagnosis:   Encounter Diagnosis   Name Primary?    Malignant neoplasm of female breast, unspecified estrogen receptor status, unspecified laterality, unspecified site of breast Yes     Physician: Paul Carey MD    Visit Date: 7/5/2019    Physician Orders: PT Eval and Treat   Medical Diagnosis: malignant neoplasm of female breast  Evaluation Date: 5/20/2019  Authorization period Expiration: 12/31/19  Plan of Care Certification Period: 7/16/2019  Visit#/authorized: 4/20    Time In: 11:00am  Time Out: 11:45pm  Total Billable Time: 45 minutes    Precautions: cancer    Subjective     Pt reports: less pain in right arm. Having difficulty reaching overhead  She was compliant with home exercise program.  Response to previous treatment: good  Functional change: able to use her right arm more with less pain    Pain: 1/10  Location: right shoulder      Objective     Soo received therapeutic exercises to develop strength, endurance, ROM and posture for 45 minutes including:  -UBE x 5' for shoulder muscle endurance  -pulleys x 2' for flexion and abduction for ROM  -Wall slides for shoulder flexion x 10, wall slides for abduction with assist x 10  -scap retraction with YTB 2x10  -ER with YTB 2x10 with assist ()  -IR with YTB 2x10  -right shoulder extension with YTB 2x10  -chair push-ups 2x10  -pec stretch at corner  -trap stretch in sitting  -wall angels (abduction)  X 10 (AAROM)  -RUE AAROM shoulder flexion (at wall) x 10  -dowel flexion and abduction in sitting x 10  -chest press in sitting x 10    Home Exercises Provided and Patient Education Provided     Education provided:   - updated home program    Written Home Exercises Provided: yes.  Exercises were reviewed and Soo was able to demonstrate them prior to the end of the session.  Soo demonstrated good  understanding of the education provided.        Assessment   Soo is progressing well towards her goals.   Soo is progressing with strength and is able to perform exercise against gravity (with minimal assist). ROM of right shoulder abduction is slightly limited. Son present during session and is able to assist her with exercises.    Pt prognosis is Excellent.     Pt will continue to benefit from skilled outpatient physical therapy to address the deficits listed in the problem list box on initial evaluation, provide pt/family education and to maximize pt's level of independence in the home and community environment.     Pt's spiritual, cultural and educational needs considered and pt agreeable to plan of care and goals.     Anticipated barriers to physical therapy: none anticipated    Goals:  Short Term Goals: In 4 weeks   1.I with HEP- ongoing goal  2.Patient to increase right shoulder flexion, extension, ER, IR by 20 degrees- goal met  3.Patient to increase MMT strength to 3/5- goal met  4.Patient to have pain less than 3/10 at all times.-goal met  5.Patient to score less than 50% impaired on the UEFS- not assessed during session     Long Term Goals: In 10 weeks  1. Patient to score less than 25% impaired on the UEFS- ongoing  2. Patient to demo increase in UE strength to 4/5-ongoing  3. Patient to have decreased pain to 1/10 at all times.- ongoing  4. Patient to demo increase GH ROM to WNL all planes- partially met/ongoing  5. Patient to perform daily activities including grooming, cleaning, bathing, bed mobility without limitation.- ongoing        Plan      Recommend continued PT 2x week to improve ROM left shoulder abduction and increase overall strength of left shoulder.       Day Conde, PT

## 2019-07-08 PROCEDURE — 77387 GUIDANCE FOR RADJ TX DLVR: CPT | Mod: TC | Performed by: RADIOLOGY

## 2019-07-08 PROCEDURE — 77417 THER RADIOLOGY PORT IMAGE(S): CPT | Performed by: RADIOLOGY

## 2019-07-08 PROCEDURE — 77412 RADIATION TX DELIVERY LVL 3: CPT | Performed by: RADIOLOGY

## 2019-07-08 PROCEDURE — G6002 STEREOSCOPIC X-RAY GUIDANCE: HCPCS | Mod: 26,,, | Performed by: RADIOLOGY

## 2019-07-08 PROCEDURE — G6002 PR STEREOSCOPIC XRAY GUIDE FOR RADIATION TX DELIV: ICD-10-PCS | Mod: 26,,, | Performed by: RADIOLOGY

## 2019-07-09 PROCEDURE — 77387 GUIDANCE FOR RADJ TX DLVR: CPT | Mod: TC | Performed by: RADIOLOGY

## 2019-07-09 PROCEDURE — G6002 PR STEREOSCOPIC XRAY GUIDE FOR RADIATION TX DELIV: ICD-10-PCS | Mod: 26,,, | Performed by: RADIOLOGY

## 2019-07-09 PROCEDURE — 77412 RADIATION TX DELIVERY LVL 3: CPT | Performed by: RADIOLOGY

## 2019-07-09 PROCEDURE — G6002 STEREOSCOPIC X-RAY GUIDANCE: HCPCS | Mod: 26,,, | Performed by: RADIOLOGY

## 2019-07-10 ENCOUNTER — DOCUMENTATION ONLY (OUTPATIENT)
Dept: RADIATION ONCOLOGY | Facility: CLINIC | Age: 79
End: 2019-07-10

## 2019-07-10 PROCEDURE — 77412 RADIATION TX DELIVERY LVL 3: CPT | Performed by: RADIOLOGY

## 2019-07-10 PROCEDURE — 77387 GUIDANCE FOR RADJ TX DLVR: CPT | Mod: TC | Performed by: RADIOLOGY

## 2019-07-10 PROCEDURE — G6002 PR STEREOSCOPIC XRAY GUIDE FOR RADIATION TX DELIV: ICD-10-PCS | Mod: 26,,, | Performed by: RADIOLOGY

## 2019-07-10 PROCEDURE — G6002 STEREOSCOPIC X-RAY GUIDANCE: HCPCS | Mod: 26,,, | Performed by: RADIOLOGY

## 2019-07-10 NOTE — PLAN OF CARE
Problem: Adult Inpatient Plan of Care  Goal: Plan of Care Review  Outcome: Ongoing (interventions implemented as appropriate)  Day 9 outpatient xrt to chest wall. Pt denies any c/o pain. No n/v/d. No sore swallowing, no sore throat, no SOB. Pt does c/o feeling weak. Will continue to monitor.

## 2019-07-11 PROCEDURE — 77412 RADIATION TX DELIVERY LVL 3: CPT | Performed by: RADIOLOGY

## 2019-07-11 PROCEDURE — G6002 PR STEREOSCOPIC XRAY GUIDE FOR RADIATION TX DELIV: ICD-10-PCS | Mod: 26,,, | Performed by: RADIOLOGY

## 2019-07-11 PROCEDURE — 77336 RADIATION PHYSICS CONSULT: CPT | Performed by: RADIOLOGY

## 2019-07-11 PROCEDURE — G6002 STEREOSCOPIC X-RAY GUIDANCE: HCPCS | Mod: 26,,, | Performed by: RADIOLOGY

## 2019-07-11 PROCEDURE — 77387 GUIDANCE FOR RADJ TX DLVR: CPT | Mod: TC | Performed by: RADIOLOGY

## 2019-07-11 NOTE — PROGRESS NOTES
Physical Therapy Daily Treatment Note     Name: Soo Burrell [facility]  Clinic Number: [id number]    Therapy Diagnosis:   Encounter Diagnosis   Name Primary?    Malignant neoplasm of female breast, unspecified estrogen receptor status, unspecified laterality, unspecified site of breast Yes     Physician: Paul Carey MD    Visit Date: 7/12/2019    Physician Orders: PT Eval and Treat   Medical Diagnosis: malignant neoplasm of female breast  Evaluation Date: 5/20/2019  Authorization period Expiration: 12/31/19  Plan of Care Certification Period: 7/28/2019  Visit#/authorized: 5/20    Time In: 11:10 pm  Time Out: 12:00pm  Total Billable Time: 50  minutes    Precautions: cancer    Subjective     Pt reports: no pain in right arm.   She was compliant with home exercise program.  Response to previous treatment: good  Functional change: able to use her right arm without pain    Pain: 0/10  Location: right shoulder      Objective     Soo received therapeutic exercises to develop strength, endurance, ROM and posture for 50 minutes including:  -UBE x 2' for shoulder muscle endurance  -pulleys x 2' for flexion and abduction for ROM  -scap retraction with RTB 2x10  -ER/ at wall x 15  -IR with RTB 2x10 BUE  -right shoulder extension with RTB 2x10  -chair push-ups 2x10  -pec stretch at corner  -trap stretch in sitting  -RUE AAROM shoulder flexion (at wall) x 10  -dowel flexion in 90 degrees and abduction in standing 2 x 10  -chest press with dowel in sitting 2 x 10  -Dowel overhead flexion in sitting 2x10    Home Exercises Provided and Patient Education Provided     UPPER EXTREMITY FUNCTIONAL SCALE: Patient scored 35% disability         Education provided:   - updated home program    Written Home Exercises Provided: yes.  Exercises were reviewed and Soo was able to demonstrate them prior to the end of the session.  Soo demonstrated good  understanding of the education provided.       Assessment   Soo is  progressing well towards her goals.   Soo is progressing with strength and is able to perform exercises against gravity (AAROM with dowel). She fatigues easily and requires multiple rest breaks between each set. She is now able to perform full ROM of RUE against gravity.  Pt prognosis is Excellent.     Pt will continue to benefit from skilled outpatient physical therapy to address the deficits listed in the problem list box on initial evaluation, provide pt/family education and to maximize pt's level of independence in the home and community environment.     Pt's spiritual, cultural and educational needs considered and pt agreeable to plan of care and goals.     Anticipated barriers to physical therapy: none anticipated    Goals: (Goals reassessed today)  Short Term Goals: In 4 weeks   1.I with HEP- ongoing goal  2.Patient to increase right shoulder flexion, extension, ER, IR by 20 degrees- goal met  3.Patient to increase MMT strength to 3/5- goal met  4.Patient to have pain less than 3/10 at all times.-goal met  5.Patient to score less than 50% impaired on the UEFS- partially met     Long Term Goals: In 10 weeks  1. Patient to score less than 25% impaired on the UEFS- ongoing  2. Patient to demo increase in RUE strength to 4/5-ongoing  3. Patient to have decreased pain to 1/10 at all times.- goal met  4. Patient to demo increase GH ROM to WNL all planes- goal met  5. Patient to perform daily activities including grooming, cleaning, bathing, bed mobility without limitation.- partially met        Plan   CERTIFICATION PERIOD: 6/28/2019 - 7/28/2019  Recommend continued PT 2x week to improve ROM left shoulder abduction and increase overall strength of left shoulder.       Day Conde, PT

## 2019-07-12 ENCOUNTER — CLINICAL SUPPORT (OUTPATIENT)
Dept: REHABILITATION | Facility: HOSPITAL | Age: 79
End: 2019-07-12
Attending: SURGERY
Payer: MEDICARE

## 2019-07-12 DIAGNOSIS — C50.919 MALIGNANT NEOPLASM OF FEMALE BREAST, UNSPECIFIED ESTROGEN RECEPTOR STATUS, UNSPECIFIED LATERALITY, UNSPECIFIED SITE OF BREAST: Primary | ICD-10-CM

## 2019-07-12 PROCEDURE — 97110 THERAPEUTIC EXERCISES: CPT

## 2019-07-12 PROCEDURE — G6002 PR STEREOSCOPIC XRAY GUIDE FOR RADIATION TX DELIV: ICD-10-PCS | Mod: 26,,, | Performed by: RADIOLOGY

## 2019-07-12 PROCEDURE — 77412 RADIATION TX DELIVERY LVL 3: CPT | Performed by: RADIOLOGY

## 2019-07-12 PROCEDURE — 77387 GUIDANCE FOR RADJ TX DLVR: CPT | Mod: TC | Performed by: RADIOLOGY

## 2019-07-12 PROCEDURE — G6002 STEREOSCOPIC X-RAY GUIDANCE: HCPCS | Mod: 26,,, | Performed by: RADIOLOGY

## 2019-07-15 ENCOUNTER — DOCUMENTATION ONLY (OUTPATIENT)
Dept: RADIATION ONCOLOGY | Facility: CLINIC | Age: 79
End: 2019-07-15

## 2019-07-15 PROCEDURE — 77387 GUIDANCE FOR RADJ TX DLVR: CPT | Mod: TC | Performed by: RADIOLOGY

## 2019-07-15 PROCEDURE — 77417 THER RADIOLOGY PORT IMAGE(S): CPT | Performed by: RADIOLOGY

## 2019-07-15 PROCEDURE — G6002 PR STEREOSCOPIC XRAY GUIDE FOR RADIATION TX DELIV: ICD-10-PCS | Mod: 26,,, | Performed by: RADIOLOGY

## 2019-07-15 PROCEDURE — G6002 STEREOSCOPIC X-RAY GUIDANCE: HCPCS | Mod: 26,,, | Performed by: RADIOLOGY

## 2019-07-15 PROCEDURE — 77412 RADIATION TX DELIVERY LVL 3: CPT | Performed by: RADIOLOGY

## 2019-07-15 NOTE — PLAN OF CARE
Problem: Adult Inpatient Plan of Care  Goal: Plan of Care Review  Outcome: Ongoing (interventions implemented as appropriate)  Day 12 of xrt to right chest wall.C/o Fatigue. No significant tenderness or breakdown with skin. Will continue to monitor.

## 2019-07-16 PROCEDURE — 77387 GUIDANCE FOR RADJ TX DLVR: CPT | Mod: TC | Performed by: RADIOLOGY

## 2019-07-16 PROCEDURE — G6002 PR STEREOSCOPIC XRAY GUIDE FOR RADIATION TX DELIV: ICD-10-PCS | Mod: 26,,, | Performed by: RADIOLOGY

## 2019-07-16 PROCEDURE — 77412 RADIATION TX DELIVERY LVL 3: CPT | Performed by: RADIOLOGY

## 2019-07-16 PROCEDURE — G6002 STEREOSCOPIC X-RAY GUIDANCE: HCPCS | Mod: 26,,, | Performed by: RADIOLOGY

## 2019-07-16 NOTE — PROGRESS NOTES
Physical Therapy Daily Treatment Note     Name: Soo Burrell [facility]  Clinic Number: [id number]    Therapy Diagnosis:   Encounter Diagnosis   Name Primary?    Malignant neoplasm of female breast, unspecified estrogen receptor status, unspecified laterality, unspecified site of breast Yes     Physician: Paul Carey MD    Visit Date: 7/17/2019    Physician Orders: PT Eval and Treat   Medical Diagnosis: malignant neoplasm of female breast  Evaluation Date: 5/20/2019  Authorization period Expiration: 12/31/19  Plan of Care Certification Period: 7/28/2019  Visit#/authorized: 6/20    Time In: 11:00am  Time Out: 11:45M  Total Billable Time:   45 minutes     Precautions: cancer    Subjective     Pt reports: no pain in right arm.   She was compliant with home exercise program.  Response to previous treatment: good  Functional change: able to use her right arm without pain    Pain: 2/10  Location: LEFT  shoulder  (denies right shoulder pain). Reports having pain in the foot due to gout.    Objective     Soo received therapeutic exercises to develop strength, endurance, ROM and posture for 45 minutes including:  -UBE x 3' for shoulder muscle endurance  -pulleys x 2' for flexion and abduction for ROM  -scap retraction with RTB 2x10  -ER/ at wall 2 x 15  -IR with RTB 2x10 BUE  -biceps curls with 2# 2x15  -bilateral shoulder extension with RTB 2x10  -pec stretch at corner  -trap stretch in sitting  -RUE AAROM shoulder flexion (at wall) x 10  -dowel flexion in 90 degrees and abduction in sitting with 1# 2 x 10  -chest press with dowel in sitting 2 x 10  -Dowel overhead flexion in sitting 2x10    Home Exercises Provided and Patient Education Provided     UPPER EXTREMITY FUNCTIONAL SCALE: Patient scored 35% disability         Education provided:   - updated home program    Written Home Exercises Provided: yes.  Exercises were reviewed and Soo was able to demonstrate them prior to the end of the session.  Soo  demonstrated good  understanding of the education provided.       Assessment   Soo is progressing well towards her goals.     Soo is pain free with RUE shoulder (she has reports of LUE shoulder pain with overhead motions). She has improved with muscle endurance by increasing time on UBE. She continues to need multiple rest breaks between each set and each exercise due to impaired activity tolerance.    Pt prognosis is Excellent.     Pt will continue to benefit from skilled outpatient physical therapy to address the deficits listed in the problem list box on initial evaluation, provide pt/family education and to maximize pt's level of independence in the home and community environment.     Pt's spiritual, cultural and educational needs considered and pt agreeable to plan of care and goals.     Anticipated barriers to physical therapy: none anticipated    Goals: (Goals reassessed today)  Short Term Goals: In 4 weeks   1.I with HEP- ongoing goal  2.Patient to increase right shoulder flexion, extension, ER, IR by 20 degrees- goal met  3.Patient to increase MMT strength to 3/5- goal met  4.Patient to have pain less than 3/10 at all times.-goal met  5.Patient to score less than 50% impaired on the UEFS- partially met     Long Term Goals: In 10 weeks  1. Patient to score less than 25% impaired on the UEFS- ongoing  2. Patient to demo increase in RUE strength to 4/5-ongoing  3. Patient to have decreased pain to 1/10 at all times.- goal met  4. Patient to demo increase GH ROM to WNL all planes- goal met  5. Patient to perform daily activities including grooming, cleaning, bathing, bed mobility without limitation.- partially met        Plan   CERTIFICATION PERIOD: 6/28/2019 - 7/28/2019  Recommend continued PT 2x week to improve ROM left shoulder abduction and increase overall strength of left shoulder.       Day Conde, PT

## 2019-07-17 ENCOUNTER — CLINICAL SUPPORT (OUTPATIENT)
Dept: REHABILITATION | Facility: HOSPITAL | Age: 79
End: 2019-07-17
Attending: SURGERY
Payer: MEDICARE

## 2019-07-17 DIAGNOSIS — C50.919 MALIGNANT NEOPLASM OF FEMALE BREAST, UNSPECIFIED ESTROGEN RECEPTOR STATUS, UNSPECIFIED LATERALITY, UNSPECIFIED SITE OF BREAST: Primary | ICD-10-CM

## 2019-07-17 PROCEDURE — G6002 STEREOSCOPIC X-RAY GUIDANCE: HCPCS | Mod: 26,,, | Performed by: RADIOLOGY

## 2019-07-17 PROCEDURE — 77387 GUIDANCE FOR RADJ TX DLVR: CPT | Mod: TC | Performed by: RADIOLOGY

## 2019-07-17 PROCEDURE — G6002 PR STEREOSCOPIC XRAY GUIDE FOR RADIATION TX DELIV: ICD-10-PCS | Mod: 26,,, | Performed by: RADIOLOGY

## 2019-07-17 PROCEDURE — 77412 RADIATION TX DELIVERY LVL 3: CPT | Performed by: RADIOLOGY

## 2019-07-17 PROCEDURE — 97110 THERAPEUTIC EXERCISES: CPT

## 2019-07-18 PROCEDURE — 77412 RADIATION TX DELIVERY LVL 3: CPT | Performed by: RADIOLOGY

## 2019-07-18 PROCEDURE — 77387 GUIDANCE FOR RADJ TX DLVR: CPT | Mod: TC | Performed by: RADIOLOGY

## 2019-07-18 PROCEDURE — G6002 STEREOSCOPIC X-RAY GUIDANCE: HCPCS | Mod: 26,,, | Performed by: RADIOLOGY

## 2019-07-18 PROCEDURE — 77336 RADIATION PHYSICS CONSULT: CPT | Performed by: RADIOLOGY

## 2019-07-18 PROCEDURE — G6002 PR STEREOSCOPIC XRAY GUIDE FOR RADIATION TX DELIV: ICD-10-PCS | Mod: 26,,, | Performed by: RADIOLOGY

## 2019-07-19 ENCOUNTER — CLINICAL SUPPORT (OUTPATIENT)
Dept: REHABILITATION | Facility: HOSPITAL | Age: 79
End: 2019-07-19
Attending: SURGERY
Payer: MEDICARE

## 2019-07-19 DIAGNOSIS — C50.919 MALIGNANT NEOPLASM OF FEMALE BREAST, UNSPECIFIED ESTROGEN RECEPTOR STATUS, UNSPECIFIED LATERALITY, UNSPECIFIED SITE OF BREAST: Primary | ICD-10-CM

## 2019-07-19 PROCEDURE — G6002 PR STEREOSCOPIC XRAY GUIDE FOR RADIATION TX DELIV: ICD-10-PCS | Mod: 26,,, | Performed by: RADIOLOGY

## 2019-07-19 PROCEDURE — 77387 GUIDANCE FOR RADJ TX DLVR: CPT | Mod: TC | Performed by: RADIOLOGY

## 2019-07-19 PROCEDURE — G6002 STEREOSCOPIC X-RAY GUIDANCE: HCPCS | Mod: 26,,, | Performed by: RADIOLOGY

## 2019-07-19 PROCEDURE — 97110 THERAPEUTIC EXERCISES: CPT

## 2019-07-19 PROCEDURE — 77412 RADIATION TX DELIVERY LVL 3: CPT | Performed by: RADIOLOGY

## 2019-07-19 NOTE — PROGRESS NOTES
Physical Therapy Daily Treatment Note     Name: Soo Burrell [facility]  Clinic Number: [id number]    Therapy Diagnosis:   Encounter Diagnosis   Name Primary?    Malignant neoplasm of female breast, unspecified estrogen receptor status, unspecified laterality, unspecified site of breast Yes     Physician: Paul Carey MD    Visit Date: 7/19/2019    Physician Orders: PT Eval and Treat   Medical Diagnosis: malignant neoplasm of female breast  Evaluation Date: 5/20/2019  Authorization period Expiration: 12/31/19  Plan of Care Certification Period: 7/28/2019  Visit#/authorized: 7/20    Time In: 11:00am  Time Out: 11:45am  Total Billable Time:   45 minutes     Precautions: cancer    Subjective     Pt reports: no pain in right arm. Patient reports overall fatigue  She was compliant with home exercise program.  Response to previous treatment: good  Functional change: able to use her right during ADLs     Pain: 0/10  Location: n/a    Objective     Soo received therapeutic exercises to develop strength, endurance, ROM and posture for 45 minutes including:  -UBE x 3' for shoulder muscle endurance  -pulleys x 2' for flexion and abduction for ROM  -scap retraction with RTB 2x10  -ER/ at wall 2 x 15  -IR with RTB 2x10 BUE  -biceps curls with 2# 2x15  -bilateral shoulder extension with RTB 2x10  -pec stretch at corner  -trap stretch in sitting  -RUE AAROM shoulder flexion (at wall) x 10  -dowel flexion in 90 degrees and abduction in sitting with 1#  2 x 10  -chest press with 1# dowel in sitting 2 x 10  -Dowel with 1# overhead flexion in sitting 2x10    Home Exercises Provided and Patient Education Provided     UPPER EXTREMITY FUNCTIONAL SCALE: Patient scored 35% disability         Education provided:   - updated home program    Written Home Exercises Provided: yes.  Exercises were reviewed and Soo was able to demonstrate them prior to the end of the session.  Soo demonstrated good  understanding of the education  provided.       Assessment   Soo is progressing well towards her goals.     Soo is pain free with RUE shoulder (she has reports of LUE shoulder pain with overhead motions). She denied left shoulder pain today. She continues to have impaired activity tolerance requiring multiple rest breaks.    Pt prognosis is Excellent.     Pt will continue to benefit from skilled outpatient physical therapy to address the deficits listed in the problem list box on initial evaluation, provide pt/family education and to maximize pt's level of independence in the home and community environment.     Pt's spiritual, cultural and educational needs considered and pt agreeable to plan of care and goals.     Anticipated barriers to physical therapy: none anticipated    Goals: (Goals reassessed today)  Short Term Goals: In 4 weeks   1.I with HEP- ongoing goal  2.Patient to increase right shoulder flexion, extension, ER, IR by 20 degrees- goal met  3.Patient to increase MMT strength to 3/5- goal met  4.Patient to have pain less than 3/10 at all times.-goal met  5.Patient to score less than 50% impaired on the UEFS- partially met     Long Term Goals: In 10 weeks  1. Patient to score less than 25% impaired on the UEFS- ongoing  2. Patient to demo increase in RUE strength to 4/5-ongoing  3. Patient to have decreased pain to 1/10 at all times.- goal met  4. Patient to demo increase GH ROM to WNL all planes- goal met  5. Patient to perform daily activities including grooming, cleaning, bathing, bed mobility without limitation.- partially met        Plan   CERTIFICATION PERIOD: 6/28/2019 - 7/28/2019  Recommend continued PT 2x week to improve ROM left shoulder abduction and increase overall strength of left shoulder.       Day Conde, PT

## 2019-07-22 PROCEDURE — G6002 STEREOSCOPIC X-RAY GUIDANCE: HCPCS | Mod: 26,,, | Performed by: RADIOLOGY

## 2019-07-22 PROCEDURE — G6002 PR STEREOSCOPIC XRAY GUIDE FOR RADIATION TX DELIV: ICD-10-PCS | Mod: 26,,, | Performed by: RADIOLOGY

## 2019-07-22 PROCEDURE — 77417 THER RADIOLOGY PORT IMAGE(S): CPT | Performed by: RADIOLOGY

## 2019-07-22 PROCEDURE — 77387 GUIDANCE FOR RADJ TX DLVR: CPT | Mod: TC | Performed by: RADIOLOGY

## 2019-07-22 PROCEDURE — 77412 RADIATION TX DELIVERY LVL 3: CPT | Performed by: RADIOLOGY

## 2019-07-23 PROCEDURE — 77412 RADIATION TX DELIVERY LVL 3: CPT | Performed by: RADIOLOGY

## 2019-07-23 PROCEDURE — G6002 PR STEREOSCOPIC XRAY GUIDE FOR RADIATION TX DELIV: ICD-10-PCS | Mod: 26,,, | Performed by: RADIOLOGY

## 2019-07-23 PROCEDURE — G6002 STEREOSCOPIC X-RAY GUIDANCE: HCPCS | Mod: 26,,, | Performed by: RADIOLOGY

## 2019-07-23 PROCEDURE — 77387 GUIDANCE FOR RADJ TX DLVR: CPT | Mod: TC | Performed by: RADIOLOGY

## 2019-07-24 ENCOUNTER — INFUSION (OUTPATIENT)
Dept: INFUSION THERAPY | Facility: HOSPITAL | Age: 79
End: 2019-07-24
Attending: INTERNAL MEDICINE
Payer: MEDICARE

## 2019-07-24 ENCOUNTER — DOCUMENTATION ONLY (OUTPATIENT)
Dept: RADIATION ONCOLOGY | Facility: CLINIC | Age: 79
End: 2019-07-24

## 2019-07-24 DIAGNOSIS — C50.919 TRIPLE NEGATIVE MALIGNANT NEOPLASM OF BREAST: Primary | ICD-10-CM

## 2019-07-24 PROCEDURE — 77387 GUIDANCE FOR RADJ TX DLVR: CPT | Mod: TC | Performed by: RADIOLOGY

## 2019-07-24 PROCEDURE — A4216 STERILE WATER/SALINE, 10 ML: HCPCS | Performed by: NURSE PRACTITIONER

## 2019-07-24 PROCEDURE — 63600175 PHARM REV CODE 636 W HCPCS: Performed by: NURSE PRACTITIONER

## 2019-07-24 PROCEDURE — G6002 STEREOSCOPIC X-RAY GUIDANCE: HCPCS | Mod: 26,,, | Performed by: RADIOLOGY

## 2019-07-24 PROCEDURE — 77412 RADIATION TX DELIVERY LVL 3: CPT | Performed by: RADIOLOGY

## 2019-07-24 PROCEDURE — G6002 PR STEREOSCOPIC XRAY GUIDE FOR RADIATION TX DELIV: ICD-10-PCS | Mod: 26,,, | Performed by: RADIOLOGY

## 2019-07-24 PROCEDURE — 96523 IRRIG DRUG DELIVERY DEVICE: CPT

## 2019-07-24 PROCEDURE — 25000003 PHARM REV CODE 250: Performed by: NURSE PRACTITIONER

## 2019-07-24 RX ORDER — SODIUM CHLORIDE 0.9 % (FLUSH) 0.9 %
10 SYRINGE (ML) INJECTION
Status: COMPLETED | OUTPATIENT
Start: 2019-07-24 | End: 2019-07-24

## 2019-07-24 RX ORDER — SODIUM CHLORIDE 0.9 % (FLUSH) 0.9 %
10 SYRINGE (ML) INJECTION
Status: CANCELLED | OUTPATIENT
Start: 2019-07-24

## 2019-07-24 RX ORDER — HEPARIN 100 UNIT/ML
500 SYRINGE INTRAVENOUS
Status: CANCELLED | OUTPATIENT
Start: 2019-07-24

## 2019-07-24 RX ORDER — HEPARIN 100 UNIT/ML
500 SYRINGE INTRAVENOUS
Status: COMPLETED | OUTPATIENT
Start: 2019-07-24 | End: 2019-07-24

## 2019-07-24 RX ADMIN — Medication 500 UNITS: at 11:07

## 2019-07-24 RX ADMIN — SODIUM CHLORIDE, PRESERVATIVE FREE 10 ML: 5 INJECTION INTRAVENOUS at 11:07

## 2019-07-24 NOTE — NURSING
"Pt here for Mediport Flush. Right chestwall mediport accessed with a 20g 1" rizvi via sterile technique.  Excellent blood return noted.  Flushed with 10ml NS and 5 ml heparin solution.  Needle D/C, site without redness, swelling, or drainage noted.  Dressing applied.  Patient tolerated well.     "

## 2019-07-24 NOTE — PLAN OF CARE
Problem: Adult Inpatient Plan of Care  Goal: Plan of Care Review  Outcome: Ongoing (interventions implemented as appropriate)  Day 19 outpatient xrt to right chest wall. c/o soreness to right axilla where the drainage tube was. Using miaderm cream. No sore swallowing, no SOB. Will continue to monitor.

## 2019-07-25 PROCEDURE — 77387 GUIDANCE FOR RADJ TX DLVR: CPT | Mod: TC | Performed by: RADIOLOGY

## 2019-07-25 PROCEDURE — G6002 STEREOSCOPIC X-RAY GUIDANCE: HCPCS | Mod: 26,,, | Performed by: RADIOLOGY

## 2019-07-25 PROCEDURE — 77336 RADIATION PHYSICS CONSULT: CPT | Performed by: RADIOLOGY

## 2019-07-25 PROCEDURE — G6002 PR STEREOSCOPIC XRAY GUIDE FOR RADIATION TX DELIV: ICD-10-PCS | Mod: 26,,, | Performed by: RADIOLOGY

## 2019-07-25 PROCEDURE — 77412 RADIATION TX DELIVERY LVL 3: CPT | Performed by: RADIOLOGY

## 2019-07-26 PROCEDURE — 77387 GUIDANCE FOR RADJ TX DLVR: CPT | Mod: TC | Performed by: RADIOLOGY

## 2019-07-26 PROCEDURE — G6002 STEREOSCOPIC X-RAY GUIDANCE: HCPCS | Mod: 26,,, | Performed by: RADIOLOGY

## 2019-07-26 PROCEDURE — 77412 RADIATION TX DELIVERY LVL 3: CPT | Performed by: RADIOLOGY

## 2019-07-26 PROCEDURE — G6002 PR STEREOSCOPIC XRAY GUIDE FOR RADIATION TX DELIV: ICD-10-PCS | Mod: 26,,, | Performed by: RADIOLOGY

## 2019-07-29 ENCOUNTER — CLINICAL SUPPORT (OUTPATIENT)
Dept: REHABILITATION | Facility: HOSPITAL | Age: 79
End: 2019-07-29
Attending: SURGERY
Payer: MEDICARE

## 2019-07-29 DIAGNOSIS — C50.919 MALIGNANT NEOPLASM OF FEMALE BREAST, UNSPECIFIED ESTROGEN RECEPTOR STATUS, UNSPECIFIED LATERALITY, UNSPECIFIED SITE OF BREAST: Primary | ICD-10-CM

## 2019-07-29 PROCEDURE — 97110 THERAPEUTIC EXERCISES: CPT

## 2019-07-29 PROCEDURE — G6002 PR STEREOSCOPIC XRAY GUIDE FOR RADIATION TX DELIV: ICD-10-PCS | Mod: 26,,, | Performed by: RADIOLOGY

## 2019-07-29 PROCEDURE — 77417 THER RADIOLOGY PORT IMAGE(S): CPT | Performed by: RADIOLOGY

## 2019-07-29 PROCEDURE — 77412 RADIATION TX DELIVERY LVL 3: CPT | Performed by: RADIOLOGY

## 2019-07-29 PROCEDURE — G6002 STEREOSCOPIC X-RAY GUIDANCE: HCPCS | Mod: 26,,, | Performed by: RADIOLOGY

## 2019-07-29 PROCEDURE — 77387 GUIDANCE FOR RADJ TX DLVR: CPT | Mod: TC | Performed by: RADIOLOGY

## 2019-07-29 NOTE — PROGRESS NOTES
Physical Therapy Daily Treatment Note     Name: Soo Burrell [facility] Number: [id number]    Therapy Diagnosis:   Encounter Diagnosis   Name Primary?    Malignant neoplasm of female breast, unspecified estrogen receptor status, unspecified laterality, unspecified site of breast Yes     Physician: Paul Carey MD    Visit Date: 7/29/2019    Physician Orders: PT Eval and Treat   Medical Diagnosis: malignant neoplasm of female breast  Evaluation Date: 5/20/2019  Authorization period Expiration: 12/31/19  Plan of Care Certification Period: 7/28/2019    Visit#/authorized: 8/20    Time In: 1100  Time Out: 1130  Total Billable Time:   30 minutes     Precautions: cancer    Subjective     Pt reports: pain from skin irritation between right arm and abdomen  She was compliant with home exercise program.  Response to previous treatment: good  Functional change: having difficulty using right arm due to skin irritation    Pain: 4/10  Location: skin irritation between RUE and abdomen    Objective     Soo received therapeutic exercises to develop strength, endurance, ROM and posture for 45 minutes including:  -UBE x 3' for shoulder muscle endurance- unable to perform today due to pain from skin irritation  -pulleys x 2' for flexion and abduction for ROM- unable to tolerate  -ball [place] CW and CCW x 15 each way  -scap retraction with RTB 2x10  -ER/ at wall 2 x 15  -IR with RTB 2x10 BUE- unable to tolerate  -biceps curls with 2# 2x15  -bilateral shoulder extension with RTB 2x10- unable to tolerate  -pec stretch at corner  -trap stretch in sitting  -dowel flexion in 90 degrees and abduction in sitting with 1#  2 x 10  -chest press with 1# dowel in sitting 2 x 10  -Dowel with 1# overhead flexion in sitting 2x10  -sit to stand x 10 for endurance  -recumbent bike x 1.75 minutes for endurance    Home Exercises Provided and Patient Education Provided     UPPER EXTREMITY FUNCTIONAL SCALE: Patient scored 35% disability          Education provided:   - updated home program to add endurance activities    Written Home Exercises Provided: yes.  Exercises were reviewed and Soo was able to demonstrate them prior to the end of the session.  Soo demonstrated good  understanding of the education provided.       Assessment   Soo is progressing well towards her goals with the RUE. She was unable to tolerate some of the exercises today due to a skin irritation from radiation. Her overall activity tolerance declined today. She could only tolerate 30 minutes of therapy due to impaired endurance.      Pt prognosis is Excellent.     Pt will continue to benefit from skilled outpatient physical therapy to address the deficits listed in the problem list box on initial evaluation, provide pt/family education and to maximize pt's level of independence in the home and community environment.     Pt's spiritual, cultural and educational needs considered and pt agreeable to plan of care and goals.     Anticipated barriers to physical therapy: none anticipated    Goals: (Goals reassessed today)  Short Term Goals: In 4 weeks   1.I with HEP- ongoing goal  2.Patient to increase right shoulder flexion, extension, ER, IR by 20 degrees- goal met  3.Patient to increase MMT strength to 3/5- goal met  4.Patient to have pain less than 3/10 at all times.-goal met   5.Patient to score less than 50% impaired on the UEFS- partially met     Long Term Goals: In 10 weeks  1. Patient to score less than 25% impaired on the UEFS- ongoing  2. Patient to demo increase in RUE strength to 4/5-ongoing  3. Patient to have decreased pain to 1/10 at all times.- ongoing goal New pain present due to radiation and skin irritation   4. Patient to demo increase GH ROM to WNL all planes- goal met  5. Patient to perform daily activities including grooming, cleaning, bathing, bed mobility without limitation.- partially met  6 Patient to perform recumbent bike x 5-7 minutes - new  LTG         Plan   CERTIFICATION PERIOD: 6/28/2019 - 7/28/2019  Recommend continued PT 2x week to improve ROM left shoulder abduction and increase overall strength of left shoulder.       Day Conde, PT

## 2019-07-29 NOTE — PROGRESS NOTES
Outpatient Therapy Updated Plan of Care     Visit Date: 7/29/2019  Name: Soo Burrell [facility] Number: [id number]    Therapy Diagnosis:   Encounter Diagnosis   Name Primary?    Malignant neoplasm of female breast, unspecified estrogen receptor status, unspecified laterality, unspecified site of breast Yes     Physician: Paul Carey MD    Physician Orders: PT TO EVAL AND TREAT  Medical Diagnosis: MALIGNANT NEOPLASM OF FEMALE BREAST  Evaluation Date: 5/20/2019    Total Visits Received: 8    Current Certification Period:  6/28/2019 to 7/28/2019  Precautions:  STANDARD, CANCER  Visits from Evaluation Date:  8  Functional Level Prior to Evaluation:  Independent without pain, sedentary lifestyle    Subjective     Update: patient has been pain-free in right shoulder with activity. She currently has pain due to effects of radiation on skin.    Objective     Update:   Strength: manual muscle test grades below   Upper Extremity Strength    (L) UE (R) UE   Shoulder flexion: 3+/5 3+/5   Shoulder Abduction: 3-/5 3/5   Shoulder IR 3+/5 3+/5   Shoulder ER 3+/5 3/5   Elbow flexion: 4/5 4/5   Elbow extension: 4/5 4/5   Lower Trap: 3+/5 3/5   Middle Trap: 3+/5 3/5    4/5 3/5       Shoulder Range of Motion:   ACTIVE ROM LEFT RIGHT   Flexion 160 160   Abduction 160 160   Horizontal Abd wnl wnl   Extension wnl wnl   IR wnl wnl   ER wnl 60   IR/90deg wnl wnl   ER/90deg wnl 60         Coordination:   - fine motor: WFL  - UE coordination: intact     - LE coordination:  Not tested      Functional Mobility (Bed mobility, transfers)  Bed mobility: I =  independent   Roll to left: I  Roll to right: I  Supine to prone: I  Scooting to edge of bed: I  Supine to sit: I  Sit to supine: I  Transfers to bed: I  Transfers to toilet: I  Sit to stand:  I  Stand pivot:  I  Car transfers: I        ADL's:  Feeding: I = independent   Grooming: I  Hygiene: I  UB Dressing: I  LB Dressing: I  Toileting: I  Bathing: set up assist     Gait  Assessment:   - AD used: none  - Assistance: independent  - Distance: household distances      Endurance Deficit: slight endurance deficit due to recent chemotherapy treatments and sedentary lifestyle      Assessment     Update: Shoulder strength has improved but overall activity tolerance is impaired. It was recommended that she perform some kind of endurance activity daily I.E walking, peddle bike for legs, sit to stand activity.    Previous Short Term Goals Status:  Continue per original short term goals  New Short Term Goals Status:   Continue per original short term goals; New LTG added for activity tolerance goal  Long Term Goal Status:   continue per initial plan of care.  Reasons for Recertification of Therapy:   Improve activity tolerance and upper extremity strength     Short Term Goals: In 4 weeks   1.I with HEP- ongoing goal  2.Patient to increase right shoulder flexion, extension, ER, IR by 20 degrees- goal met  3.Patient to increase MMT strength to 3/5- goal met  4.Patient to have pain less than 3/10 at all times.-goal met   5.Patient to score less than 50% impaired on the UEFS- partially met     Long Term Goals: In 10 weeks  1. Patient to score less than 25% impaired on the UEFS- ongoing  2. Patient to demo increase in RUE strength to 4/5-ongoing  3. Patient to have decreased pain to 1/10 at all times.- ongoing goal New pain present due to radiation and skin irritation   4. Patient to demo increase GH ROM to WNL all planes- goal met  5. Patient to perform daily activities including grooming, cleaning, bathing, bed mobility without limitation.- partially met  6 Patient to perform recumbent bike x 5-7 minutes - new LTG    Plan     Updated Certification Period: 7/29/2019 to 8/28/2019  Recommended Treatment Plan: 2 times per week for 4 weeks: Therapeutic Exercise  Other Recommendations: recommended purchase of a peddle bike for an endurance activity at home    Day Conde, PT  7/29/2019      I CERTIFY  THE NEED FOR THESE SERVICES FURNISHED UNDER THIS PLAN OF TREATMENT AND WHILE UNDER MY CARE    Physician's comments:        Physician's Signature: ___________________________________________________

## 2019-07-30 PROCEDURE — 77412 RADIATION TX DELIVERY LVL 3: CPT | Performed by: RADIOLOGY

## 2019-07-30 PROCEDURE — 77387 GUIDANCE FOR RADJ TX DLVR: CPT | Mod: TC | Performed by: RADIOLOGY

## 2019-07-30 PROCEDURE — G6002 STEREOSCOPIC X-RAY GUIDANCE: HCPCS | Mod: 26,,, | Performed by: RADIOLOGY

## 2019-07-30 PROCEDURE — G6002 PR STEREOSCOPIC XRAY GUIDE FOR RADIATION TX DELIV: ICD-10-PCS | Mod: 26,,, | Performed by: RADIOLOGY

## 2019-07-31 ENCOUNTER — DOCUMENTATION ONLY (OUTPATIENT)
Dept: RADIATION ONCOLOGY | Facility: CLINIC | Age: 79
End: 2019-07-31

## 2019-07-31 ENCOUNTER — CLINICAL SUPPORT (OUTPATIENT)
Dept: REHABILITATION | Facility: HOSPITAL | Age: 79
End: 2019-07-31
Attending: SURGERY
Payer: MEDICARE

## 2019-07-31 DIAGNOSIS — C50.919 MALIGNANT NEOPLASM OF FEMALE BREAST, UNSPECIFIED ESTROGEN RECEPTOR STATUS, UNSPECIFIED LATERALITY, UNSPECIFIED SITE OF BREAST: Primary | ICD-10-CM

## 2019-07-31 DIAGNOSIS — L58.9 RADIATION DERMATITIS: Primary | ICD-10-CM

## 2019-07-31 PROCEDURE — 77412 RADIATION TX DELIVERY LVL 3: CPT | Performed by: RADIOLOGY

## 2019-07-31 PROCEDURE — G6002 PR STEREOSCOPIC XRAY GUIDE FOR RADIATION TX DELIV: ICD-10-PCS | Mod: 26,,, | Performed by: RADIOLOGY

## 2019-07-31 PROCEDURE — 77387 GUIDANCE FOR RADJ TX DLVR: CPT | Mod: TC | Performed by: RADIOLOGY

## 2019-07-31 PROCEDURE — G6002 STEREOSCOPIC X-RAY GUIDANCE: HCPCS | Mod: 26,,, | Performed by: RADIOLOGY

## 2019-07-31 PROCEDURE — 97110 THERAPEUTIC EXERCISES: CPT

## 2019-07-31 RX ORDER — HYDROCODONE BITARTRATE AND ACETAMINOPHEN 5; 325 MG/1; MG/1
TABLET ORAL
Qty: 30 TABLET | Refills: 0 | Status: SHIPPED | OUTPATIENT
Start: 2019-07-31 | End: 2019-08-07 | Stop reason: SDUPTHER

## 2019-07-31 RX ORDER — LIDOCAINE 50 MG/G
OINTMENT TOPICAL
Qty: 35.44 G | Refills: 5 | Status: SHIPPED | OUTPATIENT
Start: 2019-07-31 | End: 2019-08-22 | Stop reason: SDUPTHER

## 2019-07-31 NOTE — PLAN OF CARE
Problem: Adult Inpatient Plan of Care  Goal: Plan of Care Review  Outcome: Ongoing (interventions implemented as appropriate)  Day 24 outpatient xrt to right chest wall. C/o soreness to right axilla area with intermitten pain 5/10. has moist desquamation to treatment area, patient has Mepilex for area. Will continue to monitor.

## 2019-07-31 NOTE — PROGRESS NOTES
Physical Therapy Daily Treatment Note     Name: Soo Burrell [facility] Number: [id number]    Therapy Diagnosis:   Encounter Diagnosis   Name Primary?    Malignant neoplasm of female breast, unspecified estrogen receptor status, unspecified laterality, unspecified site of breast Yes     Physician: Paul Carey MD    Visit Date: 7/31/2019    Physician Orders: PT Eval and Treat   Medical Diagnosis: malignant neoplasm of female breast  Evaluation Date: 5/20/2019  Authorization period Expiration: 12/31/19  Plan of Care Certification Period: 7/28/2019    Visit#/authorized: 9/20    Time In:11:20 PM  Time Out: 11:55PM  Total Billable Time:  55  minutes     Precautions: cancer    Subjective     Pt reports: pain from skin irritation between right arm and abdomen.   She was compliant with home exercise program.  Response to previous treatment: good  Functional change: having difficulty using right arm due to skin irritation    Pain: 4/10  Location: skin irritation between RUE and abdomen    Objective     Soo received therapeutic exercises to develop strength, endurance, ROM and posture for 30 minutes including:  -UBE x 3' for shoulder muscle endurance- performed with right arm abducted away from abdomen  -pulleys x 2' foR abduction only  -ball [place] CW and CCW x 15 each way  -shoulder flexion with swiss ball (flexion) BUE 2x10 reps (standing)  -scap retraction with RTB 2x10- not performed  -ER/ at wall 2 x 15   -IR with RTB 2x10 BUE- unable to tolerate  -biceps curls with 2# 2x15  -bilateral shoulder extension with RTB 2x10- unable to tolerate  -pec stretch at corner  -trap stretch in sitting- not performed today  -chest press with 1# dowel in sitting 2 x 10  -sit to stand x 10 for endurance (without UE support)  -Nu-step x 3 minutes for endurance    Home Exercises Provided and Patient Education Provided     UPPER EXTREMITY FUNCTIONAL SCALE: Patient scored 35% disability         Education provided:   -  updated home program to add endurance activities    Written Home Exercises Provided: yes.  Exercises were reviewed and Soo was able to demonstrate them prior to the end of the session.  Soo demonstrated good  understanding of the education provided.       Assessment   Soo is progressing well towards her goals with the RUE.     She is tolerating activity tolerance exercises better. Some of the UE exercises could not be performed due to skin irritation from radiation    Pt prognosis is Excellent.     Pt will continue to benefit from skilled outpatient physical therapy to address the deficits listed in the problem list box on initial evaluation, provide pt/family education and to maximize pt's level of independence in the home and community environment.     Pt's spiritual, cultural and educational needs considered and pt agreeable to plan of care and goals.     Anticipated barriers to physical therapy: none anticipated    Goals:   Short Term Goals: In 4 weeks   1.I with HEP- ongoing goal  2.Patient to increase right shoulder flexion, extension, ER, IR by 20 degrees- goal met  3.Patient to increase MMT strength to 3/5- goal met  4.Patient to have pain less than 3/10 at all times.-goal met   5.Patient to score less than 50% impaired on the UEFS- partially met     Long Term Goals: In 10 weeks  1. Patient to score less than 25% impaired on the UEFS- ongoing  2. Patient to demo increase in RUE strength to 4/5-ongoing  3. Patient to have decreased pain to 1/10 at all times.- ongoing goal New pain present due to radiation and skin irritation   4. Patient to demo increase GH ROM to WNL all planes- goal met  5. Patient to perform daily activities including grooming, cleaning, bathing, bed mobility without limitation.- partially met  6 Patient to perform recumbent bike x 5-7 minutes - new LTG         Plan   CERTIFICATION PERIOD:  7/29/2019 to 8/28/2019  Recommend continued PT 2x week to improve ROM left shoulder abduction  and increase overall strength of left shoulder.       Day Conde, PT

## 2019-08-01 ENCOUNTER — HOSPITAL ENCOUNTER (OUTPATIENT)
Dept: RADIATION THERAPY | Facility: HOSPITAL | Age: 79
Discharge: HOME | End: 2019-08-01
Attending: RADIOLOGY
Payer: MEDICARE

## 2019-08-01 PROCEDURE — G6002 STEREOSCOPIC X-RAY GUIDANCE: HCPCS | Mod: 26,,, | Performed by: RADIOLOGY

## 2019-08-01 PROCEDURE — 77387 GUIDANCE FOR RADJ TX DLVR: CPT | Mod: TC | Performed by: RADIOLOGY

## 2019-08-01 PROCEDURE — 77412 RADIATION TX DELIVERY LVL 3: CPT | Performed by: RADIOLOGY

## 2019-08-01 PROCEDURE — 77336 RADIATION PHYSICS CONSULT: CPT | Performed by: RADIOLOGY

## 2019-08-01 PROCEDURE — G6002 PR STEREOSCOPIC XRAY GUIDE FOR RADIATION TX DELIV: ICD-10-PCS | Mod: 26,,, | Performed by: RADIOLOGY

## 2019-08-02 PROCEDURE — 77338 DESIGN MLC DEVICE FOR IMRT: CPT | Mod: TC,59 | Performed by: RADIOLOGY

## 2019-08-02 PROCEDURE — 77385 HC IMRT, SIMPLE: CPT | Performed by: RADIOLOGY

## 2019-08-02 PROCEDURE — 77300 RADIATION THERAPY DOSE PLAN: CPT | Mod: TC | Performed by: RADIOLOGY

## 2019-08-02 PROCEDURE — 77338 PR  MLC IMRT DESIGN & CONSTRUCTION PER IMRT PLAN: ICD-10-PCS | Mod: 26,59,, | Performed by: RADIOLOGY

## 2019-08-02 PROCEDURE — 77321 SPECIAL TELETX PORT PLAN: CPT | Mod: TC | Performed by: RADIOLOGY

## 2019-08-02 PROCEDURE — 77334 RADIATION TREATMENT AID(S): CPT | Mod: TC | Performed by: RADIOLOGY

## 2019-08-02 PROCEDURE — 77301 PR  INTEN MOD RADIOTHER PLAN W/DOSE VOL HIST: ICD-10-PCS | Mod: 26,,, | Performed by: RADIOLOGY

## 2019-08-02 PROCEDURE — 77301 RADIOTHERAPY DOSE PLAN IMRT: CPT | Mod: TC | Performed by: RADIOLOGY

## 2019-08-02 PROCEDURE — 77338 DESIGN MLC DEVICE FOR IMRT: CPT | Mod: 26,59,, | Performed by: RADIOLOGY

## 2019-08-02 PROCEDURE — 77301 RADIOTHERAPY DOSE PLAN IMRT: CPT | Mod: 26,,, | Performed by: RADIOLOGY

## 2019-08-05 ENCOUNTER — TELEPHONE (OUTPATIENT)
Dept: PHARMACY | Facility: CLINIC | Age: 79
End: 2019-08-05

## 2019-08-05 PROCEDURE — G6002 PR STEREOSCOPIC XRAY GUIDE FOR RADIATION TX DELIV: ICD-10-PCS | Mod: 26,,, | Performed by: RADIOLOGY

## 2019-08-05 PROCEDURE — 77385 HC IMRT, SIMPLE: CPT | Performed by: RADIOLOGY

## 2019-08-05 PROCEDURE — G6002 STEREOSCOPIC X-RAY GUIDANCE: HCPCS | Mod: 26,,, | Performed by: RADIOLOGY

## 2019-08-05 NOTE — TELEPHONE ENCOUNTER
Good Morning.    The prior authorization for Ms. Holly's Lidocaine oint. has been approved through 11/2/2019.    The patient has been notified and is aware of the medication approval.    Thanks.    Aida Gutierrez CPhT.  Patient Care Advocate  [facility]  [email or web address]

## 2019-08-06 PROCEDURE — G6002 PR STEREOSCOPIC XRAY GUIDE FOR RADIATION TX DELIV: ICD-10-PCS | Mod: 26,,, | Performed by: RADIOLOGY

## 2019-08-06 PROCEDURE — 77385 HC IMRT, SIMPLE: CPT | Performed by: RADIOLOGY

## 2019-08-06 PROCEDURE — G6002 STEREOSCOPIC X-RAY GUIDANCE: HCPCS | Mod: 26,,, | Performed by: RADIOLOGY

## 2019-08-07 ENCOUNTER — DOCUMENTATION ONLY (OUTPATIENT)
Dept: RADIATION ONCOLOGY | Facility: CLINIC | Age: 79
End: 2019-08-07

## 2019-08-07 DIAGNOSIS — L58.9 RADIATION DERMATITIS: ICD-10-CM

## 2019-08-07 PROCEDURE — G6002 PR STEREOSCOPIC XRAY GUIDE FOR RADIATION TX DELIV: ICD-10-PCS | Mod: 26,,, | Performed by: RADIOLOGY

## 2019-08-07 PROCEDURE — 77385 HC IMRT, SIMPLE: CPT | Performed by: RADIOLOGY

## 2019-08-07 PROCEDURE — G6002 STEREOSCOPIC X-RAY GUIDANCE: HCPCS | Mod: 26,,, | Performed by: RADIOLOGY

## 2019-08-07 RX ORDER — HYDROCODONE BITARTRATE AND ACETAMINOPHEN 5; 325 MG/1; MG/1
TABLET ORAL
Qty: 30 TABLET | Refills: 0 | Status: SHIPPED | OUTPATIENT
Start: 2019-08-07 | End: 2019-08-22 | Stop reason: SDUPTHER

## 2019-08-07 NOTE — PLAN OF CARE
Problem: Adult Inpatient Plan of Care  Goal: Plan of Care Review  Outcome: Ongoing (interventions implemented as appropriate)  Day 29 outpatient xrt to right chest wall. C/O  intermitten pain 8/10 to right axilla area. treatment area has moist desquamination. Itching noted to upper cheast area. Patient has Lidocain Ointment with her today to appy when dressing wound. Will continue skintegrity, aquaphor, lidocaine and dressing. Will continue to monitor.

## 2019-08-08 ENCOUNTER — DOCUMENTATION ONLY (OUTPATIENT)
Dept: RADIATION ONCOLOGY | Facility: CLINIC | Age: 79
End: 2019-08-08

## 2019-08-08 PROCEDURE — 77336 RADIATION PHYSICS CONSULT: CPT | Performed by: RADIOLOGY

## 2019-08-08 PROCEDURE — G6002 PR STEREOSCOPIC XRAY GUIDE FOR RADIATION TX DELIV: ICD-10-PCS | Mod: 26,,, | Performed by: RADIOLOGY

## 2019-08-08 PROCEDURE — G6002 STEREOSCOPIC X-RAY GUIDANCE: HCPCS | Mod: 26,,, | Performed by: RADIOLOGY

## 2019-08-08 PROCEDURE — 77385 HC IMRT, SIMPLE: CPT | Performed by: RADIOLOGY

## 2019-08-09 NOTE — PLAN OF CARE
Problem: Adult Inpatient Plan of Care  Goal: Plan of Care Review  Outcome: Outcome(s) achieved Date Met: 08/09/19  Completed outpatient xrt to right chest wall today 8-8-19. Will make f/u appt.

## 2019-08-15 ENCOUNTER — OFFICE VISIT (OUTPATIENT)
Dept: SURGERY | Facility: CLINIC | Age: 79
End: 2019-08-15
Payer: MEDICARE

## 2019-08-15 ENCOUNTER — TELEPHONE (OUTPATIENT)
Dept: RADIATION ONCOLOGY | Facility: CLINIC | Age: 79
End: 2019-08-15

## 2019-08-15 VITALS
HEART RATE: 89 BPM | BODY MASS INDEX: 35.12 KG/M2 | SYSTOLIC BLOOD PRESSURE: 111 MMHG | WEIGHT: 192 LBS | TEMPERATURE: 98 F | DIASTOLIC BLOOD PRESSURE: 73 MMHG

## 2019-08-15 DIAGNOSIS — Z17.1 MALIGNANT NEOPLASM OF UPPER-OUTER QUADRANT OF RIGHT BREAST IN FEMALE, ESTROGEN RECEPTOR NEGATIVE: Primary | ICD-10-CM

## 2019-08-15 DIAGNOSIS — R23.4 SKIN DESQUAMATION: ICD-10-CM

## 2019-08-15 DIAGNOSIS — C50.411 MALIGNANT NEOPLASM OF UPPER-OUTER QUADRANT OF RIGHT BREAST IN FEMALE, ESTROGEN RECEPTOR NEGATIVE: Primary | ICD-10-CM

## 2019-08-15 PROCEDURE — 99999 PR PBB SHADOW E&M-EST. PATIENT-LVL III: ICD-10-PCS | Mod: PBBFAC,,, | Performed by: SURGERY

## 2019-08-15 PROCEDURE — 99024 POSTOP FOLLOW-UP VISIT: CPT | Mod: POP,,, | Performed by: SURGERY

## 2019-08-15 PROCEDURE — 99999 PR PBB SHADOW E&M-EST. PATIENT-LVL III: CPT | Mod: PBBFAC,,, | Performed by: SURGERY

## 2019-08-15 PROCEDURE — 99024 PR POST-OP FOLLOW-UP VISIT: ICD-10-PCS | Mod: POP,,, | Performed by: SURGERY

## 2019-08-15 PROCEDURE — 99213 OFFICE O/P EST LOW 20 MIN: CPT | Mod: PBBFAC | Performed by: SURGERY

## 2019-08-15 NOTE — TELEPHONE ENCOUNTER
made call to see how patient was doing after completing xrt to chest wall last week. Patient stated skin was still peeling off but still doing skin care as directed. Reviewed skin care and reminded patient to call back if worsening or SS of infection. Reminded patient of f/u appt next week for skin check. Patient verbalized understanding.

## 2019-08-15 NOTE — PROGRESS NOTES
Subjective:       Patient ID: Soo Holly is a 79 y.o. female.    Follow-up after mastectomy for inflammatory breast    Chief Complaint: Follow-up    Patient reports skin de epithelialization from radiation.  She reports no arm swelling.  She has right chest wall skin pain.  There is no chest wall swelling or axillary small    Review of Systems   Skin:        Right chest wall pain from the radiation treatment       Objective:      Physical Exam   Constitutional: She appears well-developed and well-nourished. No distress.   Skin:   The mastectomy incision is healed.  There is no evidence of fluid collections.  There is no axillary fluid collections.  There is significant epithelial loss and areas of bleeding in the area of radiation to the chest wall and axilla   Vitals reviewed.        Pathology was reviewed again    FINAL PATHOLOGIC DIAGNOSIS  1. Right breast and axillary contents, mastectomy:  Invasive carcinoma no special type (invasive ductal carcinoma, not otherwise specified), high-grade, 49 mm,  ypT2 N2a.  Tumor is present focally in dermal lymphatics.  Five of five lymph nodes positive for carcinoma (5/5).  See note and synoptic report below.  2. Right axillary contents:  Four of eight lymph nodes positive for carcinoma (4/8).  3. Possible subpectoral lymph node:  Benign fibroadipose tissue.  Negative for carcinoma.  No lymphoid tissue identified.    Assessment:       1. Malignant neoplasm of upper-outer quadrant of right breast in female, estrogen receptor negative    2. Skin desquamation        Plan:        No need for continued general surgical follow-up.    Patient should return to physical therapy per radiation oncology once the skin changes have resolved.    Patient was reminded to seek care should she developed lymphedema or swelling of the right arm

## 2019-08-15 NOTE — PATIENT INSTRUCTIONS
The area of irritated skin get worse please let Dr. Plata know.    Make sure that you get back to physical therapy after the skin irritation from the radiation has healed.    We will see you back in general surgery as needed

## 2019-08-20 NOTE — PROGRESS NOTES
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 ypT2 ypN2a   1. 18 had a diagnostic bilateral mammogram due to palpable fullness in the right breast. This showed an area 10 cm deep to the nipple at 3:00 position irregular asymmetric increased density with spiculation which was the area marked with a skin marker and measured 27 mm. This was not present in 2013.   2. 12/3/18 right breast core biopsy returned invasive ductal carcinoma, grade 3, ER negative, MT negative, her 2 Stormy nonamplified, Ki-67 60%.   3. She was seen by Dr. Pop who noted on physical exam inflammatory breast cancer with edema of the skin.   4. 18 PET scan showed multiple FDG avid right axillary lymph nodes in level 1 and 2 at up to 17.2 SUV and possibly tiny mildly FDG avid level 3 lymph nodes more superiorly at 2.5 SUV. There were FDG avid lymph nodes in the anterior mediastinum/superior right internal mammary chain at 5.3 SUV. There was a focal area of uptake inferior to the medial clavicle just above the costochondral junction of the 1st rib likely an FDG avid lymph node at 8.8 SUV. There was diffuse uptake in the right central breast and diffusely in the skin and inferior breast at 6.8 SUV and a focal uptake superior right breast. There was intense uptake in the left deltoid muscle at 7.1 SUV which was focal. There was also focal 4.8 SUV right deltoid uptake. On my review there are at least 10 suspicious right axillary lymph nodes, and at least 3 right superior internal mammary lymph nodes.   5. 19 skin punch biopsy of the right breast returned invasive ductal carcinoma in the subcutaneous tissue and dermal lymphatics..   6. 18 [facility] testing ordered.   7. 19 started chemotherapy with dose dense AC with Taxol.   8. 19 right axillary ultrasound-guided  "biopsy with clip placement was performed of an abnormal lymph node. This returned invasive ductal carcinoma.   9. 4/12/19 completed ddAC + T (3 cycles taxol)   10. 5/8/19 PET showed resolution of size and FDG avidity of right axillary and internal mammary nodes. There were no FDG avid right breast masses. There was persistent right breast skin thickening at 3 SUV. There was no metastatic disease.   11. 5/22/19 right modified radical mastectomy 4.9 cm invasive ductal carcinoma, grade 3, focal dermal lymphatic invasion, 9/13 lymph nodes involved largest deposit 4 mm, no extranodal extension, negative margins 10 mm closest margin deep, lymph-vascular invasion. There was a probable for definite tumor response in the breast and lymph nodes. ypT2 ypN2a   12. 8/8/19 completed chest wall and regional xochilt radiation 50 Gy with 10 Gy mastectomy incision boost     CURRENT STATUS: Soo Holly is a pleasant 79 y.o. female who presents today for follow-up.  She saw Dr. Carey.  She feels like her skin is healing but very slowly.  It is painful and she is taking the Norco 5 mg which last for about 2 hr.  She is using dressing changes 2 to 3 times a day with Aquaphor.  She is also doing a skin integrity.  Her son is helping change the dressing.  She was not able to use Mepilex because it did not stick.  She cannot use tape because it peels the skin.    PHYSICAL EXAM:   Constitutional: well appearing, no acute distress, ECOG 2 - Ambulates, capable of self care only  Vitals:    /75   Pulse 84   Temp 96.8 °F (36 °C)   Resp 18   Ht 5' 2" (1.575 m)   Wt 86 kg (189 lb 9.6 oz)   SpO2 98%   BMI 34.68 kg/m²   Skin:  Grade 3 radiation dermatitis lateral portion of the mastectomy incision corresponding electron boost.  Resolving grade 3 radiation dermatitis superior inferior to this area.  In the medial chest wall is hyperpigmented skin which may desquamated soon.    Laboratory & X-Ray Findings: Per above.  "     ASSESSMENT:  Slowly resolving radiation dermatitis    PLAN:   1. Inflammatory breast cancer:  She will follow closely with Medical Oncology and I.  We may need to consider baseline imaging given her node positivity in the internal mammary and supraclavicular regions which were not dissected.  2. Radiation dermatitis:  She will continue Aquaphor dressing changes 2 to 3 times a day.  When she removes the dressing, if it is sticky she needs to saturate with water to avoid peeling skin.  She also should continue Skintegrity let it sit for 5 min today and then rinse.  3. Pain:  I refilled her Norco 5 mg tablets.  She can take 2 once or twice a day if the pain is severe.  She is having good bowel movements, but will need to get on a stool softener if she has constipation.    REBECCA Plata M.D.  Radiation Oncology  [facility] [address] [place] [address]  Ph: [phone]  [email or web address]

## 2019-08-22 ENCOUNTER — OFFICE VISIT (OUTPATIENT)
Dept: RADIATION ONCOLOGY | Facility: CLINIC | Age: 79
End: 2019-08-22
Payer: MEDICARE

## 2019-08-22 VITALS
RESPIRATION RATE: 18 BRPM | OXYGEN SATURATION: 98 % | DIASTOLIC BLOOD PRESSURE: 75 MMHG | WEIGHT: 189.63 LBS | BODY MASS INDEX: 34.89 KG/M2 | HEART RATE: 84 BPM | SYSTOLIC BLOOD PRESSURE: 123 MMHG | TEMPERATURE: 97 F | HEIGHT: 62 IN

## 2019-08-22 DIAGNOSIS — L58.9 RADIATION DERMATITIS: ICD-10-CM

## 2019-08-22 DIAGNOSIS — C50.919 TRIPLE NEGATIVE MALIGNANT NEOPLASM OF BREAST: Primary | ICD-10-CM

## 2019-08-22 PROCEDURE — 99999 PR PBB SHADOW E&M-EST. PATIENT-LVL III: CPT | Mod: PBBFAC,,, | Performed by: RADIOLOGY

## 2019-08-22 PROCEDURE — 99214 OFFICE O/P EST MOD 30 MIN: CPT | Mod: S$PBB,,, | Performed by: RADIOLOGY

## 2019-08-22 PROCEDURE — 99214 PR OFFICE/OUTPT VISIT, EST, LEVL IV, 30-39 MIN: ICD-10-PCS | Mod: S$PBB,,, | Performed by: RADIOLOGY

## 2019-08-22 PROCEDURE — 99213 OFFICE O/P EST LOW 20 MIN: CPT | Mod: PBBFAC | Performed by: RADIOLOGY

## 2019-08-22 PROCEDURE — 99999 PR PBB SHADOW E&M-EST. PATIENT-LVL III: ICD-10-PCS | Mod: PBBFAC,,, | Performed by: RADIOLOGY

## 2019-08-22 RX ORDER — HYDROCODONE BITARTRATE AND ACETAMINOPHEN 5; 325 MG/1; MG/1
TABLET ORAL
Qty: 50 TABLET | Refills: 0 | Status: SHIPPED | OUTPATIENT
Start: 2019-08-22 | End: 2019-08-22

## 2019-08-22 RX ORDER — LIDOCAINE 50 MG/G
OINTMENT TOPICAL
Qty: 35.44 G | Refills: 5 | Status: SHIPPED | OUTPATIENT
Start: 2019-08-22 | End: 2019-09-06 | Stop reason: SDUPTHER

## 2019-08-22 RX ORDER — HYDROCODONE BITARTRATE AND ACETAMINOPHEN 5; 325 MG/1; MG/1
TABLET ORAL
Qty: 50 TABLET | Refills: 0 | Status: SHIPPED | OUTPATIENT
Start: 2019-08-22 | End: 2019-09-04 | Stop reason: SDUPTHER

## 2019-09-03 NOTE — PROGRESS NOTES
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 ypT2 ypN2a   1. 18 had a diagnostic bilateral mammogram due to palpable fullness in the right breast. This showed an area 10 cm deep to the nipple at 3:00 position irregular asymmetric increased density with spiculation which was the area marked with a skin marker and measured 27 mm. This was not present in 2013.   2. 12/3/18 right breast core biopsy returned invasive ductal carcinoma, grade 3, ER negative, NE negative, her 2 Stormy nonamplified, Ki-67 60%.   3. She was seen by Dr. Pop who noted on physical exam inflammatory breast cancer with edema of the skin.   4. 18 PET scan showed multiple FDG avid right axillary lymph nodes in level 1 and 2 at up to 17.2 SUV and possibly tiny mildly FDG avid level 3 lymph nodes more superiorly at 2.5 SUV. There were FDG avid lymph nodes in the anterior mediastinum/superior right internal mammary chain at 5.3 SUV. There was a focal area of uptake inferior to the medial clavicle just above the costochondral junction of the 1st rib likely an FDG avid lymph node at 8.8 SUV. There was diffuse uptake in the right central breast and diffusely in the skin and inferior breast at 6.8 SUV and a focal uptake superior right breast. There was intense uptake in the left deltoid muscle at 7.1 SUV which was focal. There was also focal 4.8 SUV right deltoid uptake. On my review there are at least 10 suspicious right axillary lymph nodes, and at least 3 right superior internal mammary lymph nodes.   5. 19 skin punch biopsy of the right breast returned invasive ductal carcinoma in the subcutaneous tissue and dermal lymphatics..   6. 18 [facility] testing ordered.   7. 19 started chemotherapy with dose dense AC with Taxol.   8. 19 right axillary ultrasound-guided  "biopsy with clip placement was performed of an abnormal lymph node. This returned invasive ductal carcinoma.   9. 4/12/19 completed ddAC + T (3 cycles taxol)   10. 5/8/19 PET showed resolution of size and FDG avidity of right axillary and internal mammary nodes. There were no FDG avid right breast masses. There was persistent right breast skin thickening at 3 SUV. There was no metastatic disease.   11. 5/22/19 right modified radical mastectomy 4.9 cm invasive ductal carcinoma, grade 3, focal dermal lymphatic invasion, 9/13 lymph nodes involved largest deposit 4 mm, no extranodal extension, negative margins 10 mm closest margin deep, lymph-vascular invasion. There was a probable for definite tumor response in the breast and lymph nodes. ypT2 ypN2a   12. 8/8/19 completed chest wall and regional xochilt radiation 50 Gy with 10 Gy mastectomy incision boost    CURRENT STATUS: Soo Holly is a pleasant 79 y.o. female who presents today for follow-up.  She has been using her skin regimen we discuss which is skin integrity, Aquaphor, Lidocaine and narcotic pain medicine.  She is having regular bowel movements.  Her pain is improving.  She is keeping the area dressed.    PHYSICAL EXAM:   Constitutional: well appearing, no acute distress, ECOG 1 - Ambulates, capable of light work  Vitals:    BP (!) 142/77   Pulse 96   Temp 97.6 °F (36.4 °C)   Resp 18   Ht 5' 2" (1.575 m)   Wt 86.4 kg (190 lb 6.4 oz)   SpO2 98%   BMI 34.82 kg/m²   Breast:  Most of the skin as dry desquamation which is resolving with hyperpigmented skin.  There is some edema of the lateral chest wall/back.  There is a continued area of dry desquamation hypopigmentation in the lateral portion of the mastectomy incision.    Laboratory & X-Ray Findings: Per above.      ASSESSMENT:  Recovering well from acute radiation toxicity    PLAN:  She will continue the current skin care and pain regimen.  I will see her back in 3 weeks.    REBECCA Plata, " M.D.  Radiation Oncology  [facility]  [id number] [place] [address] [place] [address]  Ph: [phone]  [email or web address]

## 2019-09-04 DIAGNOSIS — L58.9 RADIATION DERMATITIS: ICD-10-CM

## 2019-09-04 DIAGNOSIS — C80.1 CANCER: Primary | ICD-10-CM

## 2019-09-04 RX ORDER — HYDROCODONE BITARTRATE AND ACETAMINOPHEN 5; 325 MG/1; MG/1
TABLET ORAL
Qty: 50 TABLET | Refills: 0 | Status: SHIPPED | OUTPATIENT
Start: 2019-09-04 | End: 2019-09-16 | Stop reason: SDUPTHER

## 2019-09-06 ENCOUNTER — OFFICE VISIT (OUTPATIENT)
Dept: RADIATION ONCOLOGY | Facility: CLINIC | Age: 79
End: 2019-09-06
Payer: MEDICARE

## 2019-09-06 ENCOUNTER — INFUSION (OUTPATIENT)
Dept: INFUSION THERAPY | Facility: HOSPITAL | Age: 79
End: 2019-09-06
Attending: INTERNAL MEDICINE
Payer: MEDICARE

## 2019-09-06 VITALS
SYSTOLIC BLOOD PRESSURE: 142 MMHG | RESPIRATION RATE: 18 BRPM | OXYGEN SATURATION: 98 % | HEIGHT: 62 IN | TEMPERATURE: 98 F | BODY MASS INDEX: 35.03 KG/M2 | WEIGHT: 190.38 LBS | HEART RATE: 96 BPM | DIASTOLIC BLOOD PRESSURE: 77 MMHG

## 2019-09-06 DIAGNOSIS — D53.9 MACROCYTIC ANEMIA: ICD-10-CM

## 2019-09-06 DIAGNOSIS — L58.9 RADIATION DERMATITIS: ICD-10-CM

## 2019-09-06 DIAGNOSIS — C50.919 TRIPLE NEGATIVE MALIGNANT NEOPLASM OF BREAST: Primary | ICD-10-CM

## 2019-09-06 LAB
ALBUMIN SERPL BCP-MCNC: 3 G/DL (ref 3.5–5.2)
ALP SERPL-CCNC: 95 U/L (ref 55–135)
ALT SERPL W/O P-5'-P-CCNC: 22 U/L (ref 10–44)
ANION GAP SERPL CALC-SCNC: 11 MMOL/L (ref 8–16)
AST SERPL-CCNC: 26 U/L (ref 10–40)
BASOPHILS # BLD AUTO: 0.01 K/UL (ref 0–0.2)
BASOPHILS NFR BLD: 0.2 % (ref 0–1.9)
BILIRUB SERPL-MCNC: 0.3 MG/DL (ref 0.1–1)
BUN SERPL-MCNC: 8 MG/DL (ref 8–23)
CALCIUM SERPL-MCNC: 10 MG/DL (ref 8.7–10.5)
CHLORIDE SERPL-SCNC: 105 MMOL/L (ref 95–110)
CO2 SERPL-SCNC: 26 MMOL/L (ref 23–29)
CREAT SERPL-MCNC: 0.7 MG/DL (ref 0.5–1.4)
DIFFERENTIAL METHOD: ABNORMAL
EOSINOPHIL # BLD AUTO: 0.1 K/UL (ref 0–0.5)
EOSINOPHIL NFR BLD: 1.1 % (ref 0–8)
ERYTHROCYTE [DISTWIDTH] IN BLOOD BY AUTOMATED COUNT: 18.5 % (ref 11.5–14.5)
EST. GFR  (AFRICAN AMERICAN): >60 ML/MIN/1.73 M^2
EST. GFR  (NON AFRICAN AMERICAN): >60 ML/MIN/1.73 M^2
FERRITIN SERPL-MCNC: 397 NG/ML (ref 20–300)
GLUCOSE SERPL-MCNC: 130 MG/DL (ref 70–110)
HCT VFR BLD AUTO: 31.4 % (ref 37–48.5)
HGB BLD-MCNC: 10 G/DL (ref 12–16)
IRON SERPL-MCNC: 38 UG/DL (ref 30–160)
LDH SERPL L TO P-CCNC: 185 U/L (ref 110–260)
LYMPHOCYTES # BLD AUTO: 1.6 K/UL (ref 1–4.8)
LYMPHOCYTES NFR BLD: 35.6 % (ref 18–48)
MCH RBC QN AUTO: 27.9 PG (ref 27–31)
MCHC RBC AUTO-ENTMCNC: 31.8 G/DL (ref 32–36)
MCV RBC AUTO: 88 FL (ref 82–98)
MONOCYTES # BLD AUTO: 0.4 K/UL (ref 0.3–1)
MONOCYTES NFR BLD: 7.9 % (ref 4–15)
NEUTROPHILS # BLD AUTO: 2.5 K/UL (ref 1.8–7.7)
NEUTROPHILS NFR BLD: 55.4 % (ref 38–73)
PLATELET # BLD AUTO: 156 K/UL (ref 150–350)
PMV BLD AUTO: 8.8 FL (ref 9.2–12.9)
POTASSIUM SERPL-SCNC: 3.2 MMOL/L (ref 3.5–5.1)
PROT SERPL-MCNC: 7.1 G/DL (ref 6–8.4)
RBC # BLD AUTO: 3.59 M/UL (ref 4–5.4)
SATURATED IRON: 14 % (ref 20–50)
SODIUM SERPL-SCNC: 142 MMOL/L (ref 136–145)
TOTAL IRON BINDING CAPACITY: 272 UG/DL (ref 250–450)
TRANSFERRIN SERPL-MCNC: 184 MG/DL (ref 200–375)
WBC # BLD AUTO: 4.44 K/UL (ref 3.9–12.7)

## 2019-09-06 PROCEDURE — 99213 OFFICE O/P EST LOW 20 MIN: CPT | Mod: S$PBB,,, | Performed by: RADIOLOGY

## 2019-09-06 PROCEDURE — 83540 ASSAY OF IRON: CPT

## 2019-09-06 PROCEDURE — A4216 STERILE WATER/SALINE, 10 ML: HCPCS | Performed by: INTERNAL MEDICINE

## 2019-09-06 PROCEDURE — 83615 LACTATE (LD) (LDH) ENZYME: CPT

## 2019-09-06 PROCEDURE — 99213 PR OFFICE/OUTPT VISIT, EST, LEVL III, 20-29 MIN: ICD-10-PCS | Mod: S$PBB,,, | Performed by: RADIOLOGY

## 2019-09-06 PROCEDURE — 99999 PR PBB SHADOW E&M-EST. PATIENT-LVL IV: ICD-10-PCS | Mod: PBBFAC,,, | Performed by: RADIOLOGY

## 2019-09-06 PROCEDURE — 80053 COMPREHEN METABOLIC PANEL: CPT

## 2019-09-06 PROCEDURE — 82728 ASSAY OF FERRITIN: CPT

## 2019-09-06 PROCEDURE — 85025 COMPLETE CBC W/AUTO DIFF WBC: CPT

## 2019-09-06 PROCEDURE — 99214 OFFICE O/P EST MOD 30 MIN: CPT | Mod: PBBFAC,25 | Performed by: RADIOLOGY

## 2019-09-06 PROCEDURE — 99999 PR PBB SHADOW E&M-EST. PATIENT-LVL IV: CPT | Mod: PBBFAC,,, | Performed by: RADIOLOGY

## 2019-09-06 PROCEDURE — 25000003 PHARM REV CODE 250: Performed by: INTERNAL MEDICINE

## 2019-09-06 PROCEDURE — 63600175 PHARM REV CODE 636 W HCPCS: Performed by: INTERNAL MEDICINE

## 2019-09-06 PROCEDURE — 36591 DRAW BLOOD OFF VENOUS DEVICE: CPT

## 2019-09-06 RX ORDER — SODIUM CHLORIDE 0.9 % (FLUSH) 0.9 %
10 SYRINGE (ML) INJECTION
Status: CANCELLED | OUTPATIENT
Start: 2019-09-06

## 2019-09-06 RX ORDER — LIDOCAINE 50 MG/G
OINTMENT TOPICAL
Qty: 35.44 G | Refills: 5 | Status: SHIPPED | OUTPATIENT
Start: 2019-09-06 | End: 2019-09-16 | Stop reason: SDUPTHER

## 2019-09-06 RX ORDER — SODIUM CHLORIDE 0.9 % (FLUSH) 0.9 %
10 SYRINGE (ML) INJECTION
Status: COMPLETED | OUTPATIENT
Start: 2019-09-06 | End: 2019-09-06

## 2019-09-06 RX ORDER — HEPARIN 100 UNIT/ML
500 SYRINGE INTRAVENOUS
Status: CANCELLED | OUTPATIENT
Start: 2019-09-06

## 2019-09-06 RX ORDER — HEPARIN 100 UNIT/ML
500 SYRINGE INTRAVENOUS
Status: COMPLETED | OUTPATIENT
Start: 2019-09-06 | End: 2019-09-06

## 2019-09-06 RX ADMIN — Medication 10 ML: at 09:09

## 2019-09-06 RX ADMIN — HEPARIN 500 UNITS: 100 SYRINGE at 09:09

## 2019-09-06 NOTE — NURSING
"Pt here for Mediport Flush. ___R___ chestwall mediport accessed with a 20g 1" rizvi via sterile technique.  Excellent blood return noted. Labs drawn per md order Flushed with 10ml NS and 5 ml heparin solution.  Needle D/C, site without redness, swelling, or drainage noted.  Dressing applied.  Patient tolerated well.  Patient to return to clinic on 9/16/19___     "

## 2019-09-13 NOTE — TELEPHONE ENCOUNTER
Patient's son,Jasper called requesting a refill on Ms Holly's pain medicine. Dr Plata is out of the office today, will route to Dr George. I discussed with Dr George & he said she should have enough pain medicine that she shouldn't need anymore. I called the patient & talked with her, she said she isn't completely out but she doesn't want to run out. I reminded her the medicine is prescribed to take 1 pill every 6 hours as needed. She said the skin in the treatment area is getting better but she has several spots under her arm that the pain is excruciating. She denies any drainage, no fever, no s/s of infection. She has an appt with Dr Pop on Monday 9/16, she will have him check it. Informed her Dr Plata will back on Thursday, if it isn't better we could get her in to have him check it for her. Patient verbalized understanding.

## 2019-09-16 ENCOUNTER — OFFICE VISIT (OUTPATIENT)
Dept: RADIATION ONCOLOGY | Facility: CLINIC | Age: 79
End: 2019-09-16
Payer: MEDICARE

## 2019-09-16 ENCOUNTER — OFFICE VISIT (OUTPATIENT)
Dept: HEMATOLOGY/ONCOLOGY | Facility: CLINIC | Age: 79
End: 2019-09-16
Payer: MEDICARE

## 2019-09-16 VITALS
OXYGEN SATURATION: 99 % | HEART RATE: 94 BPM | DIASTOLIC BLOOD PRESSURE: 86 MMHG | WEIGHT: 189.19 LBS | TEMPERATURE: 98 F | BODY MASS INDEX: 34.82 KG/M2 | SYSTOLIC BLOOD PRESSURE: 146 MMHG | HEIGHT: 62 IN | RESPIRATION RATE: 20 BRPM

## 2019-09-16 VITALS
DIASTOLIC BLOOD PRESSURE: 86 MMHG | HEART RATE: 94 BPM | BODY MASS INDEX: 34.93 KG/M2 | OXYGEN SATURATION: 99 % | SYSTOLIC BLOOD PRESSURE: 146 MMHG | TEMPERATURE: 98 F | HEIGHT: 62 IN | WEIGHT: 189.81 LBS | RESPIRATION RATE: 20 BRPM

## 2019-09-16 DIAGNOSIS — C80.1 CANCER: ICD-10-CM

## 2019-09-16 DIAGNOSIS — L58.9 RADIATION DERMATITIS: Primary | ICD-10-CM

## 2019-09-16 DIAGNOSIS — L58.9 RADIATION DERMATITIS: ICD-10-CM

## 2019-09-16 DIAGNOSIS — C50.919 TRIPLE NEGATIVE MALIGNANT NEOPLASM OF BREAST: Primary | ICD-10-CM

## 2019-09-16 DIAGNOSIS — C50.919 TRIPLE NEGATIVE MALIGNANT NEOPLASM OF BREAST: ICD-10-CM

## 2019-09-16 PROCEDURE — 99214 OFFICE O/P EST MOD 30 MIN: CPT | Mod: S$PBB,ICN,CMP, | Performed by: RADIOLOGY

## 2019-09-16 PROCEDURE — 99999 PR PBB SHADOW E&M-EST. PATIENT-LVL IV: CPT | Mod: PBBFAC,,, | Performed by: RADIOLOGY

## 2019-09-16 PROCEDURE — 99214 PR OFFICE/OUTPT VISIT, EST, LEVL IV, 30-39 MIN: ICD-10-PCS | Mod: 25,S$PBB,, | Performed by: INTERNAL MEDICINE

## 2019-09-16 PROCEDURE — 99999 PR PBB SHADOW E&M-EST. PATIENT-LVL III: CPT | Mod: PBBFAC,,, | Performed by: INTERNAL MEDICINE

## 2019-09-16 PROCEDURE — 99214 OFFICE O/P EST MOD 30 MIN: CPT | Mod: PBBFAC,27 | Performed by: RADIOLOGY

## 2019-09-16 PROCEDURE — 99214 PR OFFICE/OUTPT VISIT, EST, LEVL IV, 30-39 MIN: ICD-10-PCS | Mod: S$PBB,ICN,CMP, | Performed by: RADIOLOGY

## 2019-09-16 PROCEDURE — 99214 OFFICE O/P EST MOD 30 MIN: CPT | Mod: 25,S$PBB,, | Performed by: INTERNAL MEDICINE

## 2019-09-16 PROCEDURE — 99999 PR PBB SHADOW E&M-EST. PATIENT-LVL IV: ICD-10-PCS | Mod: PBBFAC,,, | Performed by: RADIOLOGY

## 2019-09-16 PROCEDURE — 99213 OFFICE O/P EST LOW 20 MIN: CPT | Mod: PBBFAC | Performed by: INTERNAL MEDICINE

## 2019-09-16 PROCEDURE — 99999 PR PBB SHADOW E&M-EST. PATIENT-LVL III: ICD-10-PCS | Mod: PBBFAC,,, | Performed by: INTERNAL MEDICINE

## 2019-09-16 RX ORDER — LIDOCAINE 50 MG/G
OINTMENT TOPICAL
Qty: 35.44 G | Refills: 5 | Status: SHIPPED | OUTPATIENT
Start: 2019-09-16 | End: 2019-10-02 | Stop reason: SDUPTHER

## 2019-09-16 RX ORDER — HYDROCODONE BITARTRATE AND ACETAMINOPHEN 5; 325 MG/1; MG/1
TABLET ORAL
Qty: 50 TABLET | Refills: 0 | Status: SHIPPED | OUTPATIENT
Start: 2019-09-16 | End: 2019-10-03

## 2019-09-16 NOTE — PROGRESS NOTES
Subjective:       Patient ID: Soo Holly is a 79 y.o. female.    Chief Complaint: Results and Cancer    HPI 79-year-old female history of triple negative breast cancer node positive patients with radiation therapies plan pain on right chest wall in right posterior axillary region patient presents for review    Past Medical History:   Diagnosis Date    Cancer     R Breast cancer    Chest pain     Hyperlipidemia     Hypertension      Family History   Problem Relation Age of Onset    Breast cancer Sister      Social History     Socioeconomic History    Marital status:      Spouse name: Not on file    Number of children: Not on file    Years of education: Not on file    Highest education level: Not on file   Occupational History    Not on file   Social Needs    Financial resource strain: Not on file    Food insecurity:     Worry: Not on file     Inability: Not on file    Transportation needs:     Medical: Not on file     Non-medical: Not on file   Tobacco Use    Smoking status: Never Smoker    Smokeless tobacco: Never Used   Substance and Sexual Activity    Alcohol use: No     Frequency: Never    Drug use: No    Sexual activity: Not Currently   Lifestyle    Physical activity:     Days per week: Not on file     Minutes per session: Not on file    Stress: Not on file   Relationships    Social connections:     Talks on phone: Not on file     Gets together: Not on file     Attends Restorationist service: Not on file     Active member of club or organization: Not on file     Attends meetings of clubs or organizations: Not on file     Relationship status: Not on file   Other Topics Concern    Not on file   Social History Narrative    Lives in [place], [place] (1.5h).  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     Past Surgical History:   Procedure Laterality Date    cataracts      EYE SURGERY      HYSTERECTOMY      INSERTION, PORT-A-CATH Right 1/16/2019    Performed by Prem Massey MD at [facility] OR    JOINT  REPLACEMENT Right     Knee    MASTECTOMY, MODIFIED RADICAL Right 5/22/2019    Performed by Paul Carey MD at [facility] OR       Labs:  Lab Results   Component Value Date    WBC 4.44 09/06/2019    HGB 10.0 (L) 09/06/2019    HCT 31.4 (L) 09/06/2019    MCV 88 09/06/2019     09/06/2019     BMP  Lab Results   Component Value Date     09/06/2019    K 3.2 (L) 09/06/2019     09/06/2019    CO2 26 09/06/2019    BUN 8 09/06/2019    CREATININE 0.7 09/06/2019    CALCIUM 10.0 09/06/2019    ANIONGAP 11 09/06/2019    ESTGFRAFRICA >60 09/06/2019    EGFRNONAA >60 09/06/2019     Lab Results   Component Value Date    ALT 22 09/06/2019    AST 26 09/06/2019    ALKPHOS 95 09/06/2019    BILITOT 0.3 09/06/2019       Lab Results   Component Value Date    IRON 38 09/06/2019    TIBC 272 09/06/2019    FERRITIN 397 (H) 09/06/2019     No results found for: [id number]  No results found for: FOLATE  No results found for: TSH      Review of Systems   Constitutional: Positive for activity change and fatigue. Negative for appetite change, chills, diaphoresis, fever and unexpected weight change.   HENT: Negative for congestion, dental problem, drooling, ear discharge, ear pain, facial swelling, hearing loss, mouth sores, nosebleeds, postnasal drip, rhinorrhea, sinus pressure, sneezing, sore throat, tinnitus, trouble swallowing and voice change.    Eyes: Negative for photophobia, pain, discharge, redness, itching and visual disturbance.   Respiratory: Positive for chest tightness. Negative for cough, choking, shortness of breath, wheezing and stridor.    Cardiovascular: Positive for chest pain. Negative for palpitations and leg swelling.   Gastrointestinal: Negative for abdominal distention, abdominal pain, anal bleeding, blood in stool, constipation, diarrhea, nausea, rectal pain and vomiting.   Endocrine: Negative for cold intolerance, heat intolerance, polydipsia, polyphagia and polyuria.   Genitourinary: Negative for decreased  urine volume, difficulty urinating, dyspareunia, dysuria, enuresis, flank pain, frequency, genital sores, hematuria, menstrual problem, pelvic pain, urgency, vaginal bleeding, vaginal discharge and vaginal pain.   Musculoskeletal: Negative for arthralgias, back pain, gait problem, joint swelling, myalgias, neck pain and neck stiffness.   Skin: Negative for color change, pallor and rash.   Allergic/Immunologic: Negative for environmental allergies, food allergies and immunocompromised state.   Neurological: Positive for weakness. Negative for dizziness, tremors, seizures, syncope, facial asymmetry, speech difficulty, light-headedness, numbness and headaches.   Hematological: Negative for adenopathy. Does not bruise/bleed easily.   Psychiatric/Behavioral: Positive for dysphoric mood. Negative for agitation, behavioral problems, confusion, decreased concentration, hallucinations, self-injury, sleep disturbance and suicidal ideas. The patient is nervous/anxious. The patient is not hyperactive.        Objective:      Physical Exam   Constitutional: She is oriented to person, place, and time. She appears well-developed and well-nourished. She appears distressed.   HENT:   Head: Normocephalic and atraumatic.   Right Ear: External ear normal.   Left Ear: External ear normal.   Nose: Nose normal. Right sinus exhibits no maxillary sinus tenderness and no frontal sinus tenderness. Left sinus exhibits no maxillary sinus tenderness and no frontal sinus tenderness.   Mouth/Throat: Oropharynx is clear and moist. No oropharyngeal exudate.   Eyes: Pupils are equal, round, and reactive to light. Conjunctivae, EOM and lids are normal. Right eye exhibits no discharge. Left eye exhibits no discharge. Right conjunctiva is not injected. Right conjunctiva has no hemorrhage. Left conjunctiva is not injected. Left conjunctiva has no hemorrhage. No scleral icterus.   Neck: Normal range of motion. Neck supple. No JVD present. No tracheal  deviation present. No thyromegaly present.   Cardiovascular: Normal rate and regular rhythm.   Pulmonary/Chest: Effort normal. No stridor. No respiratory distress. She exhibits no tenderness.       Abdominal: Soft. She exhibits no distension and no mass. There is no splenomegaly or hepatomegaly. There is no tenderness. There is no rebound.   Musculoskeletal: Normal range of motion. She exhibits no edema or tenderness.   Lymphadenopathy:     She has no cervical adenopathy.     She has no axillary adenopathy.        Right: No supraclavicular adenopathy present.        Left: No supraclavicular adenopathy present.   Neurological: She is alert and oriented to person, place, and time. No cranial nerve deficit. Coordination normal.   Skin: Skin is dry. No rash noted. She is not diaphoretic. No erythema.   Psychiatric: She has a normal mood and affect. Her behavior is normal. Judgment and thought content normal.   Vitals reviewed.          Assessment:      1. Radiation dermatitis    2. Cancer    3. Triple negative malignant neoplasm of breast           Plan:     Spoke with Radiation Oncology will have patient reassess today.  To see whether not radiation alone or whether not surgical intervention is warranted at this point flush port Q 6 weeks return 12 weeks for follow-up improving laboratory studies status post mastectomy        Yaniv Pop Jr, MD FACP

## 2019-09-16 NOTE — PROGRESS NOTES
[facility] [place]  RADIATION ONCOLOGY CONSULTATION    Name: Soo Holly  : 1940      Patient Referred To Radiation Oncology By:  Dr. Sheridan referring provider defined for this encounter.    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 ypT2 ypN2a   1. 18 had a diagnostic bilateral mammogram due to palpable fullness in the right breast. This showed an area 10 cm deep to the nipple at 3:00 position irregular asymmetric increased density with spiculation which was the area marked with a skin marker and measured 27 mm. This was not present in 2013.   2. 12/3/18 right breast core biopsy returned invasive ductal carcinoma, grade 3, ER negative, ME negative, her 2 Stormy nonamplified, Ki-67 60%.   3. She was seen by Dr. Pop who noted on physical exam inflammatory breast cancer with edema of the skin.   4. 18 PET scan showed multiple FDG avid right axillary lymph nodes in level 1 and 2 at up to 17.2 SUV and possibly tiny mildly FDG avid level 3 lymph nodes more superiorly at 2.5 SUV. There were FDG avid lymph nodes in the anterior mediastinum/superior right internal mammary chain at 5.3 SUV. There was a focal area of uptake inferior to the medial clavicle just above the costochondral junction of the 1st rib likely an FDG avid lymph node at 8.8 SUV. There was diffuse uptake in the right central breast and diffusely in the skin and inferior breast at 6.8 SUV and a focal uptake superior right breast. There was intense uptake in the left deltoid muscle at 7.1 SUV which was focal. There was also focal 4.8 SUV right deltoid uptake. On my review there are at least 10 suspicious right axillary lymph nodes, and at least 3 right superior internal mammary lymph nodes.   5. 19 skin punch biopsy of the right breast returned invasive ductal carcinoma in the subcutaneous tissue and dermal lymphatics..   6. 18 [employer] Genetic testing ordered.    7. 1/18/19 started chemotherapy with dose dense AC with Taxol.   8. 1/24/19 right axillary ultrasound-guided biopsy with clip placement was performed of an abnormal lymph node. This returned invasive ductal carcinoma.   9. 4/12/19 completed ddAC + T (3 cycles taxol)   10. 5/8/19 PET showed resolution of size and FDG avidity of right axillary and internal mammary nodes. There were no FDG avid right breast masses. There was persistent right breast skin thickening at 3 SUV. There was no metastatic disease.   11. 5/22/19 right modified radical mastectomy 4.9 cm invasive ductal carcinoma, grade 3, focal dermal lymphatic invasion, 9/13 lymph nodes involved largest deposit 4 mm, no extranodal extension, negative margins 10 mm closest margin deep, lymph-vascular invasion. There was a probable for definite tumor response in the breast and lymph nodes. ypT2 ypN2a   12. 8/8/19 completed chest wall and regional xochilt radiation 50 Gy with 10 Gy mastectomy incision boost    CURRENT STATUS:  Soo Holly is a 79 y.o. female who presents for follow up for the above diagnosis. She was most recently seen by Dr. Plata on 9/6/19 but was sent today by Dr. Pop for assessment of skin toxicity and possible seroma. She travels from a considerable distance so I was able to evaluate her today.  She has continued on Aquaphor, aloe, daily dressings, lidocaine for skin care and Norco 5/325 q6hrs for pain medication, recently refilled today by Dr. Pop.  Today, she notes improving pain in her chest wall and improved skin desquamation. However, she notes increasing pain in her right axilla and posterior right chest wall with swelling and skin thickening. She denies weight loss, decreased appetite, or bony pain. She also denies any new lumps or masses in her axillae or neck.    PHYSICAL EXAMINATION:  Constitutional: well appearing, no acute distress, ECOG 2 - Ambulates, capable of self care only  Vitals:    BP (!) 146/86   Pulse 94   " Temp 97.5 °F (36.4 °C)   Resp 20   Ht 5' 2" (1.575 m)   Wt 85.8 kg (189 lb 3.2 oz)   SpO2 99%   BMI 34.61 kg/m²   Eyes: sclera anicteric, EOMI, pupils equal, round and reactive to light  ENT: oral cavity without lesions, moist mucous membranes  Neck: trachea midline, neck supple  Lymphatic: no cervical, supraclavicular or axillary adenopathy  Breast: Right breast surgically absent. Well healed skin in radiation portal with exception of desquamation over mastectomy scar. Posterior lateral chest wall with palpable seroma and thick, indurated, discolored skin with peau d'orange type changes; painful to palpation  Cardiovascular: regular rate, no murmurs, no edema of the upper or lower extremities, radial pulse 2+  Respiratory: unlabored effort, clear to auscultation, no wheezes  Abdomen: soft, non-tender, no rigidity, no masses, no hepatomegaly  Neuro: Cranial nerves III-XII intact, speech not slurred, gait non-ataxic, no dysdiadochokinesia, strength 5/5 upper and lower extremities, reflexes patellar, brachioradialis and biceps 2+  Spine: non-tender to percussion cervical, thoracic and lumbosacral spine        IMAGING AND LABORATORY FINDINGS: Per above; none new    ASSESSMENT: Persistent radiation skin toxicity mainly in post-mastectomy scar bed but with new posterior seroma and skin changes concerning for skin recurrence given inflammatory history    PLAN: Ms. Holly still has post-radiation skin toxicity especially in the mastectomy scar boost area, but this is slowly healing and she has adequate skin care and pain medication at this point. However, I am concerned about skin recurrence given her exam and appearance. I spoke with Dr. Carey, her surgeon, about potentially performing biopsy of the skin as the area is of concern for recurrence given her high risk histology (and also less likely possibly draining seroma).  However, he was dealing with an urgent case and recommended setting her up in clinic " later this with Dr. Chappell or another surgeon for punch biopsy as soon as possible. We have communicated with their staff. I discussed the need for skin biopsy with the patient and her son and we will await results of biopsy.    Maulik George III, M.D.  Radiation Oncology  [facility] [address] [place] [address]  Ph: [phone]  [email or web address]

## 2019-09-19 ENCOUNTER — OFFICE VISIT (OUTPATIENT)
Dept: SURGERY | Facility: CLINIC | Age: 79
End: 2019-09-19
Payer: MEDICARE

## 2019-09-19 DIAGNOSIS — L98.9 SKIN LESIONS: Primary | ICD-10-CM

## 2019-09-19 PROCEDURE — 11104 PUNCH BX SKIN SINGLE LESION: CPT | Mod: PBBFAC | Performed by: SURGERY

## 2019-09-19 PROCEDURE — 88305 TISSUE EXAM BY PATHOLOGIST: CPT | Performed by: PATHOLOGY

## 2019-09-19 PROCEDURE — 11104 PR PUNCH BIOPSY, SKIN, SINGLE LESION: ICD-10-PCS | Mod: S$PBB,,, | Performed by: SURGERY

## 2019-09-19 PROCEDURE — 88305 TISSUE SPECIMEN TO PATHOLOGY, SURGERY: ICD-10-PCS | Mod: 26,,, | Performed by: PATHOLOGY

## 2019-09-19 PROCEDURE — 99212 OFFICE O/P EST SF 10 MIN: CPT | Mod: 25,S$PBB,, | Performed by: SURGERY

## 2019-09-19 PROCEDURE — 11104 PUNCH BX SKIN SINGLE LESION: CPT | Mod: S$PBB,,, | Performed by: SURGERY

## 2019-09-19 PROCEDURE — 88360 TUMOR IMMUNOHISTOCHEM/MANUAL: CPT | Performed by: PATHOLOGY

## 2019-09-19 PROCEDURE — 99999 PR PBB SHADOW E&M-EST. PATIENT-LVL II: CPT | Mod: PBBFAC,,, | Performed by: SURGERY

## 2019-09-19 PROCEDURE — 99212 OFFICE O/P EST SF 10 MIN: CPT | Mod: PBBFAC | Performed by: SURGERY

## 2019-09-19 PROCEDURE — 88360 TUMOR IMMUNOHISTOCHEM/MANUAL: CPT | Mod: 26,,, | Performed by: PATHOLOGY

## 2019-09-19 PROCEDURE — 11401 EXC TR-EXT B9+MARG 0.6-1 CM: CPT | Mod: PBBFAC | Performed by: SURGERY

## 2019-09-19 PROCEDURE — 99999 PR PBB SHADOW E&M-EST. PATIENT-LVL II: ICD-10-PCS | Mod: PBBFAC,,, | Performed by: SURGERY

## 2019-09-19 PROCEDURE — 99212 PR OFFICE/OUTPT VISIT, EST, LEVL II, 10-19 MIN: ICD-10-PCS | Mod: 25,S$PBB,, | Performed by: SURGERY

## 2019-09-19 PROCEDURE — 88360 TISSUE SPECIMEN TO PATHOLOGY, SURGERY: ICD-10-PCS | Mod: 26,,, | Performed by: PATHOLOGY

## 2019-09-19 PROCEDURE — 88305 TISSUE EXAM BY PATHOLOGIST: CPT | Mod: 26,,, | Performed by: PATHOLOGY

## 2019-09-19 NOTE — PROGRESS NOTES
Subjective:       Patient ID: Soo Holly is a 79 y.o. female.    Possible breast cancer recurrence    Chief Complaint: Follow-up (right axilla pain)    Patient was seen at the request of radiation oncology.  She has undergone radiation treatment for inflammatory breast cancer.  Prior to radiation she underwent neoadjuvant chemotherapy in a modified radical mastectomy.  Patient had radiation dermatitis.  At the posterior lateral aspect of the radiation field the patient was noted to have nodules.  There was concerned about a recurrence.  Patient was sent for surgical evaluation.  Patient complains of generalized pain in the area of the skin changes from radiation.      Review of Systems   Skin:        Skin swelling and changes, nodules related to radiation versus recurrence of breast can       Objective:      Physical Exam   Constitutional:   Mildly ill   Cardiovascular: Normal rate, regular rhythm and normal heart sounds.   Pulmonary/Chest: Effort normal and breath sounds normal.   Abdominal: Soft. Bowel sounds are normal.   Skin:   The mastectomy incision is healed well.  There is some skin dermatitis radiation along the posterior lateral aspect of the incision.  Posterior and laterally along the edges of the radiation field there is multiple slightly red raised firm nodule   Vitals reviewed.      Assessment:      skin nodules concerning for recurrence of breast cancer     Plan:       Skin biopsy.  The risks of skin biopsy her infection bleeding. This was performed in clinic and sent for pathologic analysis  Patient will follow up in 1 week for suture removal and to discuss results    I did discuss with the patient and her son that this is highly concerning for recurrence of her breast cancer

## 2019-09-19 NOTE — PROCEDURES
Biopsy  Date/Time: 9/19/2019 5:36 PM  Performed by: Paul Carey MD  Authorized by: Paul Carey MD   Preparation: Patient was prepped and draped in the usual sterile fashion.  Local anesthesia used: yes    Anesthesia:  Local anesthesia used: yes  Local Anesthetic: lidocaine 1% with epinephrine  Anesthetic total: 5 mL    Sedation:  Patient sedated: no    Patient tolerance: Patient tolerated the procedure well with no immediate complications  Comments: The areas of the firm nodules on the upper lateral back were prepped and draped in the standard manner.    A time-out was performed.    5 cc of 1% lidocaine was infiltrated.  A 6 mm punch biopsy was then used to remove the section of skin at the margin of the firm nodule to include portions of the nodule.  The wound was then closed with 4 0 nylon in interrupted manner to obtain hemostasis.  Antibiotic ointment and dry sterile dressings were placed.    Patient will follow up in 1 week to discuss the biopsy results and she will need to follow up in 2 weeks for suture removal.

## 2019-09-20 ENCOUNTER — PATIENT MESSAGE (OUTPATIENT)
Dept: HEMATOLOGY/ONCOLOGY | Facility: CLINIC | Age: 79
End: 2019-09-20

## 2019-09-23 ENCOUNTER — TELEPHONE (OUTPATIENT)
Dept: SURGERY | Facility: HOSPITAL | Age: 79
End: 2019-09-23

## 2019-09-23 RX ORDER — OXYCODONE AND ACETAMINOPHEN 7.5; 325 MG/1; MG/1
1 TABLET ORAL EVERY 6 HOURS PRN
Qty: 45 TABLET | Refills: 0 | Status: SHIPPED | OUTPATIENT
Start: 2019-09-23 | End: 2019-10-02 | Stop reason: SDUPTHER

## 2019-09-23 NOTE — TELEPHONE ENCOUNTER
Percocet 7.5 mg, 45 tablets sent to patient's pharmacy in [place] due acute/likely to become chronic nature of her pain    ----- Message from Amy Reynolds MA sent at 9/23/2019  2:35 PM CDT -----  Patient's son called and stated that the patient would like a stronger pain medication called into the pharmacy.

## 2019-09-25 ENCOUNTER — TELEPHONE (OUTPATIENT)
Dept: HEMATOLOGY/ONCOLOGY | Facility: CLINIC | Age: 79
End: 2019-09-25

## 2019-09-25 NOTE — TELEPHONE ENCOUNTER
Spoke to the patient she has confirmed her appt with me for tomorrow. Patient verbalized understanding.

## 2019-09-26 ENCOUNTER — HOSPITAL ENCOUNTER (OUTPATIENT)
Dept: RADIOLOGY | Facility: HOSPITAL | Age: 79
Discharge: HOME | End: 2019-09-26
Attending: SURGERY
Payer: MEDICARE

## 2019-09-26 ENCOUNTER — OFFICE VISIT (OUTPATIENT)
Dept: SURGERY | Facility: CLINIC | Age: 79
End: 2019-09-26
Payer: MEDICARE

## 2019-09-26 ENCOUNTER — OFFICE VISIT (OUTPATIENT)
Dept: RADIATION ONCOLOGY | Facility: CLINIC | Age: 79
End: 2019-09-26
Payer: MEDICARE

## 2019-09-26 ENCOUNTER — OFFICE VISIT (OUTPATIENT)
Dept: HEMATOLOGY/ONCOLOGY | Facility: CLINIC | Age: 79
End: 2019-09-26
Payer: MEDICARE

## 2019-09-26 VITALS
OXYGEN SATURATION: 99 % | TEMPERATURE: 97 F | BODY MASS INDEX: 35.14 KG/M2 | WEIGHT: 190.94 LBS | HEIGHT: 62 IN | RESPIRATION RATE: 18 BRPM | DIASTOLIC BLOOD PRESSURE: 70 MMHG | SYSTOLIC BLOOD PRESSURE: 120 MMHG | HEART RATE: 95 BPM

## 2019-09-26 VITALS
SYSTOLIC BLOOD PRESSURE: 120 MMHG | DIASTOLIC BLOOD PRESSURE: 70 MMHG | WEIGHT: 190.94 LBS | HEART RATE: 95 BPM | OXYGEN SATURATION: 99 % | BODY MASS INDEX: 35.14 KG/M2 | HEIGHT: 62 IN | RESPIRATION RATE: 18 BRPM | TEMPERATURE: 97 F

## 2019-09-26 VITALS
HEART RATE: 92 BPM | WEIGHT: 191.56 LBS | DIASTOLIC BLOOD PRESSURE: 74 MMHG | BODY MASS INDEX: 35.25 KG/M2 | SYSTOLIC BLOOD PRESSURE: 127 MMHG | TEMPERATURE: 99 F | HEIGHT: 62 IN

## 2019-09-26 DIAGNOSIS — Z17.1 MALIGNANT NEOPLASM OF LOWER-OUTER QUADRANT OF RIGHT BREAST OF FEMALE, ESTROGEN RECEPTOR NEGATIVE: ICD-10-CM

## 2019-09-26 DIAGNOSIS — D49.89 NEOPLASM OF HEAD: ICD-10-CM

## 2019-09-26 DIAGNOSIS — C50.511 MALIGNANT NEOPLASM OF LOWER-OUTER QUADRANT OF RIGHT BREAST OF FEMALE, ESTROGEN RECEPTOR NEGATIVE: ICD-10-CM

## 2019-09-26 DIAGNOSIS — R92.8 ABNORMAL MAMMOGRAM: Primary | ICD-10-CM

## 2019-09-26 DIAGNOSIS — N63.20 LEFT BREAST MASS: ICD-10-CM

## 2019-09-26 DIAGNOSIS — C50.919 TRIPLE NEGATIVE MALIGNANT NEOPLASM OF BREAST: Primary | ICD-10-CM

## 2019-09-26 DIAGNOSIS — N63.20 LEFT BREAST MASS: Primary | ICD-10-CM

## 2019-09-26 DIAGNOSIS — F51.02 ADJUSTMENT INSOMNIA: ICD-10-CM

## 2019-09-26 PROCEDURE — 99215 PR OFFICE/OUTPT VISIT, EST, LEVL V, 40-54 MIN: ICD-10-PCS | Mod: S$PBB,,, | Performed by: INTERNAL MEDICINE

## 2019-09-26 PROCEDURE — 99999 PR PBB SHADOW E&M-EST. PATIENT-LVL IV: CPT | Mod: PBBFAC,,, | Performed by: SURGERY

## 2019-09-26 PROCEDURE — 99213 OFFICE O/P EST LOW 20 MIN: CPT | Mod: S$PBB,,, | Performed by: SURGERY

## 2019-09-26 PROCEDURE — 99213 PR OFFICE/OUTPT VISIT, EST, LEVL III, 20-29 MIN: ICD-10-PCS | Mod: S$PBB,,, | Performed by: SURGERY

## 2019-09-26 PROCEDURE — 77065 DX MAMMO INCL CAD UNI: CPT | Mod: 26,LT,, | Performed by: RADIOLOGY

## 2019-09-26 PROCEDURE — 99213 OFFICE O/P EST LOW 20 MIN: CPT | Mod: PBBFAC,25,27 | Performed by: RADIOLOGY

## 2019-09-26 PROCEDURE — 76642 ULTRASOUND BREAST LIMITED: CPT | Mod: TC,LT

## 2019-09-26 PROCEDURE — 76642 ULTRASOUND BREAST LIMITED: CPT | Mod: 26,LT,, | Performed by: RADIOLOGY

## 2019-09-26 PROCEDURE — 99214 OFFICE O/P EST MOD 30 MIN: CPT | Mod: PBBFAC,25 | Performed by: SURGERY

## 2019-09-26 PROCEDURE — 99215 OFFICE O/P EST HI 40 MIN: CPT | Mod: S$PBB,,, | Performed by: INTERNAL MEDICINE

## 2019-09-26 PROCEDURE — 77065 MAMMO DIGITAL DIAGNOSTIC LEFT WITH TOMOSYNTHESIS_CAD: ICD-10-PCS | Mod: 26,LT,, | Performed by: RADIOLOGY

## 2019-09-26 PROCEDURE — 99214 OFFICE O/P EST MOD 30 MIN: CPT | Mod: S$PBB,,, | Performed by: RADIOLOGY

## 2019-09-26 PROCEDURE — 77061 MAMMO DIGITAL DIAGNOSTIC LEFT WITH TOMOSYNTHESIS_CAD: ICD-10-PCS | Mod: 26,LT,, | Performed by: RADIOLOGY

## 2019-09-26 PROCEDURE — 77061 BREAST TOMOSYNTHESIS UNI: CPT | Mod: TC,LT

## 2019-09-26 PROCEDURE — 99999 PR PBB SHADOW E&M-EST. PATIENT-LVL III: ICD-10-PCS | Mod: PBBFAC,,, | Performed by: INTERNAL MEDICINE

## 2019-09-26 PROCEDURE — 99213 OFFICE O/P EST LOW 20 MIN: CPT | Mod: PBBFAC,25,27 | Performed by: INTERNAL MEDICINE

## 2019-09-26 PROCEDURE — 99999 PR PBB SHADOW E&M-EST. PATIENT-LVL III: CPT | Mod: PBBFAC,,, | Performed by: RADIOLOGY

## 2019-09-26 PROCEDURE — 99999 PR PBB SHADOW E&M-EST. PATIENT-LVL III: ICD-10-PCS | Mod: PBBFAC,,, | Performed by: RADIOLOGY

## 2019-09-26 PROCEDURE — 77061 BREAST TOMOSYNTHESIS UNI: CPT | Mod: 26,LT,, | Performed by: RADIOLOGY

## 2019-09-26 PROCEDURE — 99999 PR PBB SHADOW E&M-EST. PATIENT-LVL IV: ICD-10-PCS | Mod: PBBFAC,,, | Performed by: SURGERY

## 2019-09-26 PROCEDURE — 77065 DX MAMMO INCL CAD UNI: CPT | Mod: TC,LT

## 2019-09-26 PROCEDURE — 99214 PR OFFICE/OUTPT VISIT, EST, LEVL IV, 30-39 MIN: ICD-10-PCS | Mod: S$PBB,,, | Performed by: RADIOLOGY

## 2019-09-26 PROCEDURE — 99999 PR PBB SHADOW E&M-EST. PATIENT-LVL III: CPT | Mod: PBBFAC,,, | Performed by: INTERNAL MEDICINE

## 2019-09-26 PROCEDURE — 76642 US BREAST LEFT LIMITED: ICD-10-PCS | Mod: 26,LT,, | Performed by: RADIOLOGY

## 2019-09-26 RX ORDER — AMLODIPINE BESYLATE 10 MG/1
TABLET ORAL
COMMUNITY

## 2019-09-26 RX ORDER — ZOLPIDEM TARTRATE 5 MG/1
5 TABLET ORAL NIGHTLY PRN
Qty: 30 TABLET | Refills: 2 | Status: SHIPPED | OUTPATIENT
Start: 2019-09-26 | End: 2019-10-18 | Stop reason: SDUPTHER

## 2019-09-26 RX ORDER — ONDANSETRON 2 MG/ML
8 INJECTION INTRAMUSCULAR; INTRAVENOUS
Status: CANCELLED | OUTPATIENT
Start: 2019-10-17

## 2019-09-26 RX ORDER — HEPARIN 100 UNIT/ML
500 SYRINGE INTRAVENOUS
Status: CANCELLED | OUTPATIENT
Start: 2019-09-26

## 2019-09-26 RX ORDER — SODIUM CHLORIDE 0.9 % (FLUSH) 0.9 %
10 SYRINGE (ML) INJECTION
Status: CANCELLED | OUTPATIENT
Start: 2019-10-10

## 2019-09-26 RX ORDER — SENNOSIDES 25 MG/1
TABLET, FILM COATED ORAL
COMMUNITY

## 2019-09-26 RX ORDER — HYDROCODONE BITARTRATE AND ACETAMINOPHEN 7.5; 3 MG/1; MG/1
TABLET ORAL
COMMUNITY
End: 2019-10-03

## 2019-09-26 RX ORDER — ONDANSETRON 2 MG/ML
8 INJECTION INTRAMUSCULAR; INTRAVENOUS
Status: CANCELLED | OUTPATIENT
Start: 2019-09-26

## 2019-09-26 RX ORDER — HEPARIN 100 UNIT/ML
500 SYRINGE INTRAVENOUS
Status: CANCELLED | OUTPATIENT
Start: 2019-10-17

## 2019-09-26 RX ORDER — HEPARIN 100 UNIT/ML
500 SYRINGE INTRAVENOUS
Status: CANCELLED | OUTPATIENT
Start: 2019-10-10

## 2019-09-26 RX ORDER — SODIUM CHLORIDE 0.9 % (FLUSH) 0.9 %
10 SYRINGE (ML) INJECTION
Status: CANCELLED | OUTPATIENT
Start: 2019-09-26

## 2019-09-26 RX ORDER — PROCHLORPERAZINE MALEATE 10 MG
TABLET ORAL
COMMUNITY

## 2019-09-26 RX ORDER — SODIUM CHLORIDE 0.9 % (FLUSH) 0.9 %
10 SYRINGE (ML) INJECTION
Status: CANCELLED | OUTPATIENT
Start: 2019-10-17

## 2019-09-26 RX ORDER — ONDANSETRON 2 MG/ML
8 INJECTION INTRAMUSCULAR; INTRAVENOUS
Status: CANCELLED | OUTPATIENT
Start: 2019-10-10

## 2019-09-26 NOTE — PATIENT INSTRUCTIONS
As we discussed and as the mammogram and ultrasound showed you have a concerning mass in her left breast.  We will arrange a biopsy and we will discuss the results of the biopsy when we see you back to remove the sutures.    If he have any questions or concerns please call us

## 2019-09-26 NOTE — PLAN OF CARE
START ON PATHWAY REGIMEN - Breast    [id number]        Atezolizumab (Tecentriq(R))       Nab-paclitaxel (protein bound) (Abraxane(R))     **Always confirm dose/schedule in your pharmacy ordering system**    Patient Characteristics:  Distant Metastases or Locoregional Recurrent Disease - Unresected, HER2   Negative/Unknown/Equivocal, ER Negative/Unknown, Chemotherapy, First Line, ER   Negative, PD-L1 Expression Positive  Therapeutic Status: Locoregional Recurrent Disease - Unresected  BRCA Mutation Status: Absent  ER Status: Negative (-)  HER2 Status: Negative (-)  DC Status: Negative (-)  Line of Therapy: First Line  PD-L1 Expression Status: PD-L1 Positive  Intent of Therapy:  Non-Curative / Palliative Intent, Discussed with Patient

## 2019-09-26 NOTE — PROGRESS NOTES
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 ypT2 ypN2a   1. 18 had a diagnostic bilateral mammogram due to palpable fullness in the right breast. This showed an area 10 cm deep to the nipple at 3:00 position irregular asymmetric increased density with spiculation which was the area marked with a skin marker and measured 27 mm. This was not present in 2013.   2. 12/3/18 right breast core biopsy returned invasive ductal carcinoma, grade 3, ER negative, MN negative, her 2 Stormy nonamplified, Ki-67 60%.   3. She was seen by Dr. Pop who noted on physical exam inflammatory breast cancer with edema of the skin.   4. 18 PET scan showed multiple FDG avid right axillary lymph nodes in level 1 and 2 at up to 17.2 SUV and possibly tiny mildly FDG avid level 3 lymph nodes more superiorly at 2.5 SUV. There were FDG avid lymph nodes in the anterior mediastinum/superior right internal mammary chain at 5.3 SUV. There was a focal area of uptake inferior to the medial clavicle just above the costochondral junction of the 1st rib likely an FDG avid lymph node at 8.8 SUV. There was diffuse uptake in the right central breast and diffusely in the skin and inferior breast at 6.8 SUV and a focal uptake superior right breast. There was intense uptake in the left deltoid muscle at 7.1 SUV which was focal. There was also focal 4.8 SUV right deltoid uptake. On my review there are at least 10 suspicious right axillary lymph nodes, and at least 3 right superior internal mammary lymph nodes.   5. 19 skin punch biopsy of the right breast returned invasive ductal carcinoma in the subcutaneous tissue and dermal lymphatics..   6. 18 [facility] testing ordered.   7. 19 started chemotherapy with dose dense AC with Taxol.   8. 19 right axillary ultrasound-guided  "biopsy with clip placement was performed of an abnormal lymph node. This returned invasive ductal carcinoma.   9. 4/12/19 completed ddAC + T (3 cycles taxol)   10. 5/8/19 PET showed resolution of size and FDG avidity of right axillary and internal mammary nodes. There were no FDG avid right breast masses. There was persistent right breast skin thickening at 3 SUV. There was no metastatic disease.   11. 5/22/19 right modified radical mastectomy 4.9 cm invasive ductal carcinoma, grade 3, focal dermal lymphatic invasion, 9/13 lymph nodes involved largest deposit 4 mm, no extranodal extension, negative margins 10 mm closest margin deep, lymph-vascular invasion. There was a probable for definite tumor response in the breast and lymph nodes. ypT2 ypN2a   12. 8/8/19 completed chest wall and regional xochilt radiation 50 Gy with 10 Gy mastectomy incision boost  13. 9/19/19 right posterior back biopsy returned breast cancer, carcinoma en cuirasse   14. 9/26/19 left breast mammogram and ultrasound showing 3 cm left breast mass    CURRENT STATUS: Soo Holly is a pleasant 79 y.o. female who presents today for follow-up.  She has met with Dr. Pop who has ordered a PET scan, brain MRI and PD L1 testing.  She continues to have pain in the right axilla and upper back.  She is continuing skin care with Aquaphor, Lidocaine and skin integrity.  She is taking the narcotics as needed with good efficacy.  She had a mammogram and ultrasound today showing a 3 cm left breast mass.    PHYSICAL EXAM:   Constitutional: well appearing, no acute distress, ECOG 2 - Ambulates, capable of self care only  Vitals:    /70   Pulse 95   Temp 97.4 °F (36.3 °C)   Resp 18   Ht 5' 2" (1.575 m)   Wt 86.6 kg (190 lb 14.7 oz)   SpO2 99%   BMI 34.92 kg/m²   Breast:  Right chest wall recovering radiation dermatitis, 3 cm left upper inner mobile breast mass.  Right axilla and upper back firm indurated, erythematous, tender multinodular skin " consistent with inflammatory breast recurrence.    Laboratory & X-Ray Findings: Per above.      ASSESSMENT: Carcinoma en cuirasse     PLAN:  She has progressed in the skin of the axilla during radiation which has now progressed to the upper back.  She also has a new left breast lump.  She has an upcoming PET and MRI.  Dr. Donn dyer ordered PD L1 testing and is considering systemic therapy.  She has some pain in the right axilla and upper back in the area of recurrence.  However the area under the axilla received radiation recently and she is still recovering from radiation dermatitis.  She may be able to receive radiation to symptomatic areas on the right chest wall later, but I would favor systemic therapy now.  I will see her in 6 weeks to see how her skin is healing from radiation, she should continue Aquaphor, Lidocaine and skintegrity.    REBECCA Plata M.D.  Radiation Oncology  [facility] [address] [place] [address]  Ph: [phone]  [email or web address]

## 2019-09-26 NOTE — PROGRESS NOTES
Subjective:       Patient ID: Soo Holly is a 79 y.o. female.    Metastatic breast cancer    Chief Complaint: Post-op Evaluation    Patient underwent a biopsy, neoadjuvant chemotherapy, mastectomy and radiation.  She was seen last week with some nodularity in the right flank just outside the radiation field. One of these nodules was biopsied.    Patient also complains today of a mass in the left breast.    The patient's initial imaging studies were done at [facility] [place].  She was then treated at [facility] in [place] for her right-sided inflammatory breast cancer as outlined above    Review of Systems   Skin:        Ross skin nodules and pain on the right flank/back.  Mass in the left breast       Objective:      Physical Exam   Constitutional:   Overweight   Cardiovascular: Normal rate and regular rhythm.   Pulmonary/Chest: Effort normal and breath sounds normal.   Abdominal: Soft. Bowel sounds are normal.   Skin:   There is radiation changes and multiple nodules on the left axillary/flank/back region.    The left breast shows an easily palpable mass in the center portion.  There are no skin changes , nipple discharges, or axillary adenopathy    The biopsy site is healing with sutures in place   Vitals reviewed.      Pathology was consistent with adenocarcinoma consistent with breast cancer   Assessment:         metastatic breast cancer to the skin. This is related to the inflammatory breast cancer on the right.    Left breast mass P  Plan:       Patient to be evaluated by Oncology to discuss any additional chemotherapy, imaging.  She is also to see radiation oncology for additional radiation.     It is possible that this is an isolated area of disease a could be widely excised however due to the previous radiation the patient may have issues with wound healing and/or infection related to any surgical intervention.     Mass in the left breast.  Diagnostic mammogram and ultrasound.  If  this is suspicious a biopsy would be recommended     We will contact the patient with the ultrasound and mammogram results.  Will arrange a biopsy if needed.     Follow up in 10 days for suture remove       A left breast mammogram and ultrasound were done and confirmed the suspicious mass.  A biopsy will be arranged

## 2019-09-26 NOTE — PROGRESS NOTES
Subjective:       Patient ID: Soo Holly is a 79 y.o. female.    Chief Complaint: Follow-up; Results; and Breast Cancer    HPI 79-year-old female triple negative breast cancer recently completed neoadjuvant chemotherapy status post mastectomy radiation therapy.  Patient now has biopsy-proven recurrence posterior axillary line patient returns for review ECOG status 2    Past Medical History:   Diagnosis Date    Breast cancer 2019    radiation and chemo    Cancer     R Breast cancer    Chest pain     Hyperlipidemia     Hypertension      Family History   Problem Relation Age of Onset    Breast cancer Sister      Social History     Socioeconomic History    Marital status:      Spouse name: Not on file    Number of children: Not on file    Years of education: Not on file    Highest education level: Not on file   Occupational History    Not on file   Social Needs    Financial resource strain: Not on file    Food insecurity:     Worry: Not on file     Inability: Not on file    Transportation needs:     Medical: Not on file     Non-medical: Not on file   Tobacco Use    Smoking status: Never Smoker    Smokeless tobacco: Never Used   Substance and Sexual Activity    Alcohol use: No     Frequency: Never    Drug use: No    Sexual activity: Not Currently   Lifestyle    Physical activity:     Days per week: Not on file     Minutes per session: Not on file    Stress: Not on file   Relationships    Social connections:     Talks on phone: Not on file     Gets together: Not on file     Attends Restoration service: Not on file     Active member of club or organization: Not on file     Attends meetings of clubs or organizations: Not on file     Relationship status: Not on file   Other Topics Concern    Not on file   Social History Narrative    Lives in [place], [place] (1.5h).  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     Past Surgical History:   Procedure Laterality Date    BREAST BIOPSY      cataracts      EYE  SURGERY      HYSTERECTOMY      INSERTION OF TUNNELED CENTRAL VENOUS CATHETER (CVC) WITH SUBCUTANEOUS PORT Right 1/16/2019    Procedure: INSERTION, PORT-A-CATH;  Surgeon: Prem Massey MD;  Location: [facility] OR;  Service: General;  Laterality: Right;    JOINT REPLACEMENT Right     Knee    MODIFIED RADICAL MASTECTOMY W/ AXILLARY LYMPH NODE DISSECTION Right 5/22/2019    Procedure: MASTECTOMY, MODIFIED RADICAL;  Surgeon: Paul Carey MD;  Location: [facility] OR;  Service: General;  Laterality: Right;    OOPHORECTOMY         Labs:  Lab Results   Component Value Date    WBC 4.44 09/06/2019    HGB 10.0 (L) 09/06/2019    HCT 31.4 (L) 09/06/2019    MCV 88 09/06/2019     09/06/2019     BMP  Lab Results   Component Value Date     09/06/2019    K 3.2 (L) 09/06/2019     09/06/2019    CO2 26 09/06/2019    BUN 8 09/06/2019    CREATININE 0.7 09/06/2019    CALCIUM 10.0 09/06/2019    ANIONGAP 11 09/06/2019    ESTGFRAFRICA >60 09/06/2019    EGFRNONAA >60 09/06/2019     Lab Results   Component Value Date    ALT 22 09/06/2019    AST 26 09/06/2019    ALKPHOS 95 09/06/2019    BILITOT 0.3 09/06/2019       Lab Results   Component Value Date    IRON 38 09/06/2019    TIBC 272 09/06/2019    FERRITIN 397 (H) 09/06/2019     No results found for: [id number]  No results found for: FOLATE  No results found for: TSH      Review of Systems   Constitutional: Positive for activity change, appetite change and unexpected weight change. Negative for chills, diaphoresis, fatigue and fever.   HENT: Negative for congestion, dental problem, drooling, ear discharge, ear pain, facial swelling, hearing loss, mouth sores, nosebleeds, postnasal drip, rhinorrhea, sinus pressure, sneezing, sore throat, tinnitus, trouble swallowing and voice change.    Eyes: Negative for photophobia, pain, discharge, redness, itching and visual disturbance.   Respiratory: Positive for chest tightness. Negative for cough, choking, shortness of breath, wheezing and  stridor.    Cardiovascular: Positive for chest pain. Negative for palpitations and leg swelling.   Gastrointestinal: Negative for abdominal distention, abdominal pain, anal bleeding, blood in stool, constipation, diarrhea, nausea, rectal pain and vomiting.   Endocrine: Negative for cold intolerance, heat intolerance, polydipsia, polyphagia and polyuria.   Genitourinary: Negative for decreased urine volume, difficulty urinating, dyspareunia, dysuria, enuresis, flank pain, frequency, genital sores, hematuria, menstrual problem, pelvic pain, urgency, vaginal bleeding, vaginal discharge and vaginal pain.   Musculoskeletal: Negative for arthralgias, back pain, gait problem, joint swelling, myalgias, neck pain and neck stiffness.   Skin: Negative for color change, pallor and rash.   Allergic/Immunologic: Negative for environmental allergies, food allergies and immunocompromised state.   Neurological: Negative for dizziness, tremors, seizures, syncope, facial asymmetry, speech difficulty, weakness, light-headedness, numbness and headaches.   Hematological: Negative for adenopathy. Does not bruise/bleed easily.   Psychiatric/Behavioral: Positive for dysphoric mood and sleep disturbance. Negative for agitation, behavioral problems, confusion, decreased concentration, hallucinations, self-injury and suicidal ideas. The patient is nervous/anxious. The patient is not hyperactive.        Objective:      Physical Exam   Constitutional: She is oriented to person, place, and time. She has a sickly appearance. She appears ill. She appears distressed.   HENT:   Head: Normocephalic and atraumatic.   Right Ear: External ear normal.   Left Ear: External ear normal.   Nose: Nose normal. Right sinus exhibits no maxillary sinus tenderness and no frontal sinus tenderness. Left sinus exhibits no maxillary sinus tenderness and no frontal sinus tenderness.   Mouth/Throat: Oropharynx is clear and moist. No oropharyngeal exudate.   Eyes: Pupils  are equal, round, and reactive to light. Conjunctivae, EOM and lids are normal. Right eye exhibits no discharge. Left eye exhibits no discharge. Right conjunctiva is not injected. Right conjunctiva has no hemorrhage. Left conjunctiva is not injected. Left conjunctiva has no hemorrhage. No scleral icterus.   Neck: Normal range of motion. Neck supple. No JVD present. No tracheal deviation present. No thyromegaly present.   Cardiovascular: Normal rate and regular rhythm.   Pulmonary/Chest: Effort normal. No stridor. No respiratory distress. She exhibits no tenderness.   Abdominal: Soft. She exhibits no distension and no mass. There is no splenomegaly or hepatomegaly. There is no tenderness. There is no rebound.   Musculoskeletal: Normal range of motion. She exhibits no edema or tenderness.   Lymphadenopathy:     She has no cervical adenopathy.     She has no axillary adenopathy.        Right: No supraclavicular adenopathy present.        Left: No supraclavicular adenopathy present.   Neurological: She is alert and oriented to person, place, and time. No cranial nerve deficit. Coordination normal.   Skin: Skin is dry. No rash noted. She is not diaphoretic. No erythema.   Psychiatric: Her behavior is normal. Judgment and thought content normal. Her mood appears anxious. She exhibits a depressed mood.   Vitals reviewed.          Assessment:      1. Triple negative malignant neoplasm of breast    2. Malignant neoplasm of lower-outer quadrant of right breast of female, estrogen receptor negative     3. Neoplasm of head    4. Adjustment insomnia           Plan:     Recurrent triple negative breast carcinoma will ask for PD L1 testing data supports the use of PD L1 agents in patients who have greater than 1% positivity.  Will proceed with systemic treatment [employer] present will be seen by radiation therapy staging for extent of disease for prognostic significance with PET scan and MRI brain discussed with patient family  articles from up-to-date followed to them as well nurse navigator is aware        Yaniv Pop Jr, MD FACP

## 2019-10-01 ENCOUNTER — TELEPHONE (OUTPATIENT)
Dept: RADIOLOGY | Facility: HOSPITAL | Age: 79
End: 2019-10-01

## 2019-10-01 ENCOUNTER — TELEPHONE (OUTPATIENT)
Dept: HEMATOLOGY/ONCOLOGY | Facility: CLINIC | Age: 79
End: 2019-10-01

## 2019-10-01 NOTE — TELEPHONE ENCOUNTER
Spoke with pt and son and notified that appt for Thursday with Dr. Plata has been cancelled since pt was just seen last week.  Also explained to son that I am going to work on moving up appt with Dr. Pop so they dont have to wait around all day for that appt.  I also spoke with son about new treatment orders and that they are approved but need to find out from Dr. Pop if he wants to start on Thursday or plans to start after biopsy of lt breast.  Son verbalized understanding and will be awaiting my call back this afternoon to discuss.

## 2019-10-02 ENCOUNTER — HOSPITAL ENCOUNTER (OUTPATIENT)
Dept: RADIOLOGY | Facility: HOSPITAL | Age: 79
Discharge: HOME | End: 2019-10-02
Attending: INTERNAL MEDICINE
Payer: MEDICARE

## 2019-10-02 DIAGNOSIS — C50.511 MALIGNANT NEOPLASM OF LOWER-OUTER QUADRANT OF RIGHT BREAST OF FEMALE, ESTROGEN RECEPTOR NEGATIVE: ICD-10-CM

## 2019-10-02 DIAGNOSIS — I10 ESSENTIAL HYPERTENSION: Primary | ICD-10-CM

## 2019-10-02 DIAGNOSIS — D49.89 NEOPLASM OF HEAD: ICD-10-CM

## 2019-10-02 DIAGNOSIS — L58.9 RADIATION DERMATITIS: ICD-10-CM

## 2019-10-02 DIAGNOSIS — C50.919 TRIPLE NEGATIVE MALIGNANT NEOPLASM OF BREAST: Primary | ICD-10-CM

## 2019-10-02 DIAGNOSIS — C50.919 TRIPLE NEGATIVE MALIGNANT NEOPLASM OF BREAST: ICD-10-CM

## 2019-10-02 DIAGNOSIS — Z17.1 MALIGNANT NEOPLASM OF LOWER-OUTER QUADRANT OF RIGHT BREAST OF FEMALE, ESTROGEN RECEPTOR NEGATIVE: ICD-10-CM

## 2019-10-02 PROCEDURE — 78815 NM PET CT ROUTINE: ICD-10-PCS | Mod: 26,PS,, | Performed by: RADIOLOGY

## 2019-10-02 PROCEDURE — 78815 PET IMAGE W/CT SKULL-THIGH: CPT | Mod: 26,PS,, | Performed by: RADIOLOGY

## 2019-10-02 PROCEDURE — A9552 F18 FDG: HCPCS

## 2019-10-02 PROCEDURE — 78815 PET IMAGE W/CT SKULL-THIGH: CPT | Mod: TC

## 2019-10-02 RX ORDER — LIDOCAINE 50 MG/G
OINTMENT TOPICAL
Qty: 35.44 G | Refills: 5 | Status: SHIPPED | OUTPATIENT
Start: 2019-10-02

## 2019-10-02 RX ORDER — OXYCODONE AND ACETAMINOPHEN 7.5; 325 MG/1; MG/1
1 TABLET ORAL EVERY 6 HOURS PRN
Qty: 90 TABLET | Refills: 0 | Status: SHIPPED | OUTPATIENT
Start: 2019-10-02 | End: 2019-10-23 | Stop reason: SDUPTHER

## 2019-10-02 RX ORDER — OXYCODONE AND ACETAMINOPHEN 7.5; 325 MG/1; MG/1
1 TABLET ORAL EVERY 6 HOURS PRN
Qty: 90 TABLET | Refills: 0 | Status: SHIPPED | OUTPATIENT
Start: 2019-10-02 | End: 2019-10-02 | Stop reason: SDUPTHER

## 2019-10-03 ENCOUNTER — INITIAL CONSULT (OUTPATIENT)
Dept: PALLIATIVE MEDICINE | Facility: CLINIC | Age: 79
End: 2019-10-03
Payer: MEDICARE

## 2019-10-03 ENCOUNTER — OFFICE VISIT (OUTPATIENT)
Dept: CARDIOLOGY | Facility: CLINIC | Age: 79
End: 2019-10-03
Payer: MEDICARE

## 2019-10-03 ENCOUNTER — CLINICAL SUPPORT (OUTPATIENT)
Dept: CARDIOLOGY | Facility: CLINIC | Age: 79
End: 2019-10-03
Payer: MEDICARE

## 2019-10-03 ENCOUNTER — OFFICE VISIT (OUTPATIENT)
Dept: HEMATOLOGY/ONCOLOGY | Facility: CLINIC | Age: 79
End: 2019-10-03
Payer: MEDICARE

## 2019-10-03 ENCOUNTER — TELEPHONE (OUTPATIENT)
Dept: INTERNAL MEDICINE | Facility: CLINIC | Age: 79
End: 2019-10-03

## 2019-10-03 ENCOUNTER — INFUSION (OUTPATIENT)
Dept: INFUSION THERAPY | Facility: HOSPITAL | Age: 79
End: 2019-10-03
Attending: INTERNAL MEDICINE
Payer: MEDICARE

## 2019-10-03 VITALS
RESPIRATION RATE: 18 BRPM | HEART RATE: 101 BPM | HEIGHT: 62 IN | BODY MASS INDEX: 34.61 KG/M2 | SYSTOLIC BLOOD PRESSURE: 144 MMHG | TEMPERATURE: 98 F | DIASTOLIC BLOOD PRESSURE: 83 MMHG | WEIGHT: 188.06 LBS | OXYGEN SATURATION: 97 %

## 2019-10-03 VITALS
OXYGEN SATURATION: 97 % | WEIGHT: 188.06 LBS | RESPIRATION RATE: 18 BRPM | TEMPERATURE: 98 F | SYSTOLIC BLOOD PRESSURE: 144 MMHG | HEART RATE: 101 BPM | BODY MASS INDEX: 34.61 KG/M2 | HEIGHT: 62 IN | DIASTOLIC BLOOD PRESSURE: 83 MMHG

## 2019-10-03 VITALS
SYSTOLIC BLOOD PRESSURE: 141 MMHG | HEART RATE: 89 BPM | DIASTOLIC BLOOD PRESSURE: 83 MMHG | HEIGHT: 62 IN | BODY MASS INDEX: 34.61 KG/M2 | WEIGHT: 188.06 LBS

## 2019-10-03 VITALS
BODY MASS INDEX: 34.4 KG/M2 | SYSTOLIC BLOOD PRESSURE: 124 MMHG | WEIGHT: 188.06 LBS | HEART RATE: 96 BPM | DIASTOLIC BLOOD PRESSURE: 78 MMHG

## 2019-10-03 DIAGNOSIS — G47.00 INSOMNIA, UNSPECIFIED TYPE: ICD-10-CM

## 2019-10-03 DIAGNOSIS — Z71.89 ADVANCED CARE PLANNING/COUNSELING DISCUSSION: ICD-10-CM

## 2019-10-03 DIAGNOSIS — I10 ESSENTIAL HYPERTENSION: ICD-10-CM

## 2019-10-03 DIAGNOSIS — R06.09 OTHER FORM OF DYSPNEA: ICD-10-CM

## 2019-10-03 DIAGNOSIS — F41.9 ANXIETY: Primary | ICD-10-CM

## 2019-10-03 DIAGNOSIS — C50.919 TRIPLE NEGATIVE MALIGNANT NEOPLASM OF BREAST: ICD-10-CM

## 2019-10-03 DIAGNOSIS — I10 ESSENTIAL HYPERTENSION: Primary | ICD-10-CM

## 2019-10-03 DIAGNOSIS — C50.919 TRIPLE NEGATIVE MALIGNANT NEOPLASM OF BREAST: Primary | ICD-10-CM

## 2019-10-03 DIAGNOSIS — R07.9 CHEST PAIN, UNSPECIFIED TYPE: ICD-10-CM

## 2019-10-03 DIAGNOSIS — G89.3 CANCER RELATED PAIN: ICD-10-CM

## 2019-10-03 DIAGNOSIS — R79.89 ELEVATED TROPONIN: ICD-10-CM

## 2019-10-03 DIAGNOSIS — Z66 DNR (DO NOT RESUSCITATE): ICD-10-CM

## 2019-10-03 DIAGNOSIS — C79.51 SECONDARY MALIGNANT NEOPLASM OF BONE: Primary | ICD-10-CM

## 2019-10-03 PROCEDURE — 99204 OFFICE O/P NEW MOD 45 MIN: CPT | Mod: S$PBB,,, | Performed by: FAMILY MEDICINE

## 2019-10-03 PROCEDURE — 99999 PR PBB SHADOW E&M-EST. PATIENT-LVL III: ICD-10-PCS | Mod: PBBFAC,,, | Performed by: FAMILY MEDICINE

## 2019-10-03 PROCEDURE — 99999 PR PBB SHADOW E&M-EST. PATIENT-LVL III: CPT | Mod: PBBFAC,,, | Performed by: FAMILY MEDICINE

## 2019-10-03 PROCEDURE — 99999 PR PBB SHADOW E&M-EST. PATIENT-LVL III: ICD-10-PCS | Mod: PBBFAC,,, | Performed by: INTERNAL MEDICINE

## 2019-10-03 PROCEDURE — 96375 TX/PRO/DX INJ NEW DRUG ADDON: CPT

## 2019-10-03 PROCEDURE — 99213 OFFICE O/P EST LOW 20 MIN: CPT | Mod: PBBFAC,27,25 | Performed by: INTERNAL MEDICINE

## 2019-10-03 PROCEDURE — 99215 PR OFFICE/OUTPT VISIT, EST, LEVL V, 40-54 MIN: ICD-10-PCS | Mod: 25,S$PBB,, | Performed by: INTERNAL MEDICINE

## 2019-10-03 PROCEDURE — 99213 OFFICE O/P EST LOW 20 MIN: CPT | Mod: PBBFAC,25 | Performed by: INTERNAL MEDICINE

## 2019-10-03 PROCEDURE — 96417 CHEMO IV INFUS EACH ADDL SEQ: CPT

## 2019-10-03 PROCEDURE — 93010 EKG 12-LEAD: ICD-10-PCS | Mod: S$PBB,,, | Performed by: INTERNAL MEDICINE

## 2019-10-03 PROCEDURE — 99204 PR OFFICE/OUTPT VISIT, NEW, LEVL IV, 45-59 MIN: ICD-10-PCS | Mod: S$PBB,,, | Performed by: FAMILY MEDICINE

## 2019-10-03 PROCEDURE — 99214 OFFICE O/P EST MOD 30 MIN: CPT | Mod: S$PBB,25,, | Performed by: INTERNAL MEDICINE

## 2019-10-03 PROCEDURE — 93005 ELECTROCARDIOGRAM TRACING: CPT | Mod: PBBFAC | Performed by: INTERNAL MEDICINE

## 2019-10-03 PROCEDURE — 99999 PR PBB SHADOW E&M-EST. PATIENT-LVL III: CPT | Mod: PBBFAC,,, | Performed by: INTERNAL MEDICINE

## 2019-10-03 PROCEDURE — 99215 OFFICE O/P EST HI 40 MIN: CPT | Mod: 25,S$PBB,, | Performed by: INTERNAL MEDICINE

## 2019-10-03 PROCEDURE — 99214 PR OFFICE/OUTPT VISIT, EST, LEVL IV, 30-39 MIN: ICD-10-PCS | Mod: S$PBB,25,, | Performed by: INTERNAL MEDICINE

## 2019-10-03 PROCEDURE — 99213 OFFICE O/P EST LOW 20 MIN: CPT | Mod: PBBFAC,27,25 | Performed by: FAMILY MEDICINE

## 2019-10-03 PROCEDURE — 96413 CHEMO IV INFUSION 1 HR: CPT

## 2019-10-03 PROCEDURE — 93010 ELECTROCARDIOGRAM REPORT: CPT | Mod: S$PBB,,, | Performed by: INTERNAL MEDICINE

## 2019-10-03 PROCEDURE — 63600175 PHARM REV CODE 636 W HCPCS: Mod: JG | Performed by: INTERNAL MEDICINE

## 2019-10-03 RX ORDER — MORPHINE SULFATE 30 MG/1
30 TABLET, FILM COATED, EXTENDED RELEASE ORAL EVERY 8 HOURS PRN
Qty: 90 TABLET | Refills: 0 | Status: SHIPPED | OUTPATIENT
Start: 2019-10-03 | End: 2019-10-31

## 2019-10-03 RX ORDER — ONDANSETRON 2 MG/ML
8 INJECTION INTRAMUSCULAR; INTRAVENOUS
Status: COMPLETED | OUTPATIENT
Start: 2019-10-03 | End: 2019-10-03

## 2019-10-03 RX ORDER — MIRTAZAPINE 15 MG/1
15 TABLET, FILM COATED ORAL NIGHTLY
Qty: 90 TABLET | Refills: 4 | Status: SHIPPED | OUTPATIENT
Start: 2019-10-03 | End: 2020-10-02

## 2019-10-03 RX ORDER — HEPARIN 100 UNIT/ML
500 SYRINGE INTRAVENOUS
Status: CANCELLED | OUTPATIENT
Start: 2019-10-03

## 2019-10-03 RX ORDER — SODIUM CHLORIDE 0.9 % (FLUSH) 0.9 %
10 SYRINGE (ML) INJECTION
Status: CANCELLED | OUTPATIENT
Start: 2019-10-03

## 2019-10-03 RX ORDER — HEPARIN 100 UNIT/ML
500 SYRINGE INTRAVENOUS
Status: DISCONTINUED | OUTPATIENT
Start: 2019-10-03 | End: 2019-10-03 | Stop reason: HOSPADM

## 2019-10-03 RX ADMIN — ATEZOLIZUMAB 840 MG: 840 INJECTION, SOLUTION INTRAVENOUS at 12:10

## 2019-10-03 RX ADMIN — HEPARIN SODIUM (PORCINE) LOCK FLUSH IV SOLN 100 UNIT/ML 500 UNITS: 100 SOLUTION at 02:10

## 2019-10-03 RX ADMIN — SODIUM CHLORIDE: 9 INJECTION, SOLUTION INTRAVENOUS at 11:10

## 2019-10-03 RX ADMIN — ONDANSETRON 8 MG: 2 INJECTION INTRAMUSCULAR; INTRAVENOUS at 12:10

## 2019-10-03 RX ADMIN — PACLITAXEL 200 MG: 100 INJECTION, POWDER, LYOPHILIZED, FOR SUSPENSION INTRAVENOUS at 01:10

## 2019-10-03 NOTE — PROGRESS NOTES
Subjective:       Patient ID: Soo Holly is a 79 y.o. female.    Chief Complaint: Establish Care    78 yo female with metastatic breast cancer seen in infusion room for palliative care consult from Dr. Pop. She is accompanied by her son, Jasper, who is her primary caregiver and lives with her full time as well as her granddaughter, Annabelle.     Pain/symptom management: Reports uncontrolled pain despite PRN dosing of percocet which she takes at least every 6 hours. Pain focused over past surgical sites to chest wall. Denies bone pain. Also c/o anxiety and difficulty sleeping at night. Insomnia often due to the pain but sometimes unable to sleep when she is not having pain. C/o worsening fatigue following XRT.    Advanced directive: discussed healthcare wishes today and [employer] as well as POA documents completed. Family present for discussion and understand her wishes and are happy to honor them.       does not have any pertinent problems on file.  Past Medical History:   Diagnosis Date    Breast cancer 2019    radiation and chemo    Cancer     R Breast cancer    Chest pain     Hyperlipidemia     Hypertension      Past Surgical History:   Procedure Laterality Date    BREAST BIOPSY      cataracts      EYE SURGERY      HYSTERECTOMY      INSERTION OF TUNNELED CENTRAL VENOUS CATHETER (CVC) WITH SUBCUTANEOUS PORT Right 1/16/2019    Procedure: INSERTION, PORT-A-CATH;  Surgeon: Prem Massey MD;  Location: [facility] OR;  Service: General;  Laterality: Right;    JOINT REPLACEMENT Right     Knee    MODIFIED RADICAL MASTECTOMY W/ AXILLARY LYMPH NODE DISSECTION Right 5/22/2019    Procedure: MASTECTOMY, MODIFIED RADICAL;  Surgeon: Paul Carey MD;  Location: [facility] OR;  Service: General;  Laterality: Right;    OOPHORECTOMY       Family History   Problem Relation Age of Onset    Breast cancer Sister      Social History     Socioeconomic History    Marital status:      Spouse name: Not on file    Number  of children: Not on file    Years of education: Not on file    Highest education level: Not on file   Occupational History    Not on file   Social Needs    Financial resource strain: Not on file    Food insecurity:     Worry: Not on file     Inability: Not on file    Transportation needs:     Medical: Not on file     Non-medical: Not on file   Tobacco Use    Smoking status: Never Smoker    Smokeless tobacco: Never Used   Substance and Sexual Activity    Alcohol use: No     Frequency: Never    Drug use: No    Sexual activity: Not Currently   Lifestyle    Physical activity:     Days per week: Not on file     Minutes per session: Not on file    Stress: Not on file   Relationships    Social connections:     Talks on phone: Not on file     Gets together: Not on file     Attends Alevism service: Not on file     Active member of club or organization: Not on file     Attends meetings of clubs or organizations: Not on file     Relationship status: Not on file   Other Topics Concern    Not on file   Social History Narrative    Lives in [place], [place] (1.5h).  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     Review of Systems   Constitutional: Positive for activity change, appetite change and fatigue.   Gastrointestinal: Positive for constipation (take milk of magnesia as needed). Negative for nausea.   Endocrine: Positive for cold intolerance.       Objective:     Vitals:    10/03/19 1112   BP: (!) 144/83   Pulse: 101   Resp: 18   Temp: 97.9 °F (36.6 °C)        Physical Exam   Constitutional: She appears well-developed and well-nourished. No distress.   Ill appearing   Skin: Skin is warm and dry.   Psychiatric: She has a normal mood and affect. Her behavior is normal. Judgment and thought content normal.   Cognition grossly in tact. Appropriate. Normal affect; tearful at times       Assessment:       1. Anxiety    2. Cancer related pain    3. Insomnia, unspecified type    4. Advanced care planning/counseling discussion    5. DNR  (do not resuscitate)        Plan:           Problem List Items Addressed This Visit        Oncology    Cancer related pain    Current Assessment & Plan     MS Contin added today by Dr. Pop; agree with need for long-acting pain medication. Normal kidney function on recent labs (consider switch to methadone if kidney function declines).     Counseled on constipation prevention with daily miralax and senna PRN. Call for no BM >3 days.               Palliative Care    Advanced care planning/counseling discussion    Overview     [employer] and POA document completed today with patient and her 2 bonilla of . She is of sound mind and able to make decisions on her own today. She elects DNR code status, does not wish to be maintained on mechanical ventilation. Wishes to continue chemotherapy as long as tolerated and recommended by her oncologist. Goal is to do more of the things she enjoys like fishing and eating.          DNR (do not resuscitate)    Overview     [employer] document completed 10/3/19 with patient and family at bedside.               Other    Insomnia    Overview     Reports insomnia with dysphoric mood as well as poor appetite; trial of QHS mirtazapine.          Relevant Medications    mirtazapine (REMERON) 15 MG tablet      Other Visit Diagnoses     Anxiety    -  Primary    Relevant Medications    mirtazapine (REMERON) 15 MG tablet      Advance Care Planning     [employer] and POA document completed today with patient and her 2 bonilla of . She is of sound mind and able to make decisions on her own today. She elects DNR code status, does not wish to be maintained on mechanical ventilation. Wishes to continue chemotherapy as long as tolerated and recommended by her oncologist. She would like to stop chemotherapy if it causes symptoms that she find intolerable. Goal is to do more of the things she enjoys like fishing and eating food prepared by family or at the [employer].      Discussed DME that may  be helpful, such as wheelchair; patient declines and wishes to stay as mobile as possible. Discussed fall precautions. Also discussed home based palliative care programs available should that be needed in the future.    Total face to face time spent was 45 minutes with >50 % spent counseling regarding plan of care, advanced care planning, and options for pain and symptom management.

## 2019-10-03 NOTE — ASSESSMENT & PLAN NOTE
MS Contin added today by Dr. Pop; agree with need for long-acting pain medication. Normal kidney function on recent labs (consider switch to methadone if kidney function declines).     Counseled on constipation prevention with daily miralax and senna PRN. Call for no BM >3 days.

## 2019-10-03 NOTE — PROGRESS NOTES
Subjective:       Patient ID: Soo Holly is a 79 y.o. female.    Chief Complaint: Results; Breast Cancer; and Pain    HPI 79-year-old female history of metastatic triple negative breast carcinoma returns for review of recent PET scan demonstrating new evidence in soft tissue as well as metastatic disease to spine ECOG status 2    Past Medical History:   Diagnosis Date    Breast cancer 2019    radiation and chemo    Cancer     R Breast cancer    Chest pain     Hyperlipidemia     Hypertension      Family History   Problem Relation Age of Onset    Breast cancer Sister      Social History     Socioeconomic History    Marital status:      Spouse name: Not on file    Number of children: Not on file    Years of education: Not on file    Highest education level: Not on file   Occupational History    Not on file   Social Needs    Financial resource strain: Not on file    Food insecurity:     Worry: Not on file     Inability: Not on file    Transportation needs:     Medical: Not on file     Non-medical: Not on file   Tobacco Use    Smoking status: Never Smoker    Smokeless tobacco: Never Used   Substance and Sexual Activity    Alcohol use: No     Frequency: Never    Drug use: No    Sexual activity: Not Currently   Lifestyle    Physical activity:     Days per week: Not on file     Minutes per session: Not on file    Stress: Not on file   Relationships    Social connections:     Talks on phone: Not on file     Gets together: Not on file     Attends Hinduism service: Not on file     Active member of club or organization: Not on file     Attends meetings of clubs or organizations: Not on file     Relationship status: Not on file   Other Topics Concern    Not on file   Social History Narrative    Lives in [place], [place] (1.5h).  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     Past Surgical History:   Procedure Laterality Date    BREAST BIOPSY      cataracts      EYE SURGERY      HYSTERECTOMY      INSERTION  OF TUNNELED CENTRAL VENOUS CATHETER (CVC) WITH SUBCUTANEOUS PORT Right 1/16/2019    Procedure: INSERTION, PORT-A-CATH;  Surgeon: Prem Massey MD;  Location: [facility] OR;  Service: General;  Laterality: Right;    JOINT REPLACEMENT Right     Knee    MODIFIED RADICAL MASTECTOMY W/ AXILLARY LYMPH NODE DISSECTION Right 5/22/2019    Procedure: MASTECTOMY, MODIFIED RADICAL;  Surgeon: Paul Carey MD;  Location: [facility] OR;  Service: General;  Laterality: Right;    OOPHORECTOMY         Labs:  Lab Results   Component Value Date    WBC 5.87 10/02/2019    HGB 10.4 (L) 10/02/2019    HCT 33.8 (L) 10/02/2019    MCV 92 10/02/2019     10/02/2019     BMP  Lab Results   Component Value Date     10/02/2019    K 3.6 10/02/2019     10/02/2019    CO2 28 10/02/2019    BUN 7 (L) 10/02/2019    CREATININE 0.7 10/02/2019    CALCIUM 9.7 10/02/2019    ANIONGAP 12 10/02/2019    ESTGFRAFRICA >60 10/02/2019    EGFRNONAA >60 10/02/2019     Lab Results   Component Value Date    ALT 19 10/02/2019    AST 25 10/02/2019    ALKPHOS 93 10/02/2019    BILITOT 0.2 10/02/2019       Lab Results   Component Value Date    IRON 38 09/06/2019    TIBC 272 09/06/2019    FERRITIN 397 (H) 09/06/2019     No results found for: OPDPBAGO69  No results found for: FOLATE  No results found for: TSH      Review of Systems   Constitutional: Positive for activity change, appetite change and fatigue. Negative for chills, diaphoresis, fever and unexpected weight change.   HENT: Negative for congestion, dental problem, drooling, ear discharge, ear pain, facial swelling, hearing loss, mouth sores, nosebleeds, postnasal drip, rhinorrhea, sinus pressure, sneezing, sore throat, tinnitus, trouble swallowing and voice change.    Eyes: Negative for photophobia, pain, discharge, redness, itching and visual disturbance.   Respiratory: Negative for cough, choking, chest tightness, shortness of breath, wheezing and stridor.    Cardiovascular: Negative for chest pain,  palpitations and leg swelling.   Gastrointestinal: Negative for abdominal distention, abdominal pain, anal bleeding, blood in stool, constipation, diarrhea, nausea, rectal pain and vomiting.   Endocrine: Negative for cold intolerance, heat intolerance, polydipsia, polyphagia and polyuria.   Genitourinary: Negative for decreased urine volume, difficulty urinating, dyspareunia, dysuria, enuresis, flank pain, frequency, genital sores, hematuria, menstrual problem, pelvic pain, urgency, vaginal bleeding, vaginal discharge and vaginal pain.   Musculoskeletal: Positive for arthralgias, back pain and myalgias. Negative for gait problem, joint swelling, neck pain and neck stiffness.   Skin: Negative for color change, pallor and rash.   Allergic/Immunologic: Negative for environmental allergies, food allergies and immunocompromised state.   Neurological: Positive for weakness. Negative for dizziness, tremors, seizures, syncope, facial asymmetry, speech difficulty, light-headedness, numbness and headaches.   Hematological: Negative for adenopathy. Does not bruise/bleed easily.   Psychiatric/Behavioral: Positive for dysphoric mood. Negative for agitation, behavioral problems, confusion, decreased concentration, hallucinations, self-injury, sleep disturbance and suicidal ideas. The patient is nervous/anxious. The patient is not hyperactive.        Objective:      Physical Exam   Constitutional: She is oriented to person, place, and time. She appears cachectic. She has a sickly appearance. She appears ill. She appears distressed.   HENT:   Head: Normocephalic and atraumatic.   Right Ear: External ear normal.   Left Ear: External ear normal.   Nose: Nose normal. Right sinus exhibits no maxillary sinus tenderness and no frontal sinus tenderness. Left sinus exhibits no maxillary sinus tenderness and no frontal sinus tenderness.   Mouth/Throat: Oropharynx is clear and moist. No oropharyngeal exudate.   Eyes: Pupils are equal, round,  and reactive to light. Conjunctivae, EOM and lids are normal. Right eye exhibits no discharge. Left eye exhibits no discharge. Right conjunctiva is not injected. Right conjunctiva has no hemorrhage. Left conjunctiva is not injected. Left conjunctiva has no hemorrhage. No scleral icterus.   Neck: Normal range of motion. Neck supple. No JVD present. No tracheal deviation present. No thyromegaly present.   Cardiovascular: Normal rate and regular rhythm.   Pulmonary/Chest: Effort normal. No stridor. No respiratory distress. She exhibits no tenderness.   Abdominal: Soft. She exhibits no distension and no mass. There is no splenomegaly or hepatomegaly. There is no tenderness. There is no rebound.   Musculoskeletal: Normal range of motion. She exhibits no edema or tenderness.   Lymphadenopathy:     She has no cervical adenopathy.     She has no axillary adenopathy.        Right: No supraclavicular adenopathy present.        Left: No supraclavicular adenopathy present.   Neurological: She is alert and oriented to person, place, and time. No cranial nerve deficit. Coordination normal.   Skin: Skin is dry. No rash noted. She is not diaphoretic. No erythema.   Psychiatric: Her behavior is normal. Judgment and thought content normal. Her mood appears anxious. She exhibits a depressed mood.   Vitals reviewed.          Assessment:      1. Secondary malignant neoplasm of bone    2. Triple negative malignant neoplasm of breast           Plan:     Metastatic breast carcinoma at this point will begin treatment with Abraxane as well as immunotherapy medicine recently demonstrated in randomized trials improved progression-free survival.  Will ask Radiation Oncology to reassess patient to see whether not palliative radiation therapy to spine could help pain. In addition ask ambulatory referral to palliative care.  Information form Advance Care Planning  advanced care planning with living will as well as polyp return to given ambulatory  referral to palliative care made. Advance Care Planning                         Yaniv Pop Jr, MD FACP

## 2019-10-03 NOTE — TELEPHONE ENCOUNTER
----- Message from Miya Reddy MD sent at 10/3/2019 12:32 PM CDT -----  Please coordinate a visit with me along with her next visit to oncology (should be in about 3 weeks).

## 2019-10-03 NOTE — LETTER
October 3, 2019      Yaniv Pop MD  [address]  [place] [address]           [facility]  [address]  [place] [address]  Phone: [phone]  Fax: [phone]          Patient: Soo Holly   MR Number: [id number]   YOB: 1940   Date of Visit: 10/3/2019       Dear Dr. Yaniv Pop:    Thank you for referring Soo Holly to me for evaluation. Attached you will find relevant portions of my assessment and plan of care.    If you have questions, please do not hesitate to call me. I look forward to following Soo Holly along with you.    Sincerely,    Miya Reddy MD    Enclosure  CC:  No Recipients    If you would like to receive this communication electronically, please contact [email or web address] or [phone] to request more information on [facility] access.    For providers and/or their staff who would like to refer a patient to [facility], please contact us through our one-stop-shop provider referral line, [facility], at [phone].    If you feel you have received this communication in error or would no longer like to receive these types of communications, please e-mail [email or web address]

## 2019-10-03 NOTE — PROGRESS NOTES
Subjective:   Patient ID:  Soo Holly is a 79 y.o. female who presents for cardiac consult of Hypertension (5 mon f/u)      Hypertension   Associated symptoms include malaise/fatigue. Pertinent negatives include no chest pain, palpitations or shortness of breath.     The patient came in today for cardiac consult of Hypertension (5 mon f/u)    Referring Physician: Yaniv Pop MD   Reason for initial consult: pre-chemo CV eval    Soo Holly is a 79 y.o. female pt with recently diagnosed ER-MI-HER2- Breast cancer, HTN, obesity presents for follow up CV evaluation.      18  She had a 2D echo yesterday which revealed normal Bi V function and valves. She complains of being fatigue, has mild insomnia. Still active at home with cooking/cleaning etc. Mild ankle swelling occasionally, L worse than right has knee arthritis.   ECG today - NSR, neg for ischemia,     19  She presented to the ER last week due to chest pain, described periodic episodes of a pressure like sensation that starts at the right shoulder and radiates to the anterior neck and down to the left back area with associated shortness of breath sensation. She was sent from Dr. Macdonald's office for PE rule out and had echo and stress which were negative. Trop was mildly elevated. No recent CP episodes. Will have surgery eval soon and then radiation.     10/3/19  She has been having having right sided chest pain s/p surgery and radiation. No SOB. Overall feels weak with joint pain.   ECG - NSR, nonspec T wave changes    Patient feels no PND, no palpitation, no dizziness, no syncope, no CNS symptoms.    Patient has fairly decreased exercise tolerance.    Patient is compliant with medications.    FH - MI/strokes - mother side; sister  of cancer    2D ECHO 2019  Pre-chemo echo 2019  4D= 77%  Biplane= 70%  Average GPLS= -23.73%    CONCLUSIONS     1 - Normal left ventricular systolic function (EF 65-70%).     2 - Normal left  ventricular diastolic function.     3 - Normal right ventricular systolic function .     Nuclear Stress  04/28/2019  Nuclear Quantitative Functional Analysis:   LVEF: 60 %    Impression: NORMAL MYOCARDIAL PERFUSION  1. The perfusion scan is free of evidence for myocardial ischemia or injury.   2. Resting wall motion is physiologic.   3. Resting LV function is normal.   4. The ventricular volumes are normal at rest and stress.   5. The extracardiac distribution of radioactivity is normal.       2D ECHO 04/27/2019  CONCLUSIONS     1 - Moderate left atrial enlargement.     2 - No wall motion abnormalities.     3 - Normal left ventricular systolic function (EF 60-65%).     4 - Indeterminate LV diastolic function.     5 - Normal right ventricular systolic function .         Past Medical History:   Diagnosis Date    Breast cancer 2019    radiation and chemo    Cancer     R Breast cancer    Chest pain     Hyperlipidemia     Hypertension        Past Surgical History:   Procedure Laterality Date    BREAST BIOPSY      cataracts      EYE SURGERY      HYSTERECTOMY      INSERTION OF TUNNELED CENTRAL VENOUS CATHETER (CVC) WITH SUBCUTANEOUS PORT Right 1/16/2019    Procedure: INSERTION, PORT-A-CATH;  Surgeon: Prem Massey MD;  Location: [facility] OR;  Service: General;  Laterality: Right;    JOINT REPLACEMENT Right     Knee    MODIFIED RADICAL MASTECTOMY W/ AXILLARY LYMPH NODE DISSECTION Right 5/22/2019    Procedure: MASTECTOMY, MODIFIED RADICAL;  Surgeon: Paul Carey MD;  Location: [facility] OR;  Service: General;  Laterality: Right;    OOPHORECTOMY         Social History     Tobacco Use    Smoking status: Never Smoker    Smokeless tobacco: Never Used   Substance Use Topics    Alcohol use: No     Frequency: Never    Drug use: No       Family History   Problem Relation Age of Onset    Breast cancer Sister        Patient's Medications   New Prescriptions    No medications on file   Previous Medications    AMLODIPINE  (NORVASC) 10 MG TABLET    amlodipine 10 mg tablet once daily    AMLODIPINE (NORVASC) 10 MG TABLET    amlodipine besylate/benazepril hydrochloride 10-20 mg caps    ASPIRIN (ECOTRIN) 81 MG EC TABLET    Take 1 tablet (81 mg total) by mouth once daily.    CMPD DICLOFENAC-LIDOCAINE 3%-5%, 1:1, COMPOUND OINTMENT    lidocaine 5 % topical ointment    HYDROCODONE-ACETAMINOPHEN (NORCO) 5-325 MG PER TABLET    Take 1 tablet by mouth every 6 hours as needed for pain    HYDROCODONE-ACETAMINOPHEN 7.5-300 MG TAB    hydrocodone 7.5 mg-acetaminophen 650 mg tablet    LIDOCAINE (XYLOCAINE) 5 % OINT OINTMENT    Apply topically as needed. Apply to sore areas as needed.  Can mix with aquaphor if it burns.    LIDOCAINE 5 % CREA    lidocaine 5 % topical ointment    ONDANSETRON (ZOFRAN) 8 MG TABLET    TAKE ONE TABLET BY MOUTH EVERY 12 HOURS AS NEEDED FOR NAUSEA    OXYCODONE-ACETAMINOPHEN (PERCOCET) 7.5-325 MG PER TABLET    Take 1 tablet by mouth every 6 (six) hours as needed for Pain.    PROCHLORPERAZINE (COMPAZINE) 10 MG TABLET    prochlorperazine maleate 10 mg tablet    ZOLPIDEM (AMBIEN) 5 MG TAB    Take 1 tablet (5 mg total) by mouth nightly as needed.   Modified Medications    No medications on file   Discontinued Medications    No medications on file       Review of Systems   Constitutional: Positive for malaise/fatigue.   HENT: Negative.    Eyes: Negative.    Respiratory: Negative.  Negative for shortness of breath.    Cardiovascular: Positive for leg swelling. Negative for chest pain and palpitations.   Gastrointestinal: Negative.    Genitourinary: Negative.    Musculoskeletal: Positive for joint pain.   Skin: Negative.    Neurological: Negative.    Endo/Heme/Allergies: Negative.    Psychiatric/Behavioral: Negative.    All 12 systems otherwise negative.      Wt Readings from Last 3 Encounters:   10/03/19 85.3 kg (188 lb 0.8 oz)   09/26/19 86.6 kg (190 lb 14.7 oz)   09/26/19 86.6 kg (190 lb 14.7 oz)     Temp Readings from Last 3  Encounters:   09/26/19 97.4 °F (36.3 °C)   09/26/19 97.4 °F (36.3 °C) (Oral)   09/26/19 98.8 °F (37.1 °C) (Oral)     BP Readings from Last 3 Encounters:   10/03/19 124/78   09/26/19 120/70   09/26/19 120/70     Pulse Readings from Last 3 Encounters:   10/03/19 96   09/26/19 95   09/26/19 95       /78 (BP Location: Left arm, Patient Position: Sitting, BP Method: Medium (Manual))   Pulse 96   Wt 85.3 kg (188 lb 0.8 oz)   BMI 34.40 kg/m²     Objective:   Physical Exam   Constitutional: She is oriented to person, place, and time. She appears well-developed and well-nourished. No distress.   Obese   HENT:   Head: Normocephalic and atraumatic.   Nose: Nose normal.   Mouth/Throat: Oropharynx is clear and moist.   Eyes: Conjunctivae and EOM are normal. No scleral icterus.   Neck: Normal range of motion. Neck supple. No JVD present. No thyromegaly present.   Cardiovascular: Normal rate, regular rhythm, S1 normal and S2 normal. Exam reveals no gallop, no S3, no S4 and no friction rub.   No murmur heard.  Pulmonary/Chest: Effort normal and breath sounds normal. No stridor. No respiratory distress. She has no wheezes. She has no rales. She exhibits no tenderness.   Abdominal: Soft. Bowel sounds are normal. She exhibits no distension and no mass. There is no tenderness. There is no rebound.   Genitourinary:   Genitourinary Comments: Deferred   Musculoskeletal: Normal range of motion. She exhibits no edema, tenderness or deformity.   Lymphadenopathy:     She has no cervical adenopathy.   Neurological: She is alert and oriented to person, place, and time. She exhibits normal muscle tone. Coordination normal.   Skin: Skin is warm and dry. No rash noted. She is not diaphoretic. No erythema. No pallor.   Psychiatric: She has a normal mood and affect. Her behavior is normal. Judgment and thought content normal.   Nursing note and vitals reviewed.      Lab Results   Component Value Date     10/02/2019    K 3.6 10/02/2019      10/02/2019    CO2 28 10/02/2019    BUN 7 (L) 10/02/2019    CREATININE 0.7 10/02/2019     (H) 10/02/2019    HGBA1C 6.7 (H) 04/26/2019    AST 25 10/02/2019    ALT 19 10/02/2019    ALBUMIN 3.2 (L) 10/02/2019    PROT 7.4 10/02/2019    BILITOT 0.2 10/02/2019    WBC 5.87 10/02/2019    HGB 10.4 (L) 10/02/2019    HCT 33.8 (L) 10/02/2019    MCV 92 10/02/2019     10/02/2019    CHOL 212 (H) 04/28/2019    HDL 39 (L) 04/28/2019    LDLCALC 146.6 04/28/2019    TRIG 132 04/28/2019    BNP 87 04/26/2019     Assessment:      1. Essential hypertension    2. Elevated troponin    3. Triple negative malignant neoplasm of breast    4. Other form of dyspnea    5. Chest pain, unspecified type        Plan:   1. Breast cancer s/p surgery/radiation  - f/u with Heme/onc and PCP/rad onc    2. HTN  - cont meds  - low salt diet     3. Chest pain/SOB - recent hosp with elevated trop, resolved   - recent stress and echo negative  - r/o non cardiac etiologies as well - GERD/MSK?      Thank you for allowing me to participate in this patient's care. Please do not hesitate to contact me with any questions or concerns. Consult note has been forwarded to the referral physician.

## 2019-10-03 NOTE — Clinical Note
Please coordinate a visit with me along with her next visit to oncology (should be in about 3 weeks).

## 2019-10-03 NOTE — PATIENT INSTRUCTIONS
Start MS Contin 30 mg every 12 hours for long acting pain relief.     Start mirtazapine 15 mg nightly before bed to help with sleep and appetite.

## 2019-10-03 NOTE — PROGRESS NOTES
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 ypT2 ypN2a   1. 18 had a diagnostic bilateral mammogram due to palpable fullness in the right breast. This showed an area 10 cm deep to the nipple at 3:00 position irregular asymmetric increased density with spiculation which was the area marked with a skin marker and measured 27 mm. This was not present in 2013.   2. 12/3/18 right breast core biopsy returned invasive ductal carcinoma, grade 3, ER negative, SD negative, her 2 Stormy nonamplified, Ki-67 60%.   3. She was seen by Dr. Pop who noted on physical exam inflammatory breast cancer with edema of the skin.   4. 18 PET scan showed multiple FDG avid right axillary lymph nodes in level 1 and 2 at up to 17.2 SUV and possibly tiny mildly FDG avid level 3 lymph nodes more superiorly at 2.5 SUV. There were FDG avid lymph nodes in the anterior mediastinum/superior right internal mammary chain at 5.3 SUV. There was a focal area of uptake inferior to the medial clavicle just above the costochondral junction of the 1st rib likely an FDG avid lymph node at 8.8 SUV. There was diffuse uptake in the right central breast and diffusely in the skin and inferior breast at 6.8 SUV and a focal uptake superior right breast. There was intense uptake in the left deltoid muscle at 7.1 SUV which was focal. There was also focal 4.8 SUV right deltoid uptake. On my review there are at least 10 suspicious right axillary lymph nodes, and at least 3 right superior internal mammary lymph nodes.   5. 19 skin punch biopsy of the right breast returned invasive ductal carcinoma in the subcutaneous tissue and dermal lymphatics..   6. 18 [facility] testing ordered.   7. 19 started chemotherapy with dose dense AC with Taxol.   8. 19 right axillary ultrasound-guided  biopsy with clip placement was performed of an abnormal lymph node. This returned invasive ductal carcinoma.   9. 4/12/19 completed ddAC + T (3 cycles taxol)   10. 5/8/19 PET showed resolution of size and FDG avidity of right axillary and internal mammary nodes. There were no FDG avid right breast masses. There was persistent right breast skin thickening at 3 SUV. There was no metastatic disease.   11. 5/22/19 right modified radical mastectomy 4.9 cm invasive ductal carcinoma, grade 3, focal dermal lymphatic invasion, 9/13 lymph nodes involved largest deposit 4 mm, no extranodal extension, negative margins 10 mm closest margin deep, lymph-vascular invasion. There was a probable for definite tumor response in the breast and lymph nodes. ypT2 ypN2a   12. 8/8/19 completed chest wall and regional xochilt radiation 50 Gy with 10 Gy mastectomy incision boost  13. 9/19/19 right posterior back biopsy returned breast cancer, carcinoma en cuirasse   14. 9/26/19 left breast mammogram and ultrasound showing 3 cm left breast mass  15. 10/2/19 PET recurrence and right posterior lateral chest wall, new bilateral axillary and left supraclavicular adenopathy, new right pleural effusion, osseous metastases axial and appendicular    CURRENT STATUS: Soo Holly is a pleasant 79 y.o. female who presents today for follow-up.  She had a PET scan as above which I reviewed.  She went to the ER last week with seizures.  This was in [place].  She told me she had a CT of the head and per her family it was negative.  She has a brain MRI today.  She is continuing pain medicine with Percocet for the right axillary and upper back pain.  She denies any other sites of pain such as the bones of the spine or hip.  She does feel like the pain is worsening slightly in the right underarm, but is pretty well controlled with Percocet.  She is able to sleep through the night.    PAST MEDICAL HISTORY:  Past Medical History:   Diagnosis Date     Breast cancer 2019    radiation and chemo    Cancer     R Breast cancer    Chest pain     Hyperlipidemia     Hypertension        PAST SURGICAL HISTORY:  Past Surgical History:   Procedure Laterality Date    BREAST BIOPSY      cataracts      EYE SURGERY      HYSTERECTOMY      INSERTION OF TUNNELED CENTRAL VENOUS CATHETER (CVC) WITH SUBCUTANEOUS PORT Right 1/16/2019    Procedure: INSERTION, PORT-A-CATH;  Surgeon: Prem Massey MD;  Location: [facility];  Service: General;  Laterality: Right;    JOINT REPLACEMENT Right     Knee    MODIFIED RADICAL MASTECTOMY W/ AXILLARY LYMPH NODE DISSECTION Right 5/22/2019    Procedure: MASTECTOMY, MODIFIED RADICAL;  Surgeon: Paul Carey MD;  [facility];  Service: General;  Laterality: Right;    OOPHORECTOMY         SOCIAL HISTORY:  Social History     Socioeconomic History    Marital status:      Spouse name: Not on file    Number of children: Not on file    Years of education: Not on file    Highest education level: Not on file   Occupational History    Not on file   Social Needs    Financial resource strain: Not on file    Food insecurity:     Worry: Not on file     Inability: Not on file    Transportation needs:     Medical: Not on file     Non-medical: Not on file   Tobacco Use    Smoking status: Never Smoker    Smokeless tobacco: Never Used   Substance and Sexual Activity    Alcohol use: No     Frequency: Never    Drug use: No    Sexual activity: Not Currently   Lifestyle    Physical activity:     Days per week: Not on file     Minutes per session: Not on file    Stress: Not on file   Relationships    Social connections:     Talks on phone: Not on file     Gets together: Not on file     Attends Rastafari service: Not on file     Active member of club or organization: Not on file     Attends meetings of clubs or organizations: Not on file     Relationship status: Not on file   Other Topics Concern    Not on file   Social History  "Kavitha    Lives in [place], [place] (1.5h).  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       FAMILY HISTORY:  Family History   Problem Relation Age of Onset    Breast cancer Sister        PHYSICAL EXAMINATION:  Constitutional/Vitals: well appearing, no acute distress, ECOG 3 - Confined to bed or chair 50% of waking hours, BP (!) 149/82   Pulse 99   Temp 99 °F (37.2 °C)   Resp 18   Ht 5' 2" (1.575 m)   Wt 84.4 kg (186 lb 1.6 oz)   SpO2 98%   BMI 34.04 kg/m²   Lymphatic:  Diffuse recurrence in the right axilla  Breast/Endocrine:  Progression of recurrence in the right chest wall, right axilla and right upper back  Musc:  Mild tenderness to palpation of the thoracic spine, no tenderness of the hips    IMAGING AND LABORATORY FINDINGS: As per HPI; images, labs and medical records reviewed personally in EPIC.    ASSESSMENT:  Metastatic triple negative inflammatory with right intratrochanteric metastasis and thoracic spine metastases    PLAN:  1. Metastatic, inflammatory triple negative breast cancer:  Dr. Pop is planning on Abraxane.  2. Right hip metastasis:  This is an area prone to fracture risk.  Given the rapid recurrence for inflammatory triple negative breast cancer recommended short course of radiation to minimize fracture risk.  Since she is traveling from [place] I will plan on treating her with a single fraction next Monday when she comes for chemotherapy.  3. Thoracic spine metastases:  She has a rapidly growing aggressive inflammatory, triple negative malignancy with thoracic spine metastasis.  She has mild tenderness in the region of the metastases.  I think treating to prevent growth in spinal cord compression is reasonable.  We will attempt to use a field arrangement to minimize esophagus dose.  I will treat her with a single fraction next Monday.  4. Recent seizure:  She went to the ER with a seizure recently.  On review of the notes he was thought possibly due to narcotics but she has a brain MRI today which " I will review.    REBECCA Plata M.D.  Radiation Oncology  [facility] [address] [place] [address]  Ph: [phone]  [email or web address]

## 2019-10-04 NOTE — TELEPHONE ENCOUNTER
Ok; I'll ask the palliative med  and/or nurse to check with her that day.     Yana--can you add a quick visit with this patient on 10/24/1? I wanted to touch base with her again but will be out that day.

## 2019-10-05 ENCOUNTER — DOCUMENTATION ONLY (OUTPATIENT)
Dept: HEMATOLOGY/ONCOLOGY | Facility: CLINIC | Age: 79
End: 2019-10-05

## 2019-10-05 NOTE — PROGRESS NOTES
Family called due to a recent seizure by patient.  Apparently patient was started on morphine in addition to Percocet which she took both yesterday according to the granddaughter.  After taking the medications she had witnessed seizure at home.  Paramedics were called and she was given Narcan with minimal improvement.  She was eventually brought into the emergency room which she had another witnessed seizure.  She was started on anti seizure medication with Dilantin and was subsequently discharged home.  Family called me this morning asking if she should continue the morphine or Percocet.  Patient is still sleeping since discharge although she is alert awake.  I advised that they hold all narcotics at this time.  Patient has not complained of pain she has been sleeping since discharge. I also updated Dr. Reddy for advice on pain management.  Advised family to call 911 if there is another seizure at home.  They verbalized understanding.    Aaron Roberts MD

## 2019-10-07 ENCOUNTER — OFFICE VISIT (OUTPATIENT)
Dept: HEMATOLOGY/ONCOLOGY | Facility: CLINIC | Age: 79
End: 2019-10-07
Payer: MEDICARE

## 2019-10-07 ENCOUNTER — HOSPITAL ENCOUNTER (OUTPATIENT)
Dept: RADIOLOGY | Facility: HOSPITAL | Age: 79
Discharge: HOME | End: 2019-10-07
Attending: INTERNAL MEDICINE
Payer: MEDICARE

## 2019-10-07 ENCOUNTER — OFFICE VISIT (OUTPATIENT)
Dept: SURGERY | Facility: CLINIC | Age: 79
End: 2019-10-07
Payer: MEDICARE

## 2019-10-07 ENCOUNTER — OFFICE VISIT (OUTPATIENT)
Dept: RADIATION ONCOLOGY | Facility: CLINIC | Age: 79
End: 2019-10-07
Payer: MEDICARE

## 2019-10-07 ENCOUNTER — TELEPHONE (OUTPATIENT)
Dept: HEMATOLOGY/ONCOLOGY | Facility: CLINIC | Age: 79
End: 2019-10-07

## 2019-10-07 VITALS
DIASTOLIC BLOOD PRESSURE: 82 MMHG | HEART RATE: 99 BPM | SYSTOLIC BLOOD PRESSURE: 149 MMHG | WEIGHT: 186.13 LBS | OXYGEN SATURATION: 98 % | HEIGHT: 62 IN | RESPIRATION RATE: 18 BRPM | TEMPERATURE: 99 F | BODY MASS INDEX: 34.25 KG/M2

## 2019-10-07 VITALS
SYSTOLIC BLOOD PRESSURE: 122 MMHG | HEART RATE: 100 BPM | BODY MASS INDEX: 34.29 KG/M2 | OXYGEN SATURATION: 98 % | DIASTOLIC BLOOD PRESSURE: 76 MMHG | WEIGHT: 186.31 LBS | HEIGHT: 62 IN | TEMPERATURE: 100 F | RESPIRATION RATE: 18 BRPM

## 2019-10-07 VITALS
DIASTOLIC BLOOD PRESSURE: 76 MMHG | WEIGHT: 186 LBS | HEART RATE: 100 BPM | BODY MASS INDEX: 34.23 KG/M2 | TEMPERATURE: 100 F | SYSTOLIC BLOOD PRESSURE: 122 MMHG | HEIGHT: 62 IN

## 2019-10-07 DIAGNOSIS — C79.51 SECONDARY MALIGNANT NEOPLASM OF BONE: ICD-10-CM

## 2019-10-07 DIAGNOSIS — C50.919 TRIPLE NEGATIVE MALIGNANT NEOPLASM OF BREAST: ICD-10-CM

## 2019-10-07 DIAGNOSIS — C79.51 SECONDARY MALIGNANT NEOPLASM OF BONE: Primary | ICD-10-CM

## 2019-10-07 DIAGNOSIS — Z17.1 MALIGNANT NEOPLASM OF LOWER-OUTER QUADRANT OF RIGHT BREAST OF FEMALE, ESTROGEN RECEPTOR NEGATIVE: ICD-10-CM

## 2019-10-07 DIAGNOSIS — C50.511 MALIGNANT NEOPLASM OF LOWER-OUTER QUADRANT OF RIGHT BREAST OF FEMALE, ESTROGEN RECEPTOR NEGATIVE: ICD-10-CM

## 2019-10-07 DIAGNOSIS — C50.919 TRIPLE NEGATIVE MALIGNANT NEOPLASM OF BREAST: Primary | ICD-10-CM

## 2019-10-07 DIAGNOSIS — G89.3 CANCER RELATED PAIN: ICD-10-CM

## 2019-10-07 DIAGNOSIS — D49.89 NEOPLASM OF HEAD: ICD-10-CM

## 2019-10-07 PROCEDURE — A9585 GADOBUTROL INJECTION: HCPCS | Performed by: INTERNAL MEDICINE

## 2019-10-07 PROCEDURE — 70553 MRI BRAIN STEM W/O & W/DYE: CPT | Mod: TC

## 2019-10-07 PROCEDURE — 99999 PR PBB SHADOW E&M-EST. PATIENT-LVL III: ICD-10-PCS | Mod: PBBFAC,,, | Performed by: RADIOLOGY

## 2019-10-07 PROCEDURE — 99213 OFFICE O/P EST LOW 20 MIN: CPT | Mod: PBBFAC,25,27 | Performed by: INTERNAL MEDICINE

## 2019-10-07 PROCEDURE — 99214 OFFICE O/P EST MOD 30 MIN: CPT | Mod: S$PBB,,, | Performed by: RADIOLOGY

## 2019-10-07 PROCEDURE — 99213 OFFICE O/P EST LOW 20 MIN: CPT | Mod: PBBFAC,25 | Performed by: RADIOLOGY

## 2019-10-07 PROCEDURE — 25500020 PHARM REV CODE 255: Performed by: INTERNAL MEDICINE

## 2019-10-07 PROCEDURE — 99999 PR PBB SHADOW E&M-EST. PATIENT-LVL III: ICD-10-PCS | Mod: PBBFAC,,, | Performed by: SURGERY

## 2019-10-07 PROCEDURE — 99214 PR OFFICE/OUTPT VISIT, EST, LEVL IV, 30-39 MIN: ICD-10-PCS | Mod: S$PBB,,, | Performed by: RADIOLOGY

## 2019-10-07 PROCEDURE — 99999 PR PBB SHADOW E&M-EST. PATIENT-LVL III: ICD-10-PCS | Mod: PBBFAC,,, | Performed by: INTERNAL MEDICINE

## 2019-10-07 PROCEDURE — 99999 PR PBB SHADOW E&M-EST. PATIENT-LVL III: CPT | Mod: PBBFAC,,, | Performed by: RADIOLOGY

## 2019-10-07 PROCEDURE — 99999 PR PBB SHADOW E&M-EST. PATIENT-LVL III: CPT | Mod: PBBFAC,,, | Performed by: SURGERY

## 2019-10-07 PROCEDURE — 99024 PR POST-OP FOLLOW-UP VISIT: ICD-10-PCS | Mod: POP,,, | Performed by: SURGERY

## 2019-10-07 PROCEDURE — 99214 OFFICE O/P EST MOD 30 MIN: CPT | Mod: S$PBB,,, | Performed by: INTERNAL MEDICINE

## 2019-10-07 PROCEDURE — 99214 PR OFFICE/OUTPT VISIT, EST, LEVL IV, 30-39 MIN: ICD-10-PCS | Mod: S$PBB,,, | Performed by: INTERNAL MEDICINE

## 2019-10-07 PROCEDURE — 99999 PR PBB SHADOW E&M-EST. PATIENT-LVL III: CPT | Mod: PBBFAC,,, | Performed by: INTERNAL MEDICINE

## 2019-10-07 PROCEDURE — 99213 OFFICE O/P EST LOW 20 MIN: CPT | Mod: PBBFAC,25,27 | Performed by: SURGERY

## 2019-10-07 PROCEDURE — 99024 POSTOP FOLLOW-UP VISIT: CPT | Mod: POP,,, | Performed by: SURGERY

## 2019-10-07 RX ORDER — PHENYTOIN SODIUM 100 MG/1
CAPSULE, EXTENDED RELEASE ORAL
COMMUNITY
Start: 2019-10-05 | End: 2019-10-31

## 2019-10-07 RX ORDER — GADOBUTROL 604.72 MG/ML
10 INJECTION INTRAVENOUS
Status: COMPLETED | OUTPATIENT
Start: 2019-10-07 | End: 2019-10-07

## 2019-10-07 RX ADMIN — GADOBUTROL 8 ML: 604.72 INJECTION INTRAVENOUS at 03:10

## 2019-10-07 NOTE — PROGRESS NOTES
Subjective:       Patient ID: Soo Holly is a 79 y.o. female.    Suture removal    Chief Complaint: Post-op Evaluation    Patient underwent a skin biopsy.  This proved to be metastatic breast cancer.  She has since undergone a PET scan that showed multiple areas of breast cancer metastases.    Patient has a left breast mass and left axillary lymphadenopathy that is concerning for breast cancer.  Due to the discovering of metastatic disease this biopsy was canceled.    Patient has less pain of the right axilla and back    Review of Systems   Skin:        Skin changes of the right axilla and back from radiation metastatic breast cancer       Objective:      Physical Exam   Constitutional: She appears well-nourished. No distress.   Skin:   The right back sutures were removed     PET scan was reviewed  Assessment:      metastatic breast cancer     Plan:     agree with cancelling the biopsy.  Further management per Oncology and Radiation Oncology.  Follow up with surgery as needed

## 2019-10-07 NOTE — TELEPHONE ENCOUNTER
Received call from son stating that they were in the ER this weekend with new onset of seizures.  Pt stated they would like a follow up with Dr. Pop today.  Scheduled pt to see  today at 4PM following MRI of brain due to him wanting to review the MRI to discuss status.  Son verbalized understanding.

## 2019-10-07 NOTE — PROGRESS NOTES
Subjective:       Patient ID: Soo Holly is a 79 y.o. female.    Chief Complaint: Follow-up; Results; Breast Cancer; and Chemotherapy    HPI 79-year-old female cycle 1 day 5 Abraxane/ATEZOLIZUMAB metastatic breast cancer.  The patient was seen in the emergency room would with new seizure after starting on [place] Contin results of MRI brain completed which reveals no evidence of intracranial disease patient presents for further discussion    Past Medical History:   Diagnosis Date    Breast cancer 2019    radiation and chemo    Cancer     R Breast cancer    Chest pain     Hyperlipidemia     Hypertension      Family History   Problem Relation Age of Onset    Breast cancer Sister      Social History     Socioeconomic History    Marital status:      Spouse name: Not on file    Number of children: Not on file    Years of education: Not on file    Highest education level: Not on file   Occupational History    Not on file   Social Needs    Financial resource strain: Not on file    Food insecurity:     Worry: Not on file     Inability: Not on file    Transportation needs:     Medical: Not on file     Non-medical: Not on file   Tobacco Use    Smoking status: Never Smoker    Smokeless tobacco: Never Used   Substance and Sexual Activity    Alcohol use: No     Frequency: Never    Drug use: No    Sexual activity: Not Currently   Lifestyle    Physical activity:     Days per week: Not on file     Minutes per session: Not on file    Stress: Not on file   Relationships    Social connections:     Talks on phone: Not on file     Gets together: Not on file     Attends Yarsani service: Not on file     Active member of club or organization: Not on file     Attends meetings of clubs or organizations: Not on file     Relationship status: Not on file   Other Topics Concern    Not on file   Social History Narrative    Lives in [place], [place] (1.5h).  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     Past Surgical History:    Procedure Laterality Date    BREAST BIOPSY      cataracts      EYE SURGERY      HYSTERECTOMY      INSERTION OF TUNNELED CENTRAL VENOUS CATHETER (CVC) WITH SUBCUTANEOUS PORT Right 1/16/2019    Procedure: INSERTION, PORT-A-CATH;  Surgeon: Prem Massey MD;  Location: [facility] OR;  Service: General;  Laterality: Right;    JOINT REPLACEMENT Right     Knee    MODIFIED RADICAL MASTECTOMY W/ AXILLARY LYMPH NODE DISSECTION Right 5/22/2019    Procedure: MASTECTOMY, MODIFIED RADICAL;  Surgeon: Paul Carey MD;  Location: [facility] OR;  Service: General;  Laterality: Right;    OOPHORECTOMY         Labs:  Lab Results   Component Value Date    WBC 5.87 10/02/2019    HGB 10.4 (L) 10/02/2019    HCT 33.8 (L) 10/02/2019    MCV 92 10/02/2019     10/02/2019     BMP  Lab Results   Component Value Date     10/02/2019    K 3.6 10/02/2019     10/02/2019    CO2 28 10/02/2019    BUN 7 (L) 10/02/2019    CREATININE 0.7 10/02/2019    CALCIUM 9.7 10/02/2019    ANIONGAP 12 10/02/2019    ESTGFRAFRICA >60 10/02/2019    EGFRNONAA >60 10/02/2019     Lab Results   Component Value Date    ALT 19 10/02/2019    AST 25 10/02/2019    ALKPHOS 93 10/02/2019    BILITOT 0.2 10/02/2019       Lab Results   Component Value Date    IRON 38 09/06/2019    TIBC 272 09/06/2019    FERRITIN 397 (H) 09/06/2019     No results found for: CCUNWYRS31  No results found for: FOLATE  No results found for: TSH      Review of Systems   Constitutional: Positive for activity change, fatigue and unexpected weight change. Negative for chills, diaphoresis and fever.   HENT: Negative for congestion, dental problem, drooling, ear discharge, ear pain, facial swelling, hearing loss, mouth sores, nosebleeds, postnasal drip, rhinorrhea, sinus pressure, sneezing, sore throat, tinnitus, trouble swallowing and voice change.    Eyes: Negative for photophobia, pain, discharge, redness, itching and visual disturbance.   Respiratory: Negative for cough, choking, chest  tightness, shortness of breath, wheezing and stridor.    Cardiovascular: Negative for chest pain, palpitations and leg swelling.   Gastrointestinal: Negative for abdominal distention, abdominal pain, anal bleeding, blood in stool, constipation, diarrhea, nausea, rectal pain and vomiting.   Endocrine: Negative for cold intolerance, heat intolerance, polydipsia, polyphagia and polyuria.   Genitourinary: Negative for decreased urine volume, difficulty urinating, dyspareunia, dysuria, enuresis, flank pain, frequency, genital sores, hematuria, menstrual problem, pelvic pain, urgency, vaginal bleeding, vaginal discharge and vaginal pain.   Musculoskeletal: Negative for arthralgias, back pain, gait problem, joint swelling, myalgias, neck pain and neck stiffness.   Skin: Negative for color change, pallor and rash.   Allergic/Immunologic: Negative for environmental allergies, food allergies and immunocompromised state.   Neurological: Positive for weakness. Negative for dizziness, tremors, seizures, syncope, facial asymmetry, speech difficulty, light-headedness, numbness and headaches.   Hematological: Negative for adenopathy. Does not bruise/bleed easily.   Psychiatric/Behavioral: Positive for dysphoric mood. Negative for agitation, behavioral problems, confusion, decreased concentration, hallucinations, self-injury, sleep disturbance and suicidal ideas. The patient is nervous/anxious. The patient is not hyperactive.        Objective:      Physical Exam   Constitutional: She is oriented to person, place, and time. She has a sickly appearance. She appears ill. She appears distressed.   HENT:   Head: Normocephalic and atraumatic.   Right Ear: External ear normal.   Left Ear: External ear normal.   Nose: Nose normal. Right sinus exhibits no maxillary sinus tenderness and no frontal sinus tenderness. Left sinus exhibits no maxillary sinus tenderness and no frontal sinus tenderness.   Mouth/Throat: Oropharynx is clear and moist.  No oropharyngeal exudate.   Eyes: Pupils are equal, round, and reactive to light. Conjunctivae, EOM and lids are normal. Right eye exhibits no discharge. Left eye exhibits no discharge. Right conjunctiva is not injected. Right conjunctiva has no hemorrhage. Left conjunctiva is not injected. Left conjunctiva has no hemorrhage. No scleral icterus.   Neck: Normal range of motion. Neck supple. No JVD present. No tracheal deviation present. No thyromegaly present.   Cardiovascular: Normal rate and regular rhythm.   Pulmonary/Chest: Effort normal. No stridor. No respiratory distress. She exhibits no tenderness.   Abdominal: Soft. She exhibits no distension and no mass. There is no splenomegaly or hepatomegaly. There is no tenderness. There is no rebound.   Musculoskeletal: Normal range of motion. She exhibits no edema or tenderness.   Lymphadenopathy:     She has no cervical adenopathy.     She has no axillary adenopathy.        Right: No supraclavicular adenopathy present.        Left: No supraclavicular adenopathy present.   Neurological: She is alert and oriented to person, place, and time. No cranial nerve deficit. Coordination abnormal.   Skin: Skin is dry. No rash noted. She is not diaphoretic. No erythema.   Psychiatric: She has a normal mood and affect. Her behavior is normal. Judgment and thought content normal.   Vitals reviewed.          Assessment:      1. Triple negative malignant neoplasm of breast    2. Secondary malignant neoplasm of bone    3. Cancer related pain           Plan:     Patient seen urgently today reviewed information no evidence of intracranial disease.  At this point if the patient does not wish to restart her [place] Contin which I think is a very low likelihood of having a cause this event.  Patient should start on Percocet had a more frequent dosing hopefully with response to therapy she will have decreased pain discussed implications with family follow-up later this week for next cycle of  therapy        Yaniv Pop Jr, MD FACP

## 2019-10-07 NOTE — PATIENT INSTRUCTIONS
your care can be directed by your medical and radiation oncologist.  We will see you back as needed

## 2019-10-08 ENCOUNTER — TELEPHONE (OUTPATIENT)
Dept: INTERNAL MEDICINE | Facility: CLINIC | Age: 79
End: 2019-10-08

## 2019-10-08 NOTE — TELEPHONE ENCOUNTER
----- Message from Sridhar Goodrich sent at 10/8/2019  1:17 PM CDT -----  Contact: son-destiny  Type:  Needs Medical Advice    Who Called: son  Symptoms (please be specific):n/a   How long has patient had these symptoms: n/a  Pharmacy name and phone #: n/a  Would the patient rather a call back or a response via [facility]? Call back  Best Call Back Number: [phone] or [phone]  Additional Information: requesting call back regarding getting pt walker and wheel chair ordered. Son states pt needs it as soon as possible.      Thanks,  Sridhar Goodrich

## 2019-10-10 ENCOUNTER — INFUSION (OUTPATIENT)
Dept: INFUSION THERAPY | Facility: HOSPITAL | Age: 79
End: 2019-10-10
Attending: INTERNAL MEDICINE
Payer: MEDICARE

## 2019-10-10 ENCOUNTER — OFFICE VISIT (OUTPATIENT)
Dept: HEMATOLOGY/ONCOLOGY | Facility: CLINIC | Age: 79
End: 2019-10-10
Payer: MEDICARE

## 2019-10-10 VITALS
OXYGEN SATURATION: 94 % | SYSTOLIC BLOOD PRESSURE: 139 MMHG | DIASTOLIC BLOOD PRESSURE: 77 MMHG | HEIGHT: 62 IN | BODY MASS INDEX: 34.45 KG/M2 | TEMPERATURE: 97 F | RESPIRATION RATE: 18 BRPM | WEIGHT: 187.19 LBS | HEART RATE: 98 BPM

## 2019-10-10 DIAGNOSIS — C50.919 TRIPLE NEGATIVE MALIGNANT NEOPLASM OF BREAST: Primary | ICD-10-CM

## 2019-10-10 DIAGNOSIS — C79.51 SECONDARY MALIGNANT NEOPLASM OF BONE: ICD-10-CM

## 2019-10-10 DIAGNOSIS — G89.3 CANCER RELATED PAIN: ICD-10-CM

## 2019-10-10 DIAGNOSIS — C50.919 TRIPLE NEGATIVE MALIGNANT NEOPLASM OF BREAST: ICD-10-CM

## 2019-10-10 DIAGNOSIS — C79.51 SECONDARY MALIGNANT NEOPLASM OF BONE: Primary | ICD-10-CM

## 2019-10-10 PROCEDURE — 63600175 PHARM REV CODE 636 W HCPCS: Performed by: INTERNAL MEDICINE

## 2019-10-10 PROCEDURE — 96413 CHEMO IV INFUSION 1 HR: CPT

## 2019-10-10 PROCEDURE — 99999 PR PBB SHADOW E&M-EST. PATIENT-LVL III: CPT | Mod: PBBFAC,,, | Performed by: INTERNAL MEDICINE

## 2019-10-10 PROCEDURE — 99215 OFFICE O/P EST HI 40 MIN: CPT | Mod: 25,S$PBB,, | Performed by: INTERNAL MEDICINE

## 2019-10-10 PROCEDURE — 99215 PR OFFICE/OUTPT VISIT, EST, LEVL V, 40-54 MIN: ICD-10-PCS | Mod: 25,S$PBB,, | Performed by: INTERNAL MEDICINE

## 2019-10-10 PROCEDURE — 99213 OFFICE O/P EST LOW 20 MIN: CPT | Mod: PBBFAC,25 | Performed by: INTERNAL MEDICINE

## 2019-10-10 PROCEDURE — 96375 TX/PRO/DX INJ NEW DRUG ADDON: CPT

## 2019-10-10 PROCEDURE — 99999 PR PBB SHADOW E&M-EST. PATIENT-LVL III: ICD-10-PCS | Mod: PBBFAC,,, | Performed by: INTERNAL MEDICINE

## 2019-10-10 RX ORDER — HEPARIN 100 UNIT/ML
500 SYRINGE INTRAVENOUS
Status: DISCONTINUED | OUTPATIENT
Start: 2019-10-10 | End: 2019-10-10 | Stop reason: HOSPADM

## 2019-10-10 RX ORDER — ONDANSETRON 2 MG/ML
8 INJECTION INTRAMUSCULAR; INTRAVENOUS
Status: COMPLETED | OUTPATIENT
Start: 2019-10-10 | End: 2019-10-10

## 2019-10-10 RX ADMIN — SODIUM CHLORIDE: 9 INJECTION, SOLUTION INTRAVENOUS at 01:10

## 2019-10-10 RX ADMIN — PACLITAXEL 200 MG: 100 INJECTION, POWDER, LYOPHILIZED, FOR SUSPENSION INTRAVENOUS at 02:10

## 2019-10-10 RX ADMIN — ONDANSETRON HYDROCHLORIDE 8 MG: 2 INJECTION, SOLUTION INTRAMUSCULAR; INTRAVENOUS at 01:10

## 2019-10-10 RX ADMIN — HEPARIN 500 UNITS: 100 SYRINGE at 03:10

## 2019-10-10 NOTE — NURSING
Pt tolerated Abraxane well. No adverse reaction noted. Pt education reinforced on chemo regimen, side effects, what to expect, and when to call . Pt verbalized understanding. I reviewed pt calendar w/ pt and understanding verbalized. Right PAC deaccessed and flushed w/ NS and heparin per protocol.

## 2019-10-10 NOTE — DISCHARGE INSTRUCTIONS
[facility]  [address] [facility]  [address]  [phone] phone     [phone] fax  Hours of Operation: Monday- Friday 8:00am- 5:00pm  After hours phone  [phone]  Hematology / Oncology Physicians on call      Dr. Donn Martin      Please call with any concerns regarding your appointment today.    FALL PREVENTION   Falls often occur due to slipping, tripping or losing your balance. Here are ways to reduce your risk of falling again.   Was there anything that caused your fall that can be fixed, removed or replaced?   Make your home safe by keeping walkways clear of objects you may trip over.   Use non-slip pads under rugs.   Do not walk in poorly lit areas.   Do not stand on chairs or wobbly ladders.   Use caution when reaching overhead or looking upward. This position can cause a loss of balance.   Be sure your shoes fit properly, have non-slip bottoms and are in good condition.   Be cautious when going up and down stairs, curbs, and when walking on uneven sidewalks.   If your balance is poor, consider using a cane or walker.   If your fall was related to alcohol use, stop or limit alcohol intake.   If your fall was related to use of sleeping medicines, talk to your doctor about this. You may need to reduce your dosage at bedtime if you awaken during the night to go to the bathroom.   To reduce the need for nighttime bathroom trips:   Avoid drinking fluids for several hours before going to bed   Empty your bladder before going to bed   Men can keep a urinal at the bedside   © 9042-3706 [employer] [facility], [address] [place] [address]. All rights reserved. This information is not intended as a substitute for professional medical care. Always follow your healthcare professional's instructions.    IF YOU EXPERIENCE ANY OF THE FOLLOWING PROBLEMS, CALL THE OFFICE IMMEDIATELY.    *FEVER .4 OR  GREATER    *CHILLS, ESPECIALLY SHAKING CHILLS, OR SWEATING    *A SEVERE COUGH OR SORE THROAT, OR SINUS PAIN/     PRESSURE    *REDNESS, SWELLING, OR TENDERNESS AROUND A WOUND,     SORE, PIMPLE, RECTAL AREA, OR IV SITE    *SORES OR ULCERS IN THE MOUTH    *BLISTERS ON THE LIPS OR SKIN    *FREQUENT URGENCY TO URINATE OR A BURNING FEELING   WHEN YOU URINATE    *BLOOD IN THE URINE OR STOOL    *ANY UNEXPLAINED BRUISING OR PROLONGED BLEEDING,     (NOSEBLEEDS OR BLEEDING GUMS)    *LOOSE BOWEL MOVEMENTS THAT DO NOT RESPOND TO     IMODIUM OR MORE THAN THREE TIMES A DAY    *VOMITING UNRESPONSIVE TO ANTINAUSEA MEDICINE    *ANY UNUSUAL PHYSICAL SYMPTOMS THAT BEGAN AFTER     CHEMOTHERAPY    DURING WEEKDAYS, CALL AND ASK TO SPEAK DIRECTLY TO A NURSE.  AT OTHER TIMES, CALL THE OFFICE PHONE NUMBER; THE ANSWERING SERVICE WILL CONTACT THE ON-CALL PHYSICIAN.  SOMEONE IS AVAILABLE 24 HOURS A DAY, SEVEN DAYS A WEEK.

## 2019-10-10 NOTE — PROGRESS NOTES
Subjective:       Patient ID: Soo Holly is a 79 y.o. female.    Chief Complaint: Follow-up; Results; Pain; Chemotherapy; and Breast Cancer    HPI 79-year-old female triple negative breast cancer presents for cycle 1 day 8 of therapy tolerating therapy well although she states that she feels sick denies any nausea or ECOG status 2    Past Medical History:   Diagnosis Date    Breast cancer 2019    radiation and chemo    Cancer     R Breast cancer    Chest pain     Hyperlipidemia     Hypertension      Family History   Problem Relation Age of Onset    Breast cancer Sister      Social History     Socioeconomic History    Marital status:      Spouse name: Not on file    Number of children: Not on file    Years of education: Not on file    Highest education level: Not on file   Occupational History    Not on file   Social Needs    Financial resource strain: Not on file    Food insecurity:     Worry: Not on file     Inability: Not on file    Transportation needs:     Medical: Not on file     Non-medical: Not on file   Tobacco Use    Smoking status: Never Smoker    Smokeless tobacco: Never Used   Substance and Sexual Activity    Alcohol use: No     Frequency: Never    Drug use: No    Sexual activity: Not Currently   Lifestyle    Physical activity:     Days per week: Not on file     Minutes per session: Not on file    Stress: Not on file   Relationships    Social connections:     Talks on phone: Not on file     Gets together: Not on file     Attends Jewish service: Not on file     Active member of club or organization: Not on file     Attends meetings of clubs or organizations: Not on file     Relationship status: Not on file   Other Topics Concern    Not on file   Social History Narrative    Lives in [place], [place] (1.5h).  Retired [employer].     Past Surgical History:   Procedure Laterality Date    BREAST BIOPSY      cataracts      EYE SURGERY      HYSTERECTOMY       INSERTION OF TUNNELED CENTRAL VENOUS CATHETER (CVC) WITH SUBCUTANEOUS PORT Right 1/16/2019    Procedure: INSERTION, PORT-A-CATH;  Surgeon: Prem Massey MD;  Location: [facility] OR;  Service: General;  Laterality: Right;    JOINT REPLACEMENT Right     Knee    MODIFIED RADICAL MASTECTOMY W/ AXILLARY LYMPH NODE DISSECTION Right 5/22/2019    Procedure: MASTECTOMY, MODIFIED RADICAL;  Surgeon: Paul Carey MD;  Location: [facility] OR;  Service: General;  Laterality: Right;    OOPHORECTOMY         Labs:  Lab Results   Component Value Date    WBC 5.13 10/10/2019    HGB 10.0 (L) 10/10/2019    HCT 32.4 (L) 10/10/2019    MCV 95 10/10/2019     10/10/2019     BMP  Lab Results   Component Value Date     10/02/2019    K 3.6 10/02/2019     10/02/2019    CO2 28 10/02/2019    BUN 7 (L) 10/02/2019    CREATININE 0.7 10/02/2019    CALCIUM 9.7 10/02/2019    ANIONGAP 12 10/02/2019    ESTGFRAFRICA >60 10/02/2019    EGFRNONAA >60 10/02/2019     Lab Results   Component Value Date    ALT 19 10/02/2019    AST 25 10/02/2019    ALKPHOS 93 10/02/2019    BILITOT 0.2 10/02/2019       Lab Results   Component Value Date    IRON 38 09/06/2019    TIBC 272 09/06/2019    FERRITIN 397 (H) 09/06/2019     No results found for: [id number]  No results found for: FOLATE  No results found for: TSH      Review of Systems   Constitutional: Positive for activity change, appetite change and fatigue. Negative for chills, diaphoresis, fever and unexpected weight change.   HENT: Negative for congestion, dental problem, drooling, ear discharge, ear pain, facial swelling, hearing loss, mouth sores, nosebleeds, postnasal drip, rhinorrhea, sinus pressure, sneezing, sore throat, tinnitus, trouble swallowing and voice change.    Eyes: Negative for photophobia, pain, discharge, redness, itching and visual disturbance.   Respiratory: Negative for cough, choking, chest tightness, shortness of breath, wheezing and stridor.    Cardiovascular: Negative for chest  pain, palpitations and leg swelling.   Gastrointestinal: Negative for abdominal distention, abdominal pain, anal bleeding, blood in stool, constipation, diarrhea, nausea, rectal pain and vomiting.   Endocrine: Negative for cold intolerance, heat intolerance, polydipsia, polyphagia and polyuria.   Genitourinary: Negative for decreased urine volume, difficulty urinating, dyspareunia, dysuria, enuresis, flank pain, frequency, genital sores, hematuria, menstrual problem, pelvic pain, urgency, vaginal bleeding, vaginal discharge and vaginal pain.   Musculoskeletal: Positive for gait problem. Negative for arthralgias, back pain, joint swelling, myalgias, neck pain and neck stiffness.   Skin: Negative for color change, pallor and rash.   Allergic/Immunologic: Negative for environmental allergies, food allergies and immunocompromised state.   Neurological: Positive for weakness. Negative for dizziness, tremors, seizures, syncope, facial asymmetry, speech difficulty, light-headedness, numbness and headaches.   Hematological: Negative for adenopathy. Does not bruise/bleed easily.   Psychiatric/Behavioral: Positive for dysphoric mood. Negative for agitation, behavioral problems, confusion, decreased concentration, hallucinations, self-injury, sleep disturbance and suicidal ideas. The patient is nervous/anxious. The patient is not hyperactive.        Objective:      Physical Exam   Constitutional: She is oriented to person, place, and time. She has a sickly appearance. She appears ill. She appears distressed.   HENT:   Head: Normocephalic and atraumatic.   Right Ear: External ear normal.   Left Ear: External ear normal.   Nose: Nose normal. Right sinus exhibits no maxillary sinus tenderness and no frontal sinus tenderness. Left sinus exhibits no maxillary sinus tenderness and no frontal sinus tenderness.   Mouth/Throat: Oropharynx is clear and moist. No oropharyngeal exudate.   Eyes: Pupils are equal, round, and reactive to  light. Conjunctivae, EOM and lids are normal. Right eye exhibits no discharge. Left eye exhibits no discharge. Right conjunctiva is not injected. Right conjunctiva has no hemorrhage. Left conjunctiva is not injected. Left conjunctiva has no hemorrhage. No scleral icterus.   Neck: Normal range of motion. Neck supple. No JVD present. No tracheal deviation present. No thyromegaly present.   Cardiovascular: Normal rate and regular rhythm.   Pulmonary/Chest: Effort normal. No stridor. No respiratory distress. She exhibits no tenderness.   Abdominal: Soft. She exhibits no distension and no mass. There is no splenomegaly or hepatomegaly. There is no tenderness. There is no rebound.   Musculoskeletal: Normal range of motion. She exhibits no edema or tenderness.   Lymphadenopathy:     She has no cervical adenopathy.     She has no axillary adenopathy.        Right: No supraclavicular adenopathy present.        Left: No supraclavicular adenopathy present.   Neurological: She is alert and oriented to person, place, and time. No cranial nerve deficit. Coordination abnormal.   Skin: Skin is dry. No rash noted. She is not diaphoretic. No erythema.   Psychiatric: She has a normal mood and affect. Her behavior is normal. Judgment and thought content normal.   Vitals reviewed.          Assessment:      1. Triple negative malignant neoplasm of breast    2. Secondary malignant neoplasm of bone           Plan:     Proceed with triple negative therapy treatment cycle 1 day 8 single agent Abraxane follow-up in 1 week CBC CMP.  No new narcotics needed continue with treatment recommendations will continue to observe and treat        Yaniv Pop Jr, MD FACP

## 2019-10-11 NOTE — TELEPHONE ENCOUNTER
Done; called and spoke with Ms. Vann to check on her pain. MS Contin stopped due to recent seizure. She reports that she's doing pretty well and will have her son Jasper call me back.

## 2019-10-14 ENCOUNTER — HOSPITAL ENCOUNTER (OUTPATIENT)
Dept: RADIATION THERAPY | Facility: HOSPITAL | Age: 79
Discharge: HOME | End: 2019-10-14
Attending: SURGERY
Payer: MEDICARE

## 2019-10-14 ENCOUNTER — TELEPHONE (OUTPATIENT)
Dept: INTERNAL MEDICINE | Facility: CLINIC | Age: 79
End: 2019-10-14

## 2019-10-14 ENCOUNTER — HOSPITAL ENCOUNTER (OUTPATIENT)
Dept: RADIOLOGY | Facility: HOSPITAL | Age: 79
Discharge: HOME | End: 2019-10-14
Attending: INTERNAL MEDICINE
Payer: MEDICARE

## 2019-10-14 PROCEDURE — 77334 RADIATION TREATMENT AID(S): CPT | Mod: TC | Performed by: RADIOLOGY

## 2019-10-14 PROCEDURE — 77290 PR  SET RADN THERAPY FIELD COMPLEX: ICD-10-PCS | Mod: 26,,, | Performed by: RADIOLOGY

## 2019-10-14 PROCEDURE — 77290 THER RAD SIMULAJ FIELD CPLX: CPT | Mod: 26,,, | Performed by: RADIOLOGY

## 2019-10-14 PROCEDURE — 77263 THER RADIOLOGY TX PLNG CPLX: CPT | Mod: ,,, | Performed by: RADIOLOGY

## 2019-10-14 PROCEDURE — 77334 RADIATION TREATMENT AID(S): CPT | Mod: 26,,, | Performed by: RADIOLOGY

## 2019-10-14 PROCEDURE — 77334 PR  RADN TREATMENT AID(S) COMPLX: ICD-10-PCS | Mod: 26,,, | Performed by: RADIOLOGY

## 2019-10-14 PROCEDURE — 77014 HC CT GUIDANCE RADIATION THERAPY FLDS PLACEMENT: CPT | Mod: TC | Performed by: RADIOLOGY

## 2019-10-14 PROCEDURE — 77290 THER RAD SIMULAJ FIELD CPLX: CPT | Mod: TC | Performed by: RADIOLOGY

## 2019-10-14 PROCEDURE — 77263 PR  RADIATION THERAPY PLAN COMPLEX: ICD-10-PCS | Mod: ,,, | Performed by: RADIOLOGY

## 2019-10-14 NOTE — TELEPHONE ENCOUNTER
Spoke with the son and gave him the number to reach out to [facility] for the supplies. I re faxed the order over today

## 2019-10-14 NOTE — TELEPHONE ENCOUNTER
----- Message from Jaylyn Aguilar sent at 10/14/2019  9:41 AM CDT -----  Contact: Patient  Patient is checking on status of an order for a walker and wheel chair, please call her back at [phone]. Thank you

## 2019-10-16 ENCOUNTER — TELEPHONE (OUTPATIENT)
Dept: INTERNAL MEDICINE | Facility: CLINIC | Age: 79
End: 2019-10-16

## 2019-10-16 PROCEDURE — 77295 PR 3D RADIOTHERAPY PLAN: ICD-10-PCS | Mod: 26,,, | Performed by: RADIOLOGY

## 2019-10-16 PROCEDURE — 77295 3-D RADIOTHERAPY PLAN: CPT | Mod: TC | Performed by: RADIOLOGY

## 2019-10-16 PROCEDURE — 77300 RADIATION THERAPY DOSE PLAN: CPT | Mod: TC | Performed by: RADIOLOGY

## 2019-10-16 PROCEDURE — 77300 RADIATION THERAPY DOSE PLAN: CPT | Mod: 26,,, | Performed by: RADIOLOGY

## 2019-10-16 PROCEDURE — 77300 PR RADIATION THERAPY,DOSIMETRY PLAN: ICD-10-PCS | Mod: 26,,, | Performed by: RADIOLOGY

## 2019-10-16 PROCEDURE — 77334 RADIATION TREATMENT AID(S): CPT | Mod: TC | Performed by: RADIOLOGY

## 2019-10-16 PROCEDURE — 77295 3-D RADIOTHERAPY PLAN: CPT | Mod: 26,,, | Performed by: RADIOLOGY

## 2019-10-16 PROCEDURE — 77334 RADIATION TREATMENT AID(S): CPT | Mod: 26,,, | Performed by: RADIOLOGY

## 2019-10-16 PROCEDURE — 77334 PR  RADN TREATMENT AID(S) COMPLX: ICD-10-PCS | Mod: 26,,, | Performed by: RADIOLOGY

## 2019-10-17 ENCOUNTER — OFFICE VISIT (OUTPATIENT)
Dept: HEMATOLOGY/ONCOLOGY | Facility: CLINIC | Age: 79
End: 2019-10-17
Payer: MEDICARE

## 2019-10-17 ENCOUNTER — INFUSION (OUTPATIENT)
Dept: INFUSION THERAPY | Facility: HOSPITAL | Age: 79
End: 2019-10-17
Attending: INTERNAL MEDICINE
Payer: MEDICARE

## 2019-10-17 ENCOUNTER — DOCUMENTATION ONLY (OUTPATIENT)
Dept: RADIATION ONCOLOGY | Facility: CLINIC | Age: 79
End: 2019-10-17

## 2019-10-17 VITALS
SYSTOLIC BLOOD PRESSURE: 144 MMHG | BODY MASS INDEX: 34.78 KG/M2 | DIASTOLIC BLOOD PRESSURE: 78 MMHG | OXYGEN SATURATION: 98 % | HEIGHT: 62 IN | RESPIRATION RATE: 18 BRPM | HEART RATE: 93 BPM | WEIGHT: 189 LBS | TEMPERATURE: 98 F

## 2019-10-17 VITALS — DIASTOLIC BLOOD PRESSURE: 74 MMHG | HEART RATE: 97 BPM | SYSTOLIC BLOOD PRESSURE: 144 MMHG

## 2019-10-17 DIAGNOSIS — C50.919 TRIPLE NEGATIVE MALIGNANT NEOPLASM OF BREAST: Primary | ICD-10-CM

## 2019-10-17 DIAGNOSIS — C79.51 SECONDARY MALIGNANT NEOPLASM OF BONE: ICD-10-CM

## 2019-10-17 DIAGNOSIS — G89.3 CANCER RELATED PAIN: ICD-10-CM

## 2019-10-17 PROCEDURE — 99215 OFFICE O/P EST HI 40 MIN: CPT | Mod: 25,S$PBB,, | Performed by: INTERNAL MEDICINE

## 2019-10-17 PROCEDURE — G6002 STEREOSCOPIC X-RAY GUIDANCE: HCPCS | Mod: 26,,, | Performed by: RADIOLOGY

## 2019-10-17 PROCEDURE — 77387 GUIDANCE FOR RADJ TX DLVR: CPT | Mod: TC | Performed by: RADIOLOGY

## 2019-10-17 PROCEDURE — 99213 OFFICE O/P EST LOW 20 MIN: CPT | Mod: PBBFAC,25 | Performed by: INTERNAL MEDICINE

## 2019-10-17 PROCEDURE — 96413 CHEMO IV INFUSION 1 HR: CPT

## 2019-10-17 PROCEDURE — 99999 PR PBB SHADOW E&M-EST. PATIENT-LVL III: CPT | Mod: PBBFAC,,, | Performed by: INTERNAL MEDICINE

## 2019-10-17 PROCEDURE — 96417 CHEMO IV INFUS EACH ADDL SEQ: CPT

## 2019-10-17 PROCEDURE — 96375 TX/PRO/DX INJ NEW DRUG ADDON: CPT

## 2019-10-17 PROCEDURE — 77417 THER RADIOLOGY PORT IMAGE(S): CPT | Performed by: RADIOLOGY

## 2019-10-17 PROCEDURE — 99999 PR PBB SHADOW E&M-EST. PATIENT-LVL III: ICD-10-PCS | Mod: PBBFAC,,, | Performed by: INTERNAL MEDICINE

## 2019-10-17 PROCEDURE — G6002 PR STEREOSCOPIC XRAY GUIDE FOR RADIATION TX DELIV: ICD-10-PCS | Mod: 26,,, | Performed by: RADIOLOGY

## 2019-10-17 PROCEDURE — 77412 RADIATION TX DELIVERY LVL 3: CPT | Performed by: RADIOLOGY

## 2019-10-17 PROCEDURE — 99215 PR OFFICE/OUTPT VISIT, EST, LEVL V, 40-54 MIN: ICD-10-PCS | Mod: 25,S$PBB,, | Performed by: INTERNAL MEDICINE

## 2019-10-17 PROCEDURE — 63600175 PHARM REV CODE 636 W HCPCS: Performed by: INTERNAL MEDICINE

## 2019-10-17 RX ORDER — ONDANSETRON 2 MG/ML
8 INJECTION INTRAMUSCULAR; INTRAVENOUS
Status: COMPLETED | OUTPATIENT
Start: 2019-10-17 | End: 2019-10-17

## 2019-10-17 RX ORDER — SODIUM CHLORIDE 0.9 % (FLUSH) 0.9 %
10 SYRINGE (ML) INJECTION
Status: DISCONTINUED | OUTPATIENT
Start: 2019-10-17 | End: 2019-10-17 | Stop reason: HOSPADM

## 2019-10-17 RX ORDER — HEPARIN 100 UNIT/ML
500 SYRINGE INTRAVENOUS
Status: DISCONTINUED | OUTPATIENT
Start: 2019-10-17 | End: 2019-10-17 | Stop reason: HOSPADM

## 2019-10-17 RX ADMIN — ATEZOLIZUMAB 840 MG: 840 INJECTION, SOLUTION INTRAVENOUS at 01:10

## 2019-10-17 RX ADMIN — ONDANSETRON HYDROCHLORIDE 8 MG: 2 INJECTION, SOLUTION INTRAMUSCULAR; INTRAVENOUS at 01:10

## 2019-10-17 RX ADMIN — HEPARIN 500 UNITS: 100 SYRINGE at 02:10

## 2019-10-17 RX ADMIN — SODIUM CHLORIDE: 9 INJECTION, SOLUTION INTRAVENOUS at 01:10

## 2019-10-17 RX ADMIN — PACLITAXEL 200 MG: 100 INJECTION, POWDER, LYOPHILIZED, FOR SUSPENSION INTRAVENOUS at 02:10

## 2019-10-17 NOTE — DISCHARGE INSTRUCTIONS
[facility]  [address]  [address]  [phone] phone     [phone] fax  Hours of Operation: Monday- Friday 8:00am- 5:00pm  After hours phone  [phone]  Hematology / Oncology Physicians on call      Dr. Donn Martin      Please call with any concerns regarding your appointment today.    FALL PREVENTION   Falls often occur due to slipping, tripping or losing your balance. Here are ways to reduce your risk of falling again.   Was there anything that caused your fall that can be fixed, removed or replaced?   Make your home safe by keeping walkways clear of objects you may trip over.   Use non-slip pads under rugs.   Do not walk in poorly lit areas.   Do not stand on chairs or wobbly ladders.   Use caution when reaching overhead or looking upward. This position can cause a loss of balance.   Be sure your shoes fit properly, have non-slip bottoms and are in good condition.   Be cautious when going up and down stairs, curbs, and when walking on uneven sidewalks.   If your balance is poor, consider using a cane or walker.   If your fall was related to alcohol use, stop or limit alcohol intake.   If your fall was related to use of sleeping medicines, talk to your doctor about this. You may need to reduce your dosage at bedtime if you awaken during the night to go to the bathroom.   To reduce the need for nighttime bathroom trips:   Avoid drinking fluids for several hours before going to bed   Empty your bladder before going to bed   Men can keep a urinal at the bedside   © 0067-7779 [employer] Harrell, [address] [place] [id number]. All rights reserved. This information is not intended as a substitute for professional medical care. Always follow your healthcare professional's instructions.  WAYS TO HELP PREVENT INFECTION         WASH YOUR HANDS OFTEN DURING THE DAY, ESPECIALLY BEFORE YOU EAT, AFTER  "USING THE BATHROOM, AND AFTER TOUCHING ANIMALS     STAY AWAY FROM PEOPLE WHO HAVE ILLNESSES YOU CAN CATCH; SUCH AS COLDS, FLU, CHICKEN POX     TRY TO AVOID CROWDS     STAY AWAY FROM CHILDREN WHO RECENTLY HAVE RECEIVED LIVE VIRUS VACCINES     MAINTAIN GOOD MOUTH CARE     DO NOT SQUEEZE OR SCRATCH PIMPLES     CLEAN CUTS & SCRAPES RIGHT AWAY AND DAILY UNTIL HEALED WITH WARM WATER, SOAP & AN ANTISEPTIC     AVOID CONTACT WITH LITTER BOXES, BIRD CAGES, & FISH TANKS     AVOID STANDING WATER, IE., BIRD BATHS, FLOWER POTS/VASES, OR HUMIDIFIERS     WEAR GLOVES WHEN GARDENING OR CLEANING UP AFTER OTHERS, ESPECIALLY BABIES & SMALL CHILDREN    DO NOT EAT RAW FISH, SEAFOOD, MEAT, OR EGGSSupport Groups/Classes    Support groups and classes are being offered at the   [facility]!!    "Cooking with Cancer" (Nutrition Class):  Second Wednesday of each month   at noon at the [facility].  Metastatic Support Group:  Third Tuesday of each month   at noon at the [facility].  Next Steps Class/Group: Second and fourth Thursday of each month at noon at the [facility].  [facility] (Breast Cancer Support Group): First Tuesday of each month   at 5:30pm at the [facility] location.  [facility] [place] [facility]: [facility]: Second and third Tuesday of each month from 7:30am - 2pm.  [facility]: First and fourth Tuesday of each month from 7:30am - 2pm     If you are interested in attending or would like more information please ask our social workers or your nurse!  "

## 2019-10-17 NOTE — PROGRESS NOTES
Subjective:       Patient ID: Soo Holly is a 79 y.o. female.    Chief Complaint: Follow-up; Results; Breast Cancer; and Chemotherapy    HPI 79-year-old female triple negative breast cancer metastatic cycle 1 day 15 of therapy patient still reports pain involving her right side patient denies nausea vomiting fevers chills ECOG status 2    Past Medical History:   Diagnosis Date    Breast cancer 2019    radiation and chemo    Cancer     R Breast cancer    Chest pain     Hyperlipidemia     Hypertension      Family History   Problem Relation Age of Onset    Breast cancer Sister      Social History     Socioeconomic History    Marital status:      Spouse name: Not on file    Number of children: Not on file    Years of education: Not on file    Highest education level: Not on file   Occupational History    Not on file   Social Needs    Financial resource strain: Not on file    Food insecurity:     Worry: Not on file     Inability: Not on file    Transportation needs:     Medical: Not on file     Non-medical: Not on file   Tobacco Use    Smoking status: Never Smoker    Smokeless tobacco: Never Used   Substance and Sexual Activity    Alcohol use: No     Frequency: Never    Drug use: No    Sexual activity: Not Currently   Lifestyle    Physical activity:     Days per week: Not on file     Minutes per session: Not on file    Stress: Not on file   Relationships    Social connections:     Talks on phone: Not on file     Gets together: Not on file     Attends Cheondoism service: Not on file     Active member of club or organization: Not on file     Attends meetings of clubs or organizations: Not on file     Relationship status: Not on file   Other Topics Concern    Not on file   Social History Narrative    Lives in [place], [place] (1.5h).  Retired [employer].     Past Surgical History:   Procedure Laterality Date    BREAST BIOPSY      cataracts      EYE SURGERY      HYSTERECTOMY       INSERTION OF TUNNELED CENTRAL VENOUS CATHETER (CVC) WITH SUBCUTANEOUS PORT Right 1/16/2019    Procedure: INSERTION, PORT-A-CATH;  Surgeon: Prem Massey MD;  Location: [facility] OR;  Service: General;  Laterality: Right;    JOINT REPLACEMENT Right     Knee    MODIFIED RADICAL MASTECTOMY W/ AXILLARY LYMPH NODE DISSECTION Right 5/22/2019    Procedure: MASTECTOMY, MODIFIED RADICAL;  Surgeon: Paul Carey MD;  Location: [facility] OR;  Service: General;  Laterality: Right;    OOPHORECTOMY         Labs:  Lab Results   Component Value Date    WBC 3.65 (L) 10/17/2019    HGB 10.1 (L) 10/17/2019    HCT 33.1 (L) 10/17/2019    MCV 96 10/17/2019     10/17/2019     BMP  Lab Results   Component Value Date     10/10/2019    K 3.7 10/10/2019     10/10/2019    CO2 25 10/10/2019    BUN 9 10/10/2019    CREATININE 0.7 10/10/2019    CALCIUM 9.8 10/10/2019    ANIONGAP 13 10/10/2019    ESTGFRAFRICA >60 10/10/2019    EGFRNONAA >60 10/10/2019     Lab Results   Component Value Date    ALT 20 10/10/2019    AST 26 10/10/2019    ALKPHOS 99 10/10/2019    BILITOT 0.2 10/10/2019       Lab Results   Component Value Date    IRON 38 09/06/2019    TIBC 272 09/06/2019    FERRITIN 397 (H) 09/06/2019     No results found for: [id number]  No results found for: FOLATE  No results found for: TSH      Review of Systems   Constitutional: Positive for fatigue. Negative for activity change, appetite change, chills, diaphoresis, fever and unexpected weight change.   HENT: Negative for congestion, dental problem, drooling, ear discharge, ear pain, facial swelling, hearing loss, mouth sores, nosebleeds, postnasal drip, rhinorrhea, sinus pressure, sneezing, sore throat, tinnitus, trouble swallowing and voice change.    Eyes: Negative for photophobia, pain, discharge, redness, itching and visual disturbance.   Respiratory: Positive for chest tightness. Negative for cough, choking, shortness of breath, wheezing and stridor.    Cardiovascular: Positive  for chest pain. Negative for palpitations and leg swelling.   Gastrointestinal: Negative for abdominal distention, abdominal pain, anal bleeding, blood in stool, constipation, diarrhea, nausea, rectal pain and vomiting.   Endocrine: Negative for cold intolerance, heat intolerance, polydipsia, polyphagia and polyuria.   Genitourinary: Negative for decreased urine volume, difficulty urinating, dyspareunia, dysuria, enuresis, flank pain, frequency, genital sores, hematuria, menstrual problem, pelvic pain, urgency, vaginal bleeding, vaginal discharge and vaginal pain.   Musculoskeletal: Positive for arthralgias, gait problem and myalgias. Negative for back pain, joint swelling, neck pain and neck stiffness.   Skin: Negative for color change, pallor and rash.   Allergic/Immunologic: Negative for environmental allergies, food allergies and immunocompromised state.   Neurological: Positive for weakness. Negative for dizziness, tremors, seizures, syncope, facial asymmetry, speech difficulty, light-headedness, numbness and headaches.   Hematological: Negative for adenopathy. Does not bruise/bleed easily.   Psychiatric/Behavioral: Positive for dysphoric mood. Negative for agitation, behavioral problems, confusion, decreased concentration, hallucinations, self-injury, sleep disturbance and suicidal ideas. The patient is nervous/anxious. The patient is not hyperactive.        Objective:      Physical Exam   Constitutional: She is oriented to person, place, and time. She appears cachectic. She has a sickly appearance. She appears ill. She appears distressed.   HENT:   Head: Normocephalic and atraumatic.   Right Ear: External ear normal.   Left Ear: External ear normal.   Nose: Nose normal. Right sinus exhibits no maxillary sinus tenderness and no frontal sinus tenderness. Left sinus exhibits no maxillary sinus tenderness and no frontal sinus tenderness.   Mouth/Throat: Oropharynx is clear and moist. No oropharyngeal exudate.    Eyes: Pupils are equal, round, and reactive to light. Conjunctivae, EOM and lids are normal. Right eye exhibits no discharge. Left eye exhibits no discharge. Right conjunctiva is not injected. Right conjunctiva has no hemorrhage. Left conjunctiva is not injected. Left conjunctiva has no hemorrhage. No scleral icterus.   Neck: Normal range of motion. Neck supple. No JVD present. No tracheal deviation present. No thyromegaly present.   Cardiovascular: Normal rate and regular rhythm.   Pulmonary/Chest: Effort normal. No stridor. No respiratory distress. She exhibits no tenderness.   Abdominal: Soft. She exhibits no distension and no mass. There is no splenomegaly or hepatomegaly. There is no tenderness. There is no rebound.   Musculoskeletal: Normal range of motion. She exhibits no edema or tenderness.   Lymphadenopathy:     She has no cervical adenopathy.     She has no axillary adenopathy.        Right: No supraclavicular adenopathy present.        Left: No supraclavicular adenopathy present.   Neurological: She is alert and oriented to person, place, and time. No cranial nerve deficit. Coordination normal.   Skin: Skin is dry. No rash noted. She is not diaphoretic. No erythema.   Psychiatric: Her behavior is normal. Judgment and thought content normal. Her mood appears anxious. She exhibits a depressed mood.   Vitals reviewed.          Assessment:      1. Triple negative malignant neoplasm of breast    2. Secondary malignant neoplasm of bone    3. Cancer related pain           Plan:     Cycle 1 day 15 of therapy counts adequate proceed with systemic chemotherapy as outlined return 2 weeks.  Told patient not to continue on her Dilantin MRI brain is negative discussed implications with family I believe that the left chest wall appears to be somewhat smaller will continue follow-up in continue to treat through 2 cycle of therapy and repeat PET scan for response        Yaniv Pop Jr, MD FACP

## 2019-10-18 DIAGNOSIS — F51.02 ADJUSTMENT INSOMNIA: ICD-10-CM

## 2019-10-18 RX ORDER — ZOLPIDEM TARTRATE 5 MG/1
5 TABLET ORAL NIGHTLY PRN
Qty: 30 TABLET | Refills: 2 | Status: SHIPPED | OUTPATIENT
Start: 2019-10-18

## 2019-10-23 DIAGNOSIS — C50.919 TRIPLE NEGATIVE MALIGNANT NEOPLASM OF BREAST: ICD-10-CM

## 2019-10-23 RX ORDER — OXYCODONE AND ACETAMINOPHEN 7.5; 325 MG/1; MG/1
1 TABLET ORAL EVERY 6 HOURS PRN
Qty: 90 TABLET | Refills: 0 | Status: SHIPPED | OUTPATIENT
Start: 2019-10-23 | End: 2019-11-14 | Stop reason: SDUPTHER

## 2019-10-31 ENCOUNTER — DOCUMENTATION ONLY (OUTPATIENT)
Dept: HEMATOLOGY/ONCOLOGY | Facility: CLINIC | Age: 79
End: 2019-10-31

## 2019-10-31 ENCOUNTER — OFFICE VISIT (OUTPATIENT)
Dept: PALLIATIVE MEDICINE | Facility: CLINIC | Age: 79
End: 2019-10-31
Payer: MEDICARE

## 2019-10-31 ENCOUNTER — OFFICE VISIT (OUTPATIENT)
Dept: HEMATOLOGY/ONCOLOGY | Facility: CLINIC | Age: 79
End: 2019-10-31
Payer: MEDICARE

## 2019-10-31 ENCOUNTER — INFUSION (OUTPATIENT)
Dept: INFUSION THERAPY | Facility: HOSPITAL | Age: 79
End: 2019-10-31
Attending: INTERNAL MEDICINE
Payer: MEDICARE

## 2019-10-31 VITALS
WEIGHT: 194 LBS | BODY MASS INDEX: 35.7 KG/M2 | HEART RATE: 98 BPM | OXYGEN SATURATION: 98 % | SYSTOLIC BLOOD PRESSURE: 125 MMHG | RESPIRATION RATE: 18 BRPM | WEIGHT: 194 LBS | DIASTOLIC BLOOD PRESSURE: 73 MMHG | TEMPERATURE: 98 F | TEMPERATURE: 98 F | RESPIRATION RATE: 18 BRPM | BODY MASS INDEX: 35.7 KG/M2 | HEIGHT: 62 IN | HEART RATE: 98 BPM | SYSTOLIC BLOOD PRESSURE: 125 MMHG | OXYGEN SATURATION: 98 % | DIASTOLIC BLOOD PRESSURE: 73 MMHG | HEIGHT: 62 IN

## 2019-10-31 VITALS
HEIGHT: 62 IN | WEIGHT: 194 LBS | DIASTOLIC BLOOD PRESSURE: 74 MMHG | BODY MASS INDEX: 35.7 KG/M2 | RESPIRATION RATE: 18 BRPM | HEART RATE: 86 BPM | SYSTOLIC BLOOD PRESSURE: 141 MMHG

## 2019-10-31 DIAGNOSIS — C50.919 TRIPLE NEGATIVE MALIGNANT NEOPLASM OF BREAST: Primary | ICD-10-CM

## 2019-10-31 DIAGNOSIS — Z66 DNR (DO NOT RESUSCITATE): ICD-10-CM

## 2019-10-31 DIAGNOSIS — G89.3 CANCER RELATED PAIN: ICD-10-CM

## 2019-10-31 DIAGNOSIS — C79.51 SECONDARY MALIGNANT NEOPLASM OF BONE: ICD-10-CM

## 2019-10-31 DIAGNOSIS — L58.9 RADIATION DERMATITIS: ICD-10-CM

## 2019-10-31 DIAGNOSIS — R94.6 ABNORMAL RESULTS OF THYROID FUNCTION STUDIES: ICD-10-CM

## 2019-10-31 PROCEDURE — 96417 CHEMO IV INFUS EACH ADDL SEQ: CPT

## 2019-10-31 PROCEDURE — 96375 TX/PRO/DX INJ NEW DRUG ADDON: CPT

## 2019-10-31 PROCEDURE — 99215 OFFICE O/P EST HI 40 MIN: CPT | Mod: 25,S$PBB,, | Performed by: INTERNAL MEDICINE

## 2019-10-31 PROCEDURE — 99215 PR OFFICE/OUTPT VISIT, EST, LEVL V, 40-54 MIN: ICD-10-PCS | Mod: 25,S$PBB,, | Performed by: INTERNAL MEDICINE

## 2019-10-31 PROCEDURE — 99999 PR PBB SHADOW E&M-EST. PATIENT-LVL III: CPT | Mod: PBBFAC,,, | Performed by: INTERNAL MEDICINE

## 2019-10-31 PROCEDURE — 99999 PR PBB SHADOW E&M-EST. PATIENT-LVL III: CPT | Mod: PBBFAC,,, | Performed by: FAMILY MEDICINE

## 2019-10-31 PROCEDURE — 99214 OFFICE O/P EST MOD 30 MIN: CPT | Mod: S$PBB,,, | Performed by: FAMILY MEDICINE

## 2019-10-31 PROCEDURE — 99999 PR PBB SHADOW E&M-EST. PATIENT-LVL III: ICD-10-PCS | Mod: PBBFAC,,, | Performed by: FAMILY MEDICINE

## 2019-10-31 PROCEDURE — 96413 CHEMO IV INFUSION 1 HR: CPT

## 2019-10-31 PROCEDURE — 63600175 PHARM REV CODE 636 W HCPCS: Performed by: INTERNAL MEDICINE

## 2019-10-31 PROCEDURE — 99213 OFFICE O/P EST LOW 20 MIN: CPT | Mod: PBBFAC,25 | Performed by: FAMILY MEDICINE

## 2019-10-31 PROCEDURE — 99214 PR OFFICE/OUTPT VISIT, EST, LEVL IV, 30-39 MIN: ICD-10-PCS | Mod: S$PBB,,, | Performed by: FAMILY MEDICINE

## 2019-10-31 PROCEDURE — 99999 PR PBB SHADOW E&M-EST. PATIENT-LVL III: ICD-10-PCS | Mod: PBBFAC,,, | Performed by: INTERNAL MEDICINE

## 2019-10-31 PROCEDURE — 99213 OFFICE O/P EST LOW 20 MIN: CPT | Mod: PBBFAC,27,25 | Performed by: INTERNAL MEDICINE

## 2019-10-31 RX ORDER — HEPARIN 100 UNIT/ML
500 SYRINGE INTRAVENOUS
Status: CANCELLED | OUTPATIENT
Start: 2019-10-31

## 2019-10-31 RX ORDER — OXYCODONE HCL 10 MG/1
10 TABLET, FILM COATED, EXTENDED RELEASE ORAL EVERY 12 HOURS
Qty: 60 TABLET | Refills: 0 | Status: SHIPPED | OUTPATIENT
Start: 2019-10-31 | End: 2019-11-13 | Stop reason: SDUPTHER

## 2019-10-31 RX ORDER — ONDANSETRON 2 MG/ML
8 INJECTION INTRAMUSCULAR; INTRAVENOUS
Status: COMPLETED | OUTPATIENT
Start: 2019-10-31 | End: 2019-10-31

## 2019-10-31 RX ORDER — SODIUM CHLORIDE 0.9 % (FLUSH) 0.9 %
10 SYRINGE (ML) INJECTION
Status: CANCELLED | OUTPATIENT
Start: 2019-10-31

## 2019-10-31 RX ORDER — HEPARIN 100 UNIT/ML
500 SYRINGE INTRAVENOUS
Status: DISCONTINUED | OUTPATIENT
Start: 2019-10-31 | End: 2019-10-31 | Stop reason: HOSPADM

## 2019-10-31 RX ORDER — ONDANSETRON 2 MG/ML
8 INJECTION INTRAMUSCULAR; INTRAVENOUS
Status: CANCELLED | OUTPATIENT
Start: 2019-10-31

## 2019-10-31 RX ADMIN — PACLITAXEL 200 MG: 100 INJECTION, POWDER, LYOPHILIZED, FOR SUSPENSION INTRAVENOUS at 01:10

## 2019-10-31 RX ADMIN — ATEZOLIZUMAB 840 MG: 840 INJECTION, SOLUTION INTRAVENOUS at 01:10

## 2019-10-31 RX ADMIN — ONDANSETRON 8 MG: 2 INJECTION INTRAMUSCULAR; INTRAVENOUS at 12:10

## 2019-10-31 RX ADMIN — HEPARIN SODIUM (PORCINE) LOCK FLUSH IV SOLN 100 UNIT/ML 500 UNITS: 100 SOLUTION at 02:10

## 2019-10-31 NOTE — PROGRESS NOTES
Aries met with patient and her family in exam room to explain longterm care services through state waiver. Sw explained difference between home health vs waiver services. Sw explained waiver services is funded by medicaid. Pt reports she applied in the past and did not qualify. Aries reports she will follow up with [facility] to see if they can assist with [place] Medicaid. Sw discussed home health agency in their area. Pt's son reports on [facility]. Aries reports she will fax home health order to [facility] home health once ordered is obtained. Pt also reports she needs a rollator but MD has already ordered it. Aries reports she will f/u. Aries messaged [facility] on status of rollator. Aries will contact Jasper Holly at [phone] or [phone] once an answer is received.

## 2019-10-31 NOTE — PROGRESS NOTES
[facility] has been notified of pt's DME orders. Staff reports they have received the order. Staff reports order will be outsourced due to pt residing in [place].

## 2019-10-31 NOTE — PROGRESS NOTES
"Subjective:       Patient ID: Soo Holly is a 79 y.o. female.    Chief Complaint: palliative medicine follow-up  78 yo female here for palliative medicine follow up. She has had some continued pain with use of oxycodone 7.5 mg between 3-5 times daily. Denies nausea or vomiting. Appetite has been fair to poor. Sleep and appetite improved with mirtazapine. Ms. Holly reports that she is "sinking." She describes feeling weaker and weaker lately. Her son reports good days and bad days. They are both determined to complete the regimen of chemotherapy, understanding that the goal is additional time versus cure. Quality of life remains a priority; quality of life is currently acceptable to patient.     On our initial visit, extended release morphine was added to medication regimen and the patient was hospitalized with a seizure that day. MRI has been clear. Dilantin was initiated but has been stopped. MRI free of metastatic disease. Long acting pain control is still desired.     Her son is her primary caregiver and is requesting assistance from home health with bathing and skin care.  Also has had difficulty with getting DME delivered due to their address in [place].    does not have any pertinent problems on file.  Past Medical History:   Diagnosis Date    Breast cancer 2019    radiation and chemo    Cancer     R Breast cancer    Chest pain     Hyperlipidemia     Hypertension      Past Surgical History:   Procedure Laterality Date    BREAST BIOPSY      cataracts      EYE SURGERY      HYSTERECTOMY      INSERTION OF TUNNELED CENTRAL VENOUS CATHETER (CVC) WITH SUBCUTANEOUS PORT Right 1/16/2019    Procedure: INSERTION, PORT-A-CATH;  Surgeon: Prem Massey MD;  Location: [facility];  Service: General;  Laterality: Right;    JOINT REPLACEMENT Right     Knee    MODIFIED RADICAL MASTECTOMY W/ AXILLARY LYMPH NODE DISSECTION Right 5/22/2019    Procedure: MASTECTOMY, MODIFIED RADICAL;  Surgeon: Paul Carey, " MD;  Location: [facility];  Service: General;  Laterality: Right;    OOPHORECTOMY       Family History   Problem Relation Age of Onset    Breast cancer Sister      Social History     Socioeconomic History    Marital status:      Spouse name: Not on file    Number of children: Not on file    Years of education: Not on file    Highest education level: Not on file   Occupational History    Not on file   Social Needs    Financial resource strain: Not on file    Food insecurity:     Worry: Not on file     Inability: Not on file    Transportation needs:     Medical: Not on file     Non-medical: Not on file   Tobacco Use    Smoking status: Never Smoker    Smokeless tobacco: Never Used   Substance and Sexual Activity    Alcohol use: No     Frequency: Never    Drug use: No    Sexual activity: Not Currently   Lifestyle    Physical activity:     Days per week: Not on file     Minutes per session: Not on file    Stress: Not on file   Relationships    Social connections:     Talks on phone: Not on file     Gets together: Not on file     Attends Hindu service: Not on file     Active member of club or organization: Not on file     Attends meetings of clubs or organizations: Not on file     Relationship status: Not on file   Other Topics Concern    Not on file   Social History Narrative    Lives in [place], [place] (1.5h).  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     Review of Systems    Objective:     Vitals:    10/31/19 1116   BP: 125/73   Pulse: 98   Resp: 18   Temp: 98.2 °F (36.8 °C)        Physical Exam   Constitutional: No distress.   Frail, seated in wheelchair. Ill-appearing   Skin: Skin is warm and dry. There is pallor.   +XRT skin changes   Psychiatric:   Depressed mood       Assessment:       1. Triple negative malignant neoplasm of breast    2. Radiation dermatitis    3. Cancer related pain    4. DNR (do not resuscitate)        Plan:           Problem List Items Addressed This Visit        Derm    Radiation  dermatitis    Relevant Orders    Ambulatory referral to Home Health       Oncology    Triple negative malignant neoplasm of breast - Primary    Relevant Orders    Ambulatory referral to Home Health    Cancer related pain    Relevant Medications    oxyCODONE (OXYCONTIN) 10 mg 12 hr tablet       Palliative Care    DNR (do not resuscitate)    Overview     [place] document completed 10/3/19 with patient and family at bedside.            Current Assessment & Plan     Code status remains DNR; discussed benefits of hospice care if chemotherapy no longer helpful or if they elect to discontinue           Trial of long-acting oxycodone; instructions given on dosing and breakthrough pain management with short-acting 7.5 mg oxycodone tablets. Constipation precautions given.     Will fax DME order to [facility] for wheelchair and walker today.     Referral to [facility] for med management, bathing, skin care, and social work assist.    [facility] social work met with family today as well.

## 2019-10-31 NOTE — ASSESSMENT & PLAN NOTE
Code status remains DNR; discussed benefits of hospice care if chemotherapy no longer helpful or if they elect to discontinue

## 2019-10-31 NOTE — DISCHARGE INSTRUCTIONS
.  [facility]  [address] [address]  [address]  [phone] phone     [phone] fax  Hours of Operation: Monday- Friday 8:00am- 5:00pm  After hours phone  [phone]  Hematology / Oncology Physicians on call      Dr. Donn Martin    Please call with any concerns regarding your appointment today.  .FALL PREVENTION   Falls often occur due to slipping, tripping or losing your balance. Here are ways to reduce your risk of falling again.   Was there anything that caused your fall that can be fixed, removed or replaced?   Make your home safe by keeping walkways clear of objects you may trip over.   Use non-slip pads under rugs.   Do not walk in poorly lit areas.   Do not stand on chairs or wobbly ladders.   Use caution when reaching overhead or looking upward. This position can cause a loss of balance.   Be sure your shoes fit properly, have non-slip bottoms and are in good condition.   Be cautious when going up and down stairs, curbs, and when walking on uneven sidewalks.   If your balance is poor, consider using a cane or walker.   If your fall was related to alcohol use, stop or limit alcohol intake.   If your fall was related to use of sleeping medicines, talk to your doctor about this. You may need to reduce your dosage at bedtime if you awaken during the night to go to the bathroom.   To reduce the need for nighttime bathroom trips:   Avoid drinking fluids for several hours before going to bed   Empty your bladder before going to bed   Men can keep a urinal at the bedside   © 4857-4721 [employer] Harrell, [address] [place] [address]. All rights reserved. This information is not intended as a substitute for professional medical care. Always follow your healthcare professional's instructions.    .WAYS TO HELP PREVENT INFECTION         WASH YOUR HANDS OFTEN DURING THE DAY, ESPECIALLY BEFORE YOU EAT, AFTER USING  THE BATHROOM, AND AFTER TOUCHING ANIMALS     STAY AWAY FROM PEOPLE WHO HAVE ILLNESSES YOU CAN CATCH; SUCH AS COLDS, FLU, CHICKEN POX     TRY TO AVOID CROWDS     STAY AWAY FROM CHILDREN WHO RECENTLY HAVE RECEIVED LIVE VIRUS VACCINES     MAINTAIN GOOD MOUTH CARE     DO NOT SQUEEZE OR SCRATCH PIMPLES     CLEAN CUTS & SCRAPES RIGHT AWAY AND DAILY UNTIL HEALED WITH WARM WATER, SOAP & AN ANTISEPTIC     AVOID CONTACT WITH LITTER BOXES, BIRD CAGES, & FISH TANKS     AVOID STANDING WATER, IE., BIRD BATHS, FLOWER POTS/VASES, OR HUMIDIFIERS     WEAR GLOVES WHEN GARDENING OR CLEANING UP AFTER OTHERS, ESPECIALLY BABIES & SMALL CHILDREN     DO NOT EAT RAW FISH, SEAFOOD, MEAT, OR EGGS  .HOME CARE AFTER CHEMOTHERAPY   Meals   Many patients feel sick and lose their appetites during treatment. Eat small meals several times a day. Choose bland foods with little taste or smell if you have problems with nausea. Be sure to cook all food thoroughly. This kills bacteria and helps you avoid intestinal infection. Soft foods are easier to swallow and digest.   Activity   Exercise keeps you strong and keeps your heart and lungs active. Talk to your doctor about an appropriate exercise program for you.   Skin Care   To prevent a skin infection, bathe or shower once a day. Use a moisturizing soap and wash with warm water. Avoid very hot or cold water. Chemotherapy can make your skin dry . Apply moisturizing lotion to help relieve dry skin. Some drugs used in high doses can cause slight burns to appear (like sunburn). Ask for a special cream to help relieve the burn and protect your skin.   Prevent Mouth Sores   During chemotherapy, many people get mouth sores. Do the following to help prevent mouth sores or to ease discomfort.   Brush your teeth with a soft-bristle toothbrush after every meal.  Don't use dental floss if your platelet count is below 50,000. Your doctor or nurse will tell you if this is the case.  Use an oral swab or  "special soft toothbrush if your gums bleed during regular brushing.  Use mouthwash as directed. If you can't tolerate commercial mouthwash, use salt and baking soda to clean your mouth. Mix 1 teaspoon of salt and 1 teaspoon of baking soda into a glass of water. Swish and spit.  Call your doctor or return to this facility if you develop any of the following:   Sore throat   White patches in the mouth or throat   Fever of 100.4ºF (38ºC) or higher, or as directed by your healthcare provider  © 4344-9208 [employer] [facility], [address] [place] [address], [place] [address]. All rights reserved. This information is not intended as a substitute for professional medical care. Always follow your healthcare professional's     .Support Groups/Classes    Support groups and classes are being offered at the   [facility] and [facility]!!    "Cooking with Cancer" (Nutrition Class):  Second Wednesday of each month   at noon at the [facility].  Metastatic Support Group:  Third Tuesday of each month   at noon at the [facility].  Next Steps Class/Group: Second and fourth Thursday of each month at noon at the [facility].  [facility] (Breast Cancer Support Group): First Tuesday of each month   at 5:30pm at the [facility] location.  [facility] Mobile: [facility]: Second and third Tuesday of each month from 7:30am - 2pm.  [facility]: First and fourth Tuesday of each month from 7:30am - 2pm    If you are interested in attending or would like more information please ask our social workers or your nurse!    "

## 2019-10-31 NOTE — PROGRESS NOTES
Subjective:       Patient ID: Soo Holly is a 79 y.o. female.    Chief Complaint: Results; Chemotherapy; and Breast Cancer    HPI  79-year-old female history of triple negative breast cancer presents for cycle 2 day 1 of therapy patient has persistent paing right chest is concerned about erythema involving left breast this well and increasing fatigue weakness shortness of breath    Past Medical History:   Diagnosis Date    Breast cancer 2019    radiation and chemo    Cancer     R Breast cancer    Chest pain     Hyperlipidemia     Hypertension      Family History   Problem Relation Age of Onset    Breast cancer Sister      Social History     Socioeconomic History    Marital status:      Spouse name: Not on file    Number of children: Not on file    Years of education: Not on file    Highest education level: Not on file   Occupational History    Not on file   Social Needs    Financial resource strain: Not on file    Food insecurity:     Worry: Not on file     Inability: Not on file    Transportation needs:     Medical: Not on file     Non-medical: Not on file   Tobacco Use    Smoking status: Never Smoker    Smokeless tobacco: Never Used   Substance and Sexual Activity    Alcohol use: No     Frequency: Never    Drug use: No    Sexual activity: Not Currently   Lifestyle    Physical activity:     Days per week: Not on file     Minutes per session: Not on file    Stress: Not on file   Relationships    Social connections:     Talks on phone: Not on file     Gets together: Not on file     Attends Adventism service: Not on file     Active member of club or organization: Not on file     Attends meetings of clubs or organizations: Not on file     Relationship status: Not on file   Other Topics Concern    Not on file   Social History Narrative    Lives in [place], [place] (1.5h).  Retired [employer].     Past Surgical History:   Procedure Laterality Date    BREAST BIOPSY      cataracts       EYE SURGERY      HYSTERECTOMY      INSERTION OF TUNNELED CENTRAL VENOUS CATHETER (CVC) WITH SUBCUTANEOUS PORT Right 1/16/2019    Procedure: INSERTION, PORT-A-CATH;  Surgeon: Prem Massey MD;  Location: [facility];  Service: General;  Laterality: Right;    JOINT REPLACEMENT Right     Knee    MODIFIED RADICAL MASTECTOMY W/ AXILLARY LYMPH NODE DISSECTION Right 5/22/2019    Procedure: MASTECTOMY, MODIFIED RADICAL;  Surgeon: Paul Carey MD;  Location: [facility];  Service: General;  Laterality: Right;    OOPHORECTOMY         Labs:  Lab Results   Component Value Date    WBC 5.00 10/31/2019    HGB 9.9 (L) 10/31/2019    HCT 32.1 (L) 10/31/2019    MCV 95 10/31/2019     10/31/2019     BMP  Lab Results   Component Value Date     10/31/2019    K 3.9 10/31/2019     10/31/2019    CO2 28 10/31/2019    BUN 7 (L) 10/31/2019    CREATININE 0.7 10/31/2019    CALCIUM 9.3 10/31/2019    ANIONGAP 9 10/31/2019    ESTGFRAFRICA >60 10/31/2019    EGFRNONAA >60 10/31/2019     Lab Results   Component Value Date    ALT 17 10/31/2019    AST 21 10/31/2019    ALKPHOS 92 10/31/2019    BILITOT 0.2 10/31/2019       Lab Results   Component Value Date    IRON 38 09/06/2019    TIBC 272 09/06/2019    FERRITIN 397 (H) 09/06/2019     No results found for: [id number]  No results found for: FOLATE  No results found for: TSH      Review of Systems   Constitutional: Positive for activity change, appetite change and fatigue. Negative for chills, diaphoresis, fever and unexpected weight change.   HENT: Negative for congestion, dental problem, drooling, ear discharge, ear pain, facial swelling, hearing loss, mouth sores, nosebleeds, postnasal drip, rhinorrhea, sinus pressure, sneezing, sore throat, tinnitus, trouble swallowing and voice change.    Eyes: Negative for photophobia, pain, discharge, redness, itching and visual disturbance.   Respiratory: Positive for shortness of breath. Negative for cough, choking, chest tightness, wheezing  and stridor.    Cardiovascular: Negative for chest pain, palpitations and leg swelling.   Gastrointestinal: Negative for abdominal distention, abdominal pain, anal bleeding, blood in stool, constipation, diarrhea, nausea, rectal pain and vomiting.   Endocrine: Negative for cold intolerance, heat intolerance, polydipsia, polyphagia and polyuria.   Genitourinary: Negative for decreased urine volume, difficulty urinating, dyspareunia, dysuria, enuresis, flank pain, frequency, genital sores, hematuria, menstrual problem, pelvic pain, urgency, vaginal bleeding, vaginal discharge and vaginal pain.   Musculoskeletal: Positive for arthralgias, back pain, gait problem and myalgias. Negative for joint swelling, neck pain and neck stiffness.   Skin: Negative for color change, pallor and rash.   Allergic/Immunologic: Negative for environmental allergies, food allergies and immunocompromised state.   Neurological: Positive for weakness. Negative for dizziness, tremors, seizures, syncope, facial asymmetry, speech difficulty, light-headedness, numbness and headaches.   Hematological: Negative for adenopathy. Does not bruise/bleed easily.   Psychiatric/Behavioral: Positive for dysphoric mood. Negative for agitation, behavioral problems, confusion, decreased concentration, hallucinations, self-injury, sleep disturbance and suicidal ideas. The patient is nervous/anxious. The patient is not hyperactive.        Objective:      Physical Exam   Constitutional: She is oriented to person, place, and time. She appears cachectic. She has a sickly appearance. She appears ill. She appears distressed.   HENT:   Head: Normocephalic and atraumatic.   Right Ear: External ear normal.   Left Ear: External ear normal.   Nose: Nose normal. Right sinus exhibits no maxillary sinus tenderness and no frontal sinus tenderness. Left sinus exhibits no maxillary sinus tenderness and no frontal sinus tenderness.   Mouth/Throat: Oropharynx is clear and moist.  No oropharyngeal exudate.   Eyes: Pupils are equal, round, and reactive to light. Conjunctivae, EOM and lids are normal. Right eye exhibits no discharge. Left eye exhibits no discharge. Right conjunctiva is not injected. Right conjunctiva has no hemorrhage. Left conjunctiva is not injected. Left conjunctiva has no hemorrhage. No scleral icterus.   Neck: Normal range of motion. Neck supple. No JVD present. No tracheal deviation present. No thyromegaly present.   Cardiovascular: Normal rate, regular rhythm and normal heart sounds.   Pulmonary/Chest: Effort normal and breath sounds normal. No stridor. No respiratory distress. She exhibits no tenderness.   Abdominal: Soft. Bowel sounds are normal. She exhibits no distension and no mass. There is no splenomegaly or hepatomegaly. There is no tenderness. There is no rebound.   Musculoskeletal: Normal range of motion. She exhibits no edema or tenderness.   Lymphadenopathy:     She has no cervical adenopathy.     She has no axillary adenopathy.        Right: No supraclavicular adenopathy present.        Left: No supraclavicular adenopathy present.   Neurological: She is alert and oriented to person, place, and time. No cranial nerve deficit. Coordination abnormal.   Skin: Skin is dry. No rash noted. She is not diaphoretic. There is erythema.        Psychiatric: Her behavior is normal. Judgment and thought content normal. Her mood appears anxious. She exhibits a depressed mood.   Vitals reviewed.          Assessment:      1. Triple negative malignant neoplasm of breast    2. Abnormal results of thyroid function studies     3. Secondary malignant neoplasm of bone    4. Cancer related pain           Plan:    proceed with cycle 2 day 1 of therapy concerned over progression.  Extensive conversation understands treatment is not curative will reimage after completion of this cycle of therapy.  Discussed implications with patient family will need 60 days between initial PET scan in  follow-up for response.  Will ask for wound care for evaluation of left chest wall to try to decrease fall smell there appears to be some decrease in the size of the left chest wall mass          Yaniv Pop Jr, MD FACP

## 2019-11-01 ENCOUNTER — TELEPHONE (OUTPATIENT)
Dept: HEMATOLOGY/ONCOLOGY | Facility: CLINIC | Age: 79
End: 2019-11-01

## 2019-11-01 ENCOUNTER — DOCUMENTATION ONLY (OUTPATIENT)
Dept: HEMATOLOGY/ONCOLOGY | Facility: CLINIC | Age: 79
End: 2019-11-01

## 2019-11-01 NOTE — TELEPHONE ENCOUNTER
Spoke with patients granddaughter in regards to patient being set up with wound clinic. Informed her [facility] doesn't have a wound clinic but it looks like Dr. Reddy set her up with Home Health for her wound. Granddaughter states she will call back after speaking with Home Health if she would like to be referred out for wound care. Nurse verbalized understanding

## 2019-11-01 NOTE — TELEPHONE ENCOUNTER
----- Message from Mandy Degroot sent at 11/1/2019  3:30 PM CDT -----  Contact: pt grand daughter (Annabelle )  Caller stated pt was referred to a wound specialist no appt has been scheduled...  Call back: [phone]

## 2019-11-01 NOTE — PROGRESS NOTES
Aries contacted [facility] to obtain fax number to fax home health order. Staff reports patient information need to be faxed to their [place] location. Aries faxed home health order and clinicals to [phone]. Aries attempted to contact [facility] in regards to patient's DME order. Staff reports the person assisting with order is out for the day and requested Sw call back. Aries will f/u on which company pt's DME was outsourced to.

## 2019-11-05 ENCOUNTER — TELEPHONE (OUTPATIENT)
Dept: INTERNAL MEDICINE | Facility: CLINIC | Age: 79
End: 2019-11-05

## 2019-11-05 ENCOUNTER — TELEPHONE (OUTPATIENT)
Dept: HEMATOLOGY/ONCOLOGY | Facility: CLINIC | Age: 79
End: 2019-11-05

## 2019-11-05 DIAGNOSIS — G89.3 CANCER RELATED PAIN: ICD-10-CM

## 2019-11-05 RX ORDER — METHADONE HYDROCHLORIDE 5 MG/1
5 TABLET ORAL EVERY 12 HOURS
Qty: 60 TABLET | Refills: 0 | Status: SHIPPED | OUTPATIENT
Start: 2019-11-05 | End: 2019-11-14 | Stop reason: SDUPTHER

## 2019-11-05 RX ORDER — OXYCODONE HCL 10 MG/1
10 TABLET, FILM COATED, EXTENDED RELEASE ORAL EVERY 12 HOURS
Qty: 60 TABLET | Refills: 0 | Status: CANCELLED | OUTPATIENT
Start: 2019-11-05 | End: 2019-12-05

## 2019-11-05 NOTE — TELEPHONE ENCOUNTER
----- Message from Ciro Torres sent at 11/5/2019  2:54 PM CST -----  Contact: pt   .Type:  Patient Returning Call    Who Called: pt   Who Left Message for Patient: nurse   Does the patient know what this is regarding?: yes   Would the patient rather a call back or a response via [facility]? Callback   Best Call Back Number .[phone]    Additional Information:

## 2019-11-05 NOTE — TELEPHONE ENCOUNTER
Spoke to Denise the patient home health nurse about the patient having dermatitis, and green drainage under her breast with a foul odor. Dr. Pop was advised wants the patent to be seen by wound clinic. I have verbalized this to Denise the home health nurse, and verbalized that she was placing orders today.

## 2019-11-05 NOTE — TELEPHONE ENCOUNTER
----- Message from Flavio Rojas sent at 11/5/2019 12:10 PM CST -----  Contact: pt Son Jasper  Pt is calling the staff regarding a refill RXoxyCODONE-acetaminophen (PERCOCET) 7.5-325 mg per tablet / pt takes as needed.    Pt call back to be advise about a Pharmacy in [place]. Pt feels that it may be cheaper here . Pt Son stated  that the pt needs the medicine. Pt is  In a lot of pain. The cost is 300 dollars and the pt don't the money.      Pt call back [phone]  Thanks

## 2019-11-06 ENCOUNTER — DOCUMENTATION ONLY (OUTPATIENT)
Dept: HEMATOLOGY/ONCOLOGY | Facility: CLINIC | Age: 79
End: 2019-11-06

## 2019-11-06 NOTE — PROGRESS NOTES
Aries contacted [facility] to follow up on patient's DME order. [facility] could not find the order and have not outsourced. Staff Domonique requested [facility] refax order. Aries faxed DME orders to [phone]. Aries will continue to f/u.

## 2019-11-06 NOTE — PROGRESS NOTES
Aries faxed requested clinicals to Donn at [facility] at [phone]. Donn will outsource DME to [facility] in [place]. Aries will f/u.

## 2019-11-07 ENCOUNTER — INFUSION (OUTPATIENT)
Dept: INFUSION THERAPY | Facility: HOSPITAL | Age: 79
End: 2019-11-07
Attending: INTERNAL MEDICINE
Payer: MEDICARE

## 2019-11-07 ENCOUNTER — HOSPITAL ENCOUNTER (OUTPATIENT)
Dept: RADIOLOGY | Facility: HOSPITAL | Age: 79
Discharge: HOME | End: 2019-11-07
Attending: INTERNAL MEDICINE
Payer: MEDICARE

## 2019-11-07 ENCOUNTER — OFFICE VISIT (OUTPATIENT)
Dept: HEMATOLOGY/ONCOLOGY | Facility: CLINIC | Age: 79
End: 2019-11-07
Payer: MEDICARE

## 2019-11-07 VITALS
HEIGHT: 62 IN | HEART RATE: 85 BPM | BODY MASS INDEX: 35.22 KG/M2 | DIASTOLIC BLOOD PRESSURE: 69 MMHG | SYSTOLIC BLOOD PRESSURE: 112 MMHG | WEIGHT: 191.38 LBS

## 2019-11-07 VITALS
BODY MASS INDEX: 35.22 KG/M2 | RESPIRATION RATE: 18 BRPM | OXYGEN SATURATION: 98 % | TEMPERATURE: 98 F | HEART RATE: 103 BPM | WEIGHT: 191.38 LBS | SYSTOLIC BLOOD PRESSURE: 115 MMHG | DIASTOLIC BLOOD PRESSURE: 65 MMHG | HEIGHT: 62 IN

## 2019-11-07 DIAGNOSIS — C50.919 TRIPLE NEGATIVE MALIGNANT NEOPLASM OF BREAST: ICD-10-CM

## 2019-11-07 DIAGNOSIS — M79.89 SWELLING IN RIGHT ARMPIT: Primary | ICD-10-CM

## 2019-11-07 DIAGNOSIS — C50.611 MALIGNANT NEOPLASM OF AXILLARY TAIL OF RIGHT BREAST IN FEMALE, ESTROGEN RECEPTOR NEGATIVE: ICD-10-CM

## 2019-11-07 DIAGNOSIS — I82.A21 CHRONIC EMBOLISM AND THROMBOSIS OF RIGHT AXILLARY VEIN: ICD-10-CM

## 2019-11-07 DIAGNOSIS — L58.9 RADIATION DERMATITIS: ICD-10-CM

## 2019-11-07 DIAGNOSIS — Z17.1 MALIGNANT NEOPLASM OF AXILLARY TAIL OF RIGHT BREAST IN FEMALE, ESTROGEN RECEPTOR NEGATIVE: ICD-10-CM

## 2019-11-07 DIAGNOSIS — M79.89 SWELLING IN RIGHT ARMPIT: ICD-10-CM

## 2019-11-07 DIAGNOSIS — C50.919 TRIPLE NEGATIVE MALIGNANT NEOPLASM OF BREAST: Primary | ICD-10-CM

## 2019-11-07 DIAGNOSIS — C79.51 SECONDARY MALIGNANT NEOPLASM OF BONE: ICD-10-CM

## 2019-11-07 DIAGNOSIS — I82.A19 AXILLARY VEIN THROMBOSIS: ICD-10-CM

## 2019-11-07 PROCEDURE — 99215 PR OFFICE/OUTPT VISIT, EST, LEVL V, 40-54 MIN: ICD-10-PCS | Mod: 25,S$PBB,, | Performed by: INTERNAL MEDICINE

## 2019-11-07 PROCEDURE — 96413 CHEMO IV INFUSION 1 HR: CPT

## 2019-11-07 PROCEDURE — 93971 EXTREMITY STUDY: CPT | Mod: 26,RT,, | Performed by: RADIOLOGY

## 2019-11-07 PROCEDURE — 99999 PR PBB SHADOW E&M-EST. PATIENT-LVL III: CPT | Mod: PBBFAC,,, | Performed by: INTERNAL MEDICINE

## 2019-11-07 PROCEDURE — 99213 OFFICE O/P EST LOW 20 MIN: CPT | Mod: PBBFAC | Performed by: INTERNAL MEDICINE

## 2019-11-07 PROCEDURE — 99215 OFFICE O/P EST HI 40 MIN: CPT | Mod: 25,S$PBB,, | Performed by: INTERNAL MEDICINE

## 2019-11-07 PROCEDURE — 93971 EXTREMITY STUDY: CPT | Mod: TC,RT

## 2019-11-07 PROCEDURE — 63600175 PHARM REV CODE 636 W HCPCS: Performed by: INTERNAL MEDICINE

## 2019-11-07 PROCEDURE — 93971 US UPPER EXTREMITY VEINS RIGHT: ICD-10-PCS | Mod: 26,RT,, | Performed by: RADIOLOGY

## 2019-11-07 PROCEDURE — 99999 PR PBB SHADOW E&M-EST. PATIENT-LVL III: ICD-10-PCS | Mod: PBBFAC,,, | Performed by: INTERNAL MEDICINE

## 2019-11-07 PROCEDURE — 96375 TX/PRO/DX INJ NEW DRUG ADDON: CPT

## 2019-11-07 RX ORDER — HEPARIN 100 UNIT/ML
500 SYRINGE INTRAVENOUS
Status: CANCELLED | OUTPATIENT
Start: 2019-11-07

## 2019-11-07 RX ORDER — ONDANSETRON 2 MG/ML
8 INJECTION INTRAMUSCULAR; INTRAVENOUS
Status: CANCELLED | OUTPATIENT
Start: 2019-11-07

## 2019-11-07 RX ORDER — ONDANSETRON 2 MG/ML
8 INJECTION INTRAMUSCULAR; INTRAVENOUS
Status: COMPLETED | OUTPATIENT
Start: 2019-11-07 | End: 2019-11-07

## 2019-11-07 RX ORDER — HEPARIN 100 UNIT/ML
500 SYRINGE INTRAVENOUS
Status: DISCONTINUED | OUTPATIENT
Start: 2019-11-07 | End: 2019-11-07 | Stop reason: HOSPADM

## 2019-11-07 RX ORDER — SODIUM CHLORIDE 0.9 % (FLUSH) 0.9 %
10 SYRINGE (ML) INJECTION
Status: CANCELLED | OUTPATIENT
Start: 2019-11-07

## 2019-11-07 RX ADMIN — HEPARIN SODIUM (PORCINE) LOCK FLUSH IV SOLN 100 UNIT/ML 500 UNITS: 100 SOLUTION at 12:11

## 2019-11-07 RX ADMIN — ONDANSETRON 8 MG: 2 INJECTION INTRAMUSCULAR; INTRAVENOUS at 11:11

## 2019-11-07 RX ADMIN — SODIUM CHLORIDE: 9 INJECTION, SOLUTION INTRAVENOUS at 11:11

## 2019-11-07 RX ADMIN — PACLITAXEL 200 MG: 100 INJECTION, POWDER, LYOPHILIZED, FOR SUSPENSION INTRAVENOUS at 11:11

## 2019-11-07 NOTE — PROGRESS NOTES
Subjective:       Patient ID: Soo Holly is a 79 y.o. female.    Chief Complaint: Follow-up; Results; Breast Cancer; and Chemotherapy    HPI 79-year-old female presents for cycle 2 day 1 systemic chemotherapy immunotherapy for triple breast carcinoma patient is concerned about increasing swelling arm.  She has followed drainage from right chest wall and is scheduled to see tomorrow ECOG status 2    Past Medical History:   Diagnosis Date    Breast cancer 2019    radiation and chemo    Cancer     R Breast cancer    Chest pain     Hyperlipidemia     Hypertension      Family History   Problem Relation Age of Onset    Breast cancer Sister      Social History     Socioeconomic History    Marital status:      Spouse name: Not on file    Number of children: Not on file    Years of education: Not on file    Highest education level: Not on file   Occupational History    Not on file   Social Needs    Financial resource strain: Not on file    Food insecurity:     Worry: Not on file     Inability: Not on file    Transportation needs:     Medical: Not on file     Non-medical: Not on file   Tobacco Use    Smoking status: Never Smoker    Smokeless tobacco: Never Used   Substance and Sexual Activity    Alcohol use: No     Frequency: Never    Drug use: No    Sexual activity: Not Currently   Lifestyle    Physical activity:     Days per week: Not on file     Minutes per session: Not on file    Stress: Not on file   Relationships    Social connections:     Talks on phone: Not on file     Gets together: Not on file     Attends Gnosticism service: Not on file     Active member of club or organization: Not on file     Attends meetings of clubs or organizations: Not on file     Relationship status: Not on file   Other Topics Concern    Not on file   Social History Narrative    Lives in [place], [place] (1.5h).  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     Past Surgical History:   Procedure Laterality Date    BREAST BIOPSY       cataracts      EYE SURGERY      HYSTERECTOMY      INSERTION OF TUNNELED CENTRAL VENOUS CATHETER (CVC) WITH SUBCUTANEOUS PORT Right 1/16/2019    Procedure: INSERTION, PORT-A-CATH;  Surgeon: Prem Massey MD;  Location: [facility] OR;  Service: General;  Laterality: Right;    JOINT REPLACEMENT Right     Knee    MODIFIED RADICAL MASTECTOMY W/ AXILLARY LYMPH NODE DISSECTION Right 5/22/2019    Procedure: MASTECTOMY, MODIFIED RADICAL;  Surgeon: Paul Carey MD;  Location: [facility] OR;  Service: General;  Laterality: Right;    OOPHORECTOMY         Labs:  Lab Results   Component Value Date    WBC 4.65 11/07/2019    HGB 9.9 (L) 11/07/2019    HCT 32.0 (L) 11/07/2019    MCV 94 11/07/2019     11/07/2019     BMP  Lab Results   Component Value Date     11/07/2019    K 4.0 11/07/2019     11/07/2019    CO2 29 11/07/2019    BUN 6 (L) 11/07/2019    CREATININE 0.7 11/07/2019    CALCIUM 9.3 11/07/2019    ANIONGAP 10 11/07/2019    ESTGFRAFRICA >60 11/07/2019    EGFRNONAA >60 11/07/2019     Lab Results   Component Value Date    ALT 13 11/07/2019    AST 22 11/07/2019    ALKPHOS 80 11/07/2019    BILITOT 0.2 11/07/2019       Lab Results   Component Value Date    IRON 38 09/06/2019    TIBC 272 09/06/2019    FERRITIN 397 (H) 09/06/2019     No results found for: [id number]  No results found for: FOLATE  No results found for: TSH      Review of Systems   Constitutional: Positive for activity change, appetite change, fatigue and unexpected weight change. Negative for chills, diaphoresis and fever.   HENT: Negative for congestion, dental problem, drooling, ear discharge, ear pain, facial swelling, hearing loss, mouth sores, nosebleeds, postnasal drip, rhinorrhea, sinus pressure, sneezing, sore throat, tinnitus, trouble swallowing and voice change.    Eyes: Negative for photophobia, pain, discharge, redness, itching and visual disturbance.   Respiratory: Positive for chest tightness. Negative for cough, choking, shortness of  breath, wheezing and stridor.    Cardiovascular: Positive for chest pain. Negative for palpitations and leg swelling.   Gastrointestinal: Negative for abdominal distention, abdominal pain, anal bleeding, blood in stool, constipation, diarrhea, nausea, rectal pain and vomiting.   Endocrine: Negative for cold intolerance, heat intolerance, polydipsia, polyphagia and polyuria.   Genitourinary: Negative for decreased urine volume, difficulty urinating, dyspareunia, dysuria, enuresis, flank pain, frequency, genital sores, hematuria, menstrual problem, pelvic pain, urgency, vaginal bleeding, vaginal discharge and vaginal pain.   Musculoskeletal: Positive for arthralgias, gait problem and myalgias. Negative for back pain, joint swelling, neck pain and neck stiffness.   Skin: Negative for color change, pallor and rash.   Allergic/Immunologic: Negative for environmental allergies, food allergies and immunocompromised state.   Neurological: Positive for weakness. Negative for dizziness, tremors, seizures, syncope, facial asymmetry, speech difficulty, light-headedness, numbness and headaches.   Hematological: Negative for adenopathy. Does not bruise/bleed easily.   Psychiatric/Behavioral: Positive for dysphoric mood. Negative for agitation, behavioral problems, confusion, decreased concentration, hallucinations, self-injury, sleep disturbance and suicidal ideas. The patient is nervous/anxious. The patient is not hyperactive.        Objective:      Physical Exam   Constitutional: She is oriented to person, place, and time. She has a sickly appearance. She appears ill. She appears distressed.   HENT:   Head: Normocephalic and atraumatic.   Right Ear: External ear normal.   Left Ear: External ear normal.   Nose: Nose normal. Right sinus exhibits no maxillary sinus tenderness and no frontal sinus tenderness. Left sinus exhibits no maxillary sinus tenderness and no frontal sinus tenderness.   Mouth/Throat: Oropharynx is clear and  moist. No oropharyngeal exudate.   Eyes: Pupils are equal, round, and reactive to light. Conjunctivae, EOM and lids are normal. Right eye exhibits no discharge. Left eye exhibits no discharge. Right conjunctiva is not injected. Right conjunctiva has no hemorrhage. Left conjunctiva is not injected. Left conjunctiva has no hemorrhage. No scleral icterus.   Neck: Normal range of motion. Neck supple. No JVD present. No tracheal deviation present. No thyromegaly present.   Cardiovascular: Normal rate and regular rhythm.   Pulmonary/Chest: Effort normal. No stridor. No respiratory distress.         She exhibits tenderness.       Abdominal: Soft. She exhibits no distension and no mass. There is no splenomegaly or hepatomegaly. There is no tenderness. There is no rebound.   Musculoskeletal: Normal range of motion. She exhibits deformity. She exhibits no edema or tenderness.   Lymphadenopathy:     She has no cervical adenopathy.     She has no axillary adenopathy.        Right: No supraclavicular adenopathy present.        Left: No supraclavicular adenopathy present.   Neurological: She is alert and oriented to person, place, and time. No cranial nerve deficit. Coordination normal.   Skin: Skin is dry. No rash noted. She is not diaphoretic. No erythema.   Psychiatric: Her behavior is normal. Judgment and thought content normal. Her mood appears anxious. She exhibits a depressed mood.   Vitals reviewed.          Assessment:      1. Triple negative malignant neoplasm of breast    2. Secondary malignant neoplasm of bone    3. 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 Radiation dermatitis           Plan:     Extensive conversation with the family as well as the patient I am not optimistic that were having a response per will continue to treat for 2 full cycles and repeat PET scan.  With her increased swelling involving her right arm I am concerned with the tumor involvement  in this side.  Certainly can perform ultrasound of that side as well to see whether not there would be a blood clot.  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

## 2019-11-08 ENCOUNTER — TELEPHONE (OUTPATIENT)
Dept: HEMATOLOGY/ONCOLOGY | Facility: CLINIC | Age: 79
End: 2019-11-08

## 2019-11-08 DIAGNOSIS — D50.0 IRON DEFICIENCY ANEMIA DUE TO CHRONIC BLOOD LOSS: Primary | ICD-10-CM

## 2019-11-08 RX ORDER — METHYLPREDNISOLONE SOD SUCC 125 MG
125 VIAL (EA) INJECTION
Status: CANCELLED
Start: 2019-11-08

## 2019-11-08 NOTE — TELEPHONE ENCOUNTER
----- Message from Yaniv Pop MD sent at 11/8/2019  7:30 AM CST -----  Next infusion of chemotherapy will need intravenous iron orders have been placed

## 2019-11-08 NOTE — NURSING
Notified pt and son that lab work from yesterday showed her having low iron.  Explained that Dr. Pop would like her to receive iron infusion with her next treatment.  Pt and son verbalized understanding.

## 2019-11-11 ENCOUNTER — TELEPHONE (OUTPATIENT)
Dept: HOME HEALTH SERVICES | Facility: HOSPITAL | Age: 79
End: 2019-11-11

## 2019-11-11 ENCOUNTER — TELEPHONE (OUTPATIENT)
Dept: HEMATOLOGY/ONCOLOGY | Facility: CLINIC | Age: 79
End: 2019-11-11

## 2019-11-11 NOTE — TELEPHONE ENCOUNTER
Referral for wound care was faxed over to [facility] for this patient. Their clinic will contact the patient to set up an appt.

## 2019-11-13 DIAGNOSIS — G89.3 CANCER RELATED PAIN: ICD-10-CM

## 2019-11-13 RX ORDER — OXYCODONE HCL 10 MG/1
10 TABLET, FILM COATED, EXTENDED RELEASE ORAL EVERY 12 HOURS
Qty: 60 TABLET | Refills: 0 | Status: SHIPPED | OUTPATIENT
Start: 2019-11-13 | End: 2019-12-13

## 2019-11-14 ENCOUNTER — INFUSION (OUTPATIENT)
Dept: INFUSION THERAPY | Facility: HOSPITAL | Age: 79
End: 2019-11-14
Attending: INTERNAL MEDICINE
Payer: MEDICARE

## 2019-11-14 ENCOUNTER — TELEPHONE (OUTPATIENT)
Dept: INTERNAL MEDICINE | Facility: CLINIC | Age: 79
End: 2019-11-14

## 2019-11-14 ENCOUNTER — OFFICE VISIT (OUTPATIENT)
Dept: HEMATOLOGY/ONCOLOGY | Facility: CLINIC | Age: 79
End: 2019-11-14
Payer: MEDICARE

## 2019-11-14 VITALS
HEART RATE: 85 BPM | DIASTOLIC BLOOD PRESSURE: 76 MMHG | BODY MASS INDEX: 35.33 KG/M2 | RESPIRATION RATE: 18 BRPM | WEIGHT: 192 LBS | HEIGHT: 62 IN | SYSTOLIC BLOOD PRESSURE: 127 MMHG

## 2019-11-14 VITALS
DIASTOLIC BLOOD PRESSURE: 70 MMHG | WEIGHT: 192.25 LBS | TEMPERATURE: 98 F | BODY MASS INDEX: 35.16 KG/M2 | HEART RATE: 95 BPM | SYSTOLIC BLOOD PRESSURE: 109 MMHG | OXYGEN SATURATION: 97 %

## 2019-11-14 DIAGNOSIS — D50.0 IRON DEFICIENCY ANEMIA DUE TO CHRONIC BLOOD LOSS: Primary | ICD-10-CM

## 2019-11-14 DIAGNOSIS — G89.3 CANCER RELATED PAIN: ICD-10-CM

## 2019-11-14 DIAGNOSIS — C50.919 TRIPLE NEGATIVE MALIGNANT NEOPLASM OF BREAST: ICD-10-CM

## 2019-11-14 DIAGNOSIS — C50.919 TRIPLE NEGATIVE MALIGNANT NEOPLASM OF BREAST: Primary | ICD-10-CM

## 2019-11-14 PROCEDURE — 96367 TX/PROPH/DG ADDL SEQ IV INF: CPT

## 2019-11-14 PROCEDURE — 99215 OFFICE O/P EST HI 40 MIN: CPT | Mod: 25,S$PBB,, | Performed by: INTERNAL MEDICINE

## 2019-11-14 PROCEDURE — 99999 PR PBB SHADOW E&M-EST. PATIENT-LVL III: CPT | Mod: PBBFAC,,, | Performed by: INTERNAL MEDICINE

## 2019-11-14 PROCEDURE — 99215 PR OFFICE/OUTPT VISIT, EST, LEVL V, 40-54 MIN: ICD-10-PCS | Mod: 25,S$PBB,, | Performed by: INTERNAL MEDICINE

## 2019-11-14 PROCEDURE — 96413 CHEMO IV INFUSION 1 HR: CPT

## 2019-11-14 PROCEDURE — 99999 PR PBB SHADOW E&M-EST. PATIENT-LVL III: ICD-10-PCS | Mod: PBBFAC,,, | Performed by: INTERNAL MEDICINE

## 2019-11-14 PROCEDURE — 96417 CHEMO IV INFUS EACH ADDL SEQ: CPT

## 2019-11-14 PROCEDURE — 99213 OFFICE O/P EST LOW 20 MIN: CPT | Mod: PBBFAC,25 | Performed by: INTERNAL MEDICINE

## 2019-11-14 PROCEDURE — 96375 TX/PRO/DX INJ NEW DRUG ADDON: CPT

## 2019-11-14 PROCEDURE — 63600175 PHARM REV CODE 636 W HCPCS: Performed by: INTERNAL MEDICINE

## 2019-11-14 RX ORDER — COLLAGENASE SANTYL 250 [ARB'U]/G
OINTMENT TOPICAL
COMMUNITY
Start: 2019-11-11

## 2019-11-14 RX ORDER — SODIUM CHLORIDE 0.9 % (FLUSH) 0.9 %
10 SYRINGE (ML) INJECTION
Status: CANCELLED | OUTPATIENT
Start: 2019-11-14

## 2019-11-14 RX ORDER — OXYCODONE AND ACETAMINOPHEN 7.5; 325 MG/1; MG/1
1 TABLET ORAL EVERY 6 HOURS PRN
Qty: 90 TABLET | Refills: 0 | Status: SHIPPED | OUTPATIENT
Start: 2019-11-14

## 2019-11-14 RX ORDER — HEPARIN 100 UNIT/ML
500 SYRINGE INTRAVENOUS
Status: CANCELLED | OUTPATIENT
Start: 2019-11-14

## 2019-11-14 RX ORDER — HEPARIN 100 UNIT/ML
500 SYRINGE INTRAVENOUS
Status: DISCONTINUED | OUTPATIENT
Start: 2019-11-14 | End: 2019-11-14 | Stop reason: HOSPADM

## 2019-11-14 RX ORDER — ONDANSETRON 2 MG/ML
8 INJECTION INTRAMUSCULAR; INTRAVENOUS
Status: CANCELLED | OUTPATIENT
Start: 2019-11-14

## 2019-11-14 RX ORDER — METHADONE HYDROCHLORIDE 5 MG/1
5 TABLET ORAL EVERY 12 HOURS
Qty: 60 TABLET | Refills: 0 | Status: SHIPPED | OUTPATIENT
Start: 2019-11-14

## 2019-11-14 RX ORDER — METHYLPREDNISOLONE SOD SUCC 125 MG
125 VIAL (EA) INJECTION
Status: CANCELLED
Start: 2019-11-14

## 2019-11-14 RX ORDER — CIPROFLOXACIN 500 MG/1
TABLET ORAL
COMMUNITY
Start: 2019-11-11

## 2019-11-14 RX ORDER — METHYLPREDNISOLONE SOD SUCC 125 MG
125 VIAL (EA) INJECTION
Status: COMPLETED | OUTPATIENT
Start: 2019-11-14 | End: 2019-11-14

## 2019-11-14 RX ORDER — ONDANSETRON 2 MG/ML
8 INJECTION INTRAMUSCULAR; INTRAVENOUS
Status: COMPLETED | OUTPATIENT
Start: 2019-11-14 | End: 2019-11-14

## 2019-11-14 RX ADMIN — FERRIC CARBOXYMALTOSE INJECTION 750 MG: 50 INJECTION, SOLUTION INTRAVENOUS at 11:11

## 2019-11-14 RX ADMIN — ONDANSETRON 8 MG: 2 INJECTION INTRAMUSCULAR; INTRAVENOUS at 11:11

## 2019-11-14 RX ADMIN — PACLITAXEL 200 MG: 100 INJECTION, POWDER, LYOPHILIZED, FOR SUSPENSION INTRAVENOUS at 12:11

## 2019-11-14 RX ADMIN — METHYLPREDNISOLONE SODIUM SUCCINATE 125 MG: 125 INJECTION, POWDER, FOR SOLUTION INTRAMUSCULAR; INTRAVENOUS at 11:11

## 2019-11-14 RX ADMIN — HEPARIN SODIUM (PORCINE) LOCK FLUSH IV SOLN 100 UNIT/ML 500 UNITS: 100 SOLUTION at 01:11

## 2019-11-14 RX ADMIN — ATEZOLIZUMAB 840 MG: 840 INJECTION, SOLUTION INTRAVENOUS at 11:11

## 2019-11-14 NOTE — DISCHARGE INSTRUCTIONS
.  [facility]  [address] [facility]  [address]  [phone] phone     [phone] fax  Hours of Operation: Monday- Friday 8:00am- 5:00pm  After hours phone  [phone]  Hematology / Oncology Physicians on call      Dr. Donn Martin    Please call with any concerns regarding your appointment today.  .FALL PREVENTION   Falls often occur due to slipping, tripping or losing your balance. Here are ways to reduce your risk of falling again.   Was there anything that caused your fall that can be fixed, removed or replaced?   Make your home safe by keeping walkways clear of objects you may trip over.   Use non-slip pads under rugs.   Do not walk in poorly lit areas.   Do not stand on chairs or wobbly ladders.   Use caution when reaching overhead or looking upward. This position can cause a loss of balance.   Be sure your shoes fit properly, have non-slip bottoms and are in good condition.   Be cautious when going up and down stairs, curbs, and when walking on uneven sidewalks.   If your balance is poor, consider using a cane or walker.   If your fall was related to alcohol use, stop or limit alcohol intake.   If your fall was related to use of sleeping medicines, talk to your doctor about this. You may need to reduce your dosage at bedtime if you awaken during the night to go to the bathroom.   To reduce the need for nighttime bathroom trips:   Avoid drinking fluids for several hours before going to bed   Empty your bladder before going to bed   Men can keep a urinal at the bedside   © 8921-3014 [facility], [address] [place] [address]. All rights reserved. This information is not intended as a substitute for professional medical care. Always follow your healthcare professional's instructions.    .WAYS TO HELP PREVENT INFECTION         WASH YOUR HANDS OFTEN DURING THE DAY, ESPECIALLY BEFORE YOU EAT, AFTER USING  THE BATHROOM, AND AFTER TOUCHING ANIMALS     STAY AWAY FROM PEOPLE WHO HAVE ILLNESSES YOU CAN CATCH; SUCH AS COLDS, FLU, CHICKEN POX     TRY TO AVOID CROWDS     STAY AWAY FROM CHILDREN WHO RECENTLY HAVE RECEIVED LIVE VIRUS VACCINES     MAINTAIN GOOD MOUTH CARE     DO NOT SQUEEZE OR SCRATCH PIMPLES     CLEAN CUTS & SCRAPES RIGHT AWAY AND DAILY UNTIL HEALED WITH WARM WATER, SOAP & AN ANTISEPTIC     AVOID CONTACT WITH LITTER BOXES, BIRD CAGES, & FISH TANKS     AVOID STANDING WATER, IE., BIRD BATHS, FLOWER POTS/VASES, OR HUMIDIFIERS     WEAR GLOVES WHEN GARDENING OR CLEANING UP AFTER OTHERS, ESPECIALLY BABIES & SMALL CHILDREN     DO NOT EAT RAW FISH, SEAFOOD, MEAT, OR EGGS  .HOME CARE AFTER CHEMOTHERAPY   Meals   Many patients feel sick and lose their appetites during treatment. Eat small meals several times a day. Choose bland foods with little taste or smell if you have problems with nausea. Be sure to cook all food thoroughly. This kills bacteria and helps you avoid intestinal infection. Soft foods are easier to swallow and digest.   Activity   Exercise keeps you strong and keeps your heart and lungs active. Talk to your doctor about an appropriate exercise program for you.   Skin Care   To prevent a skin infection, bathe or shower once a day. Use a moisturizing soap and wash with warm water. Avoid very hot or cold water. Chemotherapy can make your skin dry . Apply moisturizing lotion to help relieve dry skin. Some drugs used in high doses can cause slight burns to appear (like sunburn). Ask for a special cream to help relieve the burn and protect your skin.   Prevent Mouth Sores   During chemotherapy, many people get mouth sores. Do the following to help prevent mouth sores or to ease discomfort.   Brush your teeth with a soft-bristle toothbrush after every meal.  Don't use dental floss if your platelet count is below 50,000. Your doctor or nurse will tell you if this is the case.  Use an oral swab or  "special soft toothbrush if your gums bleed during regular brushing.  Use mouthwash as directed. If you can't tolerate commercial mouthwash, use salt and baking soda to clean your mouth. Mix 1 teaspoon of salt and 1 teaspoon of baking soda into a glass of water. Swish and spit.  Call your doctor or return to this facility if you develop any of the following:   Sore throat   White patches in the mouth or throat   Fever of 100.4ºF (38ºC) or higher, or as directed by your healthcare provider  © 2624-0959 [facility], [address] [place] [address], [place] [address]. All rights reserved. This information is not intended as a substitute for professional medical care. Always follow your healthcare professional's     .Support Groups/Classes    Support groups and classes are being offered at the   [facility] and [facility]!!    "Cooking with Cancer" (Nutrition Class):  Second Wednesday of each month   at noon at the [facility].  Metastatic Support Group:  Third Tuesday of each month   at noon at the [facility].  Next Steps Class/Group: Second and fourth Thursday of each month at noon at the [facility].  [facility] (Breast Cancer Support Group): First Tuesday of each month   at 5:30pm at the [facility] location.  [facility] Mobile: [facility]: Second and third Tuesday of each month from 7:30am - 2pm.  [facility]: First and fourth Tuesday of each month from 7:30am - 2pm    If you are interested in attending or would like more information please ask our social workers or your nurse!    "

## 2019-11-14 NOTE — TELEPHONE ENCOUNTER
----- Message from Dalila Storey sent at 11/14/2019  1:45 PM CST -----  Contact: Home Care-  ms [id number]-[phone]  Would like to consult with the nurse,  Checking  On the status  For forms  that was sent out, would like a call back  Concerning this, Please call back at [phone], Thanks sj

## 2019-11-14 NOTE — PROGRESS NOTES
Subjective:       Patient ID: Soo Holly is a 79 y.o. female.    Chief Complaint: Results; Breast Cancer; and Chemotherapy    HPI 79-year-old female triple negative breast cancer metastatic currently on systemic therapy presents for cycle 3 day 15 therapy patient is 2 return in 2 weeks for a restaging for response to therapy.  ECOG status 2    Past Medical History:   Diagnosis Date    Breast cancer 2019    radiation and chemo    Cancer     R Breast cancer    Chest pain     Hyperlipidemia     Hypertension      Family History   Problem Relation Age of Onset    Breast cancer Sister      Social History     Socioeconomic History    Marital status:      Spouse name: Not on file    Number of children: Not on file    Years of education: Not on file    Highest education level: Not on file   Occupational History    Not on file   Social Needs    Financial resource strain: Not on file    Food insecurity:     Worry: Not on file     Inability: Not on file    Transportation needs:     Medical: Not on file     Non-medical: Not on file   Tobacco Use    Smoking status: Never Smoker    Smokeless tobacco: Never Used   Substance and Sexual Activity    Alcohol use: No     Frequency: Never    Drug use: No    Sexual activity: Not Currently   Lifestyle    Physical activity:     Days per week: Not on file     Minutes per session: Not on file    Stress: Not on file   Relationships    Social connections:     Talks on phone: Not on file     Gets together: Not on file     Attends Shinto service: Not on file     Active member of club or organization: Not on file     Attends meetings of clubs or organizations: Not on file     Relationship status: Not on file   Other Topics Concern    Not on file   Social History Narrative    Lives in [place], [place] (1.5h).  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     Past Surgical History:   Procedure Laterality Date    BREAST BIOPSY      cataracts      EYE SURGERY      HYSTERECTOMY       INSERTION OF TUNNELED CENTRAL VENOUS CATHETER (CVC) WITH SUBCUTANEOUS PORT Right 1/16/2019    Procedure: INSERTION, PORT-A-CATH;  Surgeon: Prem Massey MD;  Location: [facility] OR;  Service: General;  Laterality: Right;    JOINT REPLACEMENT Right     Knee    MODIFIED RADICAL MASTECTOMY W/ AXILLARY LYMPH NODE DISSECTION Right 5/22/2019    Procedure: MASTECTOMY, MODIFIED RADICAL;  Surgeon: Paul Carey MD;  Location: [facility] OR;  Service: General;  Laterality: Right;    OOPHORECTOMY         Labs:  Lab Results   Component Value Date    WBC 3.31 (L) 11/14/2019    HGB 9.0 (L) 11/14/2019    HCT 28.9 (L) 11/14/2019    MCV 94 11/14/2019     11/14/2019     BMP  Lab Results   Component Value Date     11/14/2019    K 4.2 11/14/2019     11/14/2019    CO2 27 11/14/2019    BUN 7 (L) 11/14/2019    CREATININE 0.7 11/14/2019    CALCIUM 9.2 11/14/2019    ANIONGAP 10 11/14/2019    ESTGFRAFRICA >60 11/14/2019    EGFRNONAA >60 11/14/2019     Lab Results   Component Value Date    ALT 12 11/14/2019    AST 26 11/14/2019    ALKPHOS 73 11/14/2019    BILITOT 0.2 11/14/2019       Lab Results   Component Value Date    IRON 18 (L) 11/07/2019    TIBC 209 (L) 11/07/2019    FERRITIN 787 (H) 11/07/2019     No results found for: [id number]  No results found for: FOLATE  Lab Results   Component Value Date    TSH 2.436 11/07/2019         Review of Systems   Constitutional: Positive for activity change, appetite change and fatigue. Negative for chills, diaphoresis, fever and unexpected weight change.   HENT: Negative for congestion, dental problem, drooling, ear discharge, ear pain, facial swelling, hearing loss, mouth sores, nosebleeds, postnasal drip, rhinorrhea, sinus pressure, sneezing, sore throat, tinnitus, trouble swallowing and voice change.    Eyes: Negative for photophobia, pain, discharge, redness, itching and visual disturbance.   Respiratory: Negative for cough, choking, chest tightness, shortness of breath, wheezing  and stridor.    Cardiovascular: Negative for chest pain, palpitations and leg swelling.   Gastrointestinal: Negative for abdominal distention, abdominal pain, anal bleeding, blood in stool, constipation, diarrhea, nausea, rectal pain and vomiting.   Endocrine: Negative for cold intolerance, heat intolerance, polydipsia, polyphagia and polyuria.   Genitourinary: Negative for decreased urine volume, difficulty urinating, dyspareunia, dysuria, enuresis, flank pain, frequency, genital sores, hematuria, menstrual problem, pelvic pain, urgency, vaginal bleeding, vaginal discharge and vaginal pain.   Musculoskeletal: Positive for arthralgias and gait problem. Negative for back pain, joint swelling, myalgias, neck pain and neck stiffness.   Skin: Negative for color change, pallor and rash.   Allergic/Immunologic: Negative for environmental allergies, food allergies and immunocompromised state.   Neurological: Positive for weakness. Negative for dizziness, tremors, seizures, syncope, facial asymmetry, speech difficulty, light-headedness, numbness and headaches.   Hematological: Negative for adenopathy. Does not bruise/bleed easily.   Psychiatric/Behavioral: Positive for dysphoric mood. Negative for agitation, behavioral problems, confusion, decreased concentration, hallucinations, self-injury, sleep disturbance and suicidal ideas. The patient is nervous/anxious. The patient is not hyperactive.        Objective:      Physical Exam   Constitutional: She is oriented to person, place, and time. She appears well-developed and well-nourished. She appears distressed.   HENT:   Head: Normocephalic and atraumatic.   Right Ear: External ear normal.   Left Ear: External ear normal.   Nose: Nose normal. Right sinus exhibits no maxillary sinus tenderness and no frontal sinus tenderness. Left sinus exhibits no maxillary sinus tenderness and no frontal sinus tenderness.   Mouth/Throat: Oropharynx is clear and moist. No oropharyngeal  exudate.   Eyes: Pupils are equal, round, and reactive to light. Conjunctivae, EOM and lids are normal. Right eye exhibits no discharge. Left eye exhibits no discharge. Right conjunctiva is not injected. Right conjunctiva has no hemorrhage. Left conjunctiva is not injected. Left conjunctiva has no hemorrhage. No scleral icterus.   Neck: Normal range of motion. Neck supple. No JVD present. No tracheal deviation present. No thyromegaly present.   Cardiovascular: Normal rate and regular rhythm.   Pulmonary/Chest: Effort normal. No stridor. No respiratory distress. She exhibits no tenderness.   Abdominal: Soft. She exhibits no distension and no mass. There is no splenomegaly or hepatomegaly. There is no tenderness. There is no rebound.   Musculoskeletal: Normal range of motion. She exhibits no edema or tenderness.   Lymphadenopathy:     She has no cervical adenopathy.     She has no axillary adenopathy.        Right: No supraclavicular adenopathy present.        Left: No supraclavicular adenopathy present.   Neurological: She is alert and oriented to person, place, and time. No cranial nerve deficit. Coordination normal.   Skin: Skin is dry. No rash noted. She is not diaphoretic. No erythema.   Psychiatric: She has a normal mood and affect. Her behavior is normal. Judgment and thought content normal.   Vitals reviewed.          Assessment:      1. Triple negative malignant neoplasm of breast    2. Cancer related pain           Plan:     Triple negative breast cancer.  At this point will proceed with systemic chemotherapy return to clinic to see us in 2 weeks with CBC CMP and PET scan for response to therapy orders written reviewed discussed implications pain medicine sent [facility] state  M checked        Yaniv Pop Jr, MD FACP

## 2019-11-21 ENCOUNTER — TELEPHONE (OUTPATIENT)
Dept: INTERNAL MEDICINE | Facility: CLINIC | Age: 79
End: 2019-11-21

## 2019-11-21 ENCOUNTER — INFUSION (OUTPATIENT)
Dept: INFUSION THERAPY | Facility: HOSPITAL | Age: 79
End: 2019-11-21
Payer: MEDICARE

## 2019-11-21 VITALS
RESPIRATION RATE: 16 BRPM | DIASTOLIC BLOOD PRESSURE: 59 MMHG | SYSTOLIC BLOOD PRESSURE: 99 MMHG | HEART RATE: 88 BPM | TEMPERATURE: 98 F | OXYGEN SATURATION: 95 %

## 2019-11-21 DIAGNOSIS — C50.919 TRIPLE NEGATIVE MALIGNANT NEOPLASM OF BREAST: ICD-10-CM

## 2019-11-21 DIAGNOSIS — D50.0 IRON DEFICIENCY ANEMIA DUE TO CHRONIC BLOOD LOSS: Primary | ICD-10-CM

## 2019-11-21 PROCEDURE — 96365 THER/PROPH/DIAG IV INF INIT: CPT

## 2019-11-21 PROCEDURE — 63600175 PHARM REV CODE 636 W HCPCS: Performed by: INTERNAL MEDICINE

## 2019-11-21 PROCEDURE — 96375 TX/PRO/DX INJ NEW DRUG ADDON: CPT

## 2019-11-21 RX ORDER — METHYLPREDNISOLONE SOD SUCC 125 MG
125 VIAL (EA) INJECTION
Status: CANCELLED
Start: 2019-11-21

## 2019-11-21 RX ORDER — SODIUM CHLORIDE 0.9 % (FLUSH) 0.9 %
10 SYRINGE (ML) INJECTION
Status: CANCELLED | OUTPATIENT
Start: 2019-11-21

## 2019-11-21 RX ORDER — HEPARIN 100 UNIT/ML
500 SYRINGE INTRAVENOUS
Status: COMPLETED | OUTPATIENT
Start: 2019-11-21 | End: 2019-11-21

## 2019-11-21 RX ORDER — METHYLPREDNISOLONE SOD SUCC 125 MG
125 VIAL (EA) INJECTION
Status: COMPLETED | OUTPATIENT
Start: 2019-11-21 | End: 2019-11-21

## 2019-11-21 RX ORDER — HEPARIN 100 UNIT/ML
500 SYRINGE INTRAVENOUS
Status: CANCELLED | OUTPATIENT
Start: 2019-11-21

## 2019-11-21 RX ADMIN — HEPARIN SODIUM (PORCINE) LOCK FLUSH IV SOLN 100 UNIT/ML 500 UNITS: 100 SOLUTION at 12:11

## 2019-11-21 RX ADMIN — METHYLPREDNISOLONE SODIUM SUCCINATE 125 MG: 125 INJECTION, POWDER, FOR SOLUTION INTRAMUSCULAR; INTRAVENOUS at 11:11

## 2019-11-21 RX ADMIN — FERRIC CARBOXYMALTOSE INJECTION 750 MG: 50 INJECTION, SOLUTION INTRAVENOUS at 11:11

## 2019-11-21 NOTE — DISCHARGE INSTRUCTIONS
[facility]  [address] [address]  [address]  [phone] phone     [phone] fax  Hours of Operation: Monday- Friday 8:00am- 5:00pm  After hours phone  [phone]  Hematology / Oncology Physicians on call      Dr. Donn Baugh    Please call with any concerns regarding your appointment today.    FALL PREVENTION   Falls often occur due to slipping, tripping or losing your balance. Here are ways to reduce your risk of falling again.   Was there anything that caused your fall that can be fixed, removed or replaced?   Make your home safe by keeping walkways clear of objects you may trip over.   Use non-slip pads under rugs.   Do not walk in poorly lit areas.   Do not stand on chairs or wobbly ladders.   Use caution when reaching overhead or looking upward. This position can cause a loss of balance.   Be sure your shoes fit properly, have non-slip bottoms and are in good condition.   Be cautious when going up and down stairs, curbs, and when walking on uneven sidewalks.   If your balance is poor, consider using a cane or walker.   If your fall was related to alcohol use, stop or limit alcohol intake.   If your fall was related to use of sleeping medicines, talk to your doctor about this. You may need to reduce your dosage at bedtime if you awaken during the night to go to the bathroom.   To reduce the need for nighttime bathroom trips:   Avoid drinking fluids for several hours before going to bed   Empty your bladder before going to bed   Men can keep a urinal at the bedside   © 7398-1629 [employer] Harrell, [address] [place] [address], [place] [address]. All rights reserved. This information is not intended as a substitute for professional medical care. Always follow your healthcare professional's instructions.

## 2019-11-21 NOTE — TELEPHONE ENCOUNTER
----- Message from Tracee Sierra sent at 11/21/2019 10:18 AM CST -----  [phone]  Would like follow-up with nurse regarding request for order . Please call back at [phone].   Md Macarena

## 2019-11-25 ENCOUNTER — DOCUMENTATION ONLY (OUTPATIENT)
Dept: REHABILITATION | Facility: HOSPITAL | Age: 79
End: 2019-11-25

## 2019-11-25 NOTE — PROGRESS NOTES
Outpatient Therapy Discharge Summary     Name: Soo Burrell [facility] Number: [id number]     Therapy Diagnosis:        Encounter Diagnosis   Name Primary?    Malignant neoplasm of female breast, unspecified estrogen receptor status, unspecified laterality, unspecified site of breast Yes      Physician: Paul Carey MD     Physician Orders: PT Eval and Treat   Medical Diagnosis: malignant neoplasm of female breast  Evaluation Date: 5/20/2019      Date of Last visit: 7/31/2019  Total Visits Received: 9  Cancelled Visits: -  No Show Visits: -    Assessment    Goals: Short Term Goals: In 4 weeks   1.I with HEP- ongoing goal  2.Patient to increase right shoulder flexion, extension, ER, IR by 20 degrees- goal met  3.Patient to increase MMT strength to 3/5- goal met  4.Patient to have pain less than 3/10 at all times.-goal met   5.Patient to score less than 50% impaired on the UEFS- partially met     Long Term Goals: In 10 weeks  1. Patient to score less than 25% impaired on the UEFS- ongoing  2. Patient to demo increase in RUE strength to 4/5-ongoing  3. Patient to have decreased pain to 1/10 at all times.- ongoing goal New pain present due to radiation and skin irritation   4. Patient to demo increase GH ROM to WNL all planes- goal met  5. Patient to perform daily activities including grooming, cleaning, bathing, bed mobility without limitation.- partially met  6 Patient to perform recumbent bike x 5-7 minutes - new LTG      Discharge reason: Patient has not attended therapy since 7/31/2019    Plan   This patient is discharged from Physical Therapy

## 2019-11-26 ENCOUNTER — TELEPHONE (OUTPATIENT)
Dept: HEMATOLOGY/ONCOLOGY | Facility: CLINIC | Age: 79
End: 2019-11-26

## 2019-11-26 ENCOUNTER — TELEPHONE (OUTPATIENT)
Dept: INTERNAL MEDICINE | Facility: CLINIC | Age: 79
End: 2019-11-26

## 2019-11-26 NOTE — TELEPHONE ENCOUNTER
----- Message from Catherine Tanner sent at 11/26/2019  9:20 AM CST -----  Contact: Jasper Lee calling regarding a wheelchair Dr Reddy needs to approve.  Cell [phone]

## 2019-11-26 NOTE — TELEPHONE ENCOUNTER
Need approval         [facility] (  [phone] )   Spoke with Chasidy and she will e-mail out it to me the order.

## 2019-11-26 NOTE — TELEPHONE ENCOUNTER
Spoke to Татьяна at [facility]. She was requesting the last office note to be faxed over to them prior to scheduling the patient. I have sent over the last office note and received confirmation that's it was received by their clinic.

## 2019-12-02 ENCOUNTER — TELEPHONE (OUTPATIENT)
Dept: RADIOLOGY | Facility: HOSPITAL | Age: 79
End: 2019-12-02

## 2019-12-04 ENCOUNTER — HOSPITAL ENCOUNTER (OUTPATIENT)
Dept: RADIOLOGY | Facility: HOSPITAL | Age: 79
Discharge: HOME | End: 2019-12-04
Attending: INTERNAL MEDICINE
Payer: MEDICARE

## 2019-12-04 ENCOUNTER — OFFICE VISIT (OUTPATIENT)
Dept: HEMATOLOGY/ONCOLOGY | Facility: CLINIC | Age: 79
End: 2019-12-04
Payer: MEDICARE

## 2019-12-04 VITALS
TEMPERATURE: 97 F | SYSTOLIC BLOOD PRESSURE: 112 MMHG | OXYGEN SATURATION: 91 % | WEIGHT: 193 LBS | BODY MASS INDEX: 35.51 KG/M2 | RESPIRATION RATE: 18 BRPM | HEIGHT: 62 IN | HEART RATE: 107 BPM | DIASTOLIC BLOOD PRESSURE: 66 MMHG

## 2019-12-04 DIAGNOSIS — L58.9 RADIATION DERMATITIS: ICD-10-CM

## 2019-12-04 DIAGNOSIS — Z17.1 MALIGNANT NEOPLASM OF AXILLARY TAIL OF RIGHT BREAST IN FEMALE, ESTROGEN RECEPTOR NEGATIVE: ICD-10-CM

## 2019-12-04 DIAGNOSIS — C50.919 TRIPLE NEGATIVE MALIGNANT NEOPLASM OF BREAST: Primary | ICD-10-CM

## 2019-12-04 DIAGNOSIS — C79.51 SECONDARY MALIGNANT NEOPLASM OF BONE: ICD-10-CM

## 2019-12-04 DIAGNOSIS — C50.611 MALIGNANT NEOPLASM OF AXILLARY TAIL OF RIGHT BREAST IN FEMALE, ESTROGEN RECEPTOR NEGATIVE: ICD-10-CM

## 2019-12-04 DIAGNOSIS — Z71.89 ADVANCED CARE PLANNING/COUNSELING DISCUSSION: ICD-10-CM

## 2019-12-04 DIAGNOSIS — C50.919 TRIPLE NEGATIVE MALIGNANT NEOPLASM OF BREAST: ICD-10-CM

## 2019-12-04 DIAGNOSIS — G89.3 CANCER RELATED PAIN: ICD-10-CM

## 2019-12-04 PROCEDURE — 78815 PET IMAGE W/CT SKULL-THIGH: CPT | Mod: TC

## 2019-12-04 PROCEDURE — 1125F PR PAIN SEVERITY QUANTIFIED, PAIN PRESENT: ICD-10-PCS | Mod: ,,, | Performed by: INTERNAL MEDICINE

## 2019-12-04 PROCEDURE — 78815 NM PET CT ROUTINE: ICD-10-PCS | Mod: 26,PS,, | Performed by: RADIOLOGY

## 2019-12-04 PROCEDURE — 1159F PR MEDICATION LIST DOCUMENTED IN MEDICAL RECORD: ICD-10-PCS | Mod: ,,, | Performed by: INTERNAL MEDICINE

## 2019-12-04 PROCEDURE — 99999 PR PBB SHADOW E&M-EST. PATIENT-LVL III: ICD-10-PCS | Mod: PBBFAC,,, | Performed by: INTERNAL MEDICINE

## 2019-12-04 PROCEDURE — 99215 PR OFFICE/OUTPT VISIT, EST, LEVL V, 40-54 MIN: ICD-10-PCS | Mod: S$PBB,,, | Performed by: INTERNAL MEDICINE

## 2019-12-04 PROCEDURE — 1159F MED LIST DOCD IN RCRD: CPT | Mod: ,,, | Performed by: INTERNAL MEDICINE

## 2019-12-04 PROCEDURE — 99999 PR PBB SHADOW E&M-EST. PATIENT-LVL III: CPT | Mod: PBBFAC,,, | Performed by: INTERNAL MEDICINE

## 2019-12-04 PROCEDURE — A9552 F18 FDG: HCPCS

## 2019-12-04 PROCEDURE — 99215 OFFICE O/P EST HI 40 MIN: CPT | Mod: S$PBB,,, | Performed by: INTERNAL MEDICINE

## 2019-12-04 PROCEDURE — 99213 OFFICE O/P EST LOW 20 MIN: CPT | Mod: PBBFAC,25 | Performed by: INTERNAL MEDICINE

## 2019-12-04 PROCEDURE — 78815 PET IMAGE W/CT SKULL-THIGH: CPT | Mod: 26,PS,, | Performed by: RADIOLOGY

## 2019-12-04 PROCEDURE — 1125F AMNT PAIN NOTED PAIN PRSNT: CPT | Mod: ,,, | Performed by: INTERNAL MEDICINE

## 2019-12-04 NOTE — PROGRESS NOTES
Subjective:       Patient ID: Soo Holly is a 79 y.o. female.    Chief Complaint: Follow-up; Results; Pain; and Breast Cancer    HPI 79-year-old female returns for review and response to therapy for triple negative breast carcinoma.  Patient had PET scan today and here with family for review ECOG status 3    Past Medical History:   Diagnosis Date    Breast cancer 2019    radiation and chemo    Cancer     R Breast cancer    Chest pain     Hyperlipidemia     Hypertension      Family History   Problem Relation Age of Onset    Breast cancer Sister      Social History     Socioeconomic History    Marital status:      Spouse name: Not on file    Number of children: Not on file    Years of education: Not on file    Highest education level: Not on file   Occupational History    Not on file   Social Needs    Financial resource strain: Not on file    Food insecurity:     Worry: Not on file     Inability: Not on file    Transportation needs:     Medical: Not on file     Non-medical: Not on file   Tobacco Use    Smoking status: Never Smoker    Smokeless tobacco: Never Used   Substance and Sexual Activity    Alcohol use: No     Frequency: Never    Drug use: No    Sexual activity: Not Currently   Lifestyle    Physical activity:     Days per week: Not on file     Minutes per session: Not on file    Stress: Not on file   Relationships    Social connections:     Talks on phone: Not on file     Gets together: Not on file     Attends Latter-day service: Not on file     Active member of club or organization: Not on file     Attends meetings of clubs or organizations: Not on file     Relationship status: Not on file   Other Topics Concern    Not on file   Social History Narrative    Lives in [place], [place] (1.5h).  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     Past Surgical History:   Procedure Laterality Date    BREAST BIOPSY      cataracts      EYE SURGERY      HYSTERECTOMY      INSERTION OF TUNNELED CENTRAL  VENOUS CATHETER (CVC) WITH SUBCUTANEOUS PORT Right 1/16/2019    Procedure: INSERTION, PORT-A-CATH;  Surgeon: Prem Massey MD;  [facility]: [facility] OR;  Service: General;  Laterality: Right;    JOINT REPLACEMENT Right     Knee    MODIFIED RADICAL MASTECTOMY W/ AXILLARY LYMPH NODE DISSECTION Right 5/22/2019    Procedure: MASTECTOMY, MODIFIED RADICAL;  Surgeon: Paul Carey MD;  Location: [facility] OR;  Service: General;  Laterality: Right;    OOPHORECTOMY         Labs:  Lab Results   Component Value Date    WBC 10.03 12/04/2019    HGB 10.8 (L) 12/04/2019    HCT 34.7 (L) 12/04/2019    MCV 94 12/04/2019     (L) 12/04/2019     BMP  Lab Results   Component Value Date     12/04/2019    K 4.0 12/04/2019    CL 98 12/04/2019    CO2 25 12/04/2019    BUN 12 12/04/2019    CREATININE 0.7 12/04/2019    CALCIUM 8.8 12/04/2019    ANIONGAP 13 12/04/2019    ESTGFRAFRICA >60 12/04/2019    EGFRNONAA >60 12/04/2019     Lab Results   Component Value Date    ALT 31 12/04/2019    AST 51 (H) 12/04/2019    ALKPHOS 114 12/04/2019    BILITOT 0.5 12/04/2019       Lab Results   Component Value Date    IRON 18 (L) 11/07/2019    TIBC 209 (L) 11/07/2019    FERRITIN 787 (H) 11/07/2019     No results found for: [id number]  No results found for: FOLATE  Lab Results   Component Value Date    TSH 2.436 11/07/2019         Review of Systems   Constitutional: Positive for fatigue. Negative for activity change, appetite change, chills, diaphoresis, fever and unexpected weight change.   HENT: Negative for congestion, dental problem, drooling, ear discharge, ear pain, facial swelling, hearing loss, mouth sores, nosebleeds, postnasal drip, rhinorrhea, sinus pressure, sneezing, sore throat, tinnitus, trouble swallowing and voice change.    Eyes: Negative for photophobia, pain, discharge, redness, itching and visual disturbance.   Respiratory: Positive for chest tightness. Negative for cough, choking, shortness of breath, wheezing and stridor.     Cardiovascular: Positive for chest pain. Negative for palpitations and leg swelling.   Gastrointestinal: Negative for abdominal distention, abdominal pain, anal bleeding, blood in stool, constipation, diarrhea, nausea, rectal pain and vomiting.   Endocrine: Negative for cold intolerance, heat intolerance, polydipsia, polyphagia and polyuria.   Genitourinary: Negative for decreased urine volume, difficulty urinating, dyspareunia, dysuria, enuresis, flank pain, frequency, genital sores, hematuria, menstrual problem, pelvic pain, urgency, vaginal bleeding, vaginal discharge and vaginal pain.   Musculoskeletal: Positive for arthralgias, gait problem and myalgias. Negative for back pain, joint swelling, neck pain and neck stiffness.   Skin: Positive for wound. Negative for color change, pallor and rash.   Allergic/Immunologic: Negative for environmental allergies, food allergies and immunocompromised state.   Neurological: Positive for weakness. Negative for dizziness, tremors, seizures, syncope, facial asymmetry, speech difficulty, light-headedness, numbness and headaches.   Hematological: Negative for adenopathy. Does not bruise/bleed easily.   Psychiatric/Behavioral: Positive for dysphoric mood. Negative for agitation, behavioral problems, confusion, decreased concentration, hallucinations, self-injury, sleep disturbance and suicidal ideas. The patient is nervous/anxious. The patient is not hyperactive.        Objective:      Physical Exam   Constitutional: She is oriented to person, place, and time. She appears cachectic. She has a sickly appearance. She appears ill. She appears distressed.   HENT:   Head: Normocephalic and atraumatic.   Right Ear: External ear normal.   Left Ear: External ear normal.   Nose: Nose normal. Right sinus exhibits no maxillary sinus tenderness and no frontal sinus tenderness. Left sinus exhibits no maxillary sinus tenderness and no frontal sinus tenderness.   Mouth/Throat: Oropharynx is  clear and moist. No oropharyngeal exudate.   Eyes: Pupils are equal, round, and reactive to light. Conjunctivae, EOM and lids are normal. Right eye exhibits no discharge. Left eye exhibits no discharge. Right conjunctiva is not injected. Right conjunctiva has no hemorrhage. Left conjunctiva is not injected. Left conjunctiva has no hemorrhage. No scleral icterus.   Neck: Normal range of motion. Neck supple. No JVD present. No tracheal deviation present. No thyromegaly present.   Cardiovascular: Normal rate and regular rhythm.   Pulmonary/Chest: Effort normal. No stridor. No respiratory distress. She exhibits tenderness.   Abdominal: Soft. She exhibits no distension and no mass. There is no splenomegaly or hepatomegaly. There is no tenderness. There is no rebound.   Musculoskeletal: Normal range of motion. She exhibits no edema or tenderness.   Lymphadenopathy:     She has no cervical adenopathy.     She has no axillary adenopathy.        Right: No supraclavicular adenopathy present.        Left: No supraclavicular adenopathy present.   Neurological: She is alert and oriented to person, place, and time. No cranial nerve deficit. Coordination normal.   Skin: Skin is dry. No rash noted. She is not diaphoretic. No erythema.   Psychiatric: Her behavior is normal. Judgment and thought content normal. Her mood appears anxious. She exhibits a depressed mood.   Vitals reviewed.          Assessment:      1. Triple negative malignant neoplasm of breast    2. Secondary malignant neoplasm of bone    3. Cancer related pain    4. Advanced care planning/counseling discussion    5. Radiation dermatitis           Plan:     Extensive conversation with family present as well as with patient reviewed imaging for the last 2 months despite the use of standard upfront chemotherapy rapidly progressive disease.  At this time with declining performance status ECOG status 3 would recommend hospice care discussed the situation with her she has  already had [facility] care planning discussed with him at this time photographs of PET scan demonstrating widespread progressive disease noted would recommend discontinuation of hyperbaric oxygen refer to hospice in hometown area of [facility].  Answered questions 40 min face-to-face time coordination of care greater than 50 present time face-to-face with family        Yaniv Pop Jr, MD FACP

## 2022-08-17 NOTE — PROGRESS NOTES
Please continue medications as prescribed.  Your x-ray shows a pulmonary nodule, multiple people have them, a very small amount may lead to cancer.  Please follow-up with primary care for further investigation as needed.  Referral has been placed for ENT.  Please call them in the morning and set up appointment.  Return the ER for any concerning reason.   Subjective:   Patient ID:  Soo Holly is a 78 y.o. female who presents for cardiac consult of Hypertension (c/o fatigue)      HPI  The patient came in today for cardiac consult of Hypertension (c/o fatigue)    Referring Physician: Yaniv Pop MD   Reason for consult: pre-chemo CV eval    18  This is a 78 year old female pt with recently diagnosed ER-N[place]-HER2- Breast cancer, HTN, obesity presents for initial CV evaluation for pre-chemo workup.     She had a 2D echo yesterday which revealed normal Bi V function and valves. She complains of being fatigue, has mild insomnia. Still active at home with cooking/cleaning etc. Mild ankle swelling occasionally, L worse than right has knee arthritis.   ECG today - NSR, neg for ischemia,     Patient feels no chest pain, no sob, no PND, no palpitation, no dizziness, no syncope, no CNS symptoms.    Patient has fairly decreased exercise tolerance.    Patient is compliant with medications.    FH - MI/strokes - mother side; sister  of cancer    2D ECHO 2019  Pre-chemo echo 2019  4D= 77%  Biplane= 70%  Average GPLS= -23.73%    CONCLUSIONS     1 - Normal left ventricular systolic function (EF 65-70%).     2 - Normal left ventricular diastolic function.     3 - Normal right ventricular systolic function .       Past Medical History:   Diagnosis Date    Hypertension        History reviewed. No pertinent surgical history.    Social History     Tobacco Use    Smoking status: Never Smoker   Substance Use Topics    Alcohol use: No     Frequency: Never    Drug use: No       Family History   Problem Relation Age of Onset    Breast cancer Sister           Medication List           Accurate as of 19 11:41 AM. If you have any questions, ask your nurse or doctor.               CONTINUE taking these medications    amLODIPine 10 MG tablet  Commonly known as:  NORVASC     aspirin 81 MG EC tablet  Commonly known as:  ECOTRIN        STOP taking these medications   "  lisinopril 10 MG tablet  Stopped by:  Mehrdad Wiley Md, MD            Review of Systems   Constitutional: Positive for malaise/fatigue.   HENT: Negative.    Eyes: Negative.    Respiratory: Negative.  Negative for shortness of breath.    Cardiovascular: Positive for leg swelling. Negative for chest pain and palpitations.   Gastrointestinal: Negative.    Genitourinary: Negative.    Musculoskeletal: Positive for joint pain.   Skin: Negative.    Neurological: Negative.    Endo/Heme/Allergies: Negative.    Psychiatric/Behavioral: Negative.    All 12 systems otherwise negative.      Wt Readings from Last 3 Encounters:   01/09/19 96.2 kg (212 lb)   01/08/19 95.9 kg (211 lb 6.7 oz)   01/08/19 96.5 kg (212 lb 11.9 oz)     Temp Readings from Last 3 Encounters:   01/08/19 97.6 °F (36.4 °C) (Oral)   01/08/19 98.1 °F (36.7 °C) (Oral)   12/31/18 97.6 °F (36.4 °C)     BP Readings from Last 3 Encounters:   01/09/19 132/70   01/08/19 138/76   01/08/19 130/70     Pulse Readings from Last 3 Encounters:   01/09/19 74   01/08/19 70   01/08/19 69       /70 (Patient Position: Sitting, BP Method: Large (Manual))   Pulse 74   Ht 5' 2" (1.575 m)   Wt 96.2 kg (212 lb)   BMI 38.78 kg/m²     Objective:   Physical Exam   Constitutional: She is oriented to person, place, and time. She appears well-developed and well-nourished. No distress.   Obese   HENT:   Head: Normocephalic and atraumatic.   Nose: Nose normal.   Mouth/Throat: Oropharynx is clear and moist.   Eyes: Conjunctivae and EOM are normal. No scleral icterus.   Neck: Normal range of motion. Neck supple. No JVD present. No thyromegaly present.   Cardiovascular: Normal rate, regular rhythm, S1 normal and S2 normal. Exam reveals no gallop, no S3, no S4 and no friction rub.   No murmur heard.  Pulmonary/Chest: Effort normal and breath sounds normal. No stridor. No respiratory distress. She has no wheezes. She has no rales. She exhibits no tenderness.   Abdominal: Soft. Bowel " sounds are normal. She exhibits no distension and no mass. There is no tenderness. There is no rebound.   Genitourinary:   Genitourinary Comments: Deferred   Musculoskeletal: Normal range of motion. She exhibits no edema, tenderness or deformity.   Lymphadenopathy:     She has no cervical adenopathy.   Neurological: She is alert and oriented to person, place, and time. She exhibits normal muscle tone. Coordination normal.   Skin: Skin is warm and dry. No rash noted. She is not diaphoretic. No erythema. No pallor.   Psychiatric: She has a normal mood and affect. Her behavior is normal. Judgment and thought content normal.   Nursing note and vitals reviewed.      Lab Results   Component Value Date     12/24/2018    K 3.8 12/24/2018     12/24/2018    CO2 26 12/24/2018    BUN 10 12/24/2018    CREATININE 0.8 12/24/2018    GLU 80 12/24/2018    AST 23 12/24/2018    ALT 15 12/24/2018    ALBUMIN 3.4 (L) 12/24/2018    PROT 8.1 12/24/2018    BILITOT 0.3 12/24/2018    WBC 6.33 12/24/2018    HGB 12.1 12/24/2018    HCT 37.6 12/24/2018    MCV 92 12/24/2018     12/24/2018     Assessment:      1. Triple negative malignant neoplasm of breast    2. Essential hypertension        Plan:   1. Breast cancer  - proceed with therapy with follow up echo in 3 months  - f/u with Heme/onc and PCP/rad onc    2. HTN  - cont meds  - low salt diet     Thank you for allowing me to participate in this patient's care. Please do not hesitate to contact me with any questions or concerns. Consult note has been forwarded to the referral physician.

## 2023-02-09 NOTE — PLAN OF CARE
Problem: Adult Inpatient Plan of Care  Goal: Plan of Care Review  Outcome: Ongoing (interventions implemented as appropriate)  Stress test results pending, tolerating activity. Monitoring labs. VS WNL, started on Lopressor this shift.          02-08-23 @ 07:01  -  02-09-23 @ 07:00  --------------------------------------------------------  OUT:    Nasogastric/Oral tube (mL): 200 mL  Total OUT: 200 mL    Total NET: -200 mL

## (undated) DEVICE — SEE MEDLINE ITEM 157027

## (undated) DEVICE — SEE MEDLINE ITEM 157216

## (undated) DEVICE — SPONGE DERMACEA GAUZE 4X4

## (undated) DEVICE — DRESSING TRANS 4X4 TEGADERM

## (undated) DEVICE — SEE MEDLINE ITEM 154981

## (undated) DEVICE — SEE MEDLINE ITEM 152622

## (undated) DEVICE — SYR 10CC LUER LOCK

## (undated) DEVICE — APPLICATOR CHLORAPREP ORN 26ML

## (undated) DEVICE — BANDAGE KERLIX AMD

## (undated) DEVICE — ELECTRODE REM PLYHSV RETURN 9

## (undated) DEVICE — SHEET THYROID W/ISO-BAC

## (undated) DEVICE — CLOSURE SKIN STERI STRIP 1/2X4

## (undated) DEVICE — CONTAINER SPECIMEN STRL 4OZ

## (undated) DEVICE — GLOVE PROTEXIS HYDROGEL SZ7.5

## (undated) DEVICE — COVER OVERHEAD SURG LT BLUE

## (undated) DEVICE — DRAPE MOBILE C-ARM

## (undated) DEVICE — SUPPORT ULNA NERVE PROTECTOR

## (undated) DEVICE — ADHESIVE MASTISOL VIAL 48/BX

## (undated) DEVICE — Device

## (undated) DEVICE — SEE MEDLINE ITEM 157117

## (undated) DEVICE — APPLIER CLIP LIAGCLIP 9.375IN

## (undated) DEVICE — MANIFOLD 4 PORT

## (undated) DEVICE — SPONGE DERMACEA 4X4IN 12PLY

## (undated) DEVICE — DRAIN FLAT JP 10X4MM P

## (undated) DEVICE — EVACUATOR WOUND BULB 100CC

## (undated) DEVICE — GLOVE PROTEXIS HYDROGEL SZ7

## (undated) DEVICE — GLOVE 7.0 PROTEXIS PI BLUE

## (undated) DEVICE — SUT MONOCRYL 4.0 PS2 CP496G

## (undated) DEVICE — PAD ABD 8X10 STERILE

## (undated) DEVICE — SEE MEDLINE ITEM 157137

## (undated) DEVICE — SUT CTD VICRYL 2-0 UND BR

## (undated) DEVICE — NDL SAFETY 22G X 1.5 ECLIPSE

## (undated) DEVICE — POSITIONER HEAD DONUT 9IN FOAM

## (undated) DEVICE — SOL 9P NACL IRR PIC IL

## (undated) DEVICE — GAUZE SPONGE 4X4 12PLY

## (undated) DEVICE — NDL SAFETY 25G X 1.5 ECLIPSE

## (undated) DEVICE — SOL WATER STRL IRR 1000ML

## (undated) DEVICE — COVER PROBE NL STRL 3.6X96IN

## (undated) DEVICE — SUT MONOCYRL 4-0 PS2 UND

## (undated) DEVICE — SEE MEDLINE ITEM 146420

## (undated) DEVICE — CLOSURE SKIN STERI STRIP 1/4X3

## (undated) DEVICE — SUT VICRYL 3-0 27 SH

## (undated) DEVICE — SEE MEDLINE ITEM 152739

## (undated) DEVICE — SUTURE SILK 2-0 PSL 486H